# Patient Record
Sex: MALE | Race: BLACK OR AFRICAN AMERICAN | NOT HISPANIC OR LATINO | URBAN - METROPOLITAN AREA
[De-identification: names, ages, dates, MRNs, and addresses within clinical notes are randomized per-mention and may not be internally consistent; named-entity substitution may affect disease eponyms.]

---

## 2019-12-26 ENCOUNTER — INPATIENT (INPATIENT)
Facility: HOSPITAL | Age: 29
LOS: 7 days | Discharge: HOME | End: 2020-01-03
Attending: INTERNAL MEDICINE | Admitting: INTERNAL MEDICINE
Payer: SELF-PAY

## 2019-12-26 VITALS
DIASTOLIC BLOOD PRESSURE: 78 MMHG | SYSTOLIC BLOOD PRESSURE: 116 MMHG | TEMPERATURE: 98 F | RESPIRATION RATE: 18 BRPM | HEART RATE: 122 BPM | OXYGEN SATURATION: 100 %

## 2019-12-26 DIAGNOSIS — E11.10 TYPE 2 DIABETES MELLITUS WITH KETOACIDOSIS WITHOUT COMA: ICD-10-CM

## 2019-12-26 LAB
ALBUMIN SERPL ELPH-MCNC: 3.8 G/DL — SIGNIFICANT CHANGE UP (ref 3.3–5)
ALBUMIN SERPL ELPH-MCNC: 4.9 G/DL — SIGNIFICANT CHANGE UP (ref 3.3–5)
ALP SERPL-CCNC: 113 U/L — SIGNIFICANT CHANGE UP (ref 40–120)
ALP SERPL-CCNC: 159 U/L — HIGH (ref 40–120)
ALT FLD-CCNC: 105 U/L — HIGH (ref 4–41)
ALT FLD-CCNC: 152 U/L — HIGH (ref 4–41)
ANION GAP SERPL CALC-SCNC: 18 MMO/L — HIGH (ref 7–14)
ANION GAP SERPL CALC-SCNC: 20 MMO/L — HIGH (ref 7–14)
ANION GAP SERPL CALC-SCNC: 25 MMO/L — HIGH (ref 7–14)
APPEARANCE UR: CLEAR — SIGNIFICANT CHANGE UP
AST SERPL-CCNC: 127 U/L — HIGH (ref 4–40)
AST SERPL-CCNC: 73 U/L — HIGH (ref 4–40)
B-OH-BUTYR SERPL-SCNC: 5.2 MMOL/L — HIGH (ref 0–0.4)
BACTERIA # UR AUTO: NEGATIVE — SIGNIFICANT CHANGE UP
BASE EXCESS BLDV CALC-SCNC: -2.6 MMOL/L — SIGNIFICANT CHANGE UP
BASE EXCESS BLDV CALC-SCNC: -4.9 MMOL/L — SIGNIFICANT CHANGE UP
BASE EXCESS BLDV CALC-SCNC: 1 MMOL/L — SIGNIFICANT CHANGE UP
BASOPHILS # BLD AUTO: 0.06 K/UL — SIGNIFICANT CHANGE UP (ref 0–0.2)
BASOPHILS NFR BLD AUTO: 0.2 % — SIGNIFICANT CHANGE UP (ref 0–2)
BILIRUB SERPL-MCNC: 0.7 MG/DL — SIGNIFICANT CHANGE UP (ref 0.2–1.2)
BILIRUB SERPL-MCNC: 0.8 MG/DL — SIGNIFICANT CHANGE UP (ref 0.2–1.2)
BILIRUB UR-MCNC: NEGATIVE — SIGNIFICANT CHANGE UP
BLOOD GAS VENOUS - CREATININE: 0.86 MG/DL — SIGNIFICANT CHANGE UP (ref 0.5–1.3)
BLOOD GAS VENOUS - CREATININE: 0.97 MG/DL — SIGNIFICANT CHANGE UP (ref 0.5–1.3)
BLOOD GAS VENOUS - CREATININE: 1.19 MG/DL — SIGNIFICANT CHANGE UP (ref 0.5–1.3)
BLOOD GAS VENOUS - FIO2: 21 — SIGNIFICANT CHANGE UP
BLOOD UR QL VISUAL: NEGATIVE — SIGNIFICANT CHANGE UP
BUN SERPL-MCNC: 20 MG/DL — SIGNIFICANT CHANGE UP (ref 7–23)
BUN SERPL-MCNC: 23 MG/DL — SIGNIFICANT CHANGE UP (ref 7–23)
BUN SERPL-MCNC: 27 MG/DL — HIGH (ref 7–23)
CALCIUM SERPL-MCNC: 10.3 MG/DL — SIGNIFICANT CHANGE UP (ref 8.4–10.5)
CALCIUM SERPL-MCNC: 10.9 MG/DL — HIGH (ref 8.4–10.5)
CALCIUM SERPL-MCNC: 9.8 MG/DL — SIGNIFICANT CHANGE UP (ref 8.4–10.5)
CHLORIDE BLDV-SCNC: 102 MMOL/L — SIGNIFICANT CHANGE UP (ref 96–108)
CHLORIDE BLDV-SCNC: 107 MMOL/L — SIGNIFICANT CHANGE UP (ref 96–108)
CHLORIDE BLDV-SCNC: 99 MMOL/L — SIGNIFICANT CHANGE UP (ref 96–108)
CHLORIDE SERPL-SCNC: 103 MMOL/L — SIGNIFICANT CHANGE UP (ref 98–107)
CHLORIDE SERPL-SCNC: 90 MMOL/L — LOW (ref 98–107)
CHLORIDE SERPL-SCNC: 99 MMOL/L — SIGNIFICANT CHANGE UP (ref 98–107)
CO2 SERPL-SCNC: 18 MMOL/L — LOW (ref 22–31)
CO2 SERPL-SCNC: 20 MMOL/L — LOW (ref 22–31)
CO2 SERPL-SCNC: 20 MMOL/L — LOW (ref 22–31)
COLOR SPEC: SIGNIFICANT CHANGE UP
CREAT SERPL-MCNC: 0.86 MG/DL — SIGNIFICANT CHANGE UP (ref 0.5–1.3)
CREAT SERPL-MCNC: 0.99 MG/DL — SIGNIFICANT CHANGE UP (ref 0.5–1.3)
CREAT SERPL-MCNC: 1.28 MG/DL — SIGNIFICANT CHANGE UP (ref 0.5–1.3)
EOSINOPHIL # BLD AUTO: 0 K/UL — SIGNIFICANT CHANGE UP (ref 0–0.5)
EOSINOPHIL NFR BLD AUTO: 0 % — SIGNIFICANT CHANGE UP (ref 0–6)
GAS PNL BLDV: 136 MMOL/L — SIGNIFICANT CHANGE UP (ref 136–146)
GAS PNL BLDV: 138 MMOL/L — SIGNIFICANT CHANGE UP (ref 136–146)
GAS PNL BLDV: 139 MMOL/L — SIGNIFICANT CHANGE UP (ref 136–146)
GLUCOSE BLDV-MCNC: 203 MG/DL — HIGH (ref 70–99)
GLUCOSE BLDV-MCNC: 341 MG/DL — HIGH (ref 70–99)
GLUCOSE BLDV-MCNC: 586 MG/DL — CRITICAL HIGH (ref 70–99)
GLUCOSE SERPL-MCNC: 208 MG/DL — HIGH (ref 70–99)
GLUCOSE SERPL-MCNC: 370 MG/DL — HIGH (ref 70–99)
GLUCOSE SERPL-MCNC: 630 MG/DL — CRITICAL HIGH (ref 70–99)
GLUCOSE UR-MCNC: >1000 — HIGH
HCO3 BLDV-SCNC: 18 MMOL/L — LOW (ref 20–27)
HCO3 BLDV-SCNC: 21 MMOL/L — SIGNIFICANT CHANGE UP (ref 20–27)
HCO3 BLDV-SCNC: 25 MMOL/L — SIGNIFICANT CHANGE UP (ref 20–27)
HCT VFR BLD CALC: 51.2 % — HIGH (ref 39–50)
HCT VFR BLDV CALC: 45.5 % — SIGNIFICANT CHANGE UP (ref 39–51)
HCT VFR BLDV CALC: 49.2 % — SIGNIFICANT CHANGE UP (ref 39–51)
HCT VFR BLDV CALC: 52.8 % — HIGH (ref 39–51)
HGB BLD-MCNC: 16.9 G/DL — SIGNIFICANT CHANGE UP (ref 13–17)
HGB BLDV-MCNC: 14.8 G/DL — SIGNIFICANT CHANGE UP (ref 13–17)
HGB BLDV-MCNC: 16.1 G/DL — SIGNIFICANT CHANGE UP (ref 13–17)
HGB BLDV-MCNC: 17.3 G/DL — HIGH (ref 13–17)
HYALINE CASTS # UR AUTO: NEGATIVE — SIGNIFICANT CHANGE UP
IMM GRANULOCYTES NFR BLD AUTO: 0.9 % — SIGNIFICANT CHANGE UP (ref 0–1.5)
KETONES UR-MCNC: HIGH
LACTATE BLDV-MCNC: 2.5 MMOL/L — HIGH (ref 0.5–2)
LACTATE BLDV-MCNC: 3.8 MMOL/L — HIGH (ref 0.5–2)
LACTATE BLDV-MCNC: 6.6 MMOL/L — CRITICAL HIGH (ref 0.5–2)
LEUKOCYTE ESTERASE UR-ACNC: NEGATIVE — SIGNIFICANT CHANGE UP
LYMPHOCYTES # BLD AUTO: 1.61 K/UL — SIGNIFICANT CHANGE UP (ref 1–3.3)
LYMPHOCYTES # BLD AUTO: 6.6 % — LOW (ref 13–44)
MAGNESIUM SERPL-MCNC: 2.7 MG/DL — HIGH (ref 1.6–2.6)
MCHC RBC-ENTMCNC: 30.3 PG — SIGNIFICANT CHANGE UP (ref 27–34)
MCHC RBC-ENTMCNC: 33 % — SIGNIFICANT CHANGE UP (ref 32–36)
MCV RBC AUTO: 91.8 FL — SIGNIFICANT CHANGE UP (ref 80–100)
MONOCYTES # BLD AUTO: 1.62 K/UL — HIGH (ref 0–0.9)
MONOCYTES NFR BLD AUTO: 6.6 % — SIGNIFICANT CHANGE UP (ref 2–14)
NEUTROPHILS # BLD AUTO: 21.03 K/UL — HIGH (ref 1.8–7.4)
NEUTROPHILS NFR BLD AUTO: 85.7 % — HIGH (ref 43–77)
NITRITE UR-MCNC: NEGATIVE — SIGNIFICANT CHANGE UP
NRBC # FLD: 0 K/UL — SIGNIFICANT CHANGE UP (ref 0–0)
PCO2 BLDV: 40 MMHG — LOW (ref 41–51)
PCO2 BLDV: 47 MMHG — SIGNIFICANT CHANGE UP (ref 41–51)
PCO2 BLDV: 48 MMHG — SIGNIFICANT CHANGE UP (ref 41–51)
PH BLDV: 7.26 PH — LOW (ref 7.32–7.43)
PH BLDV: 7.31 PH — LOW (ref 7.32–7.43)
PH BLDV: 7.42 PH — SIGNIFICANT CHANGE UP (ref 7.32–7.43)
PH UR: 5.5 — SIGNIFICANT CHANGE UP (ref 5–8)
PHOSPHATE SERPL-MCNC: 3.5 MG/DL — SIGNIFICANT CHANGE UP (ref 2.5–4.5)
PLATELET # BLD AUTO: 368 K/UL — SIGNIFICANT CHANGE UP (ref 150–400)
PMV BLD: 10.7 FL — SIGNIFICANT CHANGE UP (ref 7–13)
PO2 BLDV: 29 MMHG — LOW (ref 35–40)
PO2 BLDV: 31 MMHG — LOW (ref 35–40)
PO2 BLDV: 41 MMHG — HIGH (ref 35–40)
POTASSIUM BLDV-SCNC: 4 MMOL/L — SIGNIFICANT CHANGE UP (ref 3.4–4.5)
POTASSIUM BLDV-SCNC: 4.7 MMOL/L — HIGH (ref 3.4–4.5)
POTASSIUM BLDV-SCNC: 6.1 MMOL/L — HIGH (ref 3.4–4.5)
POTASSIUM SERPL-MCNC: 4.2 MMOL/L — SIGNIFICANT CHANGE UP (ref 3.5–5.3)
POTASSIUM SERPL-MCNC: 4.9 MMOL/L — SIGNIFICANT CHANGE UP (ref 3.5–5.3)
POTASSIUM SERPL-MCNC: 5.6 MMOL/L — HIGH (ref 3.5–5.3)
POTASSIUM SERPL-SCNC: 4.2 MMOL/L — SIGNIFICANT CHANGE UP (ref 3.5–5.3)
POTASSIUM SERPL-SCNC: 4.9 MMOL/L — SIGNIFICANT CHANGE UP (ref 3.5–5.3)
POTASSIUM SERPL-SCNC: 5.6 MMOL/L — HIGH (ref 3.5–5.3)
PROT SERPL-MCNC: 7.3 G/DL — SIGNIFICANT CHANGE UP (ref 6–8.3)
PROT SERPL-MCNC: 9.8 G/DL — HIGH (ref 6–8.3)
PROT UR-MCNC: 20 — SIGNIFICANT CHANGE UP
RBC # BLD: 5.58 M/UL — SIGNIFICANT CHANGE UP (ref 4.2–5.8)
RBC # FLD: 12.2 % — SIGNIFICANT CHANGE UP (ref 10.3–14.5)
RBC CASTS # UR COMP ASSIST: SIGNIFICANT CHANGE UP (ref 0–?)
SAO2 % BLDV: 48.6 % — LOW (ref 60–85)
SAO2 % BLDV: 54.4 % — LOW (ref 60–85)
SAO2 % BLDV: 78.4 % — SIGNIFICANT CHANGE UP (ref 60–85)
SODIUM SERPL-SCNC: 133 MMOL/L — LOW (ref 135–145)
SODIUM SERPL-SCNC: 139 MMOL/L — SIGNIFICANT CHANGE UP (ref 135–145)
SODIUM SERPL-SCNC: 141 MMOL/L — SIGNIFICANT CHANGE UP (ref 135–145)
SP GR SPEC: 1.03 — SIGNIFICANT CHANGE UP (ref 1–1.04)
SQUAMOUS # UR AUTO: SIGNIFICANT CHANGE UP
UROBILINOGEN FLD QL: NORMAL — SIGNIFICANT CHANGE UP
WBC # BLD: 24.55 K/UL — HIGH (ref 3.8–10.5)
WBC # FLD AUTO: 24.55 K/UL — HIGH (ref 3.8–10.5)
WBC UR QL: SIGNIFICANT CHANGE UP (ref 0–?)

## 2019-12-26 PROCEDURE — 71045 X-RAY EXAM CHEST 1 VIEW: CPT | Mod: 26

## 2019-12-26 RX ORDER — SODIUM CHLORIDE 9 MG/ML
1000 INJECTION, SOLUTION INTRAVENOUS
Refills: 0 | Status: DISCONTINUED | OUTPATIENT
Start: 2019-12-26 | End: 2019-12-26

## 2019-12-26 RX ORDER — METOCLOPRAMIDE HCL 10 MG
10 TABLET ORAL ONCE
Refills: 0 | Status: COMPLETED | OUTPATIENT
Start: 2019-12-26 | End: 2019-12-26

## 2019-12-26 RX ORDER — SODIUM CHLORIDE 9 MG/ML
2000 INJECTION, SOLUTION INTRAVENOUS ONCE
Refills: 0 | Status: COMPLETED | OUTPATIENT
Start: 2019-12-26 | End: 2019-12-26

## 2019-12-26 RX ORDER — ONDANSETRON 8 MG/1
4 TABLET, FILM COATED ORAL ONCE
Refills: 0 | Status: COMPLETED | OUTPATIENT
Start: 2019-12-26 | End: 2019-12-26

## 2019-12-26 RX ORDER — INSULIN LISPRO 100/ML
10 VIAL (ML) SUBCUTANEOUS ONCE
Refills: 0 | Status: COMPLETED | OUTPATIENT
Start: 2019-12-26 | End: 2019-12-26

## 2019-12-26 RX ADMIN — SODIUM CHLORIDE 2000 MILLILITER(S): 9 INJECTION, SOLUTION INTRAVENOUS at 16:01

## 2019-12-26 RX ADMIN — SODIUM CHLORIDE 2000 MILLILITER(S): 9 INJECTION, SOLUTION INTRAVENOUS at 21:27

## 2019-12-26 RX ADMIN — Medication 10 MILLIGRAM(S): at 19:00

## 2019-12-26 RX ADMIN — ONDANSETRON 4 MILLIGRAM(S): 8 TABLET, FILM COATED ORAL at 16:24

## 2019-12-26 RX ADMIN — Medication 10 UNIT(S): at 19:00

## 2019-12-26 RX ADMIN — Medication 10 UNIT(S): at 16:57

## 2019-12-26 RX ADMIN — SODIUM CHLORIDE 25 MILLILITER(S): 9 INJECTION, SOLUTION INTRAVENOUS at 21:27

## 2019-12-26 RX ADMIN — SODIUM CHLORIDE 2000 MILLILITER(S): 9 INJECTION, SOLUTION INTRAVENOUS at 18:04

## 2019-12-26 NOTE — ED PROVIDER NOTE - CLINICAL SUMMARY MEDICAL DECISION MAKING FREE TEXT BOX
30yo male p/w DKA from likely non-compliance. Will get labs, urine and give fluids/insulin and reassess then admit.

## 2019-12-26 NOTE — ED PROVIDER NOTE - CRITICAL CARE PROVIDED
direct patient care (not related to procedure)/documentation/consultation with other physicians/interpretation of diagnostic studies/additional history taking/consult w/ pt's family directly relating to pts condition

## 2019-12-26 NOTE — ED PROVIDER NOTE - OBJECTIVE STATEMENT
30yo male T1DM p/w 2 days N/V. Multiple episodes of NBNB vomiting over the past 2 days. started to have generalized abdominal pain today and found to have hypergylcemia >500. Per mother, this is how patient looks when in DKA. Took insulin this morning. Denies diarrhea, fever, altered mental status.

## 2019-12-26 NOTE — ED PROVIDER NOTE - ATTENDING CONTRIBUTION TO CARE
29M h/o DM1 on insulin p/w n/v and abd pain x2 days. Pt reports not taking his insulin in recent days due to not feeling well. Does not have endocrinologist, reports "buying insulin from hospital." Mother states he appears similar to previus episodes of DKA. No fever/chills, cp, cough, sob, dysuria/hematuria.  Gen: uncomfrtable appearing, nad  CV: tachy, regular  Pulm: clear lungs  Abd: soft, nt, nd  Ext: no edema  MDM: 29M h/o DM1 on insulin p/w n/v and abd pain x2 days concerning for DKA - likely from med compliance though will do infectious w/u with UA/UC and CXR, check basic labs and blood gas with lactate and beta-hydroxy - will give aggressive IV hydration while awaiting labs.

## 2019-12-26 NOTE — ED PROVIDER NOTE - PHYSICAL EXAMINATION
Physical Exam:  Gen: looks ill, AOx3  Head: NCAT  HEENT: EOMI, PEERLA, normal conjunctiva, tongue midline, oral mucosa moist  Lung: CTAB, no respiratory distress, no wheezes/rhonchi/rales B/L, speaking in full sentences  CV: RRR, no murmurs, rubs or gallops, distal pulses 2+ b/l  Abd: soft, NT, ND  MSK: no visible deformities, ROM normal in UE/LE, no back TTP  Neuro: No focal sensory or motor deficits  Skin: Warm, well perfused, no rash, no leg swelling  Psych: normal affect, calm

## 2019-12-26 NOTE — ED PROVIDER NOTE - PROGRESS NOTE DETAILS
Andrea PGY1: elevated WBC, will get CXR for r/o PNA Jrg: Patient endorsed to me from Dr. Sosa.  Patient is 28yo male T1DM p/w 2 days N/V who follows up in [place].  Noted to have mild to moderate DKA on labs with pH 7.26 and anion gap 25 and bicarb 18.  Will give 10 units humalog, pending completion of iv fluids.  Pending repeat BMP.  Reassess. pt was signed out to me pending repeat lab work, type 1 diabetic with hyperglycemia now downtrending to 205, pH normalized, lactate downtrending from 6 to 2, on IVF with dextrose as he has not taken PO yet, infiltrate on xray may represent pna, ct pending for better clarification, stable for admpadmini Giles, PGY-3 EM

## 2019-12-26 NOTE — ED ADULT NURSE REASSESSMENT NOTE - NS ED NURSE REASSESS COMMENT FT1
report received from DEVYN Allen, pt A&Ox3, appears weak. repeat FS done, 205. MD Napoles aware. RN to administer fluids as ordered. family at bedside.

## 2019-12-27 DIAGNOSIS — Z29.9 ENCOUNTER FOR PROPHYLACTIC MEASURES, UNSPECIFIED: ICD-10-CM

## 2019-12-27 DIAGNOSIS — D72.829 ELEVATED WHITE BLOOD CELL COUNT, UNSPECIFIED: ICD-10-CM

## 2019-12-27 DIAGNOSIS — R93.89 ABNORMAL FINDINGS ON DIAGNOSTIC IMAGING OF OTHER SPECIFIED BODY STRUCTURES: ICD-10-CM

## 2019-12-27 DIAGNOSIS — E10.10 TYPE 1 DIABETES MELLITUS WITH KETOACIDOSIS WITHOUT COMA: ICD-10-CM

## 2019-12-27 DIAGNOSIS — Z02.9 ENCOUNTER FOR ADMINISTRATIVE EXAMINATIONS, UNSPECIFIED: ICD-10-CM

## 2019-12-27 DIAGNOSIS — R10.84 GENERALIZED ABDOMINAL PAIN: ICD-10-CM

## 2019-12-27 LAB
ANION GAP SERPL CALC-SCNC: 19 MMO/L — HIGH (ref 7–14)
ANION GAP SERPL CALC-SCNC: 20 MMO/L — HIGH (ref 7–14)
ANION GAP SERPL CALC-SCNC: 20 MMO/L — HIGH (ref 7–14)
ANION GAP SERPL CALC-SCNC: 22 MMO/L — HIGH (ref 7–14)
ANION GAP SERPL CALC-SCNC: 25 MMO/L — HIGH (ref 7–14)
ANION GAP SERPL CALC-SCNC: 26 MMO/L — HIGH (ref 7–14)
B-OH-BUTYR SERPL-SCNC: 6.2 MMOL/L — HIGH (ref 0–0.4)
B-OH-BUTYR SERPL-SCNC: 6.7 MMOL/L — HIGH (ref 0–0.4)
B-OH-BUTYR SERPL-SCNC: 7.5 MMOL/L — HIGH (ref 0–0.4)
B-OH-BUTYR SERPL-SCNC: 7.5 MMOL/L — HIGH (ref 0–0.4)
BASE EXCESS BLDV CALC-SCNC: -4.4 MMOL/L — SIGNIFICANT CHANGE UP
BASE EXCESS BLDV CALC-SCNC: -4.6 MMOL/L — SIGNIFICANT CHANGE UP
BASE EXCESS BLDV CALC-SCNC: -6 MMOL/L — SIGNIFICANT CHANGE UP
BASE EXCESS BLDV CALC-SCNC: -7.1 MMOL/L — SIGNIFICANT CHANGE UP
BLOOD GAS VENOUS - CREATININE: 0.87 MG/DL — SIGNIFICANT CHANGE UP (ref 0.5–1.3)
BUN SERPL-MCNC: 15 MG/DL — SIGNIFICANT CHANGE UP (ref 7–23)
BUN SERPL-MCNC: 16 MG/DL — SIGNIFICANT CHANGE UP (ref 7–23)
BUN SERPL-MCNC: 17 MG/DL — SIGNIFICANT CHANGE UP (ref 7–23)
BUN SERPL-MCNC: 18 MG/DL — SIGNIFICANT CHANGE UP (ref 7–23)
BUN SERPL-MCNC: 18 MG/DL — SIGNIFICANT CHANGE UP (ref 7–23)
BUN SERPL-MCNC: 20 MG/DL — SIGNIFICANT CHANGE UP (ref 7–23)
CALCIUM SERPL-MCNC: 9.1 MG/DL — SIGNIFICANT CHANGE UP (ref 8.4–10.5)
CALCIUM SERPL-MCNC: 9.2 MG/DL — SIGNIFICANT CHANGE UP (ref 8.4–10.5)
CALCIUM SERPL-MCNC: 9.3 MG/DL — SIGNIFICANT CHANGE UP (ref 8.4–10.5)
CALCIUM SERPL-MCNC: 9.3 MG/DL — SIGNIFICANT CHANGE UP (ref 8.4–10.5)
CALCIUM SERPL-MCNC: 9.4 MG/DL — SIGNIFICANT CHANGE UP (ref 8.4–10.5)
CALCIUM SERPL-MCNC: 9.6 MG/DL — SIGNIFICANT CHANGE UP (ref 8.4–10.5)
CHLORIDE BLDV-SCNC: 104 MMOL/L — SIGNIFICANT CHANGE UP (ref 96–108)
CHLORIDE SERPL-SCNC: 100 MMOL/L — SIGNIFICANT CHANGE UP (ref 98–107)
CHLORIDE SERPL-SCNC: 101 MMOL/L — SIGNIFICANT CHANGE UP (ref 98–107)
CHLORIDE SERPL-SCNC: 103 MMOL/L — SIGNIFICANT CHANGE UP (ref 98–107)
CHLORIDE SERPL-SCNC: 103 MMOL/L — SIGNIFICANT CHANGE UP (ref 98–107)
CHLORIDE SERPL-SCNC: 104 MMOL/L — SIGNIFICANT CHANGE UP (ref 98–107)
CHLORIDE SERPL-SCNC: 98 MMOL/L — SIGNIFICANT CHANGE UP (ref 98–107)
CHOLEST SERPL-MCNC: 142 MG/DL — SIGNIFICANT CHANGE UP (ref 120–199)
CO2 SERPL-SCNC: 12 MMOL/L — LOW (ref 22–31)
CO2 SERPL-SCNC: 14 MMOL/L — LOW (ref 22–31)
CO2 SERPL-SCNC: 17 MMOL/L — LOW (ref 22–31)
CO2 SERPL-SCNC: 18 MMOL/L — LOW (ref 22–31)
CREAT SERPL-MCNC: 0.86 MG/DL — SIGNIFICANT CHANGE UP (ref 0.5–1.3)
CREAT SERPL-MCNC: 0.88 MG/DL — SIGNIFICANT CHANGE UP (ref 0.5–1.3)
CREAT SERPL-MCNC: 0.88 MG/DL — SIGNIFICANT CHANGE UP (ref 0.5–1.3)
CREAT SERPL-MCNC: 0.89 MG/DL — SIGNIFICANT CHANGE UP (ref 0.5–1.3)
CREAT SERPL-MCNC: 0.91 MG/DL — SIGNIFICANT CHANGE UP (ref 0.5–1.3)
CREAT SERPL-MCNC: 0.98 MG/DL — SIGNIFICANT CHANGE UP (ref 0.5–1.3)
GAS PNL BLDV: 140 MMOL/L — SIGNIFICANT CHANGE UP (ref 136–146)
GAS PNL BLDV: 141 MMOL/L — SIGNIFICANT CHANGE UP (ref 136–146)
GAS PNL BLDV: 143 MMOL/L — SIGNIFICANT CHANGE UP (ref 136–146)
GAS PNL BLDV: 143 MMOL/L — SIGNIFICANT CHANGE UP (ref 136–146)
GLUCOSE BLDV-MCNC: 233 MG/DL — HIGH (ref 70–99)
GLUCOSE BLDV-MCNC: 241 MG/DL — HIGH (ref 70–99)
GLUCOSE BLDV-MCNC: 257 MG/DL — HIGH (ref 70–99)
GLUCOSE BLDV-MCNC: 289 MG/DL — HIGH (ref 70–99)
GLUCOSE SERPL-MCNC: 225 MG/DL — HIGH (ref 70–99)
GLUCOSE SERPL-MCNC: 260 MG/DL — HIGH (ref 70–99)
GLUCOSE SERPL-MCNC: 261 MG/DL — HIGH (ref 70–99)
GLUCOSE SERPL-MCNC: 289 MG/DL — HIGH (ref 70–99)
GLUCOSE SERPL-MCNC: 292 MG/DL — HIGH (ref 70–99)
GLUCOSE SERPL-MCNC: 438 MG/DL — HIGH (ref 70–99)
HCO3 BLDV-SCNC: 19 MMOL/L — LOW (ref 20–27)
HCO3 BLDV-SCNC: 19 MMOL/L — LOW (ref 20–27)
HCO3 BLDV-SCNC: 20 MMOL/L — SIGNIFICANT CHANGE UP (ref 20–27)
HCO3 BLDV-SCNC: 21 MMOL/L — SIGNIFICANT CHANGE UP (ref 20–27)
HCT VFR BLD CALC: 42.2 % — SIGNIFICANT CHANGE UP (ref 39–50)
HCT VFR BLDV CALC: 41.2 % — SIGNIFICANT CHANGE UP (ref 39–51)
HCT VFR BLDV CALC: 41.3 % — SIGNIFICANT CHANGE UP (ref 39–51)
HCT VFR BLDV CALC: 41.7 % — SIGNIFICANT CHANGE UP (ref 39–51)
HCT VFR BLDV CALC: 43.1 % — SIGNIFICANT CHANGE UP (ref 39–51)
HDLC SERPL-MCNC: 55 MG/DL — SIGNIFICANT CHANGE UP (ref 35–55)
HGB BLD-MCNC: 13.7 G/DL — SIGNIFICANT CHANGE UP (ref 13–17)
HGB BLDV-MCNC: 13.4 G/DL — SIGNIFICANT CHANGE UP (ref 13–17)
HGB BLDV-MCNC: 13.4 G/DL — SIGNIFICANT CHANGE UP (ref 13–17)
HGB BLDV-MCNC: 13.6 G/DL — SIGNIFICANT CHANGE UP (ref 13–17)
HGB BLDV-MCNC: 14 G/DL — SIGNIFICANT CHANGE UP (ref 13–17)
LACTATE BLDV-MCNC: 1.5 MMOL/L — SIGNIFICANT CHANGE UP (ref 0.5–2)
LIPID PNL WITH DIRECT LDL SERPL: 74 MG/DL — SIGNIFICANT CHANGE UP
MAGNESIUM SERPL-MCNC: 1.9 MG/DL — SIGNIFICANT CHANGE UP (ref 1.6–2.6)
MAGNESIUM SERPL-MCNC: 2.1 MG/DL — SIGNIFICANT CHANGE UP (ref 1.6–2.6)
MCHC RBC-ENTMCNC: 30.2 PG — SIGNIFICANT CHANGE UP (ref 27–34)
MCHC RBC-ENTMCNC: 32.5 % — SIGNIFICANT CHANGE UP (ref 32–36)
MCV RBC AUTO: 93 FL — SIGNIFICANT CHANGE UP (ref 80–100)
NRBC # FLD: 0 K/UL — SIGNIFICANT CHANGE UP (ref 0–0)
PCO2 BLDV: 34 MMHG — LOW (ref 41–51)
PCO2 BLDV: 35 MMHG — LOW (ref 41–51)
PCO2 BLDV: 37 MMHG — LOW (ref 41–51)
PCO2 BLDV: 38 MMHG — LOW (ref 41–51)
PH BLDV: 7.33 PH — SIGNIFICANT CHANGE UP (ref 7.32–7.43)
PH BLDV: 7.34 PH — SIGNIFICANT CHANGE UP (ref 7.32–7.43)
PH BLDV: 7.35 PH — SIGNIFICANT CHANGE UP (ref 7.32–7.43)
PH BLDV: 7.37 PH — SIGNIFICANT CHANGE UP (ref 7.32–7.43)
PHOSPHATE SERPL-MCNC: 1.8 MG/DL — LOW (ref 2.5–4.5)
PHOSPHATE SERPL-MCNC: 2.2 MG/DL — LOW (ref 2.5–4.5)
PHOSPHATE SERPL-MCNC: 2.5 MG/DL — SIGNIFICANT CHANGE UP (ref 2.5–4.5)
PHOSPHATE SERPL-MCNC: 3.1 MG/DL — SIGNIFICANT CHANGE UP (ref 2.5–4.5)
PLATELET # BLD AUTO: 281 K/UL — SIGNIFICANT CHANGE UP (ref 150–400)
PMV BLD: 11.3 FL — SIGNIFICANT CHANGE UP (ref 7–13)
PO2 BLDV: 35 MMHG — SIGNIFICANT CHANGE UP (ref 35–40)
PO2 BLDV: 43 MMHG — HIGH (ref 35–40)
PO2 BLDV: 45 MMHG — HIGH (ref 35–40)
PO2 BLDV: 46 MMHG — HIGH (ref 35–40)
POTASSIUM BLDV-SCNC: 3.7 MMOL/L — SIGNIFICANT CHANGE UP (ref 3.4–4.5)
POTASSIUM BLDV-SCNC: 3.8 MMOL/L — SIGNIFICANT CHANGE UP (ref 3.4–4.5)
POTASSIUM BLDV-SCNC: 4.1 MMOL/L — SIGNIFICANT CHANGE UP (ref 3.4–4.5)
POTASSIUM BLDV-SCNC: 4.3 MMOL/L — SIGNIFICANT CHANGE UP (ref 3.4–4.5)
POTASSIUM SERPL-MCNC: 3.9 MMOL/L — SIGNIFICANT CHANGE UP (ref 3.5–5.3)
POTASSIUM SERPL-MCNC: 4 MMOL/L — SIGNIFICANT CHANGE UP (ref 3.5–5.3)
POTASSIUM SERPL-MCNC: 4.2 MMOL/L — SIGNIFICANT CHANGE UP (ref 3.5–5.3)
POTASSIUM SERPL-MCNC: 4.2 MMOL/L — SIGNIFICANT CHANGE UP (ref 3.5–5.3)
POTASSIUM SERPL-MCNC: 4.4 MMOL/L — SIGNIFICANT CHANGE UP (ref 3.5–5.3)
POTASSIUM SERPL-MCNC: 5.2 MMOL/L — SIGNIFICANT CHANGE UP (ref 3.5–5.3)
POTASSIUM SERPL-SCNC: 3.9 MMOL/L — SIGNIFICANT CHANGE UP (ref 3.5–5.3)
POTASSIUM SERPL-SCNC: 4 MMOL/L — SIGNIFICANT CHANGE UP (ref 3.5–5.3)
POTASSIUM SERPL-SCNC: 4.2 MMOL/L — SIGNIFICANT CHANGE UP (ref 3.5–5.3)
POTASSIUM SERPL-SCNC: 4.2 MMOL/L — SIGNIFICANT CHANGE UP (ref 3.5–5.3)
POTASSIUM SERPL-SCNC: 4.4 MMOL/L — SIGNIFICANT CHANGE UP (ref 3.5–5.3)
POTASSIUM SERPL-SCNC: 5.2 MMOL/L — SIGNIFICANT CHANGE UP (ref 3.5–5.3)
RBC # BLD: 4.54 M/UL — SIGNIFICANT CHANGE UP (ref 4.2–5.8)
RBC # FLD: 12.7 % — SIGNIFICANT CHANGE UP (ref 10.3–14.5)
SAO2 % BLDV: 67.5 % — SIGNIFICANT CHANGE UP (ref 60–85)
SAO2 % BLDV: 78.2 % — SIGNIFICANT CHANGE UP (ref 60–85)
SAO2 % BLDV: 78.2 % — SIGNIFICANT CHANGE UP (ref 60–85)
SAO2 % BLDV: 79.8 % — SIGNIFICANT CHANGE UP (ref 60–85)
SODIUM SERPL-SCNC: 136 MMOL/L — SIGNIFICANT CHANGE UP (ref 135–145)
SODIUM SERPL-SCNC: 137 MMOL/L — SIGNIFICANT CHANGE UP (ref 135–145)
SODIUM SERPL-SCNC: 140 MMOL/L — SIGNIFICANT CHANGE UP (ref 135–145)
SODIUM SERPL-SCNC: 140 MMOL/L — SIGNIFICANT CHANGE UP (ref 135–145)
SODIUM SERPL-SCNC: 141 MMOL/L — SIGNIFICANT CHANGE UP (ref 135–145)
SODIUM SERPL-SCNC: 142 MMOL/L — SIGNIFICANT CHANGE UP (ref 135–145)
TRIGL SERPL-MCNC: 86 MG/DL — SIGNIFICANT CHANGE UP (ref 10–149)
TSH SERPL-MCNC: 1.79 UIU/ML — SIGNIFICANT CHANGE UP (ref 0.27–4.2)
WBC # BLD: 18.93 K/UL — HIGH (ref 3.8–10.5)
WBC # FLD AUTO: 18.93 K/UL — HIGH (ref 3.8–10.5)

## 2019-12-27 PROCEDURE — 71260 CT THORAX DX C+: CPT | Mod: 26

## 2019-12-27 PROCEDURE — 12345: CPT | Mod: NC,GC

## 2019-12-27 PROCEDURE — 99291 CRITICAL CARE FIRST HOUR: CPT | Mod: 25

## 2019-12-27 PROCEDURE — 99223 1ST HOSP IP/OBS HIGH 75: CPT

## 2019-12-27 PROCEDURE — 99255 IP/OBS CONSLTJ NEW/EST HI 80: CPT | Mod: GC

## 2019-12-27 RX ORDER — INSULIN HUMAN 100 [IU]/ML
1 INJECTION, SOLUTION SUBCUTANEOUS
Qty: 100 | Refills: 0 | Status: DISCONTINUED | OUTPATIENT
Start: 2019-12-27 | End: 2019-12-29

## 2019-12-27 RX ORDER — DEXTROSE 50 % IN WATER 50 %
25 SYRINGE (ML) INTRAVENOUS ONCE
Refills: 0 | Status: DISCONTINUED | OUTPATIENT
Start: 2019-12-27 | End: 2020-01-03

## 2019-12-27 RX ORDER — METOCLOPRAMIDE HCL 10 MG
10 TABLET ORAL ONCE
Refills: 0 | Status: COMPLETED | OUTPATIENT
Start: 2019-12-27 | End: 2019-12-27

## 2019-12-27 RX ORDER — ACETAMINOPHEN 500 MG
1000 TABLET ORAL ONCE
Refills: 0 | Status: COMPLETED | OUTPATIENT
Start: 2019-12-27 | End: 2019-12-27

## 2019-12-27 RX ORDER — INSULIN LISPRO 100/ML
VIAL (ML) SUBCUTANEOUS EVERY 6 HOURS
Refills: 0 | Status: DISCONTINUED | OUTPATIENT
Start: 2019-12-27 | End: 2019-12-27

## 2019-12-27 RX ORDER — INSULIN LISPRO 100/ML
VIAL (ML) SUBCUTANEOUS EVERY 4 HOURS
Refills: 0 | Status: DISCONTINUED | OUTPATIENT
Start: 2019-12-27 | End: 2019-12-27

## 2019-12-27 RX ORDER — SODIUM CHLORIDE 9 MG/ML
1000 INJECTION, SOLUTION INTRAVENOUS
Refills: 0 | Status: DISCONTINUED | OUTPATIENT
Start: 2019-12-27 | End: 2019-12-28

## 2019-12-27 RX ORDER — DEXTROSE 50 % IN WATER 50 %
12.5 SYRINGE (ML) INTRAVENOUS ONCE
Refills: 0 | Status: DISCONTINUED | OUTPATIENT
Start: 2019-12-27 | End: 2020-01-03

## 2019-12-27 RX ORDER — CHLORHEXIDINE GLUCONATE 213 G/1000ML
1 SOLUTION TOPICAL
Refills: 0 | Status: DISCONTINUED | OUTPATIENT
Start: 2019-12-27 | End: 2019-12-27

## 2019-12-27 RX ORDER — SODIUM CHLORIDE 9 MG/ML
1000 INJECTION, SOLUTION INTRAVENOUS
Refills: 0 | Status: DISCONTINUED | OUTPATIENT
Start: 2019-12-27 | End: 2020-01-03

## 2019-12-27 RX ORDER — INSULIN GLARGINE 100 [IU]/ML
15 INJECTION, SOLUTION SUBCUTANEOUS AT BEDTIME
Refills: 0 | Status: DISCONTINUED | OUTPATIENT
Start: 2019-12-27 | End: 2019-12-27

## 2019-12-27 RX ORDER — ONDANSETRON 8 MG/1
4 TABLET, FILM COATED ORAL EVERY 8 HOURS
Refills: 0 | Status: DISCONTINUED | OUTPATIENT
Start: 2019-12-27 | End: 2020-01-03

## 2019-12-27 RX ORDER — INSULIN LISPRO 100/ML
VIAL (ML) SUBCUTANEOUS
Refills: 0 | Status: DISCONTINUED | OUTPATIENT
Start: 2019-12-27 | End: 2019-12-27

## 2019-12-27 RX ORDER — SODIUM CHLORIDE 9 MG/ML
1000 INJECTION, SOLUTION INTRAVENOUS
Refills: 0 | Status: DISCONTINUED | OUTPATIENT
Start: 2019-12-27 | End: 2019-12-27

## 2019-12-27 RX ORDER — FAMOTIDINE 10 MG/ML
20 INJECTION INTRAVENOUS ONCE
Refills: 0 | Status: COMPLETED | OUTPATIENT
Start: 2019-12-27 | End: 2019-12-27

## 2019-12-27 RX ORDER — DEXTROSE 50 % IN WATER 50 %
15 SYRINGE (ML) INTRAVENOUS ONCE
Refills: 0 | Status: DISCONTINUED | OUTPATIENT
Start: 2019-12-27 | End: 2020-01-03

## 2019-12-27 RX ORDER — INSULIN GLARGINE 100 [IU]/ML
20 INJECTION, SOLUTION SUBCUTANEOUS AT BEDTIME
Refills: 0 | Status: DISCONTINUED | OUTPATIENT
Start: 2019-12-27 | End: 2019-12-27

## 2019-12-27 RX ORDER — INFLUENZA VIRUS VACCINE 15; 15; 15; 15 UG/.5ML; UG/.5ML; UG/.5ML; UG/.5ML
0.5 SUSPENSION INTRAMUSCULAR ONCE
Refills: 0 | Status: DISCONTINUED | OUTPATIENT
Start: 2019-12-27 | End: 2020-01-03

## 2019-12-27 RX ORDER — POTASSIUM PHOSPHATE, MONOBASIC POTASSIUM PHOSPHATE, DIBASIC 236; 224 MG/ML; MG/ML
30 INJECTION, SOLUTION INTRAVENOUS ONCE
Refills: 0 | Status: COMPLETED | OUTPATIENT
Start: 2019-12-27 | End: 2019-12-27

## 2019-12-27 RX ORDER — INSULIN LISPRO 100/ML
5 VIAL (ML) SUBCUTANEOUS ONCE
Refills: 0 | Status: COMPLETED | OUTPATIENT
Start: 2019-12-27 | End: 2019-12-27

## 2019-12-27 RX ORDER — GLUCAGON INJECTION, SOLUTION 0.5 MG/.1ML
1 INJECTION, SOLUTION SUBCUTANEOUS ONCE
Refills: 0 | Status: DISCONTINUED | OUTPATIENT
Start: 2019-12-27 | End: 2020-01-03

## 2019-12-27 RX ADMIN — Medication 63.75 MILLIMOLE(S): at 13:51

## 2019-12-27 RX ADMIN — INSULIN GLARGINE 20 UNIT(S): 100 INJECTION, SOLUTION SUBCUTANEOUS at 21:01

## 2019-12-27 RX ADMIN — Medication 100 MILLIGRAM(S): at 02:53

## 2019-12-27 RX ADMIN — ONDANSETRON 4 MILLIGRAM(S): 8 TABLET, FILM COATED ORAL at 17:09

## 2019-12-27 RX ADMIN — SODIUM CHLORIDE 150 MILLILITER(S): 9 INJECTION, SOLUTION INTRAVENOUS at 00:35

## 2019-12-27 RX ADMIN — FAMOTIDINE 20 MILLIGRAM(S): 10 INJECTION INTRAVENOUS at 09:36

## 2019-12-27 RX ADMIN — Medication 2: at 09:38

## 2019-12-27 RX ADMIN — SODIUM CHLORIDE 100 MILLILITER(S): 9 INJECTION, SOLUTION INTRAVENOUS at 18:20

## 2019-12-27 RX ADMIN — Medication 400 MILLIGRAM(S): at 09:35

## 2019-12-27 RX ADMIN — ONDANSETRON 4 MILLIGRAM(S): 8 TABLET, FILM COATED ORAL at 09:35

## 2019-12-27 RX ADMIN — Medication 2: at 17:09

## 2019-12-27 RX ADMIN — SODIUM CHLORIDE 200 MILLILITER(S): 9 INJECTION, SOLUTION INTRAVENOUS at 09:39

## 2019-12-27 RX ADMIN — Medication 6: at 21:01

## 2019-12-27 RX ADMIN — Medication 4: at 13:51

## 2019-12-27 RX ADMIN — Medication 6: at 05:15

## 2019-12-27 RX ADMIN — Medication 1000 MILLIGRAM(S): at 09:55

## 2019-12-27 RX ADMIN — SODIUM CHLORIDE 2000 MILLILITER(S): 9 INJECTION, SOLUTION INTRAVENOUS at 01:00

## 2019-12-27 RX ADMIN — INSULIN GLARGINE 15 UNIT(S): 100 INJECTION, SOLUTION SUBCUTANEOUS at 02:53

## 2019-12-27 RX ADMIN — Medication 5 UNIT(S): at 06:05

## 2019-12-27 RX ADMIN — Medication 10 MILLIGRAM(S): at 00:35

## 2019-12-27 NOTE — H&P ADULT - HISTORY OF PRESENT ILLNESS
This is a 28 y/o male with hx of type 1DM who presents to the ED with 2 days of nausea/vomiting (NBNB) and abdominal pain which started all of a sudden. No clear precipitating factor and pt cannot describe abdominal pain further, just diffuse no radiation- pt denies fevers chills, shortness of breath, cough, diarrhea, dysuria. Per mother his sugars have been very elevated recently, however did not clarify how high. Pt reports being on 18 units of "long acting" and 10 units of "fast acting" at meals. Reports good compliance however did not take one dose two nights ago and instead took it this AM. Reports no alcohol use or other ingestion. Also reporting hiccups. No known gastroparesis. Does not seen an endocrinologist currently. no sick contacts or recent travel. In the ED pt was found hyperglycemic in DKA, given total humalog of 20 units and 4L of LR as well as reglan and zofran

## 2019-12-27 NOTE — CONSULT NOTE ADULT - SUBJECTIVE AND OBJECTIVE BOX
HPI:  This is a 30 y/o male with hx of type 1DM who presents to the ED with 2 days of nausea/vomiting (NBNB) and abdominal pain which started all of a sudden.     Endocrine History:   T1DM diagnosed in 2015 with unknown hgb a1c on Toujeo 18 units qAm and Novolog 10 units TIDAC. Follows with PCP, never had an endocrinologist in the past. Endorses compliance with insulin, last toujeo was on 12/25 in the AM. Checks FS 2-3 times a day.   ACbreakfast - 180-200  Bedtime - 190   30 mins post prandial lunch/dinner - low 200s.   No hypoglycemias for the past 3 months. Last DKA was 2 years ago. Diet is typically   Breakfast - eggs   Lunch - Turkey sandwich   Dinner - Mainly protein, such as chicken. Rare has carbs   Snack- chips and sports drink. No juice or soda.   No known microvasculaar complicaitons of diabetes. Last opthal 8 months ago.     PAST MEDICAL & SURGICAL HISTORY:  Diabetes: type 1  No significant past surgical history      FAMILY HISTORY:  FH: diabetes mellitus. Grandmother and uncle.       Social History: Works as car [employer]. No history of tobacco, etoh or illicit drug use.      Outpatient Medications: Home Medications:  Lantus Solostar Pen 100 units/mL subcutaneous solution: Inject 18 units SQ once daily at bedtime  (27 Dec 2019 11:31)  NovoLOG 100 units/mL subcutaneous solution: Inject 10 units SQ 3 times daily before meals  (27 Dec 2019 11:31)      MEDICATIONS  (STANDING):  dextrose 5%. 1000 milliLiter(s) (50 mL/Hr) IV Continuous <Continuous>  dextrose 50% Injectable 12.5 Gram(s) IV Push once  dextrose 50% Injectable 25 Gram(s) IV Push once  dextrose 50% Injectable 25 Gram(s) IV Push once  influenza   Vaccine 0.5 milliLiter(s) IntraMuscular once  insulin glargine Injectable (LANTUS) 20 Unit(s) SubCutaneous at bedtime  insulin lispro (HumaLOG) corrective regimen sliding scale   SubCutaneous every 4 hours  lactated ringers. 1000 milliLiter(s) (200 mL/Hr) IV Continuous <Continuous>    MEDICATIONS  (PRN):  dextrose 40% Gel 15 Gram(s) Oral once PRN Blood Glucose LESS THAN 70 milliGRAM(s)/deciliter  glucagon  Injectable 1 milliGRAM(s) IntraMuscular once PRN Glucose LESS THAN 70 milligrams/deciliter  ondansetron Injectable 4 milliGRAM(s) IV Push every 8 hours PRN Nausea and/or Vomiting      Allergies    No Known Allergies    Intolerances      Review of Systems:  Constitutional: No fever, chills   Neuro: No tremors, headache   Cardiovascular: No chest pain, palpitations  Respiratory: No SOB, no cough  GI: + nausea, vomiting, abdominal pain  : No dysuria, polyuria   Skin: no rash, ulcers   Psych: no depression, anxiety   Endocrine: no polyphagia, polydipsia     ALL OTHER SYSTEMS REVIEWED AND NEGATIVE        PHYSICAL EXAM:  VITALS: T(C): 36.8 (12-27-19 @ 10:38)  T(F): 98.2 (12-27-19 @ 10:38), Max: 100.2 (12-26-19 @ 23:38)  HR: 100 (12-27-19 @ 13:54) (98 - 117)  BP: 112/75 (12-27-19 @ 13:54) (108/72 - 130/81)  RR:  (16 - 18)  SpO2:  (100% - 100%)  Wt(kg): --  GENERAL: NAD, well-groomed, well-developed  EYES: No proptosis, anicteric  HEENT:  Atraumatic, Normocephalic, moist mucous membranes  RESPIRATORY: Clear to auscultation bilaterally; No rales, rhonchi, wheezing  CARDIOVASCULAR: Regular rate and rhythm; No murmurs; no peripheral edema  GI: Soft, nontender, non distended, normal bowel sounds  SKIN: Dry, intact, No rashes or lesions  NEURO: AOx3, moves all extremities spontaneously   PSYCH: Reactive affect, euthymic mood    POCT Blood Glucose.: 216 mg/dL (12-27-19 @ 13:49)  POCT Blood Glucose.: 190 mg/dL (12-27-19 @ 09:38)  POCT Blood Glucose.: 310 mg/dL (12-27-19 @ 06:42)  POCT Blood Glucose.: 415 mg/dL (12-27-19 @ 05:14)  POCT Blood Glucose.: 333 mg/dL (12-27-19 @ 02:39)  POCT Blood Glucose.: 205 mg/dL (12-26-19 @ 21:03)  POCT Blood Glucose.: 444 mg/dL (12-26-19 @ 18:02)  POCT Blood Glucose.: 507 mg/dL (12-26-19 @ 14:45)                            13.7   18.93 )-----------( 281      ( 27 Dec 2019 05:30 )             42.2       12-27    140  |  103  |  17  ----------------------------<  261<H>  4.2   |  17<L>  |  0.86    EGFR if : 137  EGFR if non : 118    Ca    9.6      12-27  Mg     1.9     12-27  Phos  2.5     12-27    TPro  7.3  /  Alb  3.8  /  TBili  0.8  /  DBili  x   /  AST  73<H>  /  ALT  105<H>  /  AlkPhos  113  12-26      Thyroid Function Tests:  12-27 @ 05:30 TSH 1.79 FreeT4 -- T3 -- Anti TPO -- Anti Thyroglobulin Ab -- TSI --          12-27 Chol 142 LDL 74 HDL 55 Trig 86      Radiology:

## 2019-12-27 NOTE — PROVIDER CONTACT NOTE (OTHER) - ACTION/TREATMENT ORDERED:
Give correctional dose of sliding scale insulin and continue to monitor. recheck glucose in one hour. Might order another dose of sliding scale insulin on top of the ordered dose at this time.

## 2019-12-27 NOTE — H&P ADULT - PROBLEM SELECTOR PLAN 2
Likely 2/2 hyperglycemia/dka. No fevers or diarrhea reported. On differential includes gastroparesis given mult episodes of n/v and suspected poor control of DM  -Will check a lipase and continue LR at 150cc/hr and keep NPO for now. endocrine consult in AM

## 2019-12-27 NOTE — CONSULT NOTE ADULT - ASSESSMENT
Pt is a 28 y/o male with hx of type 1DM who presents to the ED with 2 days of nausea/vomiting (NBNB) and abdominal pain a/w DKA; MICU consulted for insulin gtt treatment.    Pt not MICU candidate at this time    #DKA  Pt in DKA likely due to missed dose 2 days ago. Low suspicion for infectious etiology.   Pt GAP closed last night, however re-opened overnight. Pt received 11U of Lispro this AM at 6:05 AM.  AG increased from 18 -> 32. Endocrine consulted, recs appreciated.   Repeat VBG shows pH wnl. AG now improving.     Recommendations:   - C/w BMP q4hrs  - C/w IVF hydration  - Famotidine for dyspepsia  - C/w zofran/reglan for nausea  - C/w Lantus 20mg qhs, Humalog 7units premeal once able to tolerate diet as per Endo  - F/u Endocrine recs  - reconsult as needed Pt is a 30 y/o male with hx of type 1DM who presents to the ED with 2 days of nausea/vomiting (NBNB) and abdominal pain a/w DKA; MICU consulted for insulin gtt treatment.    Pt not MICU candidate at this time    #DKA  Pt in DKA likely due to missed dose 2 days ago. Low suspicion for infectious etiology.   Pt GAP closed last night, however re-opened overnight. Pt received 11U of Lispro this AM at 6:05 AM.  AG increased from 18 -> 26. Endocrine consulted, recs appreciated.   Repeat VBG shows pH wnl. AG now improving, however remains high. If Endocrine is comfortable with current management; recommend continue managing on the floor.   If AG remains high, or worsens, please re-consult.     Recommendations:   - C/w BMP q4hrs  - C/w IVF hydration  - Famotidine for dyspepsia  - C/w zofran/reglan for nausea  - C/w Lantus 20mg qhs, Humalog 7units premeal once able to tolerate diet as per Endo  - F/u Endocrine recs  - reconsult as needed Pt is a 28 y/o male with hx of type 1DM who presents to the ED with 2 days of nausea/vomiting (NBNB) and abdominal pain a/w DKA; MICU consulted for insulin gtt treatment.    Pt not MICU candidate at this time    #DKA  Pt in DKA likely due to missed dose 2 days ago. Low suspicion for infectious etiology.   Pt GAP closed last night, however re-opened overnight. Pt received 11U of Lispro this AM at 6:05 AM.  AG increased from 18 -> 26. Endocrine consulted, recs appreciated.   Repeat VBG shows pH wnl. BHB worsening 5.2 -> 6.2.  AG now improving, however remains high. If Endocrine is comfortable with current management; recommend continue managing on the floor.   If AG remains high, or worsens, please re-consult.     Recommendations:   - C/w BMP q4hrs  - C/w IVF hydration  - Famotidine for dyspepsia  - C/w zofran/reglan for nausea  - C/w Lantus 20mg qhs, Humalog 7units premeal once able to tolerate diet as per Endo  - F/u Endocrine recs  - reconsult as needed

## 2019-12-27 NOTE — H&P ADULT - ASSESSMENT
28 y/o male with hx of type 1DM who presents to the ED with 2 days of nausea/vomiting (NBNB) and abdominal pain found in the ED to be in DKA with unclear large opacity noted on CXR

## 2019-12-27 NOTE — CHART NOTE - NSCHARTNOTEFT_GEN_A_CORE
MICU Accept Note    CHIEF COMPLAINT:     HPI / INTERVAL HISTORY:     HPI:  This is a 28 y/o male with hx of type 1DM who presents to the ED with 2 days of nausea/vomiting (NBNB) and abdominal pain which started all of a sudden. No clear precipitating factor and pt cannot describe abdominal pain further, just diffuse no radiation- pt denies fevers chills, shortness of breath, cough, diarrhea, dysuria. Per mother his sugars have been very elevated recently, however did not clarify how high. Pt reports being on 18 units of "long acting" and 10 units of "fast acting" at meals. Reports good compliance however did not take one dose two nights ago and instead took it this AM. Reports no alcohol use or other ingestion. Also reporting hiccups. No known gastroparesis. Does not seen an endocrinologist currently. no sick contacts or recent travel. In the ED pt was found hyperglycemic in DKA, given total humalog of 20 units and 4L of LR as well as reglan and zofran (27 Dec 2019 00:20)    Overnight, Pt received about 4L of IVF. Anion Gap worsened 18 -> 26. Pt reported continued nausea and abdominal pain. Endocrine was consulted and gave primary team recommendations. Pt was switched to moderate insulin sliding scale and Lantus dose increased from 15U qhs to 20U qhs. Repeat vbg/ BHB/BMP was sent. MICU consulted for possible insulin gtt. Initially Pt was deemed not candidate ICU admission,  however Pt AG remain elevated (although improving) with worsening BHB. Endocrine recommended starting Insulin gtt, MICU reconsulted for ICU admission.       PAST MEDICAL & SURGICAL HISTORY:  Diabetes: type 1  No significant past surgical history      FAMILY HISTORY:  FH: diabetes mellitus      SOCIAL HISTORY:  denies alcohol, smoking, drug use    Allergies    No Known Allergies    Intolerances      HOME MEDICATIONS:    REVIEW OF SYSTEMS:  Constitutional:   Eyes:  ENT:  CV:  Resp:  GI: + abdominal pain, + dyspepsia, +nausea  :  MSK:  Integumentary:  Neurological:  Psychiatric:  Endocrine:  Hematologic/Lymphatic:  Allergic/Immunologic:  [ x] All other systems negative  [ ] Unable to assess ROS because ________      OBJECTIVE:  ICU Vital Signs Last 24 Hrs  T(C): 36.8 (27 Dec 2019 10:38), Max: 37.9 (26 Dec 2019 23:38)  T(F): 98.2 (27 Dec 2019 10:38), Max: 100.2 (26 Dec 2019 23:38)  HR: 100 (27 Dec 2019 13:54) (98 - 117)  BP: 112/75 (27 Dec 2019 13:54) (108/72 - 130/81)  BP(mean): --  ABP: --  ABP(mean): --  RR: 18 (27 Dec 2019 13:54) (16 - 18)  SpO2: 100% (27 Dec 2019 13:54) (100% - 100%)         @ 07:01  -   @ 07:00  --------------------------------------------------------  IN: 0 mL / OUT: 450 mL / NET: -450 mL     @ 07:01  -   @ 17:16  --------------------------------------------------------  IN: 0 mL / OUT: 700 mL / NET: -700 mL      CAPILLARY BLOOD GLUCOSE      POCT Blood Glucose.: 182 mg/dL (27 Dec 2019 17:01)      PHYSICAL EXAM:  GENERAL: Pt appear to be in discomfort.   HEAD:  Atraumatic, Normocephalic  EYES: EOMI, PERRLA, conjunctiva and sclera clear  NECK: Supple, No JVD  CHEST/LUNG: Clear to auscultation bilaterally; No wheeze  HEART: Regular rate and rhythm; No murmurs, rubs, or gallops  ABDOMEN: Soft, mild diffuse tenderness in all 4 quadrants   EXTREMITIES:  2+ Peripheral Pulses, No clubbing, cyanosis, or edema  PSYCH: AAOx3  NEUROLOGY: non-focal  SKIN: No rashes or lesions    LINES:     HOSPITAL MEDICATIONS:  MEDICATIONS  (STANDING):  chlorhexidine 4% Liquid 1 Application(s) Topical <User Schedule>  dextrose 5%. 1000 milliLiter(s) (50 mL/Hr) IV Continuous <Continuous>  dextrose 50% Injectable 12.5 Gram(s) IV Push once  dextrose 50% Injectable 25 Gram(s) IV Push once  dextrose 50% Injectable 25 Gram(s) IV Push once  influenza   Vaccine 0.5 milliLiter(s) IntraMuscular once  insulin glargine Injectable (LANTUS) 20 Unit(s) SubCutaneous at bedtime  insulin lispro (HumaLOG) corrective regimen sliding scale   SubCutaneous every 4 hours  lactated ringers. 1000 milliLiter(s) (200 mL/Hr) IV Continuous <Continuous>    MEDICATIONS  (PRN):  dextrose 40% Gel 15 Gram(s) Oral once PRN Blood Glucose LESS THAN 70 milliGRAM(s)/deciliter  glucagon  Injectable 1 milliGRAM(s) IntraMuscular once PRN Glucose LESS THAN 70 milligrams/deciliter  ondansetron Injectable 4 milliGRAM(s) IV Push every 8 hours PRN Nausea and/or Vomiting      LABS:                        13.7   18.93 )-----------( 281      ( 27 Dec 2019 05:30 )             42.2     Hgb Trend: 13.7<--, 16.9<--      140  |  103  |  17  ----------------------------<  261<H>  4.2   |  17<L>  |  0.86    Ca    9.6      27 Dec 2019 14:11  Phos  2.5       Mg     1.9         TPro  7.3  /  Alb  3.8  /  TBili  0.8  /  DBili  x   /  AST  73<H>  /  ALT  105<H>  /  AlkPhos  113      Creatinine Trend: 0.86<--, 0.91<--, 0.98<--, 0.89<--, 0.86<--, 0.99<--    Urinalysis Basic - ( 26 Dec 2019 17:43 )    Color: LIGHT YELLOW / Appearance: CLEAR / S.035 / pH: 5.5  Gluc: >1000 / Ketone: LARGE  / Bili: NEGATIVE / Urobili: NORMAL   Blood: NEGATIVE / Protein: 20 / Nitrite: NEGATIVE   Leuk Esterase: NEGATIVE / RBC: 0-2 / WBC 0-2   Sq Epi: OCC / Non Sq Epi: x / Bacteria: NEGATIVE        Venous Blood Gas:   @ 14:11  7.33/37/35/19/67.5  VBG Lactate: --  Venous Blood Gas:   @ 09:49  7.37/35/43/21/79.8  VBG Lactate: --  Venous Blood Gas:   @ 22:30  7.42/40/41/25/78.4  VBG Lactate: 2.5  Venous Blood Gas:   @ 19:38  7.31/47/31/21/54.4  VBG Lactate: 3.8  Venous Blood Gas:   @ 15:41  7.26/48/29/18/48.6  VBG Lactate: 6.6      MICROBIOLOGY:     RADIOLOGY & ADDITIONAL TESTS:  < from: CT Chest w/ IV Cont (19 @ 01:36) >    FINDINGS:    LUNGS AND AIRWAYS: Patentcentral airways.  No acute infiltrates or consolidative opacity.    PLEURA: No pleural effusion.    MEDIASTINUM AND SHERON: No lymphadenopathy.    VESSELS: Within normal limits.    HEART: Heart size is normal. No pericardial effusion.    CHEST WALL ANDLOWER NECK: Within normal limits.    VISUALIZED UPPER ABDOMEN: Bochdalek hernia with herniation of portion of the liver into the lower right hemithorax. There is mild wall thickening of the thoracic esophagus. This may be related to gastroesophageal reflux disease or esophagitis. Consider nonemergent endoscopic evaluation to exclude underlying lesion.    BONES: Within normal limits.    IMPRESSION:     No consolidation or pleural effusion.    Right sided Bochdalek hernia containing portion of liver.    Mild wall thickening of the thoracic esophagus. This may be related to gastroesophageal reflux disease or esophagitis. Consider nonemergent endoscopic evaluation to exclude underlying lesion.    < end of copied text >    Assessment and Plan:    Pt is a 28 y/o male with hx of type 1DM who presents to the ED with 2 days of nausea/vomiting (NBNB) and abdominal pain a/w DKA; MICU consulted for insulin gtt treatment.    #Neuro  - No acute issues; Pt awake and alert. A&Ox3    #Cards  - No acute issues    #Pulm  - No acute issues.    #GI  - NPO    #Endo  T1DM: Pt on Lantus 18U qhs and 10U pre-meals at home. Pt reported that he missed his evening dose two days ago because he was came home tired from work and fell asleep.   DKA  - Pt found to be in DKA; initially came in with A that improved to 18 but then re-opened to 26. Pt was placed on Lantus 25qhs. Endocrine on board. Now recommending Insulin gtt.   - Start insulin gtt  - FS q1hr  - BMP q4hr  - A1c  - Will plan to transition to lantus once AG is normal on 2 repeat labs.   - Will overlap insulin gtt with lantus by 2 hours    #Heme  No acute issues    #Renal/  No acute issues. MICU Accept Note    CHIEF COMPLAINT:     HPI / INTERVAL HISTORY:     HPI:  This is a 30 y/o male with hx of type 1DM who presents to the ED with 2 days of nausea/vomiting (NBNB) and abdominal pain which started all of a sudden. No clear precipitating factor and pt cannot describe abdominal pain further, just diffuse no radiation- pt denies fevers chills, shortness of breath, cough, diarrhea, dysuria. Per mother his sugars have been very elevated recently, however did not clarify how high. Pt reports being on 18 units of "long acting" and 10 units of "fast acting" at meals. Reports good compliance however did not take one dose two nights ago and instead took it this AM. Reports no alcohol use or other ingestion. Also reporting hiccups. No known gastroparesis. Does not seen an endocrinologist currently. no sick contacts or recent travel. In the ED pt was found hyperglycemic in DKA, given total humalog of 20 units and 4L of LR as well as reglan and zofran (27 Dec 2019 00:20)    Overnight, Pt received about 4L of IVF. Anion Gap worsened 18 -> 26. Pt reported continued nausea and abdominal pain. Endocrine was consulted and gave primary team recommendations. Pt was switched to moderate insulin sliding scale and Lantus dose increased from 15U qhs to 20U qhs. Repeat vbg/ BHB/BMP was sent. MICU consulted for possible insulin gtt. Initially Pt was deemed not candidate ICU admission,  however Pt AG remain elevated (although improving) with worsening BHB. Endocrine recommended starting Insulin gtt, MICU reconsulted for ICU admission.       PAST MEDICAL & SURGICAL HISTORY:  Diabetes: type 1  No significant past surgical history      FAMILY HISTORY:  FH: diabetes mellitus      SOCIAL HISTORY:  denies alcohol, smoking, drug use    Allergies    No Known Allergies    Intolerances      HOME MEDICATIONS:    REVIEW OF SYSTEMS:  Constitutional:   Eyes:  ENT:  CV:  Resp:  GI: + abdominal pain, + dyspepsia, +nausea  :  MSK:  Integumentary:  Neurological:  Psychiatric:  Endocrine:  Hematologic/Lymphatic:  Allergic/Immunologic:  [ x] All other systems negative  [ ] Unable to assess ROS because ________      OBJECTIVE:  ICU Vital Signs Last 24 Hrs  T(C): 36.8 (27 Dec 2019 10:38), Max: 37.9 (26 Dec 2019 23:38)  T(F): 98.2 (27 Dec 2019 10:38), Max: 100.2 (26 Dec 2019 23:38)  HR: 100 (27 Dec 2019 13:54) (98 - 117)  BP: 112/75 (27 Dec 2019 13:54) (108/72 - 130/81)  BP(mean): --  ABP: --  ABP(mean): --  RR: 18 (27 Dec 2019 13:54) (16 - 18)  SpO2: 100% (27 Dec 2019 13:54) (100% - 100%)         @ 07:01  -   @ 07:00  --------------------------------------------------------  IN: 0 mL / OUT: 450 mL / NET: -450 mL     @ 07:01  -   @ 17:16  --------------------------------------------------------  IN: 0 mL / OUT: 700 mL / NET: -700 mL      CAPILLARY BLOOD GLUCOSE      POCT Blood Glucose.: 182 mg/dL (27 Dec 2019 17:01)      PHYSICAL EXAM:  GENERAL: Pt appear to be in discomfort.   HEAD:  Atraumatic, Normocephalic  EYES: EOMI, PERRLA, conjunctiva and sclera clear  NECK: Supple, No JVD  CHEST/LUNG: Clear to auscultation bilaterally; No wheeze  HEART: Regular rate and rhythm; No murmurs, rubs, or gallops  ABDOMEN: Soft, mild diffuse tenderness in all 4 quadrants   EXTREMITIES:  2+ Peripheral Pulses, No clubbing, cyanosis, or edema  PSYCH: AAOx3  NEUROLOGY: non-focal  SKIN: No rashes or lesions    LINES:     HOSPITAL MEDICATIONS:  MEDICATIONS  (STANDING):  chlorhexidine 4% Liquid 1 Application(s) Topical <User Schedule>  dextrose 5%. 1000 milliLiter(s) (50 mL/Hr) IV Continuous <Continuous>  dextrose 50% Injectable 12.5 Gram(s) IV Push once  dextrose 50% Injectable 25 Gram(s) IV Push once  dextrose 50% Injectable 25 Gram(s) IV Push once  influenza   Vaccine 0.5 milliLiter(s) IntraMuscular once  insulin glargine Injectable (LANTUS) 20 Unit(s) SubCutaneous at bedtime  insulin lispro (HumaLOG) corrective regimen sliding scale   SubCutaneous every 4 hours  lactated ringers. 1000 milliLiter(s) (200 mL/Hr) IV Continuous <Continuous>    MEDICATIONS  (PRN):  dextrose 40% Gel 15 Gram(s) Oral once PRN Blood Glucose LESS THAN 70 milliGRAM(s)/deciliter  glucagon  Injectable 1 milliGRAM(s) IntraMuscular once PRN Glucose LESS THAN 70 milligrams/deciliter  ondansetron Injectable 4 milliGRAM(s) IV Push every 8 hours PRN Nausea and/or Vomiting      LABS:                        13.7   18.93 )-----------( 281      ( 27 Dec 2019 05:30 )             42.2     Hgb Trend: 13.7<--, 16.9<--      140  |  103  |  17  ----------------------------<  261<H>  4.2   |  17<L>  |  0.86    Ca    9.6      27 Dec 2019 14:11  Phos  2.5       Mg     1.9         TPro  7.3  /  Alb  3.8  /  TBili  0.8  /  DBili  x   /  AST  73<H>  /  ALT  105<H>  /  AlkPhos  113      Creatinine Trend: 0.86<--, 0.91<--, 0.98<--, 0.89<--, 0.86<--, 0.99<--    Urinalysis Basic - ( 26 Dec 2019 17:43 )    Color: LIGHT YELLOW / Appearance: CLEAR / S.035 / pH: 5.5  Gluc: >1000 / Ketone: LARGE  / Bili: NEGATIVE / Urobili: NORMAL   Blood: NEGATIVE / Protein: 20 / Nitrite: NEGATIVE   Leuk Esterase: NEGATIVE / RBC: 0-2 / WBC 0-2   Sq Epi: OCC / Non Sq Epi: x / Bacteria: NEGATIVE        Venous Blood Gas:   @ 14:11  7.33/37/35/19/67.5  VBG Lactate: --  Venous Blood Gas:   @ 09:49  7.37/35/43/21/79.8  VBG Lactate: --  Venous Blood Gas:   @ 22:30  7.42/40/41/25/78.4  VBG Lactate: 2.5  Venous Blood Gas:   @ 19:38  7.31/47/31/21/54.4  VBG Lactate: 3.8  Venous Blood Gas:   @ 15:41  7.26/48/29/18/48.6  VBG Lactate: 6.6      MICROBIOLOGY:     RADIOLOGY & ADDITIONAL TESTS:  < from: CT Chest w/ IV Cont (19 @ 01:36) >    FINDINGS:    LUNGS AND AIRWAYS: Patentcentral airways.  No acute infiltrates or consolidative opacity.    PLEURA: No pleural effusion.    MEDIASTINUM AND SHERON: No lymphadenopathy.    VESSELS: Within normal limits.    HEART: Heart size is normal. No pericardial effusion.    CHEST WALL ANDLOWER NECK: Within normal limits.    VISUALIZED UPPER ABDOMEN: Bochdalek hernia with herniation of portion of the liver into the lower right hemithorax. There is mild wall thickening of the thoracic esophagus. This may be related to gastroesophageal reflux disease or esophagitis. Consider nonemergent endoscopic evaluation to exclude underlying lesion.    BONES: Within normal limits.    IMPRESSION:     No consolidation or pleural effusion.    Right sided Bochdalek hernia containing portion of liver.    Mild wall thickening of the thoracic esophagus. This may be related to gastroesophageal reflux disease or esophagitis. Consider nonemergent endoscopic evaluation to exclude underlying lesion.    < end of copied text >    Assessment and Plan:    Pt is a 30 y/o male with hx of type 1DM who presents to the ED with 2 days of nausea/vomiting (NBNB) and abdominal pain a/w DKA; MICU consulted for insulin gtt treatment.    #Neuro  - No acute issues; Pt awake and alert. A&Ox3    #Cards  - No acute issues    #Pulm  - No acute issues.    #GI  - NPO    #Endo  T1DM: Pt on Lantus 18U qhs and 10U pre-meals at home. Pt reported that he missed his evening dose two days ago because he was came home tired from work and fell asleep.   DKA  - Pt found to be in DKA; initially came in with A that improved to 18 but then re-opened to 26. Pt was placed on Lantus 25qhs. Endocrine on board. Now recommending Insulin gtt.   - Start insulin gtt  - FS q1hr  - BMP q4hr  - A1c  - Will plan to transition to lantus once AG is normal on 2 repeat labs.   - Will overlap insulin gtt with lantus by 2 hours    #Heme  No acute issues    #Renal/  No acute issues.      Attending Attestation:  Patient seen and examined with resident/fellow.  Agree with above except as noted.  Patient with history as above, admitted with DKA and high anion gap metabolic acidosis.  Initially treated with lantus and AG improved, but never fully closed.   Will admit to MICU for insulin gtt, q1h finger sticks, IVF.  Appreciate endo reccs.  Serial BMPs.    This patient is critically ill and required frequent bedside visits with therapy change.  Total critical care time spent was 35 minutes.    María Morris MD  Attending  Pulmonary & Critical Care Medicine

## 2019-12-27 NOTE — ED ADULT NURSE REASSESSMENT NOTE - NS ED NURSE REASSESS COMMENT FT1
Pt appears resting comfortably in bed. Pt c/o epigastric pain and hiccups, medicated as per MD orders. Report given to Macy silva RN. Pt currently at CT scan. Will continue to monitor. Pt appears resting comfortably in bed. Pt c/o epigastric pain and hiccups, medicated as per MD orders. Report given to Macy silva RN. Pt currently at CT scan. Sinus tachycardia on monitor, not actively vomiting at this time. Breathing equal and unlabored. Will continue to monitor.

## 2019-12-27 NOTE — H&P ADULT - NSHPREVIEWOFSYSTEMS_GEN_ALL_CORE
REVIEW OF SYSTEMS:    CONSTITUTIONAL: +weakness, no fevers or chills  EYES/ENT: No visual changes;  No dysphagia  NECK: No pain or stiffness  RESPIRATORY: No cough, wheezing, hemoptysis; No shortness of breath  CARDIOVASCULAR: No chest pain or palpitations; No lower extremity edema  GASTROINTESTINAL: +generalized abdominal pain. + nausea, vomiting, no hematemesis; No diarrhea or constipation. No melena or hematochezia.  GENITOURINARY: No dysuria, frequency or hematuria  NEUROLOGICAL: No numbness or weakness  SKIN: No itching, burning, rashes, or lesions

## 2019-12-27 NOTE — CONSULT NOTE ADULT - PROBLEM SELECTOR RECOMMENDATION 9
T1DM presenting with DKA with glucose in 600s now with glucose that is improving but still has AG and increased BHOB on lantus 15 units (received 3AM) and mod dose humalog q4h sliding scale.    -recommend MICU reconsult as AG is only improving slightly and BHOB is worsening   -recommend insulin gtt and dka protocol until AG closes. May start d5 drip (with LR) once glucose <200.   -transition to lantus once AG is normal on 2 repeat labs. Needs to overlap insulin gtt with lantus by 2 hours. May calculate lantus dosing based on insulin gtt rate.   -NPO  -q4h bmp, vbg and bhob   -get hgb a1c with next set of labs  -page endocrine if there are any questions     discharge  basal/bolus . dose TBD   [facility]  [address] [place] [address], [address], [place] [address]  [phone] T1DM presenting with DKA with glucose in 600s now with glucose that is improving but still has AG and increased BHOB on lantus 15 units (received 3AM) and mod dose humalog q4h sliding scale.    -recommend MICU reconsult as AG is only improving slightly and BHOB is worsening   -recommend insulin gtt and dka protocol until AG closes. May start d5 drip (with LR) once glucose <200.   -transition to lantus once AG is normal on 2 repeat labs. Needs to overlap insulin gtt with lantus by 2 hours. May calculate lantus dosing based on insulin gtt rate.   -NPO  -q4h bmp, vbg and bhob   -get hgb a1c with next set of labs  -page endocrine if there are any questions     discharge  basal/bolus . dose TBD   [facility]. Patient said he has [facility]   [address], [address], [place] [address]  [phone] T1DM presenting with DKA with glucose in 600s now with glucose that is improving but still has AG and increased BHOB on lantus 15 units (received 3AM) and mod dose humalog q4h sliding scale.    -recommend MICU reconsult as AG is only improving slightly and BHOB is worsening   -recommend insulin gtt and dka protocol until AG closes. May start d5 drip (with NS) once glucose <200.   -transition to lantus once AG is normal on 2 repeat labs. Needs to overlap insulin gtt with lantus by 2 hours. May calculate lantus dosing based on insulin gtt rate.   -NPO  -if anticipating a delay in transfer to ICU setting, may do humalog mod dose sliding scale q3h with D5NS. Keep Lantus 20 units qhs    -q4h bmp, vbg and bhob   -get hgb a1c with next set of labs  -page endocrine if there are any questions     discharge  basal/bolus . dose TBD   [facility]. Patient said he has [facility]   [address], [address], [place] [address]  [phone]

## 2019-12-27 NOTE — PROGRESS NOTE ADULT - PROBLEM SELECTOR PLAN 1
Pt with widening anion gap and worsening acidosis. DKA from n/v and missing dose of insulin; no signs/symptoms of infection. Unclear trigger of n/v, possible gastroparesis. Pt does not have an endocrinologist; gets insulin from [facility] (last visited 4 months ago).  - endocrine consulted, recs appreciated  - lantus 20qhs, moderate sliding scale q4h, FS q4h  - IVF - LR 200cc/hr   - will repeat BMP, VBG and b-hydroxybutrate

## 2019-12-27 NOTE — H&P ADULT - PROBLEM SELECTOR PLAN 3
unclear, large opacity seen on cxr. Pt denies any cough, nasal congestion, or shortness of breath or sick contacts. Pt was initially afebrile but did develop low grade fever to 100.2. Will hold of antibiotics for now and obtain CT chest for further evaluation.

## 2019-12-27 NOTE — PROGRESS NOTE ADULT - SUBJECTIVE AND OBJECTIVE BOX
Ese Borjas PGY1  -[id number] | [facility] [id number]  =========================    Patient is a 29y old  Male who presents with a chief complaint of abdominal pain and hyperglycemia (27 Dec 2019 00:20)      INTERVAL HPI/OVERNIGHT EVENTS:  Pt seen at bedside, comfortable appearing. Pt endorses throat pain from vomiting. 1 episode of NBNB emesis yesterday. Denies abdominal pain, f/c, headache, chest pain.       MEDICATIONS  (STANDING):  acetaminophen  IVPB .. 1000 milliGRAM(s) IV Intermittent once  dextrose 5%. 1000 milliLiter(s) (50 mL/Hr) IV Continuous <Continuous>  dextrose 50% Injectable 12.5 Gram(s) IV Push once  dextrose 50% Injectable 25 Gram(s) IV Push once  dextrose 50% Injectable 25 Gram(s) IV Push once  influenza   Vaccine 0.5 milliLiter(s) IntraMuscular once  insulin glargine Injectable (LANTUS) 15 Unit(s) SubCutaneous at bedtime  insulin lispro (HumaLOG) corrective regimen sliding scale   SubCutaneous every 4 hours  lactated ringers. 1000 milliLiter(s) (150 mL/Hr) IV Continuous <Continuous>    MEDICATIONS  (PRN):  dextrose 40% Gel 15 Gram(s) Oral once PRN Blood Glucose LESS THAN 70 milliGRAM(s)/deciliter  glucagon  Injectable 1 milliGRAM(s) IntraMuscular once PRN Glucose LESS THAN 70 milligrams/deciliter  ondansetron Injectable 4 milliGRAM(s) IV Push every 8 hours PRN Nausea and/or Vomiting      Allergies    No Known Allergies    Intolerances        Vital Signs Last 24 Hrs  T(C): 36.6 (27 Dec 2019 05:11), Max: 37.9 (26 Dec 2019 23:38)  T(F): 97.9 (27 Dec 2019 05:11), Max: 100.2 (26 Dec 2019 23:38)  HR: 100 (27 Dec 2019 05:11) (100 - 122)  BP: 108/72 (27 Dec 2019 05:11) (108/72 - 130/81)  BP(mean): --  RR: 18 (27 Dec 2019 05:11) (16 - 18)  SpO2: 100% (27 Dec 2019 05:11) (100% - 100%)    PHYSICAL EXAM:  GENERAL: NAD. comfortable appearing,   HEENT: EOMI, clear sclera, oropharynx clear.   CHEST/LUNG: Clear to auscultation; No rales, rhonchi, or wheezing.  Respiratory effort does not appear labored.  HEART: tachycardic; S1 and S2,  no murmurs, rubs, or gallops.  ABDOMEN: Soft, not tender to palpation.  No masses or HSM appreciated.  No distension.  Bowel sounds present.  EXTREMITIES:  No clubbing, cyanosis, or edema.  Moves all extremities with strength 5/5.  SKIN: No obvious rashes or lesions.  Turgor okay.  NEURO:  Alert and oriented x 3, no focal sensory or  motor deficit    LABS:                        13.7   18.93 )-----------( 281      ( 27 Dec 2019 05:30 )             42.2         136  |  98  |  20  ----------------------------<  438<H>  5.2   |  12<L>  |  0.98    Ca    9.3      27 Dec 2019 05:30  Phos  3.1       Mg     1.9         TPro  7.3  /  Alb  3.8  /  TBili  0.8  /  DBili  x   /  AST  73<H>  /  ALT  105<H>  /  AlkPhos  113        Urinalysis Basic - ( 26 Dec 2019 17:43 )    Color: LIGHT YELLOW / Appearance: CLEAR / S.035 / pH: 5.5  Gluc: >1000 / Ketone: LARGE  / Bili: NEGATIVE / Urobili: NORMAL   Blood: NEGATIVE / Protein: 20 / Nitrite: NEGATIVE   Leuk Esterase: NEGATIVE / RBC: 0-2 / WBC 0-2   Sq Epi: OCC / Non Sq Epi: x / Bacteria: NEGATIVE      CAPILLARY BLOOD GLUCOSE      POCT Blood Glucose.: 310 mg/dL (27 Dec 2019 06:42)  POCT Blood Glucose.: 415 mg/dL (27 Dec 2019 05:14)  POCT Blood Glucose.: 333 mg/dL (27 Dec 2019 02:39)  POCT Blood Glucose.: 205 mg/dL (26 Dec 2019 21:03)  POCT Blood Glucose.: 444 mg/dL (26 Dec 2019 18:02)  POCT Blood Glucose.: 507 mg/dL (26 Dec 2019 14:45)

## 2019-12-27 NOTE — PROGRESS NOTE ADULT - ASSESSMENT
30 y/o male with hx of type 1DM,  who presents to the ED with 2 days of nausea/vomiting (NBNB), admitted for DKA, currently with increasing anion gap and acidosis

## 2019-12-27 NOTE — H&P ADULT - NSHPLABSRESULTS_GEN_ALL_CORE
141  |  103  |  20  ----------------------------<  208<H>  4.2   |  20<L>  |  0.86    Ca    9.8      26 Dec 2019 22:30  Phos  3.5       Mg     2.7         TPro  7.3  /  Alb  3.8  /  TBili  0.8  /  DBili  x   /  AST  73<H>  /  ALT  105<H>  /  AlkPhos  113                              16.9   24.55 )-----------( 368      ( 26 Dec 2019 15:41 )             51.2             LIVER FUNCTIONS - ( 26 Dec 2019 22:30 )  Alb: 3.8 g/dL / Pro: 7.3 g/dL / ALK PHOS: 113 u/L / ALT: 105 u/L / AST: 73 u/L / GGT: x                 Urinalysis Basic - ( 26 Dec 2019 17:43 )    Color: LIGHT YELLOW / Appearance: CLEAR / S.035 / pH: 5.5  Gluc: >1000 / Ketone: LARGE  / Bili: NEGATIVE / Urobili: NORMAL   Blood: NEGATIVE / Protein: 20 / Nitrite: NEGATIVE   Leuk Esterase: NEGATIVE / RBC: 0-2 / WBC 0-2   Sq Epi: OCC / Non Sq Epi: x / Bacteria: NEGATIVE    EKG: sinus tachycardia    CXR: Large opacity overlying the lower right hemithorax measuring 7.4 cm. Recommend CT chest for further evaluation.

## 2019-12-27 NOTE — CHART NOTE - NSCHARTNOTEFT_GEN_A_CORE
CT chest reviewed results reviewed, reports Bochdalek hernia with herniation of portion of the liver into the lower right hemithorax. Also with mild thickening of thoracic esophagus. Assessed pt at bedside who reported no further abdominal pain at this time. Abdomen continues to be soft and nontender, no guarding or rebound. Pt also reports he has known about the bochdalek hernia for a while and this is not new. Does not have follow up for this. Will likely obtain nonurgent surgical consult in AM    Jean-Pierre Foster MD

## 2019-12-27 NOTE — PROGRESS NOTE ADULT - PROBLEM SELECTOR PLAN 3
1.  Name of PCP: no PCP  2.  PCP Contacted on Admission: [ ] Y    [ ] N  [x] NO PCP  3.  PCP contacted at Discharge: [ ] Y    [ ] N    [ ] N/A  4.  Post-Discharge Appointment Date and Location:  5.  Summary of Handoff given to PCP:

## 2019-12-27 NOTE — H&P ADULT - PROBLEM SELECTOR PLAN 4
1.  Name of PCP:  2.  PCP Contacted on Admission: [ ] Y    [ ] N    3.  PCP contacted at Discharge: [ ] Y    [ ] N    [ ] N/A  4.  Post-Discharge Appointment Date and Location:  5.  Summary of Handoff given to PCP: likely reactive, however having low grade fever and unclear opacity on cxr. CT chest pending. Will start tx for CAP if indicated

## 2019-12-27 NOTE — CHART NOTE - NSCHARTNOTEFT_GEN_A_CORE
received page from dr valdes     28 yo M with history of DM1 presented with abdominal pain, nausea and vomiting found to be in DKA.  Serum glucose 630, HC03 18, AG 25, Ph 7.26, BHB 5.2, large ketones in urine  Overnight patient given IVF, Lantus 10 units and humalog, with improvement in AG.   This morning AG trended up to 26     ICU aware  Aggressive IVF  NPO until DKA resolved  BMP q4h, BHB, VBG  Supplement electrolytes per protocol   Recommend Lantus 20 units qhs   FS q6h   Recommend humalog mod correctional scale q6h while NPO   recommend Humalog 7units premeal once able to tolerate diet       official consult to follow today     Preet Mcghee MD  Endocrine Fellow   Pager

## 2019-12-27 NOTE — CONSULT NOTE ADULT - SUBJECTIVE AND OBJECTIVE BOX
CHIEF COMPLAINT:     HPI:  This is a 30 y/o male with hx of type 1DM who presents to the ED with 2 days of nausea/vomiting (NBNB) and abdominal pain which started all of a sudden. No clear precipitating factor and pt cannot describe abdominal pain further, just diffuse no radiation- pt denies fevers chills, shortness of breath, cough, diarrhea, dysuria. Per mother his sugars have been very elevated recently, however did not clarify how high. Pt reports being on 18 units of "long acting" and 10 units of "fast acting" at meals. Reports good compliance however did not take one dose two nights ago and instead took it this AM. Reports no alcohol use or other ingestion. Also reporting hiccups. No known gastroparesis. Does not seen an endocrinologist currently. no sick contacts or recent travel. In the ED pt was found hyperglycemic in DKA, given total humalog of 20 units and 4L of LR as well as reglan and zofran (27 Dec 2019 00:20)    Overnight, Pt received about 4L of IVF. Anion Gap worsened 18 -> 32. Pt reported continued nausea and abdominal pain. Endocrine was consulted and gave primary team recommendations. Pt was switched to moderate insulin sliding scale and Lantus dose increased from 15U qhs to 20U qhs. Repeat vbg/ BHB/BMP was sent. MICU consulted for possible insulin gtt.    FAMILY HISTORY:  FH: diabetes mellitus      SOCIAL HISTORY:  denies alcohol, smoking, drug use    Allergies    No Known Allergies    Intolerances        HOME MEDICATIONS:    REVIEW OF SYSTEMS:  Constitutional:   Eyes:  ENT:  CV:  Resp:  GI: + abdominal pain, + dypspepsia, +nausea  :  MSK:  Integumentary:  Neurological:  Psychiatric:  Endocrine:  Hematologic/Lymphatic:  Allergic/Immunologic:  [ x] All other systems negative  [ ] Unable to assess ROS because ________    OBJECTIVE:  ICU Vital Signs Last 24 Hrs  T(C): 36.6 (27 Dec 2019 05:11), Max: 37.9 (26 Dec 2019 23:38)  T(F): 97.9 (27 Dec 2019 05:11), Max: 100.2 (26 Dec 2019 23:38)  HR: 100 (27 Dec 2019 05:11) (100 - 122)  BP: 108/72 (27 Dec 2019 05:11) (108/72 - 130/81)  BP(mean): --  ABP: --  ABP(mean): --  RR: 18 (27 Dec 2019 05:11) (16 - 18)  SpO2: 100% (27 Dec 2019 05:11) (100% - 100%)         @ 07:  -   @ 07:00  --------------------------------------------------------  IN: 0 mL / OUT: 450 mL / NET: -450 mL     @ 07:01   @ 10:27  --------------------------------------------------------  IN: 0 mL / OUT: 700 mL / NET: -700 mL      CAPILLARY BLOOD GLUCOSE      POCT Blood Glucose.: 190 mg/dL (27 Dec 2019 09:38)      PHYSICAL EXAM:  GENERAL: Pt appear to be in discomfort.   HEAD:  Atraumatic, Normocephalic  EYES: EOMI, PERRLA, conjunctiva and sclera clear  NECK: Supple, No JVD  CHEST/LUNG: Clear to auscultation bilaterally; No wheeze  HEART: Regular rate and rhythm; No murmurs, rubs, or gallops  ABDOMEN: Soft, mild diffuse tenderness in all 4 quadrants   EXTREMITIES:  2+ Peripheral Pulses, No clubbing, cyanosis, or edema  PSYCH: AAOx3  NEUROLOGY: non-focal  SKIN: No rashes or lesions      HOSPITAL MEDICATIONS:  MEDICATIONS  (STANDING):  dextrose 5%. 1000 milliLiter(s) (50 mL/Hr) IV Continuous <Continuous>  dextrose 50% Injectable 12.5 Gram(s) IV Push once  dextrose 50% Injectable 25 Gram(s) IV Push once  dextrose 50% Injectable 25 Gram(s) IV Push once  influenza   Vaccine 0.5 milliLiter(s) IntraMuscular once  insulin glargine Injectable (LANTUS) 20 Unit(s) SubCutaneous at bedtime  insulin lispro (HumaLOG) corrective regimen sliding scale   SubCutaneous every 6 hours  lactated ringers. 1000 milliLiter(s) (200 mL/Hr) IV Continuous <Continuous>    MEDICATIONS  (PRN):  dextrose 40% Gel 15 Gram(s) Oral once PRN Blood Glucose LESS THAN 70 milliGRAM(s)/deciliter  glucagon  Injectable 1 milliGRAM(s) IntraMuscular once PRN Glucose LESS THAN 70 milligrams/deciliter  ondansetron Injectable 4 milliGRAM(s) IV Push every 8 hours PRN Nausea and/or Vomiting      LABS:                        13.7   18.93 )-----------( 281      ( 27 Dec 2019 05:30 )             42.2         142  |  103  |  18  ----------------------------<  225<H>  4.2   |  17<L>  |  0.91    Ca    9.4      27 Dec 2019 09:49  Phos  2.2       Mg     2.1         TPro  7.3  /  Alb  3.8  /  TBili  0.8  /  DBili  x   /  AST  73<H>  /  ALT  105<H>  /  AlkPhos  113        Urinalysis Basic - ( 26 Dec 2019 17:43 )    Color: LIGHT YELLOW / Appearance: CLEAR / S.035 / pH: 5.5  Gluc: >1000 / Ketone: LARGE  / Bili: NEGATIVE / Urobili: NORMAL   Blood: NEGATIVE / Protein: 20 / Nitrite: NEGATIVE   Leuk Esterase: NEGATIVE / RBC: 0-2 / WBC 0-2   Sq Epi: OCC / Non Sq Epi: x / Bacteria: NEGATIVE        Venous Blood Gas:   @ 09:49  7.37/35/43/21/79.8  VBG Lactate: --  Venous Blood Gas:   @ 22:30  7.42/40/41/25/78.4  VBG Lactate: 2.5  Venous Blood Gas:   @ 19:38  7.31/47/31/21/54.4  VBG Lactate: 3.8  Venous Blood Gas:   @ 15:41  7.26/48/29/18/48.6  VBG Lactate: 6.6      MICROBIOLOGY:     RADIOLOGY:  < from: CT Chest w/ IV Cont (19 @ 01:36) >  FINDINGS:    LUNGS AND AIRWAYS: Patentcentral airways.  No acute infiltrates or consolidative opacity.    PLEURA: No pleural effusion.    MEDIASTINUM AND SHERON: No lymphadenopathy.    VESSELS: Within normal limits.    HEART: Heart size is normal. No pericardial effusion.    CHEST WALL ANDLOWER NECK: Within normal limits.    VISUALIZED UPPER ABDOMEN: Bochdalek hernia with herniation of portion of the liver into the lower right hemithorax. There is mild wall thickening of the thoracic esophagus. This may be related to gastroesophageal reflux disease or esophagitis. Consider nonemergent endoscopic evaluation to exclude underlying lesion.    BONES: Within normal limits.    IMPRESSION:     No consolidation or pleural effusion.    Right sided Bochdalek hernia containing portion of liver.    Mild wall thickening of the thoracic esophagus. This may be related to gastroesophageal reflux disease or esophagitis. Consider nonemergent endoscopic evaluation to exclude underlying lesion.    < end of copied text >      EKG: CHIEF COMPLAINT:     HPI:  This is a 30 y/o male with hx of type 1DM who presents to the ED with 2 days of nausea/vomiting (NBNB) and abdominal pain which started all of a sudden. No clear precipitating factor and pt cannot describe abdominal pain further, just diffuse no radiation- pt denies fevers chills, shortness of breath, cough, diarrhea, dysuria. Per mother his sugars have been very elevated recently, however did not clarify how high. Pt reports being on 18 units of "long acting" and 10 units of "fast acting" at meals. Reports good compliance however did not take one dose two nights ago and instead took it this AM. Reports no alcohol use or other ingestion. Also reporting hiccups. No known gastroparesis. Does not seen an endocrinologist currently. no sick contacts or recent travel. In the ED pt was found hyperglycemic in DKA, given total humalog of 20 units and 4L of LR as well as reglan and zofran (27 Dec 2019 00:20)    Overnight, Pt received about 4L of IVF. Anion Gap worsened 18 -> 26. Pt reported continued nausea and abdominal pain. Endocrine was consulted and gave primary team recommendations. Pt was switched to moderate insulin sliding scale and Lantus dose increased from 15U qhs to 20U qhs. Repeat vbg/ BHB/BMP was sent. MICU consulted for possible insulin gtt.    FAMILY HISTORY:  FH: diabetes mellitus      SOCIAL HISTORY:  denies alcohol, smoking, drug use    Allergies    No Known Allergies    Intolerances        HOME MEDICATIONS:    REVIEW OF SYSTEMS:  Constitutional:   Eyes:  ENT:  CV:  Resp:  GI: + abdominal pain, + dypspepsia, +nausea  :  MSK:  Integumentary:  Neurological:  Psychiatric:  Endocrine:  Hematologic/Lymphatic:  Allergic/Immunologic:  [ x] All other systems negative  [ ] Unable to assess ROS because ________    OBJECTIVE:  ICU Vital Signs Last 24 Hrs  T(C): 36.6 (27 Dec 2019 05:11), Max: 37.9 (26 Dec 2019 23:38)  T(F): 97.9 (27 Dec 2019 05:11), Max: 100.2 (26 Dec 2019 23:38)  HR: 100 (27 Dec 2019 05:11) (100 - 122)  BP: 108/72 (27 Dec 2019 05:11) (108/72 - 130/81)  BP(mean): --  ABP: --  ABP(mean): --  RR: 18 (27 Dec 2019 05:11) (16 - 18)  SpO2: 100% (27 Dec 2019 05:11) (100% - 100%)         @ 07:  -   @ 07:00  --------------------------------------------------------  IN: 0 mL / OUT: 450 mL / NET: -450 mL     @ 07:01   @ 10:27  --------------------------------------------------------  IN: 0 mL / OUT: 700 mL / NET: -700 mL      CAPILLARY BLOOD GLUCOSE      POCT Blood Glucose.: 190 mg/dL (27 Dec 2019 09:38)      PHYSICAL EXAM:  GENERAL: Pt appear to be in discomfort.   HEAD:  Atraumatic, Normocephalic  EYES: EOMI, PERRLA, conjunctiva and sclera clear  NECK: Supple, No JVD  CHEST/LUNG: Clear to auscultation bilaterally; No wheeze  HEART: Regular rate and rhythm; No murmurs, rubs, or gallops  ABDOMEN: Soft, mild diffuse tenderness in all 4 quadrants   EXTREMITIES:  2+ Peripheral Pulses, No clubbing, cyanosis, or edema  PSYCH: AAOx3  NEUROLOGY: non-focal  SKIN: No rashes or lesions      HOSPITAL MEDICATIONS:  MEDICATIONS  (STANDING):  dextrose 5%. 1000 milliLiter(s) (50 mL/Hr) IV Continuous <Continuous>  dextrose 50% Injectable 12.5 Gram(s) IV Push once  dextrose 50% Injectable 25 Gram(s) IV Push once  dextrose 50% Injectable 25 Gram(s) IV Push once  influenza   Vaccine 0.5 milliLiter(s) IntraMuscular once  insulin glargine Injectable (LANTUS) 20 Unit(s) SubCutaneous at bedtime  insulin lispro (HumaLOG) corrective regimen sliding scale   SubCutaneous every 6 hours  lactated ringers. 1000 milliLiter(s) (200 mL/Hr) IV Continuous <Continuous>    MEDICATIONS  (PRN):  dextrose 40% Gel 15 Gram(s) Oral once PRN Blood Glucose LESS THAN 70 milliGRAM(s)/deciliter  glucagon  Injectable 1 milliGRAM(s) IntraMuscular once PRN Glucose LESS THAN 70 milligrams/deciliter  ondansetron Injectable 4 milliGRAM(s) IV Push every 8 hours PRN Nausea and/or Vomiting      LABS:                        13.7   18.93 )-----------( 281      ( 27 Dec 2019 05:30 )             42.2         142  |  103  |  18  ----------------------------<  225<H>  4.2   |  17<L>  |  0.91    Ca    9.4      27 Dec 2019 09:49  Phos  2.2       Mg     2.1         TPro  7.3  /  Alb  3.8  /  TBili  0.8  /  DBili  x   /  AST  73<H>  /  ALT  105<H>  /  AlkPhos  113        Urinalysis Basic - ( 26 Dec 2019 17:43 )    Color: LIGHT YELLOW / Appearance: CLEAR / S.035 / pH: 5.5  Gluc: >1000 / Ketone: LARGE  / Bili: NEGATIVE / Urobili: NORMAL   Blood: NEGATIVE / Protein: 20 / Nitrite: NEGATIVE   Leuk Esterase: NEGATIVE / RBC: 0-2 / WBC 0-2   Sq Epi: OCC / Non Sq Epi: x / Bacteria: NEGATIVE        Venous Blood Gas:   @ 09:49  7.37/35/43/21/79.8  VBG Lactate: --  Venous Blood Gas:   @ 22:30  7.42/40/41/25/78.4  VBG Lactate: 2.5  Venous Blood Gas:   @ 19:38  7.31/47/31/21/54.4  VBG Lactate: 3.8  Venous Blood Gas:   @ 15:41  7.26/48/29/18/48.6  VBG Lactate: 6.6      MICROBIOLOGY:     RADIOLOGY:  < from: CT Chest w/ IV Cont (19 @ 01:36) >  FINDINGS:    LUNGS AND AIRWAYS: Patentcentral airways.  No acute infiltrates or consolidative opacity.    PLEURA: No pleural effusion.    MEDIASTINUM AND SHERON: No lymphadenopathy.    VESSELS: Within normal limits.    HEART: Heart size is normal. No pericardial effusion.    CHEST WALL ANDLOWER NECK: Within normal limits.    VISUALIZED UPPER ABDOMEN: Bochdalek hernia with herniation of portion of the liver into the lower right hemithorax. There is mild wall thickening of the thoracic esophagus. This may be related to gastroesophageal reflux disease or esophagitis. Consider nonemergent endoscopic evaluation to exclude underlying lesion.    BONES: Within normal limits.    IMPRESSION:     No consolidation or pleural effusion.    Right sided Bochdalek hernia containing portion of liver.    Mild wall thickening of the thoracic esophagus. This may be related to gastroesophageal reflux disease or esophagitis. Consider nonemergent endoscopic evaluation to exclude underlying lesion.    < end of copied text >      EKG:

## 2019-12-27 NOTE — H&P ADULT - PROBLEM SELECTOR PLAN 5
SCDs, NPO 1.  Name of PCP:  2.  PCP Contacted on Admission: [ ] Y    [ ] N    3.  PCP contacted at Discharge: [ ] Y    [ ] N    [ ] N/A  4.  Post-Discharge Appointment Date and Location:  5.  Summary of Handoff given to PCP:

## 2019-12-27 NOTE — PROVIDER CONTACT NOTE (OTHER) - RECOMMENDATIONS
Give correctional dose of sliding scale insulin and continue to monitor. recheck glucose in one hour.

## 2019-12-27 NOTE — H&P ADULT - PROBLEM SELECTOR PLAN 1
Pt presented with sugars in 600s, elevated AG, ketones in urine with pH 7.2. Unclear trigger, possibly infectious given abdominal pain and n/v however from history unclear which came first. No diarrhea, fevers, chills, cough reported, however CXR does show abnormal opacity which needs further evaluation.  -unclear how compliant pt is as well with insulin as mother says he is freq hyperglycemic and pt just knows he is on "18 units of long acting and 10 units of short acting" Will need to confirm med rec. No endocrinologist following pt currently  -Pt reports missing last nights dose of lantus but took it this AM. In ED gap closing and pH normalized with humalog 10 units x2 and 4liters of LR  -will trend another bmp and restart lantus 15 units now with sliding scale q6h. Will keep NPO for now and continue IVF at 150cc/hr   -endocrine consult in AM. Trend electrolytes. check a1c

## 2019-12-27 NOTE — H&P ADULT - NSHPPHYSICALEXAM_GEN_ALL_CORE
PHYSICAL EXAM:  GENERAL: in moderate distress, laying in bed appears cachectic  HEAD:  Atraumatic, Normocephalic  EYES: EOMI, PERRLA, conjunctiva and sclera clear  NECK: Supple, No JVD  CHEST/LUNG: Clear to auscultation bilaterally; No wheezes, rales or rhonchi  HEART: tachycardic, regular rhythm, No murmurs, rubs, or gallops, (+)S1, S2  ABDOMEN: Soft, mildly ttp diffusely, no murphys, no rebound or guarding Normal Bowel sounds   EXTREMITIES:  2+ Peripheral Pulses, No clubbing, cyanosis, or edema  PSYCH: normal mood and affect  NEUROLOGY: AAOx3, non-focal  SKIN: No rashes or lesions

## 2019-12-27 NOTE — CONSULT NOTE ADULT - ASSESSMENT
This is a 28 y/o male with hx of type 1DM who presents to the ED with 2 days of nausea/vomiting (NBNB) and abdominal pain which started all of a sudden.   Consulted for T1DM and DKA

## 2019-12-28 LAB
ALBUMIN SERPL ELPH-MCNC: 3.6 G/DL — SIGNIFICANT CHANGE UP (ref 3.3–5)
ALP SERPL-CCNC: 107 U/L — SIGNIFICANT CHANGE UP (ref 40–120)
ALT FLD-CCNC: 79 U/L — HIGH (ref 4–41)
AMPHET UR-MCNC: NEGATIVE — SIGNIFICANT CHANGE UP
ANION GAP SERPL CALC-SCNC: 12 MMO/L — SIGNIFICANT CHANGE UP (ref 7–14)
ANION GAP SERPL CALC-SCNC: 13 MMO/L — SIGNIFICANT CHANGE UP (ref 7–14)
ANION GAP SERPL CALC-SCNC: 13 MMO/L — SIGNIFICANT CHANGE UP (ref 7–14)
ANION GAP SERPL CALC-SCNC: 14 MMO/L — SIGNIFICANT CHANGE UP (ref 7–14)
ANION GAP SERPL CALC-SCNC: 18 MMO/L — HIGH (ref 7–14)
AST SERPL-CCNC: 60 U/L — HIGH (ref 4–40)
B PERT DNA SPEC QL NAA+PROBE: NOT DETECTED — SIGNIFICANT CHANGE UP
B-OH-BUTYR SERPL-SCNC: 0.9 MMOL/L — HIGH (ref 0–0.4)
B-OH-BUTYR SERPL-SCNC: 1.3 MMOL/L — HIGH (ref 0–0.4)
BARBITURATES UR SCN-MCNC: NEGATIVE — SIGNIFICANT CHANGE UP
BASE EXCESS BLDV CALC-SCNC: -0.5 MMOL/L — SIGNIFICANT CHANGE UP
BASE EXCESS BLDV CALC-SCNC: 1.1 MMOL/L — SIGNIFICANT CHANGE UP
BASOPHILS # BLD AUTO: 0.03 K/UL — SIGNIFICANT CHANGE UP (ref 0–0.2)
BASOPHILS NFR BLD AUTO: 0.2 % — SIGNIFICANT CHANGE UP (ref 0–2)
BENZODIAZ UR-MCNC: NEGATIVE — SIGNIFICANT CHANGE UP
BILIRUB SERPL-MCNC: 0.8 MG/DL — SIGNIFICANT CHANGE UP (ref 0.2–1.2)
BLOOD GAS VENOUS - CREATININE: 0.77 MG/DL — SIGNIFICANT CHANGE UP (ref 0.5–1.3)
BLOOD GAS VENOUS - CREATININE: 0.79 MG/DL — SIGNIFICANT CHANGE UP (ref 0.5–1.3)
BUN SERPL-MCNC: 11 MG/DL — SIGNIFICANT CHANGE UP (ref 7–23)
BUN SERPL-MCNC: 12 MG/DL — SIGNIFICANT CHANGE UP (ref 7–23)
BUN SERPL-MCNC: 13 MG/DL — SIGNIFICANT CHANGE UP (ref 7–23)
BUN SERPL-MCNC: 14 MG/DL — SIGNIFICANT CHANGE UP (ref 7–23)
BUN SERPL-MCNC: 9 MG/DL — SIGNIFICANT CHANGE UP (ref 7–23)
C PNEUM DNA SPEC QL NAA+PROBE: NOT DETECTED — SIGNIFICANT CHANGE UP
CALCIUM SERPL-MCNC: 8.7 MG/DL — SIGNIFICANT CHANGE UP (ref 8.4–10.5)
CALCIUM SERPL-MCNC: 8.8 MG/DL — SIGNIFICANT CHANGE UP (ref 8.4–10.5)
CALCIUM SERPL-MCNC: 8.8 MG/DL — SIGNIFICANT CHANGE UP (ref 8.4–10.5)
CALCIUM SERPL-MCNC: 9.1 MG/DL — SIGNIFICANT CHANGE UP (ref 8.4–10.5)
CALCIUM SERPL-MCNC: 9.5 MG/DL — SIGNIFICANT CHANGE UP (ref 8.4–10.5)
CANNABINOIDS UR-MCNC: NEGATIVE — SIGNIFICANT CHANGE UP
CHLORIDE BLDV-SCNC: 108 MMOL/L — SIGNIFICANT CHANGE UP (ref 96–108)
CHLORIDE BLDV-SCNC: 113 MMOL/L — HIGH (ref 96–108)
CHLORIDE SERPL-SCNC: 106 MMOL/L — SIGNIFICANT CHANGE UP (ref 98–107)
CHLORIDE SERPL-SCNC: 107 MMOL/L — SIGNIFICANT CHANGE UP (ref 98–107)
CHLORIDE SERPL-SCNC: 109 MMOL/L — HIGH (ref 98–107)
CHLORIDE SERPL-SCNC: 110 MMOL/L — HIGH (ref 98–107)
CHLORIDE SERPL-SCNC: 111 MMOL/L — HIGH (ref 98–107)
CO2 SERPL-SCNC: 18 MMOL/L — LOW (ref 22–31)
CO2 SERPL-SCNC: 19 MMOL/L — LOW (ref 22–31)
CO2 SERPL-SCNC: 20 MMOL/L — LOW (ref 22–31)
CO2 SERPL-SCNC: 22 MMOL/L — SIGNIFICANT CHANGE UP (ref 22–31)
CO2 SERPL-SCNC: 22 MMOL/L — SIGNIFICANT CHANGE UP (ref 22–31)
COCAINE METAB.OTHER UR-MCNC: NEGATIVE — SIGNIFICANT CHANGE UP
CREAT SERPL-MCNC: 0.73 MG/DL — SIGNIFICANT CHANGE UP (ref 0.5–1.3)
CREAT SERPL-MCNC: 0.78 MG/DL — SIGNIFICANT CHANGE UP (ref 0.5–1.3)
CREAT SERPL-MCNC: 0.8 MG/DL — SIGNIFICANT CHANGE UP (ref 0.5–1.3)
CREAT SERPL-MCNC: 0.87 MG/DL — SIGNIFICANT CHANGE UP (ref 0.5–1.3)
CREAT SERPL-MCNC: 0.89 MG/DL — SIGNIFICANT CHANGE UP (ref 0.5–1.3)
EOSINOPHIL # BLD AUTO: 0 K/UL — SIGNIFICANT CHANGE UP (ref 0–0.5)
EOSINOPHIL NFR BLD AUTO: 0 % — SIGNIFICANT CHANGE UP (ref 0–6)
FLUAV H1 2009 PAND RNA SPEC QL NAA+PROBE: NOT DETECTED — SIGNIFICANT CHANGE UP
FLUAV H1 RNA SPEC QL NAA+PROBE: NOT DETECTED — SIGNIFICANT CHANGE UP
FLUAV H3 RNA SPEC QL NAA+PROBE: NOT DETECTED — SIGNIFICANT CHANGE UP
FLUAV SUBTYP SPEC NAA+PROBE: NOT DETECTED — SIGNIFICANT CHANGE UP
FLUBV RNA SPEC QL NAA+PROBE: NOT DETECTED — SIGNIFICANT CHANGE UP
GAS PNL BLDV: 142 MMOL/L — SIGNIFICANT CHANGE UP (ref 136–146)
GAS PNL BLDV: 143 MMOL/L — SIGNIFICANT CHANGE UP (ref 136–146)
GLUCOSE BLDV-MCNC: 162 MG/DL — HIGH (ref 70–99)
GLUCOSE BLDV-MCNC: 99 MG/DL — SIGNIFICANT CHANGE UP (ref 70–99)
GLUCOSE SERPL-MCNC: 103 MG/DL — HIGH (ref 70–99)
GLUCOSE SERPL-MCNC: 112 MG/DL — HIGH (ref 70–99)
GLUCOSE SERPL-MCNC: 153 MG/DL — HIGH (ref 70–99)
GLUCOSE SERPL-MCNC: 159 MG/DL — HIGH (ref 70–99)
GLUCOSE SERPL-MCNC: 193 MG/DL — HIGH (ref 70–99)
HADV DNA SPEC QL NAA+PROBE: NOT DETECTED — SIGNIFICANT CHANGE UP
HBA1C BLD-MCNC: 14.5 % — HIGH (ref 4–5.6)
HCO3 BLDV-SCNC: 23 MMOL/L — SIGNIFICANT CHANGE UP (ref 20–27)
HCO3 BLDV-SCNC: 25 MMOL/L — SIGNIFICANT CHANGE UP (ref 20–27)
HCOV PNL SPEC NAA+PROBE: SIGNIFICANT CHANGE UP
HCT VFR BLD CALC: 40.3 % — SIGNIFICANT CHANGE UP (ref 39–50)
HCT VFR BLDV CALC: 39.1 % — SIGNIFICANT CHANGE UP (ref 39–51)
HCT VFR BLDV CALC: 40.2 % — SIGNIFICANT CHANGE UP (ref 39–51)
HGB BLD-MCNC: 13.3 G/DL — SIGNIFICANT CHANGE UP (ref 13–17)
HGB BLDV-MCNC: 12.7 G/DL — LOW (ref 13–17)
HGB BLDV-MCNC: 13.1 G/DL — SIGNIFICANT CHANGE UP (ref 13–17)
HMPV RNA SPEC QL NAA+PROBE: NOT DETECTED — SIGNIFICANT CHANGE UP
HPIV1 RNA SPEC QL NAA+PROBE: NOT DETECTED — SIGNIFICANT CHANGE UP
HPIV2 RNA SPEC QL NAA+PROBE: NOT DETECTED — SIGNIFICANT CHANGE UP
HPIV3 RNA SPEC QL NAA+PROBE: NOT DETECTED — SIGNIFICANT CHANGE UP
HPIV4 RNA SPEC QL NAA+PROBE: NOT DETECTED — SIGNIFICANT CHANGE UP
IMM GRANULOCYTES NFR BLD AUTO: 0.4 % — SIGNIFICANT CHANGE UP (ref 0–1.5)
LACTATE BLDV-MCNC: 1.4 MMOL/L — SIGNIFICANT CHANGE UP (ref 0.5–2)
LACTATE BLDV-MCNC: 1.5 MMOL/L — SIGNIFICANT CHANGE UP (ref 0.5–2)
LYMPHOCYTES # BLD AUTO: 1.88 K/UL — SIGNIFICANT CHANGE UP (ref 1–3.3)
LYMPHOCYTES # BLD AUTO: 11.5 % — LOW (ref 13–44)
MAGNESIUM SERPL-MCNC: 1.5 MG/DL — LOW (ref 1.6–2.6)
MAGNESIUM SERPL-MCNC: 1.7 MG/DL — SIGNIFICANT CHANGE UP (ref 1.6–2.6)
MAGNESIUM SERPL-MCNC: 1.8 MG/DL — SIGNIFICANT CHANGE UP (ref 1.6–2.6)
MAGNESIUM SERPL-MCNC: 1.9 MG/DL — SIGNIFICANT CHANGE UP (ref 1.6–2.6)
MAGNESIUM SERPL-MCNC: 2.1 MG/DL — SIGNIFICANT CHANGE UP (ref 1.6–2.6)
MCHC RBC-ENTMCNC: 30.2 PG — SIGNIFICANT CHANGE UP (ref 27–34)
MCHC RBC-ENTMCNC: 33 % — SIGNIFICANT CHANGE UP (ref 32–36)
MCV RBC AUTO: 91.4 FL — SIGNIFICANT CHANGE UP (ref 80–100)
METHADONE UR-MCNC: NEGATIVE — SIGNIFICANT CHANGE UP
MONOCYTES # BLD AUTO: 1.08 K/UL — HIGH (ref 0–0.9)
MONOCYTES NFR BLD AUTO: 6.6 % — SIGNIFICANT CHANGE UP (ref 2–14)
NEUTROPHILS # BLD AUTO: 13.27 K/UL — HIGH (ref 1.8–7.4)
NEUTROPHILS NFR BLD AUTO: 81.3 % — HIGH (ref 43–77)
NRBC # FLD: 0 K/UL — SIGNIFICANT CHANGE UP (ref 0–0)
OPIATES UR-MCNC: NEGATIVE — SIGNIFICANT CHANGE UP
OXYCODONE UR-MCNC: NEGATIVE — SIGNIFICANT CHANGE UP
PCO2 BLDV: 43 MMHG — SIGNIFICANT CHANGE UP (ref 41–51)
PCO2 BLDV: 43 MMHG — SIGNIFICANT CHANGE UP (ref 41–51)
PCP UR-MCNC: NEGATIVE — SIGNIFICANT CHANGE UP
PH BLDV: 7.37 PH — SIGNIFICANT CHANGE UP (ref 7.32–7.43)
PH BLDV: 7.39 PH — SIGNIFICANT CHANGE UP (ref 7.32–7.43)
PHOSPHATE SERPL-MCNC: 1.3 MG/DL — LOW (ref 2.5–4.5)
PHOSPHATE SERPL-MCNC: 2.1 MG/DL — LOW (ref 2.5–4.5)
PHOSPHATE SERPL-MCNC: 2.9 MG/DL — SIGNIFICANT CHANGE UP (ref 2.5–4.5)
PHOSPHATE SERPL-MCNC: 3 MG/DL — SIGNIFICANT CHANGE UP (ref 2.5–4.5)
PHOSPHATE SERPL-MCNC: 3 MG/DL — SIGNIFICANT CHANGE UP (ref 2.5–4.5)
PLATELET # BLD AUTO: 253 K/UL — SIGNIFICANT CHANGE UP (ref 150–400)
PMV BLD: 10.7 FL — SIGNIFICANT CHANGE UP (ref 7–13)
PO2 BLDV: 36 MMHG — SIGNIFICANT CHANGE UP (ref 35–40)
PO2 BLDV: 47 MMHG — HIGH (ref 35–40)
POTASSIUM BLDV-SCNC: 3.1 MMOL/L — LOW (ref 3.4–4.5)
POTASSIUM BLDV-SCNC: 3.2 MMOL/L — LOW (ref 3.4–4.5)
POTASSIUM SERPL-MCNC: 3.3 MMOL/L — LOW (ref 3.5–5.3)
POTASSIUM SERPL-MCNC: 3.4 MMOL/L — LOW (ref 3.5–5.3)
POTASSIUM SERPL-MCNC: 3.4 MMOL/L — LOW (ref 3.5–5.3)
POTASSIUM SERPL-MCNC: 4 MMOL/L — SIGNIFICANT CHANGE UP (ref 3.5–5.3)
POTASSIUM SERPL-MCNC: 4.3 MMOL/L — SIGNIFICANT CHANGE UP (ref 3.5–5.3)
POTASSIUM SERPL-SCNC: 3.3 MMOL/L — LOW (ref 3.5–5.3)
POTASSIUM SERPL-SCNC: 3.4 MMOL/L — LOW (ref 3.5–5.3)
POTASSIUM SERPL-SCNC: 3.4 MMOL/L — LOW (ref 3.5–5.3)
POTASSIUM SERPL-SCNC: 4 MMOL/L — SIGNIFICANT CHANGE UP (ref 3.5–5.3)
POTASSIUM SERPL-SCNC: 4.3 MMOL/L — SIGNIFICANT CHANGE UP (ref 3.5–5.3)
PROT SERPL-MCNC: 7.1 G/DL — SIGNIFICANT CHANGE UP (ref 6–8.3)
RBC # BLD: 4.41 M/UL — SIGNIFICANT CHANGE UP (ref 4.2–5.8)
RBC # FLD: 12.9 % — SIGNIFICANT CHANGE UP (ref 10.3–14.5)
RSV RNA SPEC QL NAA+PROBE: NOT DETECTED — SIGNIFICANT CHANGE UP
RV+EV RNA SPEC QL NAA+PROBE: NOT DETECTED — SIGNIFICANT CHANGE UP
SAO2 % BLDV: 68.2 % — SIGNIFICANT CHANGE UP (ref 60–85)
SAO2 % BLDV: 80.4 % — SIGNIFICANT CHANGE UP (ref 60–85)
SODIUM SERPL-SCNC: 142 MMOL/L — SIGNIFICANT CHANGE UP (ref 135–145)
SODIUM SERPL-SCNC: 143 MMOL/L — SIGNIFICANT CHANGE UP (ref 135–145)
SODIUM SERPL-SCNC: 145 MMOL/L — SIGNIFICANT CHANGE UP (ref 135–145)
WBC # BLD: 16.33 K/UL — HIGH (ref 3.8–10.5)
WBC # FLD AUTO: 16.33 K/UL — HIGH (ref 3.8–10.5)

## 2019-12-28 PROCEDURE — 99233 SBSQ HOSP IP/OBS HIGH 50: CPT

## 2019-12-28 PROCEDURE — 99232 SBSQ HOSP IP/OBS MODERATE 35: CPT

## 2019-12-28 RX ORDER — SODIUM CHLORIDE 9 MG/ML
1000 INJECTION, SOLUTION INTRAVENOUS
Refills: 0 | Status: DISCONTINUED | OUTPATIENT
Start: 2019-12-28 | End: 2019-12-28

## 2019-12-28 RX ORDER — ACETAMINOPHEN 500 MG
1000 TABLET ORAL ONCE
Refills: 0 | Status: COMPLETED | OUTPATIENT
Start: 2019-12-28 | End: 2019-12-28

## 2019-12-28 RX ORDER — POTASSIUM CHLORIDE 20 MEQ
10 PACKET (EA) ORAL
Refills: 0 | Status: COMPLETED | OUTPATIENT
Start: 2019-12-28 | End: 2019-12-29

## 2019-12-28 RX ORDER — METOCLOPRAMIDE HCL 10 MG
10 TABLET ORAL EVERY 6 HOURS
Refills: 0 | Status: DISCONTINUED | OUTPATIENT
Start: 2019-12-28 | End: 2019-12-29

## 2019-12-28 RX ORDER — FAMOTIDINE 10 MG/ML
20 INJECTION INTRAVENOUS ONCE
Refills: 0 | Status: COMPLETED | OUTPATIENT
Start: 2019-12-28 | End: 2019-12-28

## 2019-12-28 RX ORDER — SODIUM CHLORIDE 9 MG/ML
1000 INJECTION, SOLUTION INTRAVENOUS
Refills: 0 | Status: DISCONTINUED | OUTPATIENT
Start: 2019-12-28 | End: 2019-12-31

## 2019-12-28 RX ORDER — SODIUM,POTASSIUM PHOSPHATES 278-250MG
1 POWDER IN PACKET (EA) ORAL ONCE
Refills: 0 | Status: COMPLETED | OUTPATIENT
Start: 2019-12-28 | End: 2019-12-28

## 2019-12-28 RX ORDER — FAMOTIDINE 10 MG/ML
20 INJECTION INTRAVENOUS
Refills: 0 | Status: DISCONTINUED | OUTPATIENT
Start: 2019-12-28 | End: 2019-12-29

## 2019-12-28 RX ORDER — POTASSIUM PHOSPHATE, MONOBASIC POTASSIUM PHOSPHATE, DIBASIC 236; 224 MG/ML; MG/ML
30 INJECTION, SOLUTION INTRAVENOUS ONCE
Refills: 0 | Status: COMPLETED | OUTPATIENT
Start: 2019-12-28 | End: 2019-12-28

## 2019-12-28 RX ORDER — POTASSIUM PHOSPHATE, MONOBASIC POTASSIUM PHOSPHATE, DIBASIC 236; 224 MG/ML; MG/ML
15 INJECTION, SOLUTION INTRAVENOUS ONCE
Refills: 0 | Status: COMPLETED | OUTPATIENT
Start: 2019-12-28 | End: 2019-12-28

## 2019-12-28 RX ORDER — KETOROLAC TROMETHAMINE 30 MG/ML
15 SYRINGE (ML) INJECTION ONCE
Refills: 0 | Status: DISCONTINUED | OUTPATIENT
Start: 2019-12-28 | End: 2019-12-28

## 2019-12-28 RX ADMIN — POTASSIUM PHOSPHATE, MONOBASIC POTASSIUM PHOSPHATE, DIBASIC 62.5 MILLIMOLE(S): 236; 224 INJECTION, SOLUTION INTRAVENOUS at 22:30

## 2019-12-28 RX ADMIN — SODIUM CHLORIDE 200 MILLILITER(S): 9 INJECTION, SOLUTION INTRAVENOUS at 14:43

## 2019-12-28 RX ADMIN — Medication 100 MILLIEQUIVALENT(S): at 23:30

## 2019-12-28 RX ADMIN — SODIUM CHLORIDE 150 MILLILITER(S): 9 INJECTION, SOLUTION INTRAVENOUS at 08:43

## 2019-12-28 RX ADMIN — POTASSIUM PHOSPHATE, MONOBASIC POTASSIUM PHOSPHATE, DIBASIC 83.33 MILLIMOLE(S): 236; 224 INJECTION, SOLUTION INTRAVENOUS at 00:54

## 2019-12-28 RX ADMIN — Medication 400 MILLIGRAM(S): at 10:58

## 2019-12-28 RX ADMIN — FAMOTIDINE 20 MILLIGRAM(S): 10 INJECTION INTRAVENOUS at 14:41

## 2019-12-28 RX ADMIN — INSULIN HUMAN 5 UNIT(S)/HR: 100 INJECTION, SOLUTION SUBCUTANEOUS at 00:22

## 2019-12-28 RX ADMIN — Medication 30 MILLILITER(S): at 14:41

## 2019-12-28 RX ADMIN — Medication 100 MILLIEQUIVALENT(S): at 22:29

## 2019-12-28 RX ADMIN — SODIUM CHLORIDE 200 MILLILITER(S): 9 INJECTION, SOLUTION INTRAVENOUS at 20:00

## 2019-12-28 RX ADMIN — INSULIN HUMAN 5 UNIT(S)/HR: 100 INJECTION, SOLUTION SUBCUTANEOUS at 08:43

## 2019-12-28 RX ADMIN — ONDANSETRON 4 MILLIGRAM(S): 8 TABLET, FILM COATED ORAL at 08:39

## 2019-12-28 RX ADMIN — Medication 1000 MILLIGRAM(S): at 11:37

## 2019-12-28 RX ADMIN — SODIUM CHLORIDE 150 MILLILITER(S): 9 INJECTION, SOLUTION INTRAVENOUS at 00:22

## 2019-12-28 RX ADMIN — FAMOTIDINE 104 MILLIGRAM(S): 10 INJECTION INTRAVENOUS at 22:29

## 2019-12-28 RX ADMIN — POTASSIUM PHOSPHATE, MONOBASIC POTASSIUM PHOSPHATE, DIBASIC 83.33 MILLIMOLE(S): 236; 224 INJECTION, SOLUTION INTRAVENOUS at 08:43

## 2019-12-28 RX ADMIN — INSULIN HUMAN 1 UNIT(S)/HR: 100 INJECTION, SOLUTION SUBCUTANEOUS at 16:31

## 2019-12-28 RX ADMIN — ONDANSETRON 4 MILLIGRAM(S): 8 TABLET, FILM COATED ORAL at 23:55

## 2019-12-28 RX ADMIN — Medication 10 MILLIGRAM(S): at 23:55

## 2019-12-28 RX ADMIN — Medication 15 MILLIGRAM(S): at 16:53

## 2019-12-28 RX ADMIN — Medication 15 MILLIGRAM(S): at 16:31

## 2019-12-28 RX ADMIN — INSULIN HUMAN 1 UNIT(S)/HR: 100 INJECTION, SOLUTION SUBCUTANEOUS at 20:00

## 2019-12-28 NOTE — PROGRESS NOTE ADULT - ASSESSMENT
This is a 30 y/o male with hx of type 1DM who presents to the ED with 2 days of nausea/vomiting (NBNB) and abdominal pain which started all of a sudden.   Consulted for T1DM and DKA , a1c 14.5%    1) Type 1 DM, severely uncontrolled with DKA  - target fs is 140-180 mg/dl in ICU setting  - Beta hyrdroxybutyrate is 1.5  - Would continue to check AG and BHB q 4 hours until BHb is negative  - Would c/w NPO and IV insulin infusion following DKA/insulin gtt guidelines until beta hydroxy-butyrate is negative  - Once BHB negative, pt can be transitioned off of insulin gtt to basal bolus regimen  - When transitioning off of insulin gtt, would give lantus dose (based on insulin gtt calculations at time of transition) then insulin gtt would be discontinued 2 hours after lantus administered.  - Please call endocrine if planning to transition off of insulin gtt for dose recs.  If planning to transition now, would give Lantus 25 units, turn gtt off 2 hours later, and also start humalog 9 units sq tid ac- HOLD for skips meals- and start a low humalog correction scale ac and hs.    - will trend and follow

## 2019-12-28 NOTE — PROGRESS NOTE ADULT - ASSESSMENT
Pt is a 30 y/o male with hx of type 1DM who presents to the ED with 2 days of nausea/vomiting (NBNB) and abdominal pain a/w DKA; MICU consulted for insulin gtt treatment.    #Neuro  - No acute issues; Pt awake and alert. A&Ox3    #Cards  - No acute issues    #Pulm  - No acute issues.    #GI  - NPO    #Endo  T1DM: Pt on Lantus 18U qhs and 10U pre-meals at home. Pt reported that he missed his evening dose two days ago because he was came home tired from work and fell asleep.   DKA  - Pt found to be in DKA; initially came in with A that improved to 18 but then re-opened to 26. Pt was placed on Lantus 25qhs. Endocrine on board. Now recommending Insulin gtt.   - Start insulin gtt  - FS q1hr  - BMP q4hr  - A1c  - Will plan to transition to lantus once AG is normal on 2 repeat labs.   - Will overlap insulin gtt with lantus by 2 hours    #Heme  No acute issues    #Renal/  No acute issues. Pt is a 28 y/o male with hx of type 1DM who presents to the ED with 2 days of nausea/vomiting (NBNB) and abdominal pain a/w DKA; MICU consulted for insulin gtt treatment.    #Neuro  - No acute issues; Pt awake and alert. A&Ox3    #Cards  - No acute issues    #Pulm  - No acute issues.    #GI  - NPO    #Endo  T1DM: Pt on Lantus 18U qhs and 10U pre-meals at home. Pt reported that he missed his evening dose two days prior to admission because he was came home tired from work and fell asleep.   DKA  - AG now closed on two consecutive blood draws.   - Pt not tolerateing PO   - Start insulin gtt  - FS q1hr  - BMP q4hr  - A1c: 14.5  - Will plan to transition to lantus once AG is normal on 2 repeat labs.   - Will overlap insulin gtt with lantus by 2 hours    #Heme  No acute issues    #Renal/  No acute issues.

## 2019-12-28 NOTE — PROGRESS NOTE ADULT - SUBJECTIVE AND OBJECTIVE BOX
CHIEF COMPLAINT:    Interval Events:    REVIEW OF SYSTEMS:  Constitutional: [ ] negative [ ] fevers [ ] chills [ ] weight loss [ ] weight gain  HEENT: [ ] negative [ ] dry eyes [ ] eye irritation [ ] postnasal drip [ ] nasal congestion  CV: [ ] negative  [ ] chest pain [ ] orthopnea [ ] palpitations [ ] murmur  Resp: [ ] negative [ ] cough [ ] shortness of breath [ ] dyspnea [ ] wheezing [ ] sputum [ ] hemoptysis  GI: [ ] negative [ ] nausea [ ] vomiting [ ] diarrhea [ ] constipation [ ] abd pain [ ] dysphagia   : [ ] negative [ ] dysuria [ ] nocturia [ ] hematuria [ ] increased urinary frequency  Musculoskeletal: [ ] negative [ ] back pain [ ] myalgias [ ] arthralgias [ ] fracture  Skin: [ ] negative [ ] rash [ ] itch  Neurological: [ ] negative [ ] headache [ ] dizziness [ ] syncope [ ] weakness [ ] numbness  Psychiatric: [ ] negative [ ] anxiety [ ] depression  Endocrine: [ ] negative [ ] diabetes [ ] thyroid problem  Hematologic/Lymphatic: [ ] negative [ ] anemia [ ] bleeding problem  Allergic/Immunologic: [ ] negative [ ] itchy eyes [ ] nasal discharge [ ] hives [ ] angioedema  [ ] All other systems negative  [ ] Unable to assess ROS because ________    OBJECTIVE:  ICU Vital Signs Last 24 Hrs  T(C): 36.1 (28 Dec 2019 04:00), Max: 37.3 (27 Dec 2019 22:12)  T(F): 97 (28 Dec 2019 04:00), Max: 99.1 (27 Dec 2019 22:12)  HR: 79 (28 Dec 2019 07:00) (72 - 100)  BP: 97/58 (28 Dec 2019 07:00) (97/58 - 128/83)  BP(mean): 66 (28 Dec 2019 07:00) (66 - 91)  ABP: --  ABP(mean): --  RR: 13 (28 Dec 2019 07:00) (10 - 18)  SpO2: 99% (28 Dec 2019 07:00) (99% - 100%)         @ 07:01  -   @ 07:00  --------------------------------------------------------  IN: 1918 mL / OUT: 1150 mL / NET: 768 mL      CAPILLARY BLOOD GLUCOSE      POCT Blood Glucose.: 175 mg/dL (28 Dec 2019 07:04)      PHYSICAL EXAM:  General:   HEENT:   Lymph Nodes:  Neck:   Respiratory:   Cardiovascular:   Abdomen:   Extremities:   Skin:   Neurological:  Psychiatry:    LINES:    HOSPITAL MEDICATIONS:  Standing Meds:  dextrose 5% + sodium chloride 0.45%. 1000 milliLiter(s) IV Continuous <Continuous>  dextrose 5% + sodium chloride 0.9%. 1000 milliLiter(s) IV Continuous <Continuous>  dextrose 5%. 1000 milliLiter(s) IV Continuous <Continuous>  dextrose 50% Injectable 12.5 Gram(s) IV Push once  dextrose 50% Injectable 25 Gram(s) IV Push once  dextrose 50% Injectable 25 Gram(s) IV Push once  influenza   Vaccine 0.5 milliLiter(s) IntraMuscular once  insulin regular Infusion 5 Unit(s)/Hr IV Continuous <Continuous>  potassium phosphate IVPB 30 milliMole(s) IV Intermittent once      PRN Meds:  dextrose 40% Gel 15 Gram(s) Oral once PRN  glucagon  Injectable 1 milliGRAM(s) IntraMuscular once PRN  ondansetron Injectable 4 milliGRAM(s) IV Push every 8 hours PRN      LABS:                        13.3   16.33 )-----------( 253      ( 28 Dec 2019 02:05 )             40.3     Hgb Trend: 13.3<--, 13.7<--, 16.9<--      142  |  109<H>  |  13  ----------------------------<  193<H>  4.0   |  19<L>  |  0.87    Ca    9.1      28 Dec 2019 06:29  Phos  3.0       Mg     1.9         TPro  7.1  /  Alb  3.6  /  TBili  0.8  /  DBili  x   /  AST  60<H>  /  ALT  79<H>  /  AlkPhos  107      Creatinine Trend: 0.87<--, 0.89<--, 0.88<--, 0.88<--, 0.86<--, 0.91<--    Urinalysis Basic - ( 26 Dec 2019 17:43 )    Color: LIGHT YELLOW / Appearance: CLEAR / S.035 / pH: 5.5  Gluc: >1000 / Ketone: LARGE  / Bili: NEGATIVE / Urobili: NORMAL   Blood: NEGATIVE / Protein: 20 / Nitrite: NEGATIVE   Leuk Esterase: NEGATIVE / RBC: 0-2 / WBC 0-2   Sq Epi: OCC / Non Sq Epi: x / Bacteria: NEGATIVE        Venous Blood Gas:   @ 22:20  7.34/34/46/19/78.2  VBG Lactate: 1.5  Venous Blood Gas:   @ 18:28  7.35/38/45/20/78.2  VBG Lactate: --  Venous Blood Gas:   @ 14:11  7.33/37/35/19/67.5  VBG Lactate: --  Venous Blood Gas:   @ 09:49  7.37/35/43/21/79.8  VBG Lactate: --  Venous Blood Gas:   @ 22:30  7.42/40/41/25/78.4  VBG Lactate: 2.5  Venous Blood Gas:   @ 19:38  7.31/47/31/21/54.4  VBG Lactate: 3.8  Venous Blood Gas:   @ 15:41  7.26/48/29/18/48.6  VBG Lactate: 6.6      MICROBIOLOGY:     RADIOLOGY:  [ ] Reviewed and interpreted by me    EKG: CHIEF COMPLAINT:    Interval Events: No acute overnight events. Pt continued to report abdominal pain and nausea.     REVIEW OF SYSTEMS:  Constitutional: [ ] negative [ ] fevers [ ] chills [ ] weight loss [ ] weight gain  HEENT: [ ] negative [ ] dry eyes [ ] eye irritation [ ] postnasal drip [ ] nasal congestion  CV: [ ] negative  [ ] chest pain [ ] orthopnea [ ] palpitations [ ] murmur  Resp: [ ] negative [ ] cough [ ] shortness of breath [ ] dyspnea [ ] wheezing [ ] sputum [ ] hemoptysis  GI: [ ] negative [x ] nausea [ ] vomiting [ ] diarrhea [ ] constipation [x ] abd pain [ ] dysphagia   : [ ] negative [ ] dysuria [ ] nocturia [ ] hematuria [ ] increased urinary frequency  Musculoskeletal: [ ] negative [ ] back pain [ ] myalgias [ ] arthralgias [ ] fracture  Skin: [ ] negative [ ] rash [ ] itch  Neurological: [ ] negative [ ] headache [ ] dizziness [ ] syncope [ ] weakness [ ] numbness  Psychiatric: [ ] negative [ ] anxiety [ ] depression  Endocrine: [ ] negative [ ] diabetes [ ] thyroid problem  Hematologic/Lymphatic: [ ] negative [ ] anemia [ ] bleeding problem  Allergic/Immunologic: [ ] negative [ ] itchy eyes [ ] nasal discharge [ ] hives [ ] angioedema  [ x] All other systems negative  [ ] Unable to assess ROS because ________    OBJECTIVE:  ICU Vital Signs Last 24 Hrs  T(C): 36.1 (28 Dec 2019 04:00), Max: 37.3 (27 Dec 2019 22:12)  T(F): 97 (28 Dec 2019 04:00), Max: 99.1 (27 Dec 2019 22:12)  HR: 79 (28 Dec 2019 07:00) (72 - 100)  BP: 97/58 (28 Dec 2019 07:00) (97/58 - 128/83)  BP(mean): 66 (28 Dec 2019 07:00) (66 - 91)  ABP: --  ABP(mean): --  RR: 13 (28 Dec 2019 07:00) (10 - 18)  SpO2: 99% (28 Dec 2019 07:00) (99% - 100%)         @ 07:01  -   @ 07:00  --------------------------------------------------------  IN: 1918 mL / OUT: 1150 mL / NET: 768 mL      CAPILLARY BLOOD GLUCOSE      POCT Blood Glucose.: 175 mg/dL (28 Dec 2019 07:04)      PHYSICAL EXAM:  GENERAL: Pt appear to be in discomfort.   HEAD:  Atraumatic, Normocephalic  EYES: EOMI, PERRLA, conjunctiva and sclera clear  NECK: Supple, No JVD  CHEST/LUNG: Clear to auscultation bilaterally; No wheeze  HEART: Regular rate and rhythm; No murmurs, rubs, or gallops  ABDOMEN: Soft, mild diffuse tenderness in all 4 quadrants   EXTREMITIES:  2+ Peripheral Pulses, No clubbing, cyanosis, or edema  PSYCH: AAOx3  NEUROLOGY: non-focal  SKIN: No rashes or lesions    LINES:    HOSPITAL MEDICATIONS:  Standing Meds:  dextrose 5% + sodium chloride 0.45%. 1000 milliLiter(s) IV Continuous <Continuous>  dextrose 5% + sodium chloride 0.9%. 1000 milliLiter(s) IV Continuous <Continuous>  dextrose 5%. 1000 milliLiter(s) IV Continuous <Continuous>  dextrose 50% Injectable 12.5 Gram(s) IV Push once  dextrose 50% Injectable 25 Gram(s) IV Push once  dextrose 50% Injectable 25 Gram(s) IV Push once  influenza   Vaccine 0.5 milliLiter(s) IntraMuscular once  insulin regular Infusion 5 Unit(s)/Hr IV Continuous <Continuous>  potassium phosphate IVPB 30 milliMole(s) IV Intermittent once      PRN Meds:  dextrose 40% Gel 15 Gram(s) Oral once PRN  glucagon  Injectable 1 milliGRAM(s) IntraMuscular once PRN  ondansetron Injectable 4 milliGRAM(s) IV Push every 8 hours PRN      LABS:                        13.3   16.33 )-----------( 253      ( 28 Dec 2019 02:05 )             40.3     Hgb Trend: 13.3<--, 13.7<--, 16.9<--      142  |  109<H>  |  13  ----------------------------<  193<H>  4.0   |  19<L>  |  0.87    Ca    9.1      28 Dec 2019 06:29  Phos  3.0       Mg     1.9         TPro  7.1  /  Alb  3.6  /  TBili  0.8  /  DBili  x   /  AST  60<H>  /  ALT  79<H>  /  AlkPhos  107      Creatinine Trend: 0.87<--, 0.89<--, 0.88<--, 0.88<--, 0.86<--, 0.91<--    Urinalysis Basic - ( 26 Dec 2019 17:43 )    Color: LIGHT YELLOW / Appearance: CLEAR / S.035 / pH: 5.5  Gluc: >1000 / Ketone: LARGE  / Bili: NEGATIVE / Urobili: NORMAL   Blood: NEGATIVE / Protein: 20 / Nitrite: NEGATIVE   Leuk Esterase: NEGATIVE / RBC: 0-2 / WBC 0-2   Sq Epi: OCC / Non Sq Epi: x / Bacteria: NEGATIVE        Venous Blood Gas:   @ 22:20  7.34/34/46/19/78.2  VBG Lactate: 1.5  Venous Blood Gas:   @ 18:28  7.35/38/45/20/78.2  VBG Lactate: --  Venous Blood Gas:   @ 14:11  7.33/37/35/19/67.5  VBG Lactate: --  Venous Blood Gas:   @ 09:49  7.37/35/43/21/79.8  VBG Lactate: --  Venous Blood Gas:   @ 22:30  7.42/40/41/25/78.4  VBG Lactate: 2.5  Venous Blood Gas:   @ 19:38  7.31/47/31/21/54.4  VBG Lactate: 3.8  Venous Blood Gas:   @ 15:41  7.26/48/29/18/48.6  VBG Lactate: 6.6      MICROBIOLOGY:     RADIOLOGY:  [ ] Reviewed and interpreted by me    EKG:

## 2019-12-29 LAB
ANION GAP SERPL CALC-SCNC: 11 MMO/L — SIGNIFICANT CHANGE UP (ref 7–14)
ANION GAP SERPL CALC-SCNC: 11 MMO/L — SIGNIFICANT CHANGE UP (ref 7–14)
ANION GAP SERPL CALC-SCNC: 12 MMO/L — SIGNIFICANT CHANGE UP (ref 7–14)
ANION GAP SERPL CALC-SCNC: 12 MMO/L — SIGNIFICANT CHANGE UP (ref 7–14)
APAP SERPL-MCNC: < 15 UG/ML — LOW (ref 15–25)
B-OH-BUTYR SERPL-SCNC: 1.6 MMOL/L — HIGH (ref 0–0.4)
B-OH-BUTYR SERPL-SCNC: < 0 MMOL/L — LOW (ref 0–0.4)
BASOPHILS # BLD AUTO: 0.03 K/UL — SIGNIFICANT CHANGE UP (ref 0–0.2)
BASOPHILS NFR BLD AUTO: 0.4 % — SIGNIFICANT CHANGE UP (ref 0–2)
BUN SERPL-MCNC: 5 MG/DL — LOW (ref 7–23)
BUN SERPL-MCNC: 7 MG/DL — SIGNIFICANT CHANGE UP (ref 7–23)
BUN SERPL-MCNC: 8 MG/DL — SIGNIFICANT CHANGE UP (ref 7–23)
BUN SERPL-MCNC: 8 MG/DL — SIGNIFICANT CHANGE UP (ref 7–23)
CALCIUM SERPL-MCNC: 8.5 MG/DL — SIGNIFICANT CHANGE UP (ref 8.4–10.5)
CALCIUM SERPL-MCNC: 8.6 MG/DL — SIGNIFICANT CHANGE UP (ref 8.4–10.5)
CHLORIDE SERPL-SCNC: 105 MMOL/L — SIGNIFICANT CHANGE UP (ref 98–107)
CHLORIDE SERPL-SCNC: 108 MMOL/L — HIGH (ref 98–107)
CHLORIDE SERPL-SCNC: 108 MMOL/L — HIGH (ref 98–107)
CHLORIDE SERPL-SCNC: 110 MMOL/L — HIGH (ref 98–107)
CO2 SERPL-SCNC: 18 MMOL/L — LOW (ref 22–31)
CO2 SERPL-SCNC: 19 MMOL/L — LOW (ref 22–31)
CO2 SERPL-SCNC: 22 MMOL/L — SIGNIFICANT CHANGE UP (ref 22–31)
CO2 SERPL-SCNC: 23 MMOL/L — SIGNIFICANT CHANGE UP (ref 22–31)
CREAT SERPL-MCNC: 0.71 MG/DL — SIGNIFICANT CHANGE UP (ref 0.5–1.3)
CREAT SERPL-MCNC: 0.71 MG/DL — SIGNIFICANT CHANGE UP (ref 0.5–1.3)
CREAT SERPL-MCNC: 0.74 MG/DL — SIGNIFICANT CHANGE UP (ref 0.5–1.3)
CREAT SERPL-MCNC: 0.74 MG/DL — SIGNIFICANT CHANGE UP (ref 0.5–1.3)
EOSINOPHIL # BLD AUTO: 0 K/UL — SIGNIFICANT CHANGE UP (ref 0–0.5)
EOSINOPHIL NFR BLD AUTO: 0 % — SIGNIFICANT CHANGE UP (ref 0–6)
ETHANOL BLD-MCNC: < 10 MG/DL — SIGNIFICANT CHANGE UP
GLUCOSE SERPL-MCNC: 123 MG/DL — HIGH (ref 70–99)
GLUCOSE SERPL-MCNC: 154 MG/DL — HIGH (ref 70–99)
GLUCOSE SERPL-MCNC: 199 MG/DL — HIGH (ref 70–99)
GLUCOSE SERPL-MCNC: 242 MG/DL — HIGH (ref 70–99)
HCT VFR BLD CALC: 36.9 % — LOW (ref 39–50)
HGB BLD-MCNC: 11.9 G/DL — LOW (ref 13–17)
IMM GRANULOCYTES NFR BLD AUTO: 0.6 % — SIGNIFICANT CHANGE UP (ref 0–1.5)
LYMPHOCYTES # BLD AUTO: 1.71 K/UL — SIGNIFICANT CHANGE UP (ref 1–3.3)
LYMPHOCYTES # BLD AUTO: 21.5 % — SIGNIFICANT CHANGE UP (ref 13–44)
MAGNESIUM SERPL-MCNC: 1.4 MG/DL — LOW (ref 1.6–2.6)
MAGNESIUM SERPL-MCNC: 1.5 MG/DL — LOW (ref 1.6–2.6)
MAGNESIUM SERPL-MCNC: 1.8 MG/DL — SIGNIFICANT CHANGE UP (ref 1.6–2.6)
MAGNESIUM SERPL-MCNC: 2.1 MG/DL — SIGNIFICANT CHANGE UP (ref 1.6–2.6)
MCHC RBC-ENTMCNC: 29.8 PG — SIGNIFICANT CHANGE UP (ref 27–34)
MCHC RBC-ENTMCNC: 32.2 % — SIGNIFICANT CHANGE UP (ref 32–36)
MCV RBC AUTO: 92.5 FL — SIGNIFICANT CHANGE UP (ref 80–100)
MONOCYTES # BLD AUTO: 0.67 K/UL — SIGNIFICANT CHANGE UP (ref 0–0.9)
MONOCYTES NFR BLD AUTO: 8.4 % — SIGNIFICANT CHANGE UP (ref 2–14)
NEUTROPHILS # BLD AUTO: 5.49 K/UL — SIGNIFICANT CHANGE UP (ref 1.8–7.4)
NEUTROPHILS NFR BLD AUTO: 69.1 % — SIGNIFICANT CHANGE UP (ref 43–77)
NRBC # FLD: 0 K/UL — SIGNIFICANT CHANGE UP (ref 0–0)
PHOSPHATE SERPL-MCNC: 1.9 MG/DL — LOW (ref 2.5–4.5)
PHOSPHATE SERPL-MCNC: 2.8 MG/DL — SIGNIFICANT CHANGE UP (ref 2.5–4.5)
PHOSPHATE SERPL-MCNC: 2.8 MG/DL — SIGNIFICANT CHANGE UP (ref 2.5–4.5)
PHOSPHATE SERPL-MCNC: 3.1 MG/DL — SIGNIFICANT CHANGE UP (ref 2.5–4.5)
PLATELET # BLD AUTO: 195 K/UL — SIGNIFICANT CHANGE UP (ref 150–400)
PMV BLD: 10.6 FL — SIGNIFICANT CHANGE UP (ref 7–13)
POTASSIUM SERPL-MCNC: 3.3 MMOL/L — LOW (ref 3.5–5.3)
POTASSIUM SERPL-MCNC: 3.9 MMOL/L — SIGNIFICANT CHANGE UP (ref 3.5–5.3)
POTASSIUM SERPL-MCNC: 4 MMOL/L — SIGNIFICANT CHANGE UP (ref 3.5–5.3)
POTASSIUM SERPL-MCNC: 6.2 MMOL/L — CRITICAL HIGH (ref 3.5–5.3)
POTASSIUM SERPL-SCNC: 3.3 MMOL/L — LOW (ref 3.5–5.3)
POTASSIUM SERPL-SCNC: 3.9 MMOL/L — SIGNIFICANT CHANGE UP (ref 3.5–5.3)
POTASSIUM SERPL-SCNC: 4 MMOL/L — SIGNIFICANT CHANGE UP (ref 3.5–5.3)
POTASSIUM SERPL-SCNC: 6.2 MMOL/L — CRITICAL HIGH (ref 3.5–5.3)
RBC # BLD: 3.99 M/UL — LOW (ref 4.2–5.8)
RBC # FLD: 12.9 % — SIGNIFICANT CHANGE UP (ref 10.3–14.5)
SALICYLATES SERPL-MCNC: < 5 MG/DL — LOW (ref 15–30)
SODIUM SERPL-SCNC: 138 MMOL/L — SIGNIFICANT CHANGE UP (ref 135–145)
SODIUM SERPL-SCNC: 139 MMOL/L — SIGNIFICANT CHANGE UP (ref 135–145)
SODIUM SERPL-SCNC: 140 MMOL/L — SIGNIFICANT CHANGE UP (ref 135–145)
SODIUM SERPL-SCNC: 142 MMOL/L — SIGNIFICANT CHANGE UP (ref 135–145)
WBC # BLD: 7.95 K/UL — SIGNIFICANT CHANGE UP (ref 3.8–10.5)
WBC # FLD AUTO: 7.95 K/UL — SIGNIFICANT CHANGE UP (ref 3.8–10.5)

## 2019-12-29 PROCEDURE — 99232 SBSQ HOSP IP/OBS MODERATE 35: CPT | Mod: GC

## 2019-12-29 PROCEDURE — 99232 SBSQ HOSP IP/OBS MODERATE 35: CPT

## 2019-12-29 RX ORDER — ENOXAPARIN SODIUM 100 MG/ML
40 INJECTION SUBCUTANEOUS DAILY
Refills: 0 | Status: DISCONTINUED | OUTPATIENT
Start: 2019-12-29 | End: 2020-01-01

## 2019-12-29 RX ORDER — FAMOTIDINE 10 MG/ML
20 INJECTION INTRAVENOUS EVERY 24 HOURS
Refills: 0 | Status: DISCONTINUED | OUTPATIENT
Start: 2019-12-29 | End: 2019-12-29

## 2019-12-29 RX ORDER — INSULIN HUMAN 100 [IU]/ML
1 INJECTION, SOLUTION SUBCUTANEOUS
Qty: 100 | Refills: 0 | Status: DISCONTINUED | OUTPATIENT
Start: 2019-12-29 | End: 2019-12-31

## 2019-12-29 RX ORDER — FAMOTIDINE 10 MG/ML
20 INJECTION INTRAVENOUS DAILY
Refills: 0 | Status: DISCONTINUED | OUTPATIENT
Start: 2019-12-29 | End: 2020-01-02

## 2019-12-29 RX ORDER — PANTOPRAZOLE SODIUM 20 MG/1
40 TABLET, DELAYED RELEASE ORAL DAILY
Refills: 0 | Status: DISCONTINUED | OUTPATIENT
Start: 2019-12-29 | End: 2020-01-02

## 2019-12-29 RX ORDER — ACETAMINOPHEN 500 MG
1000 TABLET ORAL ONCE
Refills: 0 | Status: COMPLETED | OUTPATIENT
Start: 2019-12-29 | End: 2019-12-29

## 2019-12-29 RX ORDER — METOCLOPRAMIDE HCL 10 MG
10 TABLET ORAL EVERY 8 HOURS
Refills: 0 | Status: DISCONTINUED | OUTPATIENT
Start: 2019-12-29 | End: 2020-01-03

## 2019-12-29 RX ORDER — MAGNESIUM SULFATE 500 MG/ML
1 VIAL (ML) INJECTION ONCE
Refills: 0 | Status: COMPLETED | OUTPATIENT
Start: 2019-12-29 | End: 2019-12-29

## 2019-12-29 RX ORDER — POTASSIUM CHLORIDE 20 MEQ
10 PACKET (EA) ORAL
Refills: 0 | Status: COMPLETED | OUTPATIENT
Start: 2019-12-29 | End: 2019-12-29

## 2019-12-29 RX ADMIN — Medication 100 MILLIEQUIVALENT(S): at 00:30

## 2019-12-29 RX ADMIN — ENOXAPARIN SODIUM 40 MILLIGRAM(S): 100 INJECTION SUBCUTANEOUS at 15:01

## 2019-12-29 RX ADMIN — Medication 1000 MILLIGRAM(S): at 00:45

## 2019-12-29 RX ADMIN — SODIUM CHLORIDE 200 MILLILITER(S): 9 INJECTION, SOLUTION INTRAVENOUS at 20:00

## 2019-12-29 RX ADMIN — FAMOTIDINE 104 MILLIGRAM(S): 10 INJECTION INTRAVENOUS at 05:06

## 2019-12-29 RX ADMIN — Medication 100 MILLIEQUIVALENT(S): at 11:00

## 2019-12-29 RX ADMIN — Medication 10 MILLIGRAM(S): at 15:02

## 2019-12-29 RX ADMIN — Medication 400 MILLIGRAM(S): at 00:15

## 2019-12-29 RX ADMIN — Medication 100 GRAM(S): at 02:30

## 2019-12-29 RX ADMIN — SODIUM CHLORIDE 125 MILLILITER(S): 9 INJECTION, SOLUTION INTRAVENOUS at 21:14

## 2019-12-29 RX ADMIN — Medication 10 MILLIGRAM(S): at 21:21

## 2019-12-29 RX ADMIN — PANTOPRAZOLE SODIUM 40 MILLIGRAM(S): 20 TABLET, DELAYED RELEASE ORAL at 15:03

## 2019-12-29 RX ADMIN — Medication 100 MILLIEQUIVALENT(S): at 10:15

## 2019-12-29 RX ADMIN — ONDANSETRON 4 MILLIGRAM(S): 8 TABLET, FILM COATED ORAL at 08:50

## 2019-12-29 RX ADMIN — INSULIN HUMAN 2 UNIT(S)/HR: 100 INJECTION, SOLUTION SUBCUTANEOUS at 20:01

## 2019-12-29 RX ADMIN — Medication 85 MILLIMOLE(S): at 04:01

## 2019-12-29 RX ADMIN — Medication 10 MILLIGRAM(S): at 07:41

## 2019-12-29 RX ADMIN — INSULIN HUMAN 1 UNIT(S)/HR: 100 INJECTION, SOLUTION SUBCUTANEOUS at 07:42

## 2019-12-29 RX ADMIN — Medication 100 MILLIEQUIVALENT(S): at 08:50

## 2019-12-29 RX ADMIN — SODIUM CHLORIDE 200 MILLILITER(S): 9 INJECTION, SOLUTION INTRAVENOUS at 07:43

## 2019-12-29 RX ADMIN — SODIUM CHLORIDE 200 MILLILITER(S): 9 INJECTION, SOLUTION INTRAVENOUS at 10:15

## 2019-12-29 RX ADMIN — Medication 100 GRAM(S): at 05:06

## 2019-12-29 NOTE — PROGRESS NOTE ADULT - SUBJECTIVE AND OBJECTIVE BOX
Chief Complaint: uncontrolled Dm1, DKA    History:  patient started on diet yesterday. reports that he did not eat because was afraid of vomiting,.  today ate some breakfast but had nausea and reports "it came back up".  on insulin gtt    MEDICATIONS  (STANDING):  dextrose 5% + lactated ringers 1000 milliLiter(s) (200 mL/Hr) IV Continuous <Continuous>  dextrose 5%. 1000 milliLiter(s) (50 mL/Hr) IV Continuous <Continuous>  dextrose 50% Injectable 12.5 Gram(s) IV Push once  dextrose 50% Injectable 25 Gram(s) IV Push once  dextrose 50% Injectable 25 Gram(s) IV Push once  influenza   Vaccine 0.5 milliLiter(s) IntraMuscular once  insulin regular Infusion 3 Unit(s)/Hr (3 mL/Hr) IV Continuous <Continuous>    MEDICATIONS  (PRN):  dextrose 40% Gel 15 Gram(s) Oral once PRN Blood Glucose LESS THAN 70 milliGRAM(s)/deciliter  glucagon  Injectable 1 milliGRAM(s) IntraMuscular once PRN Glucose LESS THAN 70 milligrams/deciliter  metoclopramide Injectable 10 milliGRAM(s) IV Push every 6 hours PRN Nausea  ondansetron Injectable 4 milliGRAM(s) IV Push every 8 hours PRN Nausea and/or Vomiting      Allergies    No Known Allergies    Intolerances      Review of Systems:  Constitutional: No fever  Eyes: No blurry vision      ALL OTHER SYSTEMS REVIEWED AND NEGATIVE        PHYSICAL EXAM:  VITALS: T(C): 37.1 (12-28-19 @ 08:00)  T(F): 98.7 (12-28-19 @ 08:00), Max: 99.1 (12-27-19 @ 22:12)  HR: 68 (12-28-19 @ 12:00) (68 - 100)  BP: 115/74 (12-28-19 @ 12:00) (97/58 - 137/71)  RR:  (10 - 18)  SpO2:  (99% - 100%)  Wt(kg): --  GENERAL: NAD, well-groomed, well-developed  EYES: No proptosis, no lid lag, anicteric  GI: Soft, nontender, non distended  SKIN: Dry, intact, No rashes or lesions  PSYCH: Alert and oriented x 3, normal affect, normal mood      CAPILLARY BLOOD GLUCOSE      POCT Blood Glucose.: 114 mg/dL (28 Dec 2019 12:13)  POCT Blood Glucose.: 134 mg/dL (28 Dec 2019 11:10)  POCT Blood Glucose.: 154 mg/dL (28 Dec 2019 10:11)  POCT Blood Glucose.: 136 mg/dL (28 Dec 2019 09:18)  POCT Blood Glucose.: 133 mg/dL (28 Dec 2019 08:20)  POCT Blood Glucose.: 175 mg/dL (28 Dec 2019 07:04)  POCT Blood Glucose.: 174 mg/dL (28 Dec 2019 06:03)  POCT Blood Glucose.: 142 mg/dL (28 Dec 2019 05:11)  POCT Blood Glucose.: 145 mg/dL (28 Dec 2019 04:00)  POCT Blood Glucose.: 116 mg/dL (28 Dec 2019 03:05)  POCT Blood Glucose.: 135 mg/dL (28 Dec 2019 02:00)  POCT Blood Glucose.: 170 mg/dL (28 Dec 2019 00:57)  POCT Blood Glucose.: 223 mg/dL (27 Dec 2019 23:51)  POCT Blood Glucose.: 270 mg/dL (27 Dec 2019 20:42)  POCT Blood Glucose.: 182 mg/dL (27 Dec 2019 17:01)  POCT Blood Glucose.: 216 mg/dL (27 Dec 2019 13:49)      12-29    140  |  105  |  5<L>  ----------------------------<  199<H>  3.9   |  23  |  0.71    Ca    8.6      29 Dec 2019 10:44  Phos  3.1     12-29  Mg     1.8     12-29    TPro  7.1  /  Alb  3.6  /  TBili  0.8  /  DBili  x   /  AST  60<H>  /  ALT  79<H>  /  AlkPhos  107  12-28    Beta Hydroxy-Butyrate: < 0.0 mmol/L (12.29.19 @ 05:58)    Beta Hydroxy-Butyrate: 1.6 mmol/L (12.29.19 @ 02:10)    Anion Gap, Serum: 12 mmo/L (12.29.19 @ 10:44)        Thyroid Function Tests:  12-27 @ 05:30 TSH 1.79 FreeT4 -- T3 -- Anti TPO -- Anti Thyroglobulin Ab -- TSI --      Hemoglobin A1C, Whole Blood: 14.5 % <H> [4.0 - 5.6] (12-28-19 @ 02:05)

## 2019-12-29 NOTE — CONSULT NOTE ADULT - ASSESSMENT
Impression:  # Nausea/vomiting: Likely element of gastroparesis vs severe GERD vs less likely GOO. Pt symptoms appear acute but has long standing uncontroleld DM1 with A1c of 14.5.  # Esophageal thickening: Likely inflammatory changes from n/v with esophagitis and GERD. No risk factors of esophgeal cancer/neoplasm  # T1DM with DKA: resolving    Recommendation:  - can switch to standing reglan 10mg TID  - zofran prn for now  - add ppi 40mg po in the AM; in not tolerating can make IV ppi 40mg daily  - pepcid in the evening as needed  - monitor Qtc  - if symptoms persist, can check gastric emptying study, although results will be altered in setting of promotility agents  - small frequent meals and encourage ambulation  - minimize narcotics  - aggressive electrolyte repletion  - will consider EGD pending clinical course  - supportive care    Juan Sanderson  [phone]  [phone]  Please call/page on call fellow on weekends and weekdays after 5pm Impression:  # Nausea/vomiting: Likely element of gastroparesis vs severe GERD vs less likely GOO. Pt symptoms appear acute but has long standing uncontroleld DM1 with A1c of 14.5.  # Esophageal thickening: Likely inflammatory changes from n/v with esophagitis and GERD. No risk factors of esophgeal cancer/neoplasm  # T1DM with DKA: resolving    Recommendation:  - can switch to standing reglan 10mg TID  - zofran prn for now  - add ppi 40mg po in the AM; in not tolerating can make IV ppi 40mg daily  - pepcid in the evening as needed  - monitor Qtc  - if symptoms persist, can check gastric emptying study, although results will be altered in setting of promotility agents  - small frequent meals and encourage ambulation  - minimize narcotics  - aggressive electrolyte repletion  - will plan for EGD 12/31 to assess CT findings  - keep NPO midnight 12/30 evening  - supportive care    Juan Sanderson  [phone]  [phone]  Please call/page on call fellow on weekends and weekdays after 5pm

## 2019-12-29 NOTE — CONSULT NOTE ADULT - SUBJECTIVE AND OBJECTIVE BOX
Chief Complaint:  Patient is a 29y old  Male who presents with a chief complaint of abdominal pain and hyperglycemia (29 Dec 2019 07:28)      HPI: Pt is a 28 yo male with hx of type 1DM who presents to the ED with 2 days of nausea/vomiting (NBNB) and abdominal pain. Pt found to be in DKA; MICU consulted for insulin gtt treatment now with improvement. Pt with persistent n/v. No hematemesis or coffee grounds. Never had symptoms to this degree. Prior was tolerating po at home. has 1-2 BM daily. No issues with acid reflux or regurigtating food in past. Denies having abdominal pain prior after eating. No fever or chills, shortness of breath, cough.    Allergies:  No Known Allergies      Home Medications:    Hospital Medications:  aluminum hydroxide/magnesium hydroxide/simethicone Suspension 30 milliLiter(s) Oral every 4 hours PRN  dextrose 40% Gel 15 Gram(s) Oral once PRN  dextrose 5% + lactated ringers 1000 milliLiter(s) IV Continuous <Continuous>  dextrose 5%. 1000 milliLiter(s) IV Continuous <Continuous>  dextrose 50% Injectable 12.5 Gram(s) IV Push once  dextrose 50% Injectable 25 Gram(s) IV Push once  dextrose 50% Injectable 25 Gram(s) IV Push once  famotidine  IVPB 20 milliGRAM(s) IV Intermittent two times a day  glucagon  Injectable 1 milliGRAM(s) IntraMuscular once PRN  influenza   Vaccine 0.5 milliLiter(s) IntraMuscular once  insulin regular Infusion 1 Unit(s)/Hr IV Continuous <Continuous>  metoclopramide Injectable 10 milliGRAM(s) IV Push every 6 hours PRN  ondansetron Injectable 4 milliGRAM(s) IV Push every 8 hours PRN  potassium chloride  10 mEq/100 mL IVPB 10 milliEquivalent(s) IV Intermittent every 1 hour      PMHX/PSHX:  Diabetes  No significant past surgical history      Family history:  FH: diabetes mellitus      Social History:     ROS:     General:  No wt loss, fevers, chills, night sweats, fatigue,   Eyes:  Good vision, no reported pain  ENT:  No sore throat, pain, runny nose, dysphagia  CV:  No pain, palpitations, hypo/hypertension  Resp:  No dyspnea, cough, tachypnea, wheezing  GI:  See HPI  :  No pain, bleeding, incontinence, nocturia  Muscle:  No pain, weakness  Neuro:  No weakness, tingling, memory problems  Psych:  No fatigue, insomnia, mood problems, depression  Endocrine:  No polyuria, polydipsia, cold/heat intolerance  Heme:  No petechiae, ecchymosis, easy bruisability  Skin:  No rash, edema      PHYSICAL EXAM:     Vital Signs:  Vital Signs Last 24 Hrs  T(C): 37.2 (29 Dec 2019 08:00), Max: 37.6 (29 Dec 2019 00:00)  T(F): 98.9 (29 Dec 2019 08:00), Max: 99.6 (29 Dec 2019 00:00)  HR: 68 (29 Dec 2019 09:00) (67 - 86)  BP: 115/76 (29 Dec 2019 09:) (102/67 - 136/76)  BP(mean): 84 (29 Dec 2019 09:) (74 - 89)  RR: 11 (29 Dec 2019 09:) (10 - 15)  SpO2: 100% (29 Dec 2019 09:) (98% - 100%)  Daily     Daily Weight in k (29 Dec 2019 06:00)    GENERAL:  Appears stated age, well-groomed, well-nourished, no distress  HEENT:  NC/AT,  conjunctivae clear and pink  CHEST:  Full & symmetric excursion, no increased effort  HEART:  Regular rhythm, no JVD  ABDOMEN:  Soft, non-tender, non-distended, normoactive bowel sounds,  no masses , no hepatosplenomegaly  EXTREMITIES:  no cyanosis, clubbing or edema  SKIN:  No rash/erythema/ecchymoses/petechiae/wounds/abscess/warm/dry  NEURO:  Alert, oriented, nonfocal    LABS:                        11.9   7.95  )-----------( 195      ( 29 Dec 2019 02:10 )             36.9     12-    142  |  108<H>  |  7   ----------------------------<  123<H>  3.3<L>   |  22  |  0.74    Ca    8.5      29 Dec 2019 05:58  Phos  2.8     -  Mg     2.1     -    TPro  7.1  /  Alb  3.6  /  TBili  0.8  /  DBili  x   /  AST  60<H>  /  ALT  79<H>  /  AlkPhos  107  12    LIVER FUNCTIONS - ( 28 Dec 2019 02:05 )  Alb: 3.6 g/dL / Pro: 7.1 g/dL / ALK PHOS: 107 u/L / ALT: 79 u/L / AST: 60 u/L / GGT: x                   Imaging:    < from: CT Chest w/ IV Cont (19 @ 01:36) >      FINDINGS:    LUNGS AND AIRWAYS: Patentcentral airways.  No acute infiltrates or consolidative opacity.    PLEURA: No pleural effusion.    MEDIASTINUM AND SHERON: No lymphadenopathy.    VESSELS: Within normal limits.    HEART: Heart size is normal. No pericardial effusion.    CHEST WALL ANDLOWER NECK: Within normal limits.    VISUALIZED UPPER ABDOMEN: Bochdalek hernia with herniation of portion of the liver into the lower right hemithorax. There is mild wall thickening of the thoracic esophagus. This may be related to gastroesophageal reflux disease or esophagitis. Consider nonemergent endoscopic evaluation to exclude underlying lesion.    BONES: Within normal limits.    IMPRESSION:     No consolidation or pleural effusion.    Right sided Bochdalek hernia containing portion of liver.    Mild wall thickening of the thoracic esophagus. This may be related to gastroesophageal reflux disease or esophagitis. Consider nonemergent endoscopic evaluation to exclude underlying lesion.        < end of copied text >

## 2019-12-29 NOTE — PROGRESS NOTE ADULT - SUBJECTIVE AND OBJECTIVE BOX
OVERNIGHT EVENTS / SUBJECTIVE: Patient seen and examined at bedside.     OBJECTIVE:    VITAL SIGNS:  ICU Vital Signs Last 24 Hrs  T(C): 36.9 (29 Dec 2019 04:00), Max: 37.6 (29 Dec 2019 00:00)  T(F): 98.4 (29 Dec 2019 04:00), Max: 99.6 (29 Dec 2019 00:00)  HR: 67 (29 Dec 2019 07:00) (67 - 86)  BP: 117/79 (29 Dec 2019 07:00) (102/67 - 137/71)  BP(mean): 87 (29 Dec 2019 07:00) (74 - 89)  ABP: --  ABP(mean): --  RR: 13 (29 Dec 2019 07:00) (10 - 15)  SpO2: 100% (29 Dec 2019 07:00) (98% - 100%)        12-28 @ 07:01  -  12-29 @ 07:00  --------------------------------------------------------  IN: 6892 mL / OUT: 1475 mL / NET: 5417 mL      CAPILLARY BLOOD GLUCOSE      POCT Blood Glucose.: 109 mg/dL (29 Dec 2019 05:57)      PHYSICAL EXAM:  GENERAL: Pt appear to be in discomfort.   HEAD:  Atraumatic, Normocephalic  EYES: EOMI, PERRLA, conjunctiva and sclera clear  NECK: Supple, No JVD  CHEST/LUNG: Clear to auscultation bilaterally; No wheeze  HEART: Regular rate and rhythm; No murmurs, rubs, or gallops  ABDOMEN: Soft, mild diffuse tenderness in all 4 quadrants   EXTREMITIES:  2+ Peripheral Pulses, No clubbing, cyanosis, or edema  PSYCH: AAOx3  NEUROLOGY: non-focal  SKIN: No rashes or lesions    MEDICATIONS:  MEDICATIONS  (STANDING):  dextrose 5% + lactated ringers 1000 milliLiter(s) (200 mL/Hr) IV Continuous <Continuous>  dextrose 5%. 1000 milliLiter(s) (50 mL/Hr) IV Continuous <Continuous>  dextrose 50% Injectable 12.5 Gram(s) IV Push once  dextrose 50% Injectable 25 Gram(s) IV Push once  dextrose 50% Injectable 25 Gram(s) IV Push once  famotidine  IVPB 20 milliGRAM(s) IV Intermittent two times a day  influenza   Vaccine 0.5 milliLiter(s) IntraMuscular once  insulin regular Infusion 1 Unit(s)/Hr (1 mL/Hr) IV Continuous <Continuous>    MEDICATIONS  (PRN):  aluminum hydroxide/magnesium hydroxide/simethicone Suspension 30 milliLiter(s) Oral every 4 hours PRN Dyspepsia  dextrose 40% Gel 15 Gram(s) Oral once PRN Blood Glucose LESS THAN 70 milliGRAM(s)/deciliter  glucagon  Injectable 1 milliGRAM(s) IntraMuscular once PRN Glucose LESS THAN 70 milligrams/deciliter  metoclopramide Injectable 10 milliGRAM(s) IV Push every 6 hours PRN Nausea  ondansetron Injectable 4 milliGRAM(s) IV Push every 8 hours PRN Nausea and/or Vomiting      ALLERGIES:  Allergies    No Known Allergies    Intolerances        LABS:                        11.9   7.95  )-----------( 195      ( 29 Dec 2019 02:10 )             36.9     Hemoglobin: 11.9 g/dL (12-29 @ 02:10)  Hemoglobin: 13.3 g/dL (12-28 @ 02:05)  Hemoglobin: 13.7 g/dL (12-27 @ 05:30)  Hemoglobin: 16.9 g/dL (12-26 @ 15:41)    CBC Full  -  ( 29 Dec 2019 02:10 )  WBC Count : 7.95 K/uL  RBC Count : 3.99 M/uL  Hemoglobin : 11.9 g/dL  Hematocrit : 36.9 %  Platelet Count - Automated : 195 K/uL  Mean Cell Volume : 92.5 fL  Mean Cell Hemoglobin : 29.8 pg  Mean Cell Hemoglobin Concentration : 32.2 %  Auto Neutrophil # : 5.49 K/uL  Auto Lymphocyte # : 1.71 K/uL  Auto Monocyte # : 0.67 K/uL  Auto Eosinophil # : 0.00 K/uL  Auto Basophil # : 0.03 K/uL  Auto Neutrophil % : 69.1 %  Auto Lymphocyte % : 21.5 %  Auto Monocyte % : 8.4 %  Auto Eosinophil % : 0.0 %  Auto Basophil % : 0.4 %    12-29    139  |  110<H>  |  8   ----------------------------<  242<H>  4.0   |  18<L>  |  0.74    Ca    8.6      29 Dec 2019 02:10  Phos  1.9     12-29  Mg     1.4     12-29    TPro  7.1  /  Alb  3.6  /  TBili  0.8  /  DBili  x   /  AST  60<H>  /  ALT  79<H>  /  AlkPhos  107  12-28    Creatinine Trend: 0.74<--, 0.71<--, 0.73<--, 0.78<--, 0.80<--, 0.87<--  LIVER FUNCTIONS - ( 28 Dec 2019 02:05 )  Alb: 3.6 g/dL / Pro: 7.1 g/dL / ALK PHOS: 107 u/L / ALT: 79 u/L / AST: 60 u/L / GGT: x           hs Troponin:    15:00 - VBG - pH: 7.39  | pCO2: 43    | pO2: 36    | Lactate: 1.5    10:44 - VBG - pH: 7.37  | pCO2: 43    | pO2: 47    | Lactate: 1.4 OVERNIGHT EVENTS / SUBJECTIVE: Patient seen and examined at bedside.     OBJECTIVE: No acute events overnight. Pt reports persistent nausea, epigatric discomfort, and inability to tolerate PO intake. Has received reglan, zofran, tylenol prn with minimal relief. No fevers/chills, chest pain, SOB, diarrhea, urinary sxs, rashes.    VITAL SIGNS:  ICU Vital Signs Last 24 Hrs  T(C): 36.9 (29 Dec 2019 04:00), Max: 37.6 (29 Dec 2019 00:00)  T(F): 98.4 (29 Dec 2019 04:00), Max: 99.6 (29 Dec 2019 00:00)  HR: 67 (29 Dec 2019 07:00) (67 - 86)  BP: 117/79 (29 Dec 2019 07:00) (102/67 - 137/71)  BP(mean): 87 (29 Dec 2019 07:00) (74 - 89)  ABP: --  ABP(mean): --  RR: 13 (29 Dec 2019 07:00) (10 - 15)  SpO2: 100% (29 Dec 2019 07:00) (98% - 100%)      12-28 @ 07:01  -  12-29 @ 07:00  --------------------------------------------------------  IN: 6892 mL / OUT: 1475 mL / NET: 5417 mL      CAPILLARY BLOOD GLUCOSE      POCT Blood Glucose.: 109 mg/dL (29 Dec 2019 05:57)      PHYSICAL EXAM:  GENERAL: Laying in stretcher appears uncomfortable secondary to pain  HEAD:  Atraumatic, Normocephalic  EYES: EOMI, PERRLA, conjunctiva and sclera clear  NECK: Supple, No JVD  CHEST/LUNG: Clear to auscultation bilaterally; No wheeze  HEART: Regular rate and rhythm; No murmurs, rubs, or gallops  ABDOMEN: Soft, mild diffuse tenderness in all 4 quadrants, no rebound or guarding  EXTREMITIES:  2+ Peripheral Pulses, No clubbing, cyanosis, or edema  PSYCH: AAOx3  NEUROLOGY: non-focal  SKIN: No rashes or lesions    MEDICATIONS:  MEDICATIONS  (STANDING):  dextrose 5% + lactated ringers 1000 milliLiter(s) (200 mL/Hr) IV Continuous <Continuous>  dextrose 5%. 1000 milliLiter(s) (50 mL/Hr) IV Continuous <Continuous>  dextrose 50% Injectable 12.5 Gram(s) IV Push once  dextrose 50% Injectable 25 Gram(s) IV Push once  dextrose 50% Injectable 25 Gram(s) IV Push once  famotidine  IVPB 20 milliGRAM(s) IV Intermittent two times a day  influenza   Vaccine 0.5 milliLiter(s) IntraMuscular once  insulin regular Infusion 1 Unit(s)/Hr (1 mL/Hr) IV Continuous <Continuous>    MEDICATIONS  (PRN):  aluminum hydroxide/magnesium hydroxide/simethicone Suspension 30 milliLiter(s) Oral every 4 hours PRN Dyspepsia  dextrose 40% Gel 15 Gram(s) Oral once PRN Blood Glucose LESS THAN 70 milliGRAM(s)/deciliter  glucagon  Injectable 1 milliGRAM(s) IntraMuscular once PRN Glucose LESS THAN 70 milligrams/deciliter  metoclopramide Injectable 10 milliGRAM(s) IV Push every 6 hours PRN Nausea  ondansetron Injectable 4 milliGRAM(s) IV Push every 8 hours PRN Nausea and/or Vomiting      ALLERGIES:  Allergies    No Known Allergies    Intolerances        LABS:                        11.9   7.95  )-----------( 195      ( 29 Dec 2019 02:10 )             36.9     Hemoglobin: 11.9 g/dL (12-29 @ 02:10)  Hemoglobin: 13.3 g/dL (12-28 @ 02:05)  Hemoglobin: 13.7 g/dL (12-27 @ 05:30)  Hemoglobin: 16.9 g/dL (12-26 @ 15:41)    CBC Full  -  ( 29 Dec 2019 02:10 )  WBC Count : 7.95 K/uL  RBC Count : 3.99 M/uL  Hemoglobin : 11.9 g/dL  Hematocrit : 36.9 %  Platelet Count - Automated : 195 K/uL  Mean Cell Volume : 92.5 fL  Mean Cell Hemoglobin : 29.8 pg  Mean Cell Hemoglobin Concentration : 32.2 %  Auto Neutrophil # : 5.49 K/uL  Auto Lymphocyte # : 1.71 K/uL  Auto Monocyte # : 0.67 K/uL  Auto Eosinophil # : 0.00 K/uL  Auto Basophil # : 0.03 K/uL  Auto Neutrophil % : 69.1 %  Auto Lymphocyte % : 21.5 %  Auto Monocyte % : 8.4 %  Auto Eosinophil % : 0.0 %  Auto Basophil % : 0.4 %    12-29    139  |  110<H>  |  8   ----------------------------<  242<H>  4.0   |  18<L>  |  0.74    Ca    8.6      29 Dec 2019 02:10  Phos  1.9     12-29  Mg     1.4     12-29    TPro  7.1  /  Alb  3.6  /  TBili  0.8  /  DBili  x   /  AST  60<H>  /  ALT  79<H>  /  AlkPhos  107  12-28    Creatinine Trend: 0.74<--, 0.71<--, 0.73<--, 0.78<--, 0.80<--, 0.87<--  LIVER FUNCTIONS - ( 28 Dec 2019 02:05 )  Alb: 3.6 g/dL / Pro: 7.1 g/dL / ALK PHOS: 107 u/L / ALT: 79 u/L / AST: 60 u/L / GGT: x           hs Troponin:    15:00 - VBG - pH: 7.39  | pCO2: 43    | pO2: 36    | Lactate: 1.5    10:44 - VBG - pH: 7.37  | pCO2: 43    | pO2: 47    | Lactate: 1.4

## 2019-12-29 NOTE — CONSULT NOTE ADULT - ATTENDING COMMENTS
GI consulted for nausea, vomiting and abnormal CT findings.   Patient reports ongoing GERD and nausea/vomiting despite improvements in glucose control. Denies prior history of GI symptoms.   CT with esophageal thickening, likely c/w esophagitis. May have underlying gastroparesis.   Will tentatively plan for EGD tomorrow to further evaluate (patient amenable). Continue PPI.   Can have NM GES as outpatient pending clinical course.
Patient admitted with DKA, high AG, improving with management on floor with lantus overnight.  AG improving, would c/w IVF and SSI.  Will reach out to endocrine, may need NPH at this time. until he gets lantus later.
Type 1 DM now in DKA with worsened labs and looking clinically unwell.  Recommend transfer to MICU and insulin drip protocol. If delay in obtaining MICU transfer please increase frequency of moderate scale to q3h, add dextrose to IVF and raise Lantus to 20 units qhs. Check HbA1c.

## 2019-12-29 NOTE — PROGRESS NOTE ADULT - ASSESSMENT
This is a 30 y/o male with hx of type 1DM who presents to the ED with 2 days of nausea/vomiting (NBNB) and abdominal pain which started all of a sudden.   Consulted for T1DM and DKA , a1c 14.5%    1) Type 1 DM, severely uncontrolled with DKA  - target fs is 140-180 mg/dl in ICU setting  - Beta hyrdroxybutyrate is now 0.0  - Can transition patient to basal bolus once tolerating PO    - When transitioning off of insulin gtt, would give lantus dose (based on insulin gtt calculations at time of transition) then insulin gtt would be discontinued 2 hours after lantus administered.  - Please call endocrine if planning to transition off of insulin gtt for dose recs.  If planning to transition now, would give Lantus 18 units, turn gtt off 2 hours later, and also start humalog 6 units sq tid ac- HOLD for skips meals- and start a low humalog correction scale ac and hs.    - will continue to trend and follow    For discharge patient DOES NOT have insurance until February and will need to be on a cost effective plan for patient.  Will involve transition iof care pharmacist to assist with dc planning and education.

## 2019-12-29 NOTE — PROGRESS NOTE ADULT - ASSESSMENT
28 y/o male with hx of type 1DM who presents to the ED with 2 days of nausea/vomiting (NBNB) and abdominal pain a/w DKA; MICU consulted for insulin gtt treatment.    #Neuro  - No acute issues; Pt awake and alert. A&Ox3    #Cards  - No acute issues    #Pulm  - No acute issues.    #GI  - NPO    #Endo  T1DM: Pt on Lantus 18U qhs and 10U pre-meals at home. Pt reported that he missed his evening dose two days prior to admission because he was came home tired from work and fell asleep.   DKA  - AG now closed on two consecutive blood draws.   - Pt not tolerateing PO   - Start insulin gtt  - FS q1hr  - BMP q4hr  - A1c: 14.5  - Will plan to transition to lantus once AG is normal on 2 repeat labs.   - Will overlap insulin gtt with lantus by 2 hours    #Heme  No acute issues    #Renal/  No acute issues. 30 y/o male with hx of type 1DM who presents to the ED with 2 days of nausea/vomiting (NBNB) and abdominal pain a/w DKA; admitted to MICU for insulin gtt and further management.    #Neuro  - No acute issues; Pt awake and alert. A&Ox3    #Cards  - No acute issues  - daily EKG while on standing reglan    #Pulm  - No acute issues.    #GI  - Will attempt clear liquid diet today  - Will start standing reglan 10mg TID, protonix qday, pepcid qhs for sxs 2/2 possible GERD/gatroparesis  - F/u GI recs    #Endo  T1DM: Pt on Lantus 18U qhs and 10U pre-meals at home. Pt reported that he missed his evening dose two days prior to admission because he was came home tired from work and fell asleep.   DKA  - AG now closed on two consecutive blood draws.   - Pt not tolerating PO   - C/w insulin gtt, will transition to lantus when pt tolerating PO  - FS q1hr  - BMP qd  - A1c: 14.5    #Heme  No acute issues    #Renal/  No acute issues.    #PPX  - lovenox

## 2019-12-30 LAB
ALBUMIN SERPL ELPH-MCNC: 3 G/DL — LOW (ref 3.3–5)
ALP SERPL-CCNC: 87 U/L — SIGNIFICANT CHANGE UP (ref 40–120)
ALT FLD-CCNC: 45 U/L — HIGH (ref 4–41)
ANION GAP SERPL CALC-SCNC: 9 MMO/L — SIGNIFICANT CHANGE UP (ref 7–14)
AST SERPL-CCNC: 26 U/L — SIGNIFICANT CHANGE UP (ref 4–40)
BASOPHILS # BLD AUTO: 0.03 K/UL — SIGNIFICANT CHANGE UP (ref 0–0.2)
BASOPHILS NFR BLD AUTO: 0.5 % — SIGNIFICANT CHANGE UP (ref 0–2)
BILIRUB SERPL-MCNC: 1.2 MG/DL — SIGNIFICANT CHANGE UP (ref 0.2–1.2)
BUN SERPL-MCNC: 3 MG/DL — LOW (ref 7–23)
CALCIUM SERPL-MCNC: 8.8 MG/DL — SIGNIFICANT CHANGE UP (ref 8.4–10.5)
CHLORIDE SERPL-SCNC: 102 MMOL/L — SIGNIFICANT CHANGE UP (ref 98–107)
CO2 SERPL-SCNC: 25 MMOL/L — SIGNIFICANT CHANGE UP (ref 22–31)
CREAT SERPL-MCNC: 0.67 MG/DL — SIGNIFICANT CHANGE UP (ref 0.5–1.3)
EOSINOPHIL # BLD AUTO: 0.03 K/UL — SIGNIFICANT CHANGE UP (ref 0–0.5)
EOSINOPHIL NFR BLD AUTO: 0.5 % — SIGNIFICANT CHANGE UP (ref 0–6)
GLUCOSE SERPL-MCNC: 129 MG/DL — HIGH (ref 70–99)
HCT VFR BLD CALC: 38.2 % — LOW (ref 39–50)
HGB BLD-MCNC: 12.6 G/DL — LOW (ref 13–17)
IMM GRANULOCYTES NFR BLD AUTO: 0.2 % — SIGNIFICANT CHANGE UP (ref 0–1.5)
LYMPHOCYTES # BLD AUTO: 2.8 K/UL — SIGNIFICANT CHANGE UP (ref 1–3.3)
LYMPHOCYTES # BLD AUTO: 47.1 % — HIGH (ref 13–44)
MAGNESIUM SERPL-MCNC: 1.6 MG/DL — SIGNIFICANT CHANGE UP (ref 1.6–2.6)
MCHC RBC-ENTMCNC: 30.1 PG — SIGNIFICANT CHANGE UP (ref 27–34)
MCHC RBC-ENTMCNC: 33 % — SIGNIFICANT CHANGE UP (ref 32–36)
MCV RBC AUTO: 91.4 FL — SIGNIFICANT CHANGE UP (ref 80–100)
MONOCYTES # BLD AUTO: 0.5 K/UL — SIGNIFICANT CHANGE UP (ref 0–0.9)
MONOCYTES NFR BLD AUTO: 8.4 % — SIGNIFICANT CHANGE UP (ref 2–14)
NEUTROPHILS # BLD AUTO: 2.58 K/UL — SIGNIFICANT CHANGE UP (ref 1.8–7.4)
NEUTROPHILS NFR BLD AUTO: 43.3 % — SIGNIFICANT CHANGE UP (ref 43–77)
NRBC # FLD: 0 K/UL — SIGNIFICANT CHANGE UP (ref 0–0)
PHOSPHATE SERPL-MCNC: 1.9 MG/DL — LOW (ref 2.5–4.5)
PLATELET # BLD AUTO: 192 K/UL — SIGNIFICANT CHANGE UP (ref 150–400)
PMV BLD: 10.3 FL — SIGNIFICANT CHANGE UP (ref 7–13)
POTASSIUM SERPL-MCNC: 3.9 MMOL/L — SIGNIFICANT CHANGE UP (ref 3.5–5.3)
POTASSIUM SERPL-SCNC: 3.9 MMOL/L — SIGNIFICANT CHANGE UP (ref 3.5–5.3)
PROT SERPL-MCNC: 5.9 G/DL — LOW (ref 6–8.3)
RBC # BLD: 4.18 M/UL — LOW (ref 4.2–5.8)
RBC # FLD: 12.2 % — SIGNIFICANT CHANGE UP (ref 10.3–14.5)
SODIUM SERPL-SCNC: 136 MMOL/L — SIGNIFICANT CHANGE UP (ref 135–145)
WBC # BLD: 5.95 K/UL — SIGNIFICANT CHANGE UP (ref 3.8–10.5)
WBC # FLD AUTO: 5.95 K/UL — SIGNIFICANT CHANGE UP (ref 3.8–10.5)

## 2019-12-30 PROCEDURE — 99222 1ST HOSP IP/OBS MODERATE 55: CPT | Mod: GC

## 2019-12-30 PROCEDURE — 99291 CRITICAL CARE FIRST HOUR: CPT | Mod: 25

## 2019-12-30 PROCEDURE — 99232 SBSQ HOSP IP/OBS MODERATE 35: CPT

## 2019-12-30 RX ORDER — POTASSIUM PHOSPHATE, MONOBASIC POTASSIUM PHOSPHATE, DIBASIC 236; 224 MG/ML; MG/ML
30 INJECTION, SOLUTION INTRAVENOUS ONCE
Refills: 0 | Status: COMPLETED | OUTPATIENT
Start: 2019-12-30 | End: 2019-12-30

## 2019-12-30 RX ADMIN — FAMOTIDINE 20 MILLIGRAM(S): 10 INJECTION INTRAVENOUS at 05:40

## 2019-12-30 RX ADMIN — PANTOPRAZOLE SODIUM 40 MILLIGRAM(S): 20 TABLET, DELAYED RELEASE ORAL at 14:23

## 2019-12-30 RX ADMIN — Medication 10 MILLIGRAM(S): at 22:11

## 2019-12-30 RX ADMIN — Medication 10 MILLIGRAM(S): at 14:23

## 2019-12-30 RX ADMIN — INSULIN HUMAN 2 UNIT(S)/HR: 100 INJECTION, SOLUTION SUBCUTANEOUS at 06:38

## 2019-12-30 RX ADMIN — SODIUM CHLORIDE 125 MILLILITER(S): 9 INJECTION, SOLUTION INTRAVENOUS at 08:11

## 2019-12-30 RX ADMIN — INSULIN HUMAN 1 UNIT(S)/HR: 100 INJECTION, SOLUTION SUBCUTANEOUS at 03:36

## 2019-12-30 RX ADMIN — SODIUM CHLORIDE 125 MILLILITER(S): 9 INJECTION, SOLUTION INTRAVENOUS at 20:00

## 2019-12-30 RX ADMIN — POTASSIUM PHOSPHATE, MONOBASIC POTASSIUM PHOSPHATE, DIBASIC 83.33 MILLIMOLE(S): 236; 224 INJECTION, SOLUTION INTRAVENOUS at 04:08

## 2019-12-30 RX ADMIN — INSULIN HUMAN 1.5 UNIT(S)/HR: 100 INJECTION, SOLUTION SUBCUTANEOUS at 20:05

## 2019-12-30 RX ADMIN — ENOXAPARIN SODIUM 40 MILLIGRAM(S): 100 INJECTION SUBCUTANEOUS at 14:23

## 2019-12-30 RX ADMIN — Medication 10 MILLIGRAM(S): at 05:40

## 2019-12-30 RX ADMIN — INSULIN HUMAN 2 UNIT(S)/HR: 100 INJECTION, SOLUTION SUBCUTANEOUS at 08:11

## 2019-12-30 RX ADMIN — Medication 30 MILLILITER(S): at 03:45

## 2019-12-30 RX ADMIN — INSULIN HUMAN 1.5 UNIT(S)/HR: 100 INJECTION, SOLUTION SUBCUTANEOUS at 12:22

## 2019-12-30 NOTE — PROGRESS NOTE ADULT - ASSESSMENT
This is a 28 y/o male with hx of type 1DM who presents to the ED with 2 days of nausea/vomiting (NBNB) and abdominal pain which started all of a sudden.   Consulted for T1DM and DKA , a1c 14.5%    1) Type 1 DM, severely uncontrolled with DKA  - target fs is 140-180 mg/dl in ICU setting  - Beta hyrdroxybutyrate is now 0.0  - If continuing insulin infusion would c.w dextrose IVF.  If for any reason FS trend lower, do not abruptly stop insulin infusion, please increase dextrose IVF as patient is type 1 DM.  If infusion abruptly stopped, patient is at risk for DKA    Consider transitioning off insulin gtt  - When transitioning off of insulin gtt, would give lantus dose (based on insulin gtt calculations at time of transition) then insulin gtt would be discontinued 2 hours after lantus administered.   If planning to transition now, would give Lantus 28 units, turn gtt off 2 hours later,  and start a low humalog correction scale q 4 hours.  - While patient is NPO, please continue on dextrose infusion.  Once patient is able to eat and tolerate food, would need lantus dose adjusted and humalog premeal added.  Please let endocrine know when patient is able to tolerate food and when there is a plan to DC dextrose IV.    - will continue to trend and follow    For discharge patient DOES NOT have insurance until February and will need to be on a cost effective plan for patient.  Will involve transition of care pharmacist to assist with dc planning and education.

## 2019-12-30 NOTE — CHART NOTE - NSCHARTNOTEFT_GEN_A_CORE
Given CT findings, plan for EGD; please keep NPO at midnight for tentative schedule tmro.    D/w attending

## 2019-12-30 NOTE — PROGRESS NOTE ADULT - SUBJECTIVE AND OBJECTIVE BOX
Pt seen and examined by the bedside. No acute events overnight. Still feeling a little nauseous. Having some abdominal pain. Otherwise denies fevers, chills, chest pain, SOB, b/l LE edema. Unable to eat last night, but feeling like he may be able to eat this morning.    REVIEW OF SYSTEMS:  Constitutional: [ ] negative [ ] fevers [ ] chills [ ] weight loss [ ] weight gain  HEENT: [ ] negative [ ] dry eyes [ ] eye irritation [ ] postnasal drip [ ] nasal congestion  CV: [ ] negative  [ ] chest pain [ ] orthopnea [ ] palpitations [ ] murmur  Resp: [ ] negative [ ] cough [ ] shortness of breath [ ] dyspnea [ ] wheezing [ ] sputum [ ] hemoptysis  GI: [ ] negative [x] nausea [ ] vomiting [ ] diarrhea [ ] constipation [x] abd pain [ ] dysphagia   : [ ] negative [ ] dysuria [ ] nocturia [ ] hematuria [ ] increased urinary frequency  Musculoskeletal: [ ] negative [ ] back pain [ ] myalgias [ ] arthralgias [ ] fracture  Skin: [ ] negative [ ] rash [ ] itch  Neurological: [ ] negative [ ] headache [ ] dizziness [ ] syncope [ ] weakness [ ] numbness  Psychiatric: [ ] negative [ ] anxiety [ ] depression  Endocrine: [ ] negative [ ] diabetes [ ] thyroid problem  Hematologic/Lymphatic: [ ] negative [ ] anemia [ ] bleeding problem  Allergic/Immunologic: [ ] negative [ ] itchy eyes [ ] nasal discharge [ ] hives [ ] angioedema  [x] All other systems negative    OBJECTIVE:  ICU Vital Signs Last 24 Hrs  T(C): 36.9 (30 Dec 2019 08:00), Max: 37.3 (29 Dec 2019 12:00)  T(F): 98.5 (30 Dec 2019 08:00), Max: 99.1 (29 Dec 2019 12:00)  HR: 65 (30 Dec 2019 09:00) (64 - 85)  BP: 118/82 (30 Dec 2019 09:00) (106/75 - 129/84)  BP(mean): 91 (30 Dec 2019 09:00) (77 - 94)  ABP: --  ABP(mean): --  RR: 14 (30 Dec 2019 09:00) (10 - 19)  SpO2: 100% (30 Dec 2019 09:00) (99% - 100%)        12-29 @ 07:01  -  12-30 @ 07:00  --------------------------------------------------------  IN: 4889 mL / OUT: 2150 mL / NET: 2739 mL    12-30 @ 07:01 - 12-30 @ 09:26  --------------------------------------------------------  IN: 379 mL / OUT: 700 mL / NET: -321 mL      CAPILLARY BLOOD GLUCOSE  POCT Blood Glucose.: 152 mg/dL (30 Dec 2019 08:01)      PHYSICAL EXAM:  General: NAD, pleasant   HEENT: NCAt, moist mucosal membranes  Respiratory: CTAB  Cardiovascular: S1/S2 normal, RRR, no murmurs/rubs/gallops appreciated  Abdomen: soft, mildly tender, no distension  Extremities: no b/l LE edema, no cyanosis/clubbing  Neurological: AAOx3, answering questions appropriately, no focal deficits    HOSPITAL MEDICATIONS:  enoxaparin Injectable 40 milliGRAM(s) SubCutaneous daily    dextrose 40% Gel 15 Gram(s) Oral once PRN  dextrose 50% Injectable 12.5 Gram(s) IV Push once  dextrose 50% Injectable 25 Gram(s) IV Push once  dextrose 50% Injectable 25 Gram(s) IV Push once  glucagon  Injectable 1 milliGRAM(s) IntraMuscular once PRN  insulin regular Infusion 2 Unit(s)/Hr IV Continuous <Continuous>    metoclopramide Injectable 10 milliGRAM(s) IV Push every 8 hours  ondansetron Injectable 4 milliGRAM(s) IV Push every 8 hours PRN    aluminum hydroxide/magnesium hydroxide/simethicone Suspension 30 milliLiter(s) Oral every 4 hours PRN  famotidine Injectable 20 milliGRAM(s) IV Push daily  pantoprazole  Injectable 40 milliGRAM(s) IV Push daily    dextrose 5% + lactated ringers 1000 milliLiter(s) IV Continuous <Continuous>  dextrose 5%. 1000 milliLiter(s) IV Continuous <Continuous>    influenza   Vaccine 0.5 milliLiter(s) IntraMuscular once      LABS:                        12.6   5.95  )-----------( 192      ( 30 Dec 2019 02:16 )             38.2     Hgb Trend: 12.6<--, 11.9<--, 13.3<--, 13.7<--, 16.9<--  12-30    136  |  102  |  3<L>  ----------------------------<  129<H>  3.9   |  25  |  0.67    Ca    8.8      30 Dec 2019 02:16  Phos  1.9     12-30  Mg     1.6     12-30    TPro  5.9<L>  /  Alb  3.0<L>  /  TBili  1.2  /  DBili  x   /  AST  26  /  ALT  45<H>  /  AlkPhos  87  12-30    Creatinine Trend: 0.67<--, 0.71<--, 0.74<--, 0.74<--, 0.71<--, 0.73<--    Venous Blood Gas:  12-28 @ 15:00  7.39/43/36/25/68.2  VBG Lactate: 1.5  Venous Blood Gas:  12-28 @ 10:44  7.37/43/47/23/80.4  VBG Lactate: 1.4    MICROBIOLOGY:   RVP neg

## 2019-12-30 NOTE — PROGRESS NOTE ADULT - ASSESSMENT
29M hx DM1 p/w nausea/vomiting (NBNB) x2 days and abdominal pain. Found to be in DKA.     #Neuro  No acute issues. Pt awake and alert. A&Ox3    #Cards  No acute issues. Qtc 391.   - daily EKG while on standing reglan    #Pulm  - No acute issues    #Endo  Hx of T1DM. HbA1c 14.5. P/w abdominal pain, nausea/vomiting which is a similar presentation to prev episodes of DKA. AG initially was 25, glu 630, BHB 5.2, pH 7.2. At home, pt is on Lantus 18U qhs and 10U pre-meals at home. Prior to admission, missed evening dose because he was tired from work and fell asleep - likely noncompliance was trigger for current episode of DKA. Currently on insulin gtt at 2U/hr. AG closed, bicarb 25, FS in 150s. But he has not eaten yet. Endocrine following.   - will start diet, once eating will give lantus 18U, lispro 6U tidac and stop insulin gtt 2 hours afterwards; otherwise cont insulin at current rate of 2U/hr  - cont D5 LR @ 125 cc/hr for now  - f/u FS q1h  - f/u BMP  - appreciate endocrine recs    #GI  Pt c/o abdominal pain. Given uncontrolled DM, may have gastroparesis - can also consider GERD. Attempting soft, mechanical diet today. 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 GI following.   - cont reglan 10mg TID, protonix qday, zofran prn, pepcid qhs for sxs 2/2 possible GERD/gastroparesis  - F/u GI recs, ?upper endoscopy    #ID  No active issues    #Heme  No active issues    #Renal/  K is 3.9. Repleting through fluids. Phos also low at 1.9, received repletion  - f/u BMP today    #ppx  - lovenox 40mg qd    ----------------------------------------  Jasmyn Hooper MD PGY-6  Pulmonary/Critical Care Fellow  Pager # [phone] ([place]), [phone] ([facility])

## 2019-12-31 LAB
ALBUMIN SERPL ELPH-MCNC: 2.8 G/DL — LOW (ref 3.3–5)
ALP SERPL-CCNC: 83 U/L — SIGNIFICANT CHANGE UP (ref 40–120)
ALT FLD-CCNC: 32 U/L — SIGNIFICANT CHANGE UP (ref 4–41)
ANION GAP SERPL CALC-SCNC: 10 MMO/L — SIGNIFICANT CHANGE UP (ref 7–14)
ANION GAP SERPL CALC-SCNC: 9 MMO/L — SIGNIFICANT CHANGE UP (ref 7–14)
AST SERPL-CCNC: 22 U/L — SIGNIFICANT CHANGE UP (ref 4–40)
BASOPHILS # BLD AUTO: 0.03 K/UL — SIGNIFICANT CHANGE UP (ref 0–0.2)
BASOPHILS NFR BLD AUTO: 0.7 % — SIGNIFICANT CHANGE UP (ref 0–2)
BILIRUB SERPL-MCNC: 0.7 MG/DL — SIGNIFICANT CHANGE UP (ref 0.2–1.2)
BUN SERPL-MCNC: 4 MG/DL — LOW (ref 7–23)
BUN SERPL-MCNC: 5 MG/DL — LOW (ref 7–23)
CALCIUM SERPL-MCNC: 9 MG/DL — SIGNIFICANT CHANGE UP (ref 8.4–10.5)
CALCIUM SERPL-MCNC: 9.6 MG/DL — SIGNIFICANT CHANGE UP (ref 8.4–10.5)
CHLORIDE SERPL-SCNC: 100 MMOL/L — SIGNIFICANT CHANGE UP (ref 98–107)
CHLORIDE SERPL-SCNC: 96 MMOL/L — LOW (ref 98–107)
CO2 SERPL-SCNC: 27 MMOL/L — SIGNIFICANT CHANGE UP (ref 22–31)
CO2 SERPL-SCNC: 30 MMOL/L — SIGNIFICANT CHANGE UP (ref 22–31)
CREAT SERPL-MCNC: 0.73 MG/DL — SIGNIFICANT CHANGE UP (ref 0.5–1.3)
CREAT SERPL-MCNC: 0.86 MG/DL — SIGNIFICANT CHANGE UP (ref 0.5–1.3)
EOSINOPHIL # BLD AUTO: 0.03 K/UL — SIGNIFICANT CHANGE UP (ref 0–0.5)
EOSINOPHIL NFR BLD AUTO: 0.7 % — SIGNIFICANT CHANGE UP (ref 0–6)
GLUCOSE SERPL-MCNC: 121 MG/DL — HIGH (ref 70–99)
GLUCOSE SERPL-MCNC: 196 MG/DL — HIGH (ref 70–99)
HCT VFR BLD CALC: 36.5 % — LOW (ref 39–50)
HGB BLD-MCNC: 12 G/DL — LOW (ref 13–17)
IMM GRANULOCYTES NFR BLD AUTO: 0.2 % — SIGNIFICANT CHANGE UP (ref 0–1.5)
LYMPHOCYTES # BLD AUTO: 2.52 K/UL — SIGNIFICANT CHANGE UP (ref 1–3.3)
LYMPHOCYTES # BLD AUTO: 57 % — HIGH (ref 13–44)
MAGNESIUM SERPL-MCNC: 1.5 MG/DL — LOW (ref 1.6–2.6)
MAGNESIUM SERPL-MCNC: 2.1 MG/DL — SIGNIFICANT CHANGE UP (ref 1.6–2.6)
MCHC RBC-ENTMCNC: 30.5 PG — SIGNIFICANT CHANGE UP (ref 27–34)
MCHC RBC-ENTMCNC: 32.9 % — SIGNIFICANT CHANGE UP (ref 32–36)
MCV RBC AUTO: 92.6 FL — SIGNIFICANT CHANGE UP (ref 80–100)
MONOCYTES # BLD AUTO: 0.34 K/UL — SIGNIFICANT CHANGE UP (ref 0–0.9)
MONOCYTES NFR BLD AUTO: 7.7 % — SIGNIFICANT CHANGE UP (ref 2–14)
NEUTROPHILS # BLD AUTO: 1.49 K/UL — LOW (ref 1.8–7.4)
NEUTROPHILS NFR BLD AUTO: 33.7 % — LOW (ref 43–77)
NRBC # FLD: 0 K/UL — SIGNIFICANT CHANGE UP (ref 0–0)
PHOSPHATE SERPL-MCNC: 2.9 MG/DL — SIGNIFICANT CHANGE UP (ref 2.5–4.5)
PHOSPHATE SERPL-MCNC: 3.1 MG/DL — SIGNIFICANT CHANGE UP (ref 2.5–4.5)
PLATELET # BLD AUTO: 178 K/UL — SIGNIFICANT CHANGE UP (ref 150–400)
PMV BLD: 10.8 FL — SIGNIFICANT CHANGE UP (ref 7–13)
POTASSIUM SERPL-MCNC: 4.5 MMOL/L — SIGNIFICANT CHANGE UP (ref 3.5–5.3)
POTASSIUM SERPL-MCNC: 4.8 MMOL/L — SIGNIFICANT CHANGE UP (ref 3.5–5.3)
POTASSIUM SERPL-SCNC: 4.5 MMOL/L — SIGNIFICANT CHANGE UP (ref 3.5–5.3)
POTASSIUM SERPL-SCNC: 4.8 MMOL/L — SIGNIFICANT CHANGE UP (ref 3.5–5.3)
PROT SERPL-MCNC: 5.6 G/DL — LOW (ref 6–8.3)
RBC # BLD: 3.94 M/UL — LOW (ref 4.2–5.8)
RBC # FLD: 11.9 % — SIGNIFICANT CHANGE UP (ref 10.3–14.5)
SODIUM SERPL-SCNC: 135 MMOL/L — SIGNIFICANT CHANGE UP (ref 135–145)
SODIUM SERPL-SCNC: 137 MMOL/L — SIGNIFICANT CHANGE UP (ref 135–145)
WBC # BLD: 4.42 K/UL — SIGNIFICANT CHANGE UP (ref 3.8–10.5)
WBC # FLD AUTO: 4.42 K/UL — SIGNIFICANT CHANGE UP (ref 3.8–10.5)

## 2019-12-31 PROCEDURE — 99232 SBSQ HOSP IP/OBS MODERATE 35: CPT | Mod: GC

## 2019-12-31 PROCEDURE — 99232 SBSQ HOSP IP/OBS MODERATE 35: CPT

## 2019-12-31 PROCEDURE — 99291 CRITICAL CARE FIRST HOUR: CPT | Mod: 25

## 2019-12-31 RX ORDER — MAGNESIUM SULFATE 500 MG/ML
1 VIAL (ML) INJECTION ONCE
Refills: 0 | Status: COMPLETED | OUTPATIENT
Start: 2019-12-31 | End: 2019-12-31

## 2019-12-31 RX ORDER — SODIUM CHLORIDE 9 MG/ML
1000 INJECTION, SOLUTION INTRAVENOUS
Refills: 0 | Status: DISCONTINUED | OUTPATIENT
Start: 2019-12-31 | End: 2020-01-01

## 2019-12-31 RX ORDER — INSULIN LISPRO 100/ML
VIAL (ML) SUBCUTANEOUS
Refills: 0 | Status: DISCONTINUED | OUTPATIENT
Start: 2019-12-31 | End: 2020-01-02

## 2019-12-31 RX ORDER — INSULIN GLARGINE 100 [IU]/ML
18 INJECTION, SOLUTION SUBCUTANEOUS EVERY MORNING
Refills: 0 | Status: DISCONTINUED | OUTPATIENT
Start: 2019-12-31 | End: 2019-12-31

## 2019-12-31 RX ORDER — INSULIN LISPRO 100/ML
VIAL (ML) SUBCUTANEOUS AT BEDTIME
Refills: 0 | Status: DISCONTINUED | OUTPATIENT
Start: 2019-12-31 | End: 2020-01-02

## 2019-12-31 RX ORDER — SODIUM CHLORIDE 9 MG/ML
1000 INJECTION, SOLUTION INTRAVENOUS
Refills: 0 | Status: DISCONTINUED | OUTPATIENT
Start: 2019-12-31 | End: 2019-12-31

## 2019-12-31 RX ORDER — INSULIN GLARGINE 100 [IU]/ML
20 INJECTION, SOLUTION SUBCUTANEOUS EVERY MORNING
Refills: 0 | Status: DISCONTINUED | OUTPATIENT
Start: 2019-12-31 | End: 2020-01-03

## 2019-12-31 RX ORDER — MAGNESIUM SULFATE 500 MG/ML
2 VIAL (ML) INJECTION ONCE
Refills: 0 | Status: COMPLETED | OUTPATIENT
Start: 2019-12-31 | End: 2019-12-31

## 2019-12-31 RX ORDER — INSULIN GLARGINE 100 [IU]/ML
28 INJECTION, SOLUTION SUBCUTANEOUS ONCE
Refills: 0 | Status: COMPLETED | OUTPATIENT
Start: 2019-12-31 | End: 2019-12-31

## 2019-12-31 RX ADMIN — Medication 10 MILLIGRAM(S): at 21:00

## 2019-12-31 RX ADMIN — ENOXAPARIN SODIUM 40 MILLIGRAM(S): 100 INJECTION SUBCUTANEOUS at 13:09

## 2019-12-31 RX ADMIN — Medication 10 MILLIGRAM(S): at 13:09

## 2019-12-31 RX ADMIN — Medication 50 GRAM(S): at 11:57

## 2019-12-31 RX ADMIN — INSULIN HUMAN 1 UNIT(S)/HR: 100 INJECTION, SOLUTION SUBCUTANEOUS at 10:57

## 2019-12-31 RX ADMIN — PANTOPRAZOLE SODIUM 40 MILLIGRAM(S): 20 TABLET, DELAYED RELEASE ORAL at 13:09

## 2019-12-31 RX ADMIN — FAMOTIDINE 20 MILLIGRAM(S): 10 INJECTION INTRAVENOUS at 05:32

## 2019-12-31 RX ADMIN — Medication 2: at 13:08

## 2019-12-31 RX ADMIN — SODIUM CHLORIDE 50 MILLILITER(S): 9 INJECTION, SOLUTION INTRAVENOUS at 17:10

## 2019-12-31 RX ADMIN — Medication 100 GRAM(S): at 04:06

## 2019-12-31 RX ADMIN — INSULIN GLARGINE 28 UNIT(S): 100 INJECTION, SOLUTION SUBCUTANEOUS at 10:57

## 2019-12-31 RX ADMIN — Medication 2: at 17:07

## 2019-12-31 RX ADMIN — Medication 10 MILLIGRAM(S): at 05:32

## 2019-12-31 NOTE — DIETITIAN INITIAL EVALUATION ADULT. - PROBLEM SELECTOR PLAN 2
Likely 2/2 hyperglycemia/dka. No fevers or diarrhea reported. On differential includes gastroparesis given mult episodes of n/v and suspected poor control of DM  -Will check a lipase and continue LR at 150cc/hr and keep NPO for now. endocrine consult in AM
none known

## 2019-12-31 NOTE — DIETITIAN INITIAL EVALUATION ADULT. - OTHER INFO
RD visited with patient for LOS nutritional assessment.  Patient a 30 y/o male with hx of type 1DM who presents to the ED with 2 days of nausea/vomiting (NBNB) and abdominal pain  In the ED pt was found hyperglycemic in DKA.      PO intake reported as fair by patient and RN.  As per patient, he was educated during a previous hospitalization, but was not seeing a RD in the outpatient setting; did not recall when last endocrinology visit was PTA.  RD provided diet education on CHO food sources and recommended serving sizes.  Reviewed recommended regimen of 45gm CHO at bfst / 60 gm CHO at lunch and dinner. RD initiated education on CHO counting and provided sample meal plan; additional written materials were provided. As per patient, he was eating breakfast at home, consisting of avocado + 2 eggs; no CHO intake in the morning PTA.  As per patient, was eating lunch and dinner as prepared by family.  Patient stated he thought he was doing "ok" and that all of this happened because he missed a dose of his insulin.  Educated patient on the importance of regular meals/consistent CHO for glycemic control and importance of regular meal planning.      Encouraged patient to consider visiting with a RD and/or CDE in the outpatient setting to ensure long-term adherence to nutrition recommendations/diet prescription given severely uncontrolled DM (A1c 14.5%).      Reports of nausea, without vomiting; denies diarrhea.  Receiving Odansetron PRN.  As per RN, mechanical soft diet being provided for sore throat at this time.  usual body weight reported at 145 pounds.  Questionable weights 12/28 & 12/29 in flowsheets - 63/57.4kg respectively noted.  Weight 12/31/19 65.2kg which is more consistent with usual body weight.  Denies recent weight loss PTA.

## 2019-12-31 NOTE — DIETITIAN INITIAL EVALUATION ADULT. - PERTINENT MEDS FT
_________CAPILLARY BLOOD GLUCOSE_________  POCT Blood Glucose.: 165 mg/dL (31 Dec 2019 14:52)  POCT Blood Glucose.: 204 mg/dL (31 Dec 2019 13:07)  POCT Blood Glucose.: 162 mg/dL (31 Dec 2019 10:38)  POCT Blood Glucose.: 157 mg/dL (31 Dec 2019 08:16)  POCT Blood Glucose.: 156 mg/dL (31 Dec 2019 05:45)  POCT Blood Glucose.: 139 mg/dL (31 Dec 2019 04:05)  POCT Blood Glucose.: 125 mg/dL (31 Dec 2019 02:22)  POCT Blood Glucose.: 121 mg/dL (31 Dec 2019 00:14)  POCT Blood Glucose.: 130 mg/dL (30 Dec 2019 22:07)  POCT Blood Glucose.: 163 mg/dL (30 Dec 2019 20:01)  POCT Blood Glucose.: 138 mg/dL (30 Dec 2019 17:55)    MEDICATIONS  (STANDING):  dextrose 5% + lactated ringers 1000 milliLiter(s) (50 mL/Hr) IV Continuous <Continuous>  dextrose 5%. 1000 milliLiter(s) (50 mL/Hr) IV Continuous <Continuous>  dextrose 50% Injectable 12.5 Gram(s) IV Push once  dextrose 50% Injectable 25 Gram(s) IV Push once  dextrose 50% Injectable 25 Gram(s) IV Push once  enoxaparin Injectable 40 milliGRAM(s) SubCutaneous daily  famotidine Injectable 20 milliGRAM(s) IV Push daily  influenza   Vaccine 0.5 milliLiter(s) IntraMuscular once  insulin glargine Injectable (LANTUS) 20 Unit(s) SubCutaneous every morning  insulin lispro (HumaLOG) corrective regimen sliding scale   SubCutaneous three times a day before meals  insulin lispro (HumaLOG) corrective regimen sliding scale   SubCutaneous at bedtime  insulin regular Infusion 1 Unit(s)/Hr (1 mL/Hr) IV Continuous <Continuous>  metoclopramide Injectable 10 milliGRAM(s) IV Push every 8 hours  pantoprazole  Injectable 40 milliGRAM(s) IV Push daily

## 2019-12-31 NOTE — PROGRESS NOTE ADULT - ASSESSMENT
Impression:  # Nausea/vomiting: Likely element of gastroparesis vs severe GERD. Pt symptoms appear acute but has long standing uncontroleld DM1 with A1c of 14.5.  # Esophageal thickening: Likely inflammatory changes from n/v with esophagitis and GERD. No risk factors of esophgeal cancer/neoplasm  # T1DM with DKA: resolving    Recommendation:  - continue with reglan 10mg TID and zofran prn for now  - ppi 40mg po daily  - pepcid in the evening as needed  - monitor Qtc  - small frequent meals and encourage ambulation  - minimize narcotics with aggressive electrolyte repletion  - will plan for EGD once off of insulin gtt, consult with endocrine for insulin regimen in prep for procedure  - plan for EGD Thursday, 1/2  - keep NPO midnight 1/1 evening  - supportive care Impression:  # Nausea/vomiting: Likely element of gastroparesis vs severe GERD. Pt symptoms appear acute but has long standing uncontroleld DM1 with A1c of 14.5.  # Esophageal thickening: Likely inflammatory changes from n/v with esophagitis and GERD. No risk factors of esophgeal cancer/neoplasm  # T1DM with DKA: resolving    Recommendation:  - continue with reglan 10mg TID and zofran prn for now  - ppi 40mg po daily  - pepcid in the evening as needed  - monitor Qtc  - small frequent meals and encourage ambulation  - minimize narcotics with aggressive electrolyte repletion  - will plan for EGD once off of insulin gtt, consult with endocrine for insulin regimen in prep for procedure  - tentatively plan for EGD Thursday, 1/2  - keep NPO midnight 1/1 evening  - supportive care

## 2019-12-31 NOTE — CHART NOTE - NSCHARTNOTEFT_GEN_A_CORE
MICU Transfer Note    Transfer from: MICU  Transfer to:  (x) Medicine    (  ) Telemetry    (  ) RCU    (  ) Palliative    (  ) Stroke Unit    (  ) _______________  Accepting physican:      MICU COURSE:  29M hx Type DM1 p/w nausea/vomiting, abdominal pain x 2 days. Usually on 18U Lantus and 10U lispro tidac. Denies fevers chills, shortness of breath, cough, diarrhea, dysuria. Per mother his sugars have been very elevated recently, however did not clarify how high. Reports good compliance however did not take one dose two nights ago and instead took it this AM. Reports no alcohol use or other ingestion. Also reporting hiccups. No known gastroparesis. Does not seen an endocrinologist currently. no sick contacts or recent travel. In the ED pt was found hyperglycemic in DKA, given total humalog of 20 units and 4L of LR as well as reglan and zofran    Patient admitted to MICU for DKA. Insulin gtt started. Pt continued to have nausea/vomiting. Initially had AG 25, glucose 600s, Beta-hydroxy burate of 5.2, pH 7.2. Endocrine consulted. Pt able to eat on 12/31 and transitioned with Lantus 28U and NALINI. FS stable. D5 LR maintained as pt still not eating full diet. GI evaluated patient's nausea/vomiting and abdominal pain. 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 Started on reglan, protonix, pepcid and zofran for possible GERD/gastroparesis.     EGD tentatively planned for Thursday.     ASSESSMENT & PLAN:   29M hx DM1 p/w nausea/vomiting (NBNB) x2 days and abdominal pain. Found to be in DKA.     #Neuro  No acute issues. Pt awake and alert. A&Ox3    #Cards  No acute issues. Qtc 391.   - daily EKG while on standing reglan    #Pulm  - No acute issues    #Endo  Hx of T1DM. HbA1c 14.5. P/w abdominal pain, nausea/vomiting which is a similar presentation to prev episodes of DKA. AG initially was 25, glu 630, BHB 5.2, pH 7.2. At home, pt is on Lantus 18U qhs and 10U pre-meals at home. Prior to admission, missed evening dose because he was tired from work and fell asleep - likely noncompliance was trigger for current episode of DKA. Currently on insulin gtt at 1U/hr, lantus given this morning, will d/c insulin at 1PM. AG closed, bicarb 25, FS in 150s. Endocrine following.   - will start diet today; will lantus 28U with NALINI  - cont D5 LR for now  - f/u FS q1h  - f/u BMP  - appreciate endocrine recs    #GI  Pt c/o abdominal pain. Given uncontrolled DM, may have gastroparesis - can also consider GERD. Attempting soft, mechanical diet today. 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 GI following.   - cont reglan 10mg TID, protonix qday, zofran prn, pepcid qhs for sxs 2/2 possible GERD/gastroparesis  - F/u GI recs, ?upper endoscopy on Thursday tentatively    #ID  No active issues    #Heme  No active issues    #Renal/  K 4.5. Repleting through fluids.   - f/u BMP today    #ppx  - lovenox 40mg qd      For Follow-Up:  []Endocrine follow up  []order lantus in AM  []GI follow up for endoscopy, tentatively scheduled for Thursday, keep NPO MN on Wednesday        Vital Signs Last 24 Hrs  T(C): 37.1 (31 Dec 2019 12:00), Max: 37.4 (31 Dec 2019 04:00)  T(F): 98.7 (31 Dec 2019 12:00), Max: 99.3 (31 Dec 2019 04:00)  HR: 68 (31 Dec 2019 15:00) (56 - 75)  BP: 100/68 (31 Dec 2019 14:00) (100/63 - 120/83)  BP(mean): 75 (31 Dec 2019 14:00) (72 - 92)  RR: 11 (31 Dec 2019 15:00) (11 - 16)  SpO2: 100% (31 Dec 2019 15:00) (99% - 100%)  I&O's Summary    30 Dec 2019 07:01  -  31 Dec 2019 07:00  --------------------------------------------------------  IN: 3109 mL / OUT: 3775 mL / NET: -666 mL    31 Dec 2019 07:01  -  31 Dec 2019 15:21  --------------------------------------------------------  IN: 505 mL / OUT: 1150 mL / NET: -645 mL    MEDICATIONS  (STANDING):  dextrose 5% + lactated ringers 1000 milliLiter(s) (100 mL/Hr) IV Continuous <Continuous>  dextrose 5%. 1000 milliLiter(s) (50 mL/Hr) IV Continuous <Continuous>  dextrose 50% Injectable 12.5 Gram(s) IV Push once  dextrose 50% Injectable 25 Gram(s) IV Push once  dextrose 50% Injectable 25 Gram(s) IV Push once  enoxaparin Injectable 40 milliGRAM(s) SubCutaneous daily  famotidine Injectable 20 milliGRAM(s) IV Push daily  influenza   Vaccine 0.5 milliLiter(s) IntraMuscular once  insulin lispro (HumaLOG) corrective regimen sliding scale   SubCutaneous three times a day before meals  insulin lispro (HumaLOG) corrective regimen sliding scale   SubCutaneous at bedtime  insulin regular Infusion 1 Unit(s)/Hr (1 mL/Hr) IV Continuous <Continuous>  metoclopramide Injectable 10 milliGRAM(s) IV Push every 8 hours  pantoprazole  Injectable 40 milliGRAM(s) IV Push daily    MEDICATIONS  (PRN):  aluminum hydroxide/magnesium hydroxide/simethicone Suspension 30 milliLiter(s) Oral every 4 hours PRN Dyspepsia  dextrose 40% Gel 15 Gram(s) Oral once PRN Blood Glucose LESS THAN 70 milliGRAM(s)/deciliter  glucagon  Injectable 1 milliGRAM(s) IntraMuscular once PRN Glucose LESS THAN 70 milligrams/deciliter  ondansetron Injectable 4 milliGRAM(s) IV Push every 8 hours PRN Nausea and/or Vomiting        LABS                                            12.0                  Neurophils% (auto):   33.7   (12-31 @ 02:22):    4.42 )-----------(178          Lymphocytes% (auto):  57.0                                          36.5                   Eosinphils% (auto):   0.7      Manual%: Neutrophils x    ; Lymphocytes x    ; Eosinophils x    ; Bands%: x    ; Blasts x                                    137    |  100    |  4                   Calcium: 9.0   / iCa: x      (12-31 @ 02:22)    ----------------------------<  121       Magnesium: 1.5                              4.5     |  27     |  0.73             Phosphorous: 2.9      TPro  5.6    /  Alb  2.8    /  TBili  0.7    /  DBili  x      /  AST  22     /  ALT  32     /  AlkPhos  83     31 Dec 2019 02:22 MICU Transfer Note    Transfer from: MICU  Transfer to:  (x) Medicine    (  ) Telemetry    (  ) RCU    (  ) Palliative    (  ) Stroke Unit    (  ) _______________  Accepting physican: [email or web address]      MICU COURSE:  29M hx Type DM1 p/w nausea/vomiting, abdominal pain x 2 days. Usually on 18U Lantus and 10U lispro tidac. Denies fevers chills, shortness of breath, cough, diarrhea, dysuria. Per mother his sugars have been very elevated recently, however did not clarify how high. Reports good compliance however did not take one dose two nights ago and instead took it this AM. Reports no alcohol use or other ingestion. Also reporting hiccups. No known gastroparesis. Does not seen an endocrinologist currently. no sick contacts or recent travel. In the ED pt was found hyperglycemic in DKA, given total humalog of 20 units and 4L of LR as well as reglan and zofran    Patient admitted to MICU for DKA. Insulin gtt started. Pt continued to have nausea/vomiting. Initially had AG 25, glucose 600s, Beta-hydroxy burate of 5.2, pH 7.2. Endocrine consulted. Pt able to eat on 12/31 and transitioned with Lantus 28U and NALINI. FS stable. D5 LR maintained as pt still not eating full diet. GI evaluated patient's nausea/vomiting and abdominal pain. 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 Started on reglan, protonix, pepcid and zofran for possible GERD/gastroparesis.     EGD tentatively planned for Thursday.     ASSESSMENT & PLAN:   29M hx DM1 p/w nausea/vomiting (NBNB) x2 days and abdominal pain. Found to be in DKA.     #Neuro  No acute issues. Pt awake and alert. A&Ox3    #Cards  No acute issues. Qtc 391.   - daily EKG while on standing reglan    #Pulm  - No acute issues    #Endo  Hx of T1DM. HbA1c 14.5. P/w abdominal pain, nausea/vomiting which is a similar presentation to prev episodes of DKA. AG initially was 25, glu 630, BHB 5.2, pH 7.2. At home, pt is on Lantus 18U qhs and 10U pre-meals at home. Prior to admission, missed evening dose because he was tired from work and fell asleep - likely noncompliance was trigger for current episode of DKA. Currently on insulin gtt at 1U/hr, lantus given this morning, will d/c insulin at 1PM. AG closed, bicarb 25, FS in 150s. Endocrine following.   - will start diet today; will lantus 28U with NALINI  - cont D5 LR for now  - f/u FS q1h  - f/u BMP  - appreciate endocrine recs    #GI  Pt c/o abdominal pain. Given uncontrolled DM, may have gastroparesis - can also consider GERD. Attempting soft, mechanical diet today. 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 GI following.   - cont reglan 10mg TID, protonix qday, zofran prn, pepcid qhs for sxs 2/2 possible GERD/gastroparesis  - F/u GI recs, ?upper endoscopy on Thursday tentatively    #ID  No active issues    #Heme  No active issues    #Renal/  K 4.5. Repleting through fluids.   - f/u BMP today    #ppx  - lovenox 40mg qd      For Follow-Up:  []Endocrine follow up  []order lantus in AM  []GI follow up for endoscopy, tentatively scheduled for Thursday, keep NPO MN on Wednesday        Vital Signs Last 24 Hrs  T(C): 37.1 (31 Dec 2019 12:00), Max: 37.4 (31 Dec 2019 04:00)  T(F): 98.7 (31 Dec 2019 12:00), Max: 99.3 (31 Dec 2019 04:00)  HR: 68 (31 Dec 2019 15:00) (56 - 75)  BP: 100/68 (31 Dec 2019 14:00) (100/63 - 120/83)  BP(mean): 75 (31 Dec 2019 14:00) (72 - 92)  RR: 11 (31 Dec 2019 15:00) (11 - 16)  SpO2: 100% (31 Dec 2019 15:00) (99% - 100%)  I&O's Summary    30 Dec 2019 07:01  -  31 Dec 2019 07:00  --------------------------------------------------------  IN: 3109 mL / OUT: 3775 mL / NET: -666 mL    31 Dec 2019 07:01  -  31 Dec 2019 15:21  --------------------------------------------------------  IN: 505 mL / OUT: 1150 mL / NET: -645 mL    MEDICATIONS  (STANDING):  dextrose 5% + lactated ringers 1000 milliLiter(s) (100 mL/Hr) IV Continuous <Continuous>  dextrose 5%. 1000 milliLiter(s) (50 mL/Hr) IV Continuous <Continuous>  dextrose 50% Injectable 12.5 Gram(s) IV Push once  dextrose 50% Injectable 25 Gram(s) IV Push once  dextrose 50% Injectable 25 Gram(s) IV Push once  enoxaparin Injectable 40 milliGRAM(s) SubCutaneous daily  famotidine Injectable 20 milliGRAM(s) IV Push daily  influenza   Vaccine 0.5 milliLiter(s) IntraMuscular once  insulin lispro (HumaLOG) corrective regimen sliding scale   SubCutaneous three times a day before meals  insulin lispro (HumaLOG) corrective regimen sliding scale   SubCutaneous at bedtime  insulin regular Infusion 1 Unit(s)/Hr (1 mL/Hr) IV Continuous <Continuous>  metoclopramide Injectable 10 milliGRAM(s) IV Push every 8 hours  pantoprazole  Injectable 40 milliGRAM(s) IV Push daily    MEDICATIONS  (PRN):  aluminum hydroxide/magnesium hydroxide/simethicone Suspension 30 milliLiter(s) Oral every 4 hours PRN Dyspepsia  dextrose 40% Gel 15 Gram(s) Oral once PRN Blood Glucose LESS THAN 70 milliGRAM(s)/deciliter  glucagon  Injectable 1 milliGRAM(s) IntraMuscular once PRN Glucose LESS THAN 70 milligrams/deciliter  ondansetron Injectable 4 milliGRAM(s) IV Push every 8 hours PRN Nausea and/or Vomiting        LABS                                            12.0                  Neurophils% (auto):   33.7   (12-31 @ 02:22):    4.42 )-----------(178          Lymphocytes% (auto):  57.0                                          36.5                   Eosinphils% (auto):   0.7      Manual%: Neutrophils x    ; Lymphocytes x    ; Eosinophils x    ; Bands%: x    ; Blasts x                                    137    |  100    |  4                   Calcium: 9.0   / iCa: x      (12-31 @ 02:22)    ----------------------------<  121       Magnesium: 1.5                              4.5     |  27     |  0.73             Phosphorous: 2.9      TPro  5.6    /  Alb  2.8    /  TBili  0.7    /  DBili  x      /  AST  22     /  ALT  32     /  AlkPhos  83     31 Dec 2019 02:22

## 2019-12-31 NOTE — PROGRESS NOTE ADULT - ASSESSMENT
This is a 28 y/o male with hx of type 1DM who presents to the ED with 2 days of nausea/vomiting (NBNB) and abdominal pain which started all of a sudden.   Consulted for T1DM and DKA , a1c 14.5%    1) Type 1 DM, severely uncontrolled with DKA  -Target BG is 140-180 mg/dl in ICU setting  -Labs from this AM improved with , AG 10, C02 27  -As stated above, patient was titrated off insulin gtt, yesterday and overnight requiring 1 - 1.5 unit/hr, received Lantus 28 units at 10 AM, insulin drip stopped at 12 PM.   -Patient tolerating small amounts of PO, c/o mild nausea with no vomiting. Continued on dextrose IVF at 100 ml/hr  -Recommend titrating off dextrose IVF. Consider 50 ml/hr to start given higher basal dose on board currently  -Lantus 28 units may be high for patient considering total insulin requirement over last 24h, recommend decreasing Lantus to 20 units daily at 10 AM, starting tomorrow  -Ok to continue Humalog LOW dose correctional scale before meals and Humalog LOW scale before bed   -For now, monitor off pre-meal Humalog. Last  mg/dl  -Can consider adding low dose Humalog 4 units SQ TID before meals if prandial BG trending up.   -Reviewed plan with primary team MICU [id number], will follow      For discharge patient DOES NOT have insurance until February and will need to be on a cost effective plan for patient.  Will involve transition of care pharmacist to assist with dc planning and education.

## 2019-12-31 NOTE — DIETITIAN INITIAL EVALUATION ADULT. - PROBLEM SELECTOR PLAN 4
likely reactive, however having low grade fever and unclear opacity on cxr. CT chest pending. Will start tx for CAP if indicated

## 2019-12-31 NOTE — DIETITIAN INITIAL EVALUATION ADULT. - CONTINUE CURRENT NUTRITION CARE PLAN
Consider Glucerna Therapeutic Nutrition 240mls 2x daily (440kcals, 20g protein) should PO intake remain fair./yes

## 2019-12-31 NOTE — DIETITIAN INITIAL EVALUATION ADULT. - ADD RECOMMEND
1) Monitor weights, PO intake/diet tolerance, skin integrity, pertinent labs. 2) May benefit from outpatient RD services for ongoing nutrition education and intervention to ensure long term adherence to diet recommendations.

## 2019-12-31 NOTE — PROGRESS NOTE ADULT - SUBJECTIVE AND OBJECTIVE BOX
CHIEF COMPLAINT:    Interval Events:    REVIEW OF SYSTEMS:  Constitutional: [ ] negative [ ] fevers [ ] chills [ ] weight loss [ ] weight gain  HEENT: [ ] negative [ ] dry eyes [ ] eye irritation [ ] postnasal drip [ ] nasal congestion  CV: [ ] negative  [ ] chest pain [ ] orthopnea [ ] palpitations [ ] murmur  Resp: [ ] negative [ ] cough [ ] shortness of breath [ ] dyspnea [ ] wheezing [ ] sputum [ ] hemoptysis  GI: [ ] negative [ ] nausea [ ] vomiting [ ] diarrhea [ ] constipation [ ] abd pain [ ] dysphagia   : [ ] negative [ ] dysuria [ ] nocturia [ ] hematuria [ ] increased urinary frequency  Musculoskeletal: [ ] negative [ ] back pain [ ] myalgias [ ] arthralgias [ ] fracture  Skin: [ ] negative [ ] rash [ ] itch  Neurological: [ ] negative [ ] headache [ ] dizziness [ ] syncope [ ] weakness [ ] numbness  Psychiatric: [ ] negative [ ] anxiety [ ] depression  Endocrine: [ ] negative [ ] diabetes [ ] thyroid problem  Hematologic/Lymphatic: [ ] negative [ ] anemia [ ] bleeding problem  Allergic/Immunologic: [ ] negative [ ] itchy eyes [ ] nasal discharge [ ] hives [ ] angioedema  [ ] All other systems negative  [ ] Unable to assess ROS because ________    OBJECTIVE:  ICU Vital Signs Last 24 Hrs  T(C): 36.9 (31 Dec 2019 08:00), Max: 37.4 (31 Dec 2019 04:00)  T(F): 98.4 (31 Dec 2019 08:00), Max: 99.3 (31 Dec 2019 04:00)  HR: 63 (31 Dec 2019 08:00) (58 - 82)  BP: 106/78 (31 Dec 2019 08:00) (100/63 - 121/84)  BP(mean): 83 (31 Dec 2019 08:00) (72 - 92)  ABP: --  ABP(mean): --  RR: 12 (31 Dec 2019 08:00) (11 - 19)  SpO2: 100% (31 Dec 2019 08:00) (98% - 100%)        12-30 @ 07:01  -  12-31 @ 07:00  --------------------------------------------------------  IN: 3109 mL / OUT: 3775 mL / NET: -666 mL      CAPILLARY BLOOD GLUCOSE      POCT Blood Glucose.: 157 mg/dL (31 Dec 2019 08:16)      PHYSICAL EXAM:  General:   HEENT:   Lymph Nodes:  Neck:   Respiratory:   Cardiovascular:   Abdomen:   Extremities:   Skin:   Neurological:  Psychiatry:    LINES:    HOSPITAL MEDICATIONS:  enoxaparin Injectable 40 milliGRAM(s) SubCutaneous daily        dextrose 40% Gel 15 Gram(s) Oral once PRN  dextrose 50% Injectable 12.5 Gram(s) IV Push once  dextrose 50% Injectable 25 Gram(s) IV Push once  dextrose 50% Injectable 25 Gram(s) IV Push once  glucagon  Injectable 1 milliGRAM(s) IntraMuscular once PRN  insulin regular Infusion 1 Unit(s)/Hr IV Continuous <Continuous>      metoclopramide Injectable 10 milliGRAM(s) IV Push every 8 hours  ondansetron Injectable 4 milliGRAM(s) IV Push every 8 hours PRN    aluminum hydroxide/magnesium hydroxide/simethicone Suspension 30 milliLiter(s) Oral every 4 hours PRN  famotidine Injectable 20 milliGRAM(s) IV Push daily  pantoprazole  Injectable 40 milliGRAM(s) IV Push daily        dextrose 5% + lactated ringers 1000 milliLiter(s) IV Continuous <Continuous>  dextrose 5%. 1000 milliLiter(s) IV Continuous <Continuous>  magnesium sulfate  IVPB 2 Gram(s) IV Intermittent once    influenza   Vaccine 0.5 milliLiter(s) IntraMuscular once          LABS:                        12.0   4.42  )-----------( 178      ( 31 Dec 2019 02:22 )             36.5     Hgb Trend: 12.0<--, 12.6<--, 11.9<--, 13.3<--, 13.7<--  12-31    137  |  100  |  4<L>  ----------------------------<  121<H>  4.5   |  27  |  0.73    Ca    9.0      31 Dec 2019 02:22  Phos  2.9     12-31  Mg     1.5     12-31    TPro  5.6<L>  /  Alb  2.8<L>  /  TBili  0.7  /  DBili  x   /  AST  22  /  ALT  32  /  AlkPhos  83  12-31    Creatinine Trend: 0.73<--, 0.67<--, 0.71<--, 0.74<--, 0.74<--, 0.71<--            MICROBIOLOGY:     RADIOLOGY:  [ ] Reviewed and interpreted by me    EKG: Pt seen and examined by the bedside. No acute events overnight. Feeling better and hungry. Nausea better.    REVIEW OF SYSTEMS:  Constitutional: [ ] negative [ ] fevers [ ] chills [ ] weight loss [ ] weight gain  HEENT: [ ] negative [ ] dry eyes [ ] eye irritation [ ] postnasal drip [ ] nasal congestion  CV: [ ] negative  [ ] chest pain [ ] orthopnea [ ] palpitations [ ] murmur  Resp: [ ] negative [ ] cough [ ] shortness of breath [ ] dyspnea [ ] wheezing [ ] sputum [ ] hemoptysis  GI: [ ] negative [x] nausea [ ] vomiting [ ] diarrhea [ ] constipation [ ] abd pain [ ] dysphagia   : [ ] negative [ ] dysuria [ ] nocturia [ ] hematuria [ ] increased urinary frequency  Musculoskeletal: [ ] negative [ ] back pain [ ] myalgias [ ] arthralgias [ ] fracture  Skin: [ ] negative [ ] rash [ ] itch  Neurological: [ ] negative [ ] headache [ ] dizziness [ ] syncope [ ] weakness [ ] numbness  Psychiatric: [ ] negative [ ] anxiety [ ] depression  Endocrine: [ ] negative [ ] diabetes [ ] thyroid problem  Hematologic/Lymphatic: [ ] negative [ ] anemia [ ] bleeding problem  Allergic/Immunologic: [ ] negative [ ] itchy eyes [ ] nasal discharge [ ] hives [ ] angioedema  [x]All other systems negative    OBJECTIVE:  ICU Vital Signs Last 24 Hrs  T(C): 36.9 (31 Dec 2019 08:00), Max: 37.4 (31 Dec 2019 04:00)  T(F): 98.4 (31 Dec 2019 08:00), Max: 99.3 (31 Dec 2019 04:00)  HR: 63 (31 Dec 2019 08:00) (58 - 82)  BP: 106/78 (31 Dec 2019 08:00) (100/63 - 121/84)  BP(mean): 83 (31 Dec 2019 08:00) (72 - 92)  ABP: --  ABP(mean): --  RR: 12 (31 Dec 2019 08:00) (11 - 19)  SpO2: 100% (31 Dec 2019 08:00) (98% - 100%)      12-30 @ 07:01  -  12-31 @ 07:00  --------------------------------------------------------  IN: 3109 mL / OUT: 3775 mL / NET: -666 mL    CAPILLARY BLOOD GLUCOSE  POCT Blood Glucose.: 157 mg/dL (31 Dec 2019 08:16)      PHYSICAL EXAM:  General: NAD, pleasant   HEENT: NCAT, moist mucosal membranes  Respiratory: CTAB  Cardiovascular: S1/S2 normal, RRR, no murmurs/rubs/gallops appreciated  Abdomen: soft, mildly tender, no distension  Extremities: no b/l LE edema, no cyanosis/clubbing  Neurological: AAOx3, answering questions appropriately, no focal deficits    HOSPITAL MEDICATIONS:  enoxaparin Injectable 40 milliGRAM(s) SubCutaneous daily    dextrose 40% Gel 15 Gram(s) Oral once PRN  dextrose 50% Injectable 12.5 Gram(s) IV Push once  dextrose 50% Injectable 25 Gram(s) IV Push once  dextrose 50% Injectable 25 Gram(s) IV Push once  glucagon  Injectable 1 milliGRAM(s) IntraMuscular once PRN  insulin regular Infusion 1 Unit(s)/Hr IV Continuous <Continuous>    metoclopramide Injectable 10 milliGRAM(s) IV Push every 8 hours  ondansetron Injectable 4 milliGRAM(s) IV Push every 8 hours PRN    aluminum hydroxide/magnesium hydroxide/simethicone Suspension 30 milliLiter(s) Oral every 4 hours PRN  famotidine Injectable 20 milliGRAM(s) IV Push daily  pantoprazole  Injectable 40 milliGRAM(s) IV Push daily    dextrose 5% + lactated ringers 1000 milliLiter(s) IV Continuous <Continuous>  dextrose 5%. 1000 milliLiter(s) IV Continuous <Continuous>  magnesium sulfate  IVPB 2 Gram(s) IV Intermittent once    influenza   Vaccine 0.5 milliLiter(s) IntraMuscular once        LABS:                        12.0   4.42  )-----------( 178      ( 31 Dec 2019 02:22 )             36.5     Hgb Trend: 12.0<--, 12.6<--, 11.9<--, 13.3<--, 13.7<--  12-31    137  |  100  |  4<L>  ----------------------------<  121<H>  4.5   |  27  |  0.73    Ca    9.0      31 Dec 2019 02:22  Phos  2.9     12-31  Mg     1.5     12-31    TPro  5.6<L>  /  Alb  2.8<L>  /  TBili  0.7  /  DBili  x   /  AST  22  /  ALT  32  /  AlkPhos  83  12-31    Creatinine Trend: 0.73<--, 0.67<--, 0.71<--, 0.74<--, 0.74<--, 0.71<--

## 2019-12-31 NOTE — DIETITIAN INITIAL EVALUATION ADULT. - PERTINENT LABORATORY DATA
12-31 Na135 mmol/L Glu 196 mg/dL<H> K+ 4.8 mmol/L Cr  0.86 mg/dL BUN 5 mg/dL<L>  12-31 Phos 3.1 mg/dL 12-31 Alb 2.8 g/dL<L> 12-28 [id number] 14.5 %<H>   12-27 Chol 142 mg/dL LDL 74 mg/dL HDL 55 mg/dL Trig 86 mg/dL

## 2019-12-31 NOTE — PROGRESS NOTE ADULT - SUBJECTIVE AND OBJECTIVE BOX
Chief Complaint: DM 1, s/p DKA    History: Patient seen at bedside. Transitioned off insulin gtt at 12 PM today, Lantus 28 units given at 10 AM.   Patient reports he is tolerating small amount of PO intake. Mild nausea, no vomiting. Remains on supplemental dextrose IVF.   Denies s/s of hypoglycemia.    MEDICATIONS  (STANDING):  dextrose 5% + lactated ringers 1000 milliLiter(s) (50 mL/Hr) IV Continuous <Continuous>  dextrose 5%. 1000 milliLiter(s) (50 mL/Hr) IV Continuous <Continuous>  dextrose 50% Injectable 12.5 Gram(s) IV Push once  dextrose 50% Injectable 25 Gram(s) IV Push once  dextrose 50% Injectable 25 Gram(s) IV Push once  enoxaparin Injectable 40 milliGRAM(s) SubCutaneous daily  famotidine Injectable 20 milliGRAM(s) IV Push daily  influenza   Vaccine 0.5 milliLiter(s) IntraMuscular once  insulin glargine Injectable (LANTUS) 20 Unit(s) SubCutaneous every morning  insulin lispro (HumaLOG) corrective regimen sliding scale   SubCutaneous three times a day before meals  insulin lispro (HumaLOG) corrective regimen sliding scale   SubCutaneous at bedtime  insulin regular Infusion 1 Unit(s)/Hr (1 mL/Hr) IV Continuous <Continuous>  metoclopramide Injectable 10 milliGRAM(s) IV Push every 8 hours  pantoprazole  Injectable 40 milliGRAM(s) IV Push daily    MEDICATIONS  (PRN):  aluminum hydroxide/magnesium hydroxide/simethicone Suspension 30 milliLiter(s) Oral every 4 hours PRN Dyspepsia  dextrose 40% Gel 15 Gram(s) Oral once PRN Blood Glucose LESS THAN 70 milliGRAM(s)/deciliter  glucagon  Injectable 1 milliGRAM(s) IntraMuscular once PRN Glucose LESS THAN 70 milligrams/deciliter  ondansetron Injectable 4 milliGRAM(s) IV Push every 8 hours PRN Nausea and/or Vomiting      No Known Allergies    Review of Systems:  HEENT: No pain  Cardiovascular: No chest pain  Respiratory: No SOB  GI: No nausea, vomiting    PHYSICAL EXAM:  VITALS: T(C): 37.1 (12-31-19 @ 12:00)  T(F): 98.7 (12-31-19 @ 12:00), Max: 99.3 (12-31-19 @ 04:00)  HR: 68 (12-31-19 @ 15:00) (56 - 75)  BP: 100/68 (12-31-19 @ 14:00) (100/63 - 120/83)  RR:  (11 - 16)  SpO2:  (99% - 100%)  Wt(kg): --  GENERAL: NAD  EYES: No proptosis, anicteric  HEENT:  Atraumatic, Normocephalic  RESPIRATORY: unlabored respirations   PSYCH: Alert and oriented x 3, normal affect, normal mood    CAPILLARY BLOOD GLUCOSE    POCT Blood Glucose.: 165 mg/dL (31 Dec 2019 14:52)  POCT Blood Glucose.: 204 mg/dL (31 Dec 2019 13:07)  POCT Blood Glucose.: 162 mg/dL (31 Dec 2019 10:38)  POCT Blood Glucose.: 157 mg/dL (31 Dec 2019 08:16)  POCT Blood Glucose.: 156 mg/dL (31 Dec 2019 05:45)  POCT Blood Glucose.: 139 mg/dL (31 Dec 2019 04:05)  POCT Blood Glucose.: 125 mg/dL (31 Dec 2019 02:22)  POCT Blood Glucose.: 121 mg/dL (31 Dec 2019 00:14)  POCT Blood Glucose.: 130 mg/dL (30 Dec 2019 22:07)  POCT Blood Glucose.: 163 mg/dL (30 Dec 2019 20:01)  POCT Blood Glucose.: 138 mg/dL (30 Dec 2019 17:55)  POCT Blood Glucose.: 166 mg/dL (30 Dec 2019 16:10)      12-31    137  |  100  |  4<L>  ----------------------------<  121<H>  4.5   |  27  |  0.73    EGFR if : 146  EGFR if non : 126    Ca    9.0      12-31  Mg     1.5     12-31  Phos  2.9     12-31    TPro  5.6<L>  /  Alb  2.8<L>  /  TBili  0.7  /  DBili  x   /  AST  22  /  ALT  32  /  AlkPhos  83  12-31      Thyroid Function Tests:  12-27 @ 05:30 TSH 1.79 FreeT4 -- T3 -- Anti TPO -- Anti Thyroglobulin Ab -- TSI --      Hemoglobin A1C, Whole Blood: 14.5 % <H> [4.0 - 5.6] (12-28-19 @ 02:05)

## 2019-12-31 NOTE — CHART NOTE - NSCHARTNOTEFT_GEN_A_CORE
· Note Type	MICU Transfer Note      MICU Transfer Note    Transfer from: MICU  Transfer to:  (x) Medicine    (  ) Telemetry    (  ) RCU    (  ) Palliative    (  ) Stroke Unit    (  ) _______________  Accepting physican: PapiAndrae blankkelly      MICU COURSE:  29M hx Type DM1 p/w nausea/vomiting, abdominal pain x 2 days. Usually on 18U Lantus and 10U lispro tidac. Denies fevers chills, shortness of breath, cough, diarrhea, dysuria. Per mother his sugars have been very elevated recently, however did not clarify how high. Reports good compliance however did not take one dose two nights ago and instead took it this AM. Reports no alcohol use or other ingestion. Also reporting hiccups. No known gastroparesis. Does not seen an endocrinologist currently. no sick contacts or recent travel. In the ED pt was found hyperglycemic in DKA, given total humalog of 20 units and 4L of LR as well as reglan and zofran    Patient admitted to MICU for DKA. Insulin gtt started. Pt continued to have nausea/vomiting. Initially had AG 25, glucose 600s, Beta-hydroxy burate of 5.2, pH 7.2. Endocrine consulted. Pt able to eat on 12/31 and transitioned with Lantus 28U and NALINI. FS stable. D5 LR maintained as pt still not eating full diet. GI evaluated patient's nausea/vomiting and abdominal pain. 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 Started on reglan, protonix, pepcid and zofran for possible GERD/gastroparesis.     EGD tentatively planned for Thursday.       For Follow-Up:  []Endocrine follow up  []ADAT; start pre-meal Humalog when taking more PO  []GI follow up for endoscopy, tentatively scheduled for Thursday, keep NPO MN on Wednesday      Eugene Avila D.O.  PGY-3 EM/IM  Pager #[phone]

## 2019-12-31 NOTE — PROGRESS NOTE ADULT - SUBJECTIVE AND OBJECTIVE BOX
Chief Complaint:  Patient is a 29y old  Male who presents with a chief complaint of abdominal pain and hyperglycemia (31 Dec 2019 09:07)      Interval Events: Patient denies any nausea or vomiting for the past 1 day. Denies fever or chills.     Allergies:  No Known Allergies      Hospital Medications:  aluminum hydroxide/magnesium hydroxide/simethicone Suspension 30 milliLiter(s) Oral every 4 hours PRN  dextrose 40% Gel 15 Gram(s) Oral once PRN  dextrose 5% + lactated ringers 1000 milliLiter(s) IV Continuous <Continuous>  dextrose 5%. 1000 milliLiter(s) IV Continuous <Continuous>  dextrose 50% Injectable 12.5 Gram(s) IV Push once  dextrose 50% Injectable 25 Gram(s) IV Push once  dextrose 50% Injectable 25 Gram(s) IV Push once  enoxaparin Injectable 40 milliGRAM(s) SubCutaneous daily  famotidine Injectable 20 milliGRAM(s) IV Push daily  glucagon  Injectable 1 milliGRAM(s) IntraMuscular once PRN  influenza   Vaccine 0.5 milliLiter(s) IntraMuscular once  insulin regular Infusion 1 Unit(s)/Hr IV Continuous <Continuous>  magnesium sulfate  IVPB 2 Gram(s) IV Intermittent once  metoclopramide Injectable 10 milliGRAM(s) IV Push every 8 hours  ondansetron Injectable 4 milliGRAM(s) IV Push every 8 hours PRN  pantoprazole  Injectable 40 milliGRAM(s) IV Push daily      PMHX/PSHX:  Diabetes  No significant past surgical history      Family history:  FH: diabetes mellitus      ROS:     General:  No wt loss, fevers, chills, night sweats, fatigue,   Eyes:  Good vision, no reported pain  ENT:  No sore throat, pain, runny nose, dysphagia  CV:  No pain, palpitations, hypo/hypertension  Resp:  No dyspnea, cough, tachypnea, wheezing  GI:  See HPI  :  No pain, bleeding, incontinence, nocturia  Muscle:  No pain, weakness  Neuro:  No weakness, tingling, memory problems  Psych:  No fatigue, insomnia, mood problems, depression  Endocrine:  No polyuria, polydipsia, cold/heat intolerance  Heme:  No petechiae, ecchymosis, easy bruisability  Skin:  No rash, edema      PHYSICAL EXAM:     Vital Signs:  Vital Signs Last 24 Hrs  T(C): 36.9 (31 Dec 2019 08:00), Max: 37.4 (31 Dec 2019 04:00)  T(F): 98.4 (31 Dec 2019 08:00), Max: 99.3 (31 Dec 2019 04:00)  HR: 56 (31 Dec 2019 10:00) (56 - 82)  BP: 100/76 (31 Dec 2019 09:00) (100/63 - 121/84)  BP(mean): 80 (31 Dec 2019 09:00) (72 - 92)  RR: 9 (31 Dec 2019 10:00) (9 - 19)  SpO2: 100% (31 Dec 2019 10:00) (98% - 100%)  Daily     Daily Weight in k.2 (31 Dec 2019 04:00)    GENERAL:  appears comfortable, no acute distress  HEENT:  NC/AT,  conjunctivae clear, sclera -anicteric  CHEST:  no increased effort  HEART:  Regular rate and rhythm  ABDOMEN:  Soft, non-tender, non-distended,  no masses ,no hepato-splenomegaly,   EXTREMITIES:  no cyanosis, clubbing or edema  SKIN:  No rash/erythema/ecchymoses/petechiae/wounds  NEURO:  Alert, oriented    LABS:                        12.0   4.42  )-----------( 178      ( 31 Dec 2019 02:22 )             36.5     12    137  |  100  |  4<L>  ----------------------------<  121<H>  4.5   |  27  |  0.73    Ca    9.0      31 Dec 2019 02:22  Phos  2.9       Mg     1.5         TPro  5.6<L>  /  Alb  2.8<L>  /  TBili  0.7  /  DBili  x   /  AST  22  /  ALT  32  /  AlkPhos  83      LIVER FUNCTIONS - ( 31 Dec 2019 02:22 )  Alb: 2.8 g/dL / Pro: 5.6 g/dL / ALK PHOS: 83 u/L / ALT: 32 u/L / AST: 22 u/L / GGT: x                   Imaging:  < from: CT Chest w/ IV Cont (19 @ 01:36) >    FINDINGS:    LUNGS AND AIRWAYS: Patentcentral airways.  No acute infiltrates or consolidative opacity.    PLEURA: No pleural effusion.    MEDIASTINUM AND SHERON: No lymphadenopathy.    VESSELS: Within normal limits.    HEART: Heart size is normal. No pericardial effusion.    CHEST WALL ANDLOWER NECK: Within normal limits.    VISUALIZED UPPER ABDOMEN: Bochdalek hernia with herniation of portion of the liver into the lower right hemithorax. There is mild wall thickening of the thoracic esophagus. This may be related to gastroesophageal reflux disease or esophagitis. Consider nonemergent endoscopic evaluation to exclude underlying lesion.    BONES: Within normal limits.    IMPRESSION:     No consolidation or pleural effusion.    Right sided Bochdalek hernia containing portion of liver.    Mild wall thickening of the thoracic esophagus. This may be related to gastroesophageal reflux disease or esophagitis. Consider nonemergent endoscopic evaluation to exclude underlying lesion.          < end of copied text > Chief Complaint:  Patient is a 29y old  Male who presents with a chief complaint of abdominal pain and hyperglycemia (31 Dec 2019 09:07)      Interval Events: Patient denies any nausea or vomiting for the past 1 day. Denies fever or chills. Patient restarted on insulin drip.    Allergies:  No Known Allergies      Hospital Medications:  aluminum hydroxide/magnesium hydroxide/simethicone Suspension 30 milliLiter(s) Oral every 4 hours PRN  dextrose 40% Gel 15 Gram(s) Oral once PRN  dextrose 5% + lactated ringers 1000 milliLiter(s) IV Continuous <Continuous>  dextrose 5%. 1000 milliLiter(s) IV Continuous <Continuous>  dextrose 50% Injectable 12.5 Gram(s) IV Push once  dextrose 50% Injectable 25 Gram(s) IV Push once  dextrose 50% Injectable 25 Gram(s) IV Push once  enoxaparin Injectable 40 milliGRAM(s) SubCutaneous daily  famotidine Injectable 20 milliGRAM(s) IV Push daily  glucagon  Injectable 1 milliGRAM(s) IntraMuscular once PRN  influenza   Vaccine 0.5 milliLiter(s) IntraMuscular once  insulin regular Infusion 1 Unit(s)/Hr IV Continuous <Continuous>  magnesium sulfate  IVPB 2 Gram(s) IV Intermittent once  metoclopramide Injectable 10 milliGRAM(s) IV Push every 8 hours  ondansetron Injectable 4 milliGRAM(s) IV Push every 8 hours PRN  pantoprazole  Injectable 40 milliGRAM(s) IV Push daily      PMHX/PSHX:  Diabetes  No significant past surgical history      Family history:  FH: diabetes mellitus      ROS:     General:  No wt loss, fevers, chills, night sweats, fatigue,   Eyes:  Good vision, no reported pain  ENT:  No sore throat, pain, runny nose, dysphagia  CV:  No pain, palpitations, hypo/hypertension  Resp:  No dyspnea, cough, tachypnea, wheezing  GI:  See HPI  :  No pain, bleeding, incontinence, nocturia  Muscle:  No pain, weakness  Neuro:  No weakness, tingling, memory problems  Psych:  No fatigue, insomnia, mood problems, depression  Endocrine:  No polyuria, polydipsia, cold/heat intolerance  Heme:  No petechiae, ecchymosis, easy bruisability  Skin:  No rash, edema      PHYSICAL EXAM:     Vital Signs:  Vital Signs Last 24 Hrs  T(C): 36.9 (31 Dec 2019 08:00), Max: 37.4 (31 Dec 2019 04:00)  T(F): 98.4 (31 Dec 2019 08:00), Max: 99.3 (31 Dec 2019 04:00)  HR: 56 (31 Dec 2019 10:00) (56 - 82)  BP: 100/76 (31 Dec 2019 09:00) (100/63 - 121/84)  BP(mean): 80 (31 Dec 2019 09:00) (72 - 92)  RR: 9 (31 Dec 2019 10:00) (9 - 19)  SpO2: 100% (31 Dec 2019 10:00) (98% - 100%)  Daily     Daily Weight in k.2 (31 Dec 2019 04:00)    GENERAL:  appears comfortable, no acute distress  HEENT:  NC/AT,  conjunctivae clear, sclera -anicteric  CHEST:  no increased effort  HEART:  Regular rate and rhythm  ABDOMEN:  Soft, non-tender, non-distended,  no masses ,no hepato-splenomegaly,   EXTREMITIES:  no cyanosis, clubbing or edema  SKIN:  No rash/erythema/ecchymoses/petechiae/wounds  NEURO:  Alert, oriented    LABS:                        12.0   4.42  )-----------( 178      ( 31 Dec 2019 02:22 )             36.5         137  |  100  |  4<L>  ----------------------------<  121<H>  4.5   |  27  |  0.73    Ca    9.0      31 Dec 2019 02:22  Phos  2.9       Mg     1.5         TPro  5.6<L>  /  Alb  2.8<L>  /  TBili  0.7  /  DBili  x   /  AST  22  /  ALT  32  /  AlkPhos  83      LIVER FUNCTIONS - ( 31 Dec 2019 02:22 )  Alb: 2.8 g/dL / Pro: 5.6 g/dL / ALK PHOS: 83 u/L / ALT: 32 u/L / AST: 22 u/L / GGT: x                   Imaging:  < from: CT Chest w/ IV Cont (19 @ 01:36) >    FINDINGS:    LUNGS AND AIRWAYS: Patentcentral airways.  No acute infiltrates or consolidative opacity.    PLEURA: No pleural effusion.    MEDIASTINUM AND SHERON: No lymphadenopathy.    VESSELS: Within normal limits.    HEART: Heart size is normal. No pericardial effusion.    CHEST WALL ANDLOWER NECK: Within normal limits.    VISUALIZED UPPER ABDOMEN: Bochdalek hernia with herniation of portion of the liver into the lower right hemithorax. There is mild wall thickening of the thoracic esophagus. This may be related to gastroesophageal reflux disease or esophagitis. Consider nonemergent endoscopic evaluation to exclude underlying lesion.    BONES: Within normal limits.    IMPRESSION:     No consolidation or pleural effusion.    Right sided Bochdalek hernia containing portion of liver.    Mild wall thickening of the thoracic esophagus. This may be related to gastroesophageal reflux disease or esophagitis. Consider nonemergent endoscopic evaluation to exclude underlying lesion.          < end of copied text >

## 2019-12-31 NOTE — PROGRESS NOTE ADULT - ASSESSMENT
----------------------------------------  Jasmyn Hooper MD PGY-6  Pulmonary/Critical Care Fellow  Pager # [phone] (NS), [phone] ([facility]) 29M hx DM1 p/w nausea/vomiting (NBNB) x2 days and abdominal pain. Found to be in DKA.     #Neuro  No acute issues. Pt awake and alert. A&Ox3    #Cards  No acute issues. Qtc 391.   - daily EKG while on standing reglan    #Pulm  - No acute issues    #Endo  Hx of T1DM. HbA1c 14.5. P/w abdominal pain, nausea/vomiting which is a similar presentation to prev episodes of DKA. AG initially was 25, glu 630, BHB 5.2, pH 7.2. At home, pt is on Lantus 18U qhs and 10U pre-meals at home. Prior to admission, missed evening dose because he was tired from work and fell asleep - likely noncompliance was trigger for current episode of DKA. Currently on insulin gtt at 1U/hr, lantus given this morning, will d/c insulin at 1PM. AG closed, bicarb 25, FS in 150s. Endocrine following.   - will start diet today; will lantus 28U with NALINI  - cont D5 LR for now  - f/u FS q1h  - f/u BMP  - appreciate endocrine recs    #GI  Pt c/o abdominal pain. Given uncontrolled DM, may have gastroparesis - can also consider GERD. Attempting soft, mechanical diet today. 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 GI following.   - cont reglan 10mg TID, protonix qday, zofran prn, pepcid qhs for sxs 2/2 possible GERD/gastroparesis  - F/u GI recs, ?upper endoscopy on Thursday tentatively    #ID  No active issues    #Heme  No active issues    #Renal/  K 4.5. Repleting through fluids.   - f/u BMP today    #ppx  - lovenox 40mg qd    #Dispo  - transfer to floors when off insulin gtt    ----------------------------------------  Jasmyn Hooper MD PGY-6  Pulmonary/Critical Care Fellow  Pager # [phone] ([facility]), [phone] ([facility])

## 2019-12-31 NOTE — DIETITIAN INITIAL EVALUATION ADULT. - ENERGY NEEDS
Ht: 170.18cm Wt: 65.2kg 12/31/19 BMI: 22.6  IBW: 148 pounds +/- 10%  Edema: Dependent edema noted per flowsheet  Pressure Injuries: None noted

## 2020-01-01 DIAGNOSIS — E87.1 HYPO-OSMOLALITY AND HYPONATREMIA: ICD-10-CM

## 2020-01-01 DIAGNOSIS — R11.2 NAUSEA WITH VOMITING, UNSPECIFIED: ICD-10-CM

## 2020-01-01 LAB
ALBUMIN SERPL ELPH-MCNC: 3 G/DL — LOW (ref 3.3–5)
ALP SERPL-CCNC: 81 U/L — SIGNIFICANT CHANGE UP (ref 40–120)
ALT FLD-CCNC: 29 U/L — SIGNIFICANT CHANGE UP (ref 4–41)
ANION GAP SERPL CALC-SCNC: 10 MMO/L — SIGNIFICANT CHANGE UP (ref 7–14)
AST SERPL-CCNC: 25 U/L — SIGNIFICANT CHANGE UP (ref 4–40)
BASOPHILS # BLD AUTO: 0.03 K/UL — SIGNIFICANT CHANGE UP (ref 0–0.2)
BASOPHILS NFR BLD AUTO: 0.5 % — SIGNIFICANT CHANGE UP (ref 0–2)
BILIRUB SERPL-MCNC: 0.6 MG/DL — SIGNIFICANT CHANGE UP (ref 0.2–1.2)
BUN SERPL-MCNC: 8 MG/DL — SIGNIFICANT CHANGE UP (ref 7–23)
CALCIUM SERPL-MCNC: 9.1 MG/DL — SIGNIFICANT CHANGE UP (ref 8.4–10.5)
CHLORIDE SERPL-SCNC: 95 MMOL/L — LOW (ref 98–107)
CO2 SERPL-SCNC: 29 MMOL/L — SIGNIFICANT CHANGE UP (ref 22–31)
CREAT SERPL-MCNC: 0.81 MG/DL — SIGNIFICANT CHANGE UP (ref 0.5–1.3)
EOSINOPHIL # BLD AUTO: 0.15 K/UL — SIGNIFICANT CHANGE UP (ref 0–0.5)
EOSINOPHIL NFR BLD AUTO: 2.7 % — SIGNIFICANT CHANGE UP (ref 0–6)
GLUCOSE SERPL-MCNC: 117 MG/DL — HIGH (ref 70–99)
HCT VFR BLD CALC: 37.2 % — LOW (ref 39–50)
HGB BLD-MCNC: 12.2 G/DL — LOW (ref 13–17)
IMM GRANULOCYTES NFR BLD AUTO: 0.4 % — SIGNIFICANT CHANGE UP (ref 0–1.5)
LYMPHOCYTES # BLD AUTO: 3.18 K/UL — SIGNIFICANT CHANGE UP (ref 1–3.3)
LYMPHOCYTES # BLD AUTO: 58 % — HIGH (ref 13–44)
MAGNESIUM SERPL-MCNC: 1.7 MG/DL — SIGNIFICANT CHANGE UP (ref 1.6–2.6)
MCHC RBC-ENTMCNC: 30.1 PG — SIGNIFICANT CHANGE UP (ref 27–34)
MCHC RBC-ENTMCNC: 32.8 % — SIGNIFICANT CHANGE UP (ref 32–36)
MCV RBC AUTO: 91.9 FL — SIGNIFICANT CHANGE UP (ref 80–100)
MONOCYTES # BLD AUTO: 0.38 K/UL — SIGNIFICANT CHANGE UP (ref 0–0.9)
MONOCYTES NFR BLD AUTO: 6.9 % — SIGNIFICANT CHANGE UP (ref 2–14)
NEUTROPHILS # BLD AUTO: 1.72 K/UL — LOW (ref 1.8–7.4)
NEUTROPHILS NFR BLD AUTO: 31.5 % — LOW (ref 43–77)
NRBC # FLD: 0 K/UL — SIGNIFICANT CHANGE UP (ref 0–0)
PHOSPHATE SERPL-MCNC: 4.4 MG/DL — SIGNIFICANT CHANGE UP (ref 2.5–4.5)
PLATELET # BLD AUTO: 199 K/UL — SIGNIFICANT CHANGE UP (ref 150–400)
PMV BLD: 11.2 FL — SIGNIFICANT CHANGE UP (ref 7–13)
POTASSIUM SERPL-MCNC: 4.6 MMOL/L — SIGNIFICANT CHANGE UP (ref 3.5–5.3)
POTASSIUM SERPL-SCNC: 4.6 MMOL/L — SIGNIFICANT CHANGE UP (ref 3.5–5.3)
PROT SERPL-MCNC: 5.8 G/DL — LOW (ref 6–8.3)
RBC # BLD: 4.05 M/UL — LOW (ref 4.2–5.8)
RBC # FLD: 11.8 % — SIGNIFICANT CHANGE UP (ref 10.3–14.5)
SODIUM SERPL-SCNC: 134 MMOL/L — LOW (ref 135–145)
WBC # BLD: 5.48 K/UL — SIGNIFICANT CHANGE UP (ref 3.8–10.5)
WBC # FLD AUTO: 5.48 K/UL — SIGNIFICANT CHANGE UP (ref 3.8–10.5)

## 2020-01-01 PROCEDURE — 99233 SBSQ HOSP IP/OBS HIGH 50: CPT | Mod: GC

## 2020-01-01 RX ADMIN — ENOXAPARIN SODIUM 40 MILLIGRAM(S): 100 INJECTION SUBCUTANEOUS at 11:44

## 2020-01-01 RX ADMIN — Medication 1: at 12:32

## 2020-01-01 RX ADMIN — Medication 10 MILLIGRAM(S): at 05:19

## 2020-01-01 RX ADMIN — FAMOTIDINE 20 MILLIGRAM(S): 10 INJECTION INTRAVENOUS at 08:42

## 2020-01-01 RX ADMIN — INSULIN GLARGINE 20 UNIT(S): 100 INJECTION, SOLUTION SUBCUTANEOUS at 08:42

## 2020-01-01 RX ADMIN — Medication 10 MILLIGRAM(S): at 14:26

## 2020-01-01 RX ADMIN — Medication 2: at 17:45

## 2020-01-01 RX ADMIN — PANTOPRAZOLE SODIUM 40 MILLIGRAM(S): 20 TABLET, DELAYED RELEASE ORAL at 11:43

## 2020-01-01 NOTE — PROGRESS NOTE ADULT - PROBLEM SELECTOR PLAN 2
Likely gastroparesis given mult episodes of n/v and poor control of DM  - plan for endoscopy tmrw   - NPO at midnight  - c/w reglan, zofran, pepcid, maalox, PPI with N/V, likely 2/2 gastroparesis given mult episodes of n/v and poor control of DM, also found to have Bochdolek hernia  - plan for endoscopy tmrw   - c/w reglan, zofran, pepcid, maalox, PPI  - NPO at midnight

## 2020-01-01 NOTE — PROGRESS NOTE ADULT - ASSESSMENT
30 y/o male with hx of type 1DM who presented for nausea/vomiting (NBNB) and abdominal pain, found to be in DKA, was admitted to MICU for insulin gtt, now transferred to medicine for further management. Currently pending endoscopy for thickened esophageal wall seen in CT. 28 y/o male with hx of type 1DM who presented for nausea/vomiting (NBNB) and abdominal pain, found to be in DKA, was admitted to MICU for insulin gtt, now transferred to medicine for further management. 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

## 2020-01-01 NOTE — PROGRESS NOTE ADULT - PROBLEM SELECTOR PLAN 3
Likely gastroparesis given mult episodes of n/v and poor control of DM  - plan for endoscopy tmrw   - NPO at midnight -mild hyponatremia, will repeat with AM labs

## 2020-01-01 NOTE — CHART NOTE - NSCHARTNOTEFT_GEN_A_CORE
Plan for EGD tmro to assess esophogeal thickening and reasons for n/v. Please keep NPO at midnight. Sugar control/insulin regimen per primary team and endocrine.

## 2020-01-01 NOTE — PROGRESS NOTE ADULT - PROBLEM SELECTOR PLAN 1
presented for abd pain and n/v; MICU stay for insulin gtt due to DKA. A1c 14.5. N/V likely due to gastroparesis given poorly controlled diabetes. Home regimen: lantus 18u, 10u premeal.   - lantus 20u daily with ISS  - tolerating meals now but still with poor PO intake -> will keep d5 @ 50 cc/hr.   - per endocrine, keep lantus 20u with NPO at MN -in setting of med non-compliance  -presented for abd pain and n/v; MICU admit for insulin gtt due to DKA. A1c 14.5. -N/V likely due to gastroparesis given poorly controlled diabetes. Home regimen: lantus 18u, 10u premeal.   - c/w lantus 20u daily with ISS  - tolerating meals now but still with poor PO intake. FS with increasing glucose while on D5 + LR at 50 cc/hr. will d/c D5 and monitor FS  - per endocrine, keep lantus 20u with NPO at MN

## 2020-01-01 NOTE — PROGRESS NOTE ADULT - SUBJECTIVE AND OBJECTIVE BOX
Ese Borjas PGY1  -[id number] | [facility] [id number]  =========================    Patient is a 29y old  Male who presents with a chief complaint of abdominal pain and hyperglycemia (31 Dec 2019 15:43)      INTERVAL HPI/OVERNIGHT EVENTS:  No acute events overnight. Pt reports nausea is improved. No emesis overnight or this morning. Denies chest pain, abdominal pain, SOB.         MEDICATIONS  (STANDING):  dextrose 5% + lactated ringers 1000 milliLiter(s) (50 mL/Hr) IV Continuous <Continuous>  dextrose 5%. 1000 milliLiter(s) (50 mL/Hr) IV Continuous <Continuous>  dextrose 50% Injectable 12.5 Gram(s) IV Push once  dextrose 50% Injectable 25 Gram(s) IV Push once  dextrose 50% Injectable 25 Gram(s) IV Push once  enoxaparin Injectable 40 milliGRAM(s) SubCutaneous daily  famotidine Injectable 20 milliGRAM(s) IV Push daily  influenza   Vaccine 0.5 milliLiter(s) IntraMuscular once  insulin glargine Injectable (LANTUS) 20 Unit(s) SubCutaneous every morning  insulin lispro (HumaLOG) corrective regimen sliding scale   SubCutaneous three times a day before meals  insulin lispro (HumaLOG) corrective regimen sliding scale   SubCutaneous at bedtime  metoclopramide Injectable 10 milliGRAM(s) IV Push every 8 hours  pantoprazole  Injectable 40 milliGRAM(s) IV Push daily    MEDICATIONS  (PRN):  aluminum hydroxide/magnesium hydroxide/simethicone Suspension 30 milliLiter(s) Oral every 4 hours PRN Dyspepsia  dextrose 40% Gel 15 Gram(s) Oral once PRN Blood Glucose LESS THAN 70 milliGRAM(s)/deciliter  glucagon  Injectable 1 milliGRAM(s) IntraMuscular once PRN Glucose LESS THAN 70 milligrams/deciliter  ondansetron Injectable 4 milliGRAM(s) IV Push every 8 hours PRN Nausea and/or Vomiting      Allergies    No Known Allergies    Intolerances        Vital Signs Last 24 Hrs  T(C): 36.8 (01 Jan 2020 05:05), Max: 37.6 (31 Dec 2019 22:06)  T(F): 98.2 (01 Jan 2020 05:05), Max: 99.6 (31 Dec 2019 22:06)  HR: 80 (01 Jan 2020 05:05) (68 - 80)  BP: 119/79 (01 Jan 2020 05:05) (100/68 - 119/79)  BP(mean): 76 (31 Dec 2019 18:00) (72 - 82)  RR: 18 (01 Jan 2020 05:05) (11 - 18)  SpO2: 100% (01 Jan 2020 05:05) (99% - 100%)    PHYSICAL EXAM:  GENERAL: NAD. comfortable appearing  CHEST/LUNG: Clear to auscultation; No rales, rhonchi, or wheezing.  Respiratory effort does not appear labored.  HEART: Regular rate and rhythm; S1 and S2,  no murmurs, rubs, or gallops.  ABDOMEN: Soft, not tender to palpation.  No masses or HSM appreciated.  No distension.  Bowel sounds present.  EXTREMITIES:  No clubbing, cyanosis, or edema.  Moves all extremities with strength 5/5.  SKIN: No obvious rashes or lesions.  Turgor okay.  NEURO:  Alert and oriented x 3, no focal sensory or  motor deficit    LABS:                        12.2   5.48  )-----------( 199      ( 01 Jan 2020 06:00 )             37.2     01-01    134<L>  |  95<L>  |  8   ----------------------------<  117<H>  4.6   |  29  |  0.81    Ca    9.1      01 Jan 2020 06:00  Phos  4.4     01-01  Mg     1.7     01-01    TPro  5.8<L>  /  Alb  3.0<L>  /  TBili  0.6  /  DBili  x   /  AST  25  /  ALT  29  /  AlkPhos  81  01-01        CAPILLARY BLOOD GLUCOSE      POCT Blood Glucose.: 104 mg/dL (01 Jan 2020 08:24)  POCT Blood Glucose.: 134 mg/dL (31 Dec 2019 22:49)  POCT Blood Glucose.: 228 mg/dL (31 Dec 2019 17:03)  POCT Blood Glucose.: 165 mg/dL (31 Dec 2019 14:52)  POCT Blood Glucose.: 204 mg/dL (31 Dec 2019 13:07)

## 2020-01-01 NOTE — PROGRESS NOTE ADULT - PROBLEM SELECTOR PLAN 5
lovenox  Diet: CC with evening snack. NPO at MN dvt ppx: improve score 0 and will monitor off dvt ppx  Diet: CC with evening snack. NPO at MN  dispo: For discharge patient DOES NOT have insurance until February and will need to be on a cost effective plan for patient.  Will involve transition of care pharmacist to assist with dc planning and education.

## 2020-01-02 ENCOUNTER — RESULT REVIEW (OUTPATIENT)
Age: 30
End: 2020-01-02

## 2020-01-02 LAB
ANION GAP SERPL CALC-SCNC: 7 MMO/L — SIGNIFICANT CHANGE UP (ref 7–14)
BASOPHILS # BLD AUTO: 0.04 K/UL — SIGNIFICANT CHANGE UP (ref 0–0.2)
BASOPHILS NFR BLD AUTO: 0.8 % — SIGNIFICANT CHANGE UP (ref 0–2)
BUN SERPL-MCNC: 11 MG/DL — SIGNIFICANT CHANGE UP (ref 7–23)
CALCIUM SERPL-MCNC: 9.2 MG/DL — SIGNIFICANT CHANGE UP (ref 8.4–10.5)
CHLORIDE SERPL-SCNC: 96 MMOL/L — LOW (ref 98–107)
CO2 SERPL-SCNC: 29 MMOL/L — SIGNIFICANT CHANGE UP (ref 22–31)
CREAT SERPL-MCNC: 0.85 MG/DL — SIGNIFICANT CHANGE UP (ref 0.5–1.3)
EOSINOPHIL # BLD AUTO: 0.13 K/UL — SIGNIFICANT CHANGE UP (ref 0–0.5)
EOSINOPHIL NFR BLD AUTO: 2.7 % — SIGNIFICANT CHANGE UP (ref 0–6)
GLUCOSE SERPL-MCNC: 169 MG/DL — HIGH (ref 70–99)
HCT VFR BLD CALC: 38.4 % — LOW (ref 39–50)
HGB BLD-MCNC: 12.6 G/DL — LOW (ref 13–17)
IMM GRANULOCYTES NFR BLD AUTO: 0.2 % — SIGNIFICANT CHANGE UP (ref 0–1.5)
LYMPHOCYTES # BLD AUTO: 2.6 K/UL — SIGNIFICANT CHANGE UP (ref 1–3.3)
LYMPHOCYTES # BLD AUTO: 54.7 % — HIGH (ref 13–44)
MAGNESIUM SERPL-MCNC: 1.7 MG/DL — SIGNIFICANT CHANGE UP (ref 1.6–2.6)
MCHC RBC-ENTMCNC: 29.9 PG — SIGNIFICANT CHANGE UP (ref 27–34)
MCHC RBC-ENTMCNC: 32.8 % — SIGNIFICANT CHANGE UP (ref 32–36)
MCV RBC AUTO: 91.2 FL — SIGNIFICANT CHANGE UP (ref 80–100)
MONOCYTES # BLD AUTO: 0.34 K/UL — SIGNIFICANT CHANGE UP (ref 0–0.9)
MONOCYTES NFR BLD AUTO: 7.2 % — SIGNIFICANT CHANGE UP (ref 2–14)
NEUTROPHILS # BLD AUTO: 1.63 K/UL — LOW (ref 1.8–7.4)
NEUTROPHILS NFR BLD AUTO: 34.4 % — LOW (ref 43–77)
NRBC # FLD: 0 K/UL — SIGNIFICANT CHANGE UP (ref 0–0)
PHOSPHATE SERPL-MCNC: 3.9 MG/DL — SIGNIFICANT CHANGE UP (ref 2.5–4.5)
PLATELET # BLD AUTO: 235 K/UL — SIGNIFICANT CHANGE UP (ref 150–400)
PMV BLD: 11.4 FL — SIGNIFICANT CHANGE UP (ref 7–13)
POTASSIUM SERPL-MCNC: 4.4 MMOL/L — SIGNIFICANT CHANGE UP (ref 3.5–5.3)
POTASSIUM SERPL-SCNC: 4.4 MMOL/L — SIGNIFICANT CHANGE UP (ref 3.5–5.3)
RBC # BLD: 4.21 M/UL — SIGNIFICANT CHANGE UP (ref 4.2–5.8)
RBC # FLD: 11.7 % — SIGNIFICANT CHANGE UP (ref 10.3–14.5)
SODIUM SERPL-SCNC: 132 MMOL/L — LOW (ref 135–145)
WBC # BLD: 4.75 K/UL — SIGNIFICANT CHANGE UP (ref 3.8–10.5)
WBC # FLD AUTO: 4.75 K/UL — SIGNIFICANT CHANGE UP (ref 3.8–10.5)

## 2020-01-02 PROCEDURE — 88305 TISSUE EXAM BY PATHOLOGIST: CPT | Mod: 26

## 2020-01-02 PROCEDURE — 88312 SPECIAL STAINS GROUP 1: CPT | Mod: 26

## 2020-01-02 PROCEDURE — 99233 SBSQ HOSP IP/OBS HIGH 50: CPT | Mod: GC

## 2020-01-02 PROCEDURE — 43239 EGD BIOPSY SINGLE/MULTIPLE: CPT | Mod: GC

## 2020-01-02 RX ORDER — INSULIN LISPRO 100/ML
VIAL (ML) SUBCUTANEOUS EVERY 6 HOURS
Refills: 0 | Status: DISCONTINUED | OUTPATIENT
Start: 2020-01-02 | End: 2020-01-02

## 2020-01-02 RX ORDER — PANTOPRAZOLE SODIUM 20 MG/1
40 TABLET, DELAYED RELEASE ORAL
Refills: 0 | Status: DISCONTINUED | OUTPATIENT
Start: 2020-01-02 | End: 2020-01-03

## 2020-01-02 RX ORDER — INSULIN LISPRO 100/ML
VIAL (ML) SUBCUTANEOUS
Refills: 0 | Status: DISCONTINUED | OUTPATIENT
Start: 2020-01-02 | End: 2020-01-03

## 2020-01-02 RX ORDER — SODIUM CHLORIDE 9 MG/ML
1000 INJECTION, SOLUTION INTRAVENOUS
Refills: 0 | Status: DISCONTINUED | OUTPATIENT
Start: 2020-01-02 | End: 2020-01-03

## 2020-01-02 RX ORDER — INSULIN LISPRO 100/ML
VIAL (ML) SUBCUTANEOUS AT BEDTIME
Refills: 0 | Status: DISCONTINUED | OUTPATIENT
Start: 2020-01-02 | End: 2020-01-03

## 2020-01-02 RX ADMIN — INSULIN GLARGINE 20 UNIT(S): 100 INJECTION, SOLUTION SUBCUTANEOUS at 07:25

## 2020-01-02 RX ADMIN — FAMOTIDINE 20 MILLIGRAM(S): 10 INJECTION INTRAVENOUS at 06:31

## 2020-01-02 RX ADMIN — SODIUM CHLORIDE 50 MILLILITER(S): 9 INJECTION, SOLUTION INTRAVENOUS at 09:16

## 2020-01-02 RX ADMIN — Medication 3: at 17:41

## 2020-01-02 RX ADMIN — Medication 10 MILLIGRAM(S): at 22:39

## 2020-01-02 RX ADMIN — Medication 10 MILLIGRAM(S): at 15:03

## 2020-01-02 RX ADMIN — PANTOPRAZOLE SODIUM 40 MILLIGRAM(S): 20 TABLET, DELAYED RELEASE ORAL at 15:03

## 2020-01-02 NOTE — PROGRESS NOTE ADULT - PROBLEM SELECTOR PLAN 2
- with N/V, likely 2/2 gastroparesis given mult episodes of n/v and poor control of DM, also found to have Bochdolek hernia and thickening of thoracic esophagus  - plan for endoscopy today  - c/w reglan, zofran PRN, pepcid, maalox, PPI  - pain improved

## 2020-01-02 NOTE — PROGRESS NOTE ADULT - PROBLEM SELECTOR PLAN 1
-in setting of med non-compliance  -presented for abd pain and n/v; MICU admit for insulin gtt due to DKA. A1c 14.5. -N/V likely due to gastroparesis given poorly controlled diabetes. Home regimen: lantus 18u, 10u premeal.   - c/w lantus 20u daily with ISS  - tolerating meals now but still with poor PO intake  - c/w D5NS for now while NPO

## 2020-01-02 NOTE — PROGRESS NOTE ADULT - ASSESSMENT
30 y/o male with hx of type 1DM who presented for nausea/vomiting (NBNB) and abdominal pain, found to be in DKA, was admitted to MICU for insulin gtt, now transferred to medicine for further management. 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

## 2020-01-02 NOTE — PROVIDER CONTACT NOTE (OTHER) - ACTION/TREATMENT ORDERED:
Md made aware and states that this is pt's normal range. No interventions ordered at this time. Will continue to monitor

## 2020-01-02 NOTE — PROGRESS NOTE ADULT - SUBJECTIVE AND OBJECTIVE BOX
Mame Schultz PGY 3  Pager: [phone]/ [phone]    Patient is a 29y old  Male who presents with a chief complaint of abdominal pain and hyperglycemia (01 Jan 2020 11:58)      SUBJECTIVE / OVERNIGHT EVENTS:  No acute events overnight. Patient seen and evaluated at bedside. No fever/chills.  Denies SOB at rest, chest pain, palpitations, abdominal pain, nausea/vomiting    ADDITIONAL REVIEW OF SYSTEMS:    MEDICATIONS  (STANDING):  dextrose 5%. 1000 milliLiter(s) (50 mL/Hr) IV Continuous <Continuous>  dextrose 50% Injectable 12.5 Gram(s) IV Push once  dextrose 50% Injectable 25 Gram(s) IV Push once  dextrose 50% Injectable 25 Gram(s) IV Push once  famotidine Injectable 20 milliGRAM(s) IV Push daily  influenza   Vaccine 0.5 milliLiter(s) IntraMuscular once  insulin glargine Injectable (LANTUS) 20 Unit(s) SubCutaneous every morning  insulin lispro (HumaLOG) corrective regimen sliding scale   SubCutaneous at bedtime  insulin lispro (HumaLOG) corrective regimen sliding scale   SubCutaneous every 6 hours  metoclopramide Injectable 10 milliGRAM(s) IV Push every 8 hours  pantoprazole  Injectable 40 milliGRAM(s) IV Push daily    MEDICATIONS  (PRN):  aluminum hydroxide/magnesium hydroxide/simethicone Suspension 30 milliLiter(s) Oral every 4 hours PRN Dyspepsia  dextrose 40% Gel 15 Gram(s) Oral once PRN Blood Glucose LESS THAN 70 milliGRAM(s)/deciliter  glucagon  Injectable 1 milliGRAM(s) IntraMuscular once PRN Glucose LESS THAN 70 milligrams/deciliter  ondansetron Injectable 4 milliGRAM(s) IV Push every 8 hours PRN Nausea and/or Vomiting      CAPILLARY BLOOD GLUCOSE      POCT Blood Glucose.: 149 mg/dL (02 Jan 2020 06:30)  POCT Blood Glucose.: 213 mg/dL (01 Jan 2020 21:20)  POCT Blood Glucose.: 243 mg/dL (01 Jan 2020 17:30)  POCT Blood Glucose.: 180 mg/dL (01 Jan 2020 12:04)    I&O's Summary      PHYSICAL EXAM:  Vital Signs Last 24 Hrs  T(C): 36.6 (02 Jan 2020 05:19), Max: 36.7 (01 Jan 2020 21:17)  T(F): 97.8 (02 Jan 2020 05:19), Max: 98 (01 Jan 2020 21:17)  HR: 66 (02 Jan 2020 05:19) (66 - 70)  BP: 104/58 (02 Jan 2020 05:19) (101/66 - 107/69)  BP(mean): --  RR: 17 (02 Jan 2020 05:19) (17 - 18)  SpO2: 98% (02 Jan 2020 05:19) (98% - 100%)    CONSTITUTIONAL: NAD, well-developed  RESPIRATORY: Normal respiratory effort; lungs are clear to auscultation bilaterally  CARDIOVASCULAR: Regular rate and rhythm, normal S1 and S2, no murmur/rub/gallop; No lower extremity edema; Peripheral pulses are 2+ bilaterally  ABDOMEN: Nontender to palpation, normoactive bowel sounds, no rebound/guarding; No hepatosplenomegaly  MUSCLOSKELETAL: no clubbing or cyanosis of digits; no joint swelling or tenderness to palpation  PSYCH: A+O to person, place, and time; affect appropriate    LABS:                        12.6   4.75  )-----------( 235      ( 02 Jan 2020 05:30 )             38.4     01-02    132<L>  |  96<L>  |  11  ----------------------------<  169<H>  4.4   |  29  |  0.85    Ca    9.2      02 Jan 2020 05:30  Phos  3.9     01-02  Mg     1.7     01-02    TPro  5.8<L>  /  Alb  3.0<L>  /  TBili  0.6  /  DBili  x   /  AST  25  /  ALT  29  /  AlkPhos  81  01-01                RADIOLOGY & ADDITIONAL TESTS:  Results Reviewed:    Imaging Personally Reviewed:  Electrocardiogram Personally Reviewed:    COORDINATION OF CARE:  Care Discussed with Consultants/Other Providers [Y/N]: gi and endo  Prior or Outpatient Records Reviewed [Y/N]:

## 2020-01-02 NOTE — PROGRESS NOTE ADULT - PROBLEM SELECTOR PLAN 5
dvt ppx: improve score 0 and will monitor off dvt ppx  Diet: CC with evening snack.   dispo: For discharge patient DOES NOT have insurance until February and will need to be on a cost effective plan for patient.  Will involve transition of care pharmacist to assist with dc planning and education.

## 2020-01-03 ENCOUNTER — TRANSCRIPTION ENCOUNTER (OUTPATIENT)
Age: 30
End: 2020-01-03

## 2020-01-03 VITALS
DIASTOLIC BLOOD PRESSURE: 58 MMHG | HEART RATE: 74 BPM | SYSTOLIC BLOOD PRESSURE: 92 MMHG | OXYGEN SATURATION: 100 % | TEMPERATURE: 98 F

## 2020-01-03 PROBLEM — E11.9 TYPE 2 DIABETES MELLITUS WITHOUT COMPLICATIONS: Chronic | Status: ACTIVE | Noted: 2019-12-26

## 2020-01-03 LAB
ANION GAP SERPL CALC-SCNC: 9 MMO/L — SIGNIFICANT CHANGE UP (ref 7–14)
BASOPHILS # BLD AUTO: 0.03 K/UL — SIGNIFICANT CHANGE UP (ref 0–0.2)
BASOPHILS NFR BLD AUTO: 0.7 % — SIGNIFICANT CHANGE UP (ref 0–2)
BUN SERPL-MCNC: 13 MG/DL — SIGNIFICANT CHANGE UP (ref 7–23)
CALCIUM SERPL-MCNC: 9.1 MG/DL — SIGNIFICANT CHANGE UP (ref 8.4–10.5)
CHLORIDE SERPL-SCNC: 100 MMOL/L — SIGNIFICANT CHANGE UP (ref 98–107)
CO2 SERPL-SCNC: 26 MMOL/L — SIGNIFICANT CHANGE UP (ref 22–31)
CREAT SERPL-MCNC: 0.92 MG/DL — SIGNIFICANT CHANGE UP (ref 0.5–1.3)
EOSINOPHIL # BLD AUTO: 0.09 K/UL — SIGNIFICANT CHANGE UP (ref 0–0.5)
EOSINOPHIL NFR BLD AUTO: 2 % — SIGNIFICANT CHANGE UP (ref 0–6)
GLUCOSE SERPL-MCNC: 211 MG/DL — HIGH (ref 70–99)
HCT VFR BLD CALC: 37.5 % — LOW (ref 39–50)
HGB BLD-MCNC: 12 G/DL — LOW (ref 13–17)
IMM GRANULOCYTES NFR BLD AUTO: 0.2 % — SIGNIFICANT CHANGE UP (ref 0–1.5)
LYMPHOCYTES # BLD AUTO: 2.55 K/UL — SIGNIFICANT CHANGE UP (ref 1–3.3)
LYMPHOCYTES # BLD AUTO: 55.3 % — HIGH (ref 13–44)
MAGNESIUM SERPL-MCNC: 1.7 MG/DL — SIGNIFICANT CHANGE UP (ref 1.6–2.6)
MCHC RBC-ENTMCNC: 29.5 PG — SIGNIFICANT CHANGE UP (ref 27–34)
MCHC RBC-ENTMCNC: 32 % — SIGNIFICANT CHANGE UP (ref 32–36)
MCV RBC AUTO: 92.1 FL — SIGNIFICANT CHANGE UP (ref 80–100)
MONOCYTES # BLD AUTO: 0.49 K/UL — SIGNIFICANT CHANGE UP (ref 0–0.9)
MONOCYTES NFR BLD AUTO: 10.6 % — SIGNIFICANT CHANGE UP (ref 2–14)
NEUTROPHILS # BLD AUTO: 1.44 K/UL — LOW (ref 1.8–7.4)
NEUTROPHILS NFR BLD AUTO: 31.2 % — LOW (ref 43–77)
NRBC # FLD: 0 K/UL — SIGNIFICANT CHANGE UP (ref 0–0)
PHOSPHATE SERPL-MCNC: 3.9 MG/DL — SIGNIFICANT CHANGE UP (ref 2.5–4.5)
PLATELET # BLD AUTO: 258 K/UL — SIGNIFICANT CHANGE UP (ref 150–400)
PMV BLD: 11 FL — SIGNIFICANT CHANGE UP (ref 7–13)
POTASSIUM SERPL-MCNC: 4.5 MMOL/L — SIGNIFICANT CHANGE UP (ref 3.5–5.3)
POTASSIUM SERPL-SCNC: 4.5 MMOL/L — SIGNIFICANT CHANGE UP (ref 3.5–5.3)
RBC # BLD: 4.07 M/UL — LOW (ref 4.2–5.8)
RBC # FLD: 11.6 % — SIGNIFICANT CHANGE UP (ref 10.3–14.5)
SODIUM SERPL-SCNC: 135 MMOL/L — SIGNIFICANT CHANGE UP (ref 135–145)
WBC # BLD: 4.61 K/UL — SIGNIFICANT CHANGE UP (ref 3.8–10.5)
WBC # FLD AUTO: 4.61 K/UL — SIGNIFICANT CHANGE UP (ref 3.8–10.5)

## 2020-01-03 PROCEDURE — 99233 SBSQ HOSP IP/OBS HIGH 50: CPT

## 2020-01-03 PROCEDURE — 99239 HOSP IP/OBS DSCHRG MGMT >30: CPT | Mod: GC

## 2020-01-03 RX ORDER — INSULIN DEGLUDEC 100 U/ML
20 INJECTION, SOLUTION SUBCUTANEOUS
Qty: 5 | Refills: 0
Start: 2020-01-03 | End: 2020-02-01

## 2020-01-03 RX ORDER — ENOXAPARIN SODIUM 100 MG/ML
0 INJECTION SUBCUTANEOUS
Qty: 0 | Refills: 0 | DISCHARGE

## 2020-01-03 RX ORDER — INSULIN ASPART 100 [IU]/ML
6 INJECTION, SOLUTION SUBCUTANEOUS
Qty: 5 | Refills: 0
Start: 2020-01-03 | End: 2020-02-01

## 2020-01-03 RX ORDER — INSULIN ASPART 100 [IU]/ML
6 INJECTION, SOLUTION SUBCUTANEOUS
Qty: 15 | Refills: 0
Start: 2020-01-03 | End: 2020-02-01

## 2020-01-03 RX ORDER — INSULIN DEGLUDEC 100 U/ML
20 INJECTION, SOLUTION SUBCUTANEOUS
Qty: 0 | Refills: 0 | DISCHARGE
Start: 2020-01-03

## 2020-01-03 RX ORDER — PANTOPRAZOLE SODIUM 20 MG/1
1 TABLET, DELAYED RELEASE ORAL
Qty: 30 | Refills: 0
Start: 2020-01-03 | End: 2020-02-01

## 2020-01-03 RX ORDER — ENOXAPARIN SODIUM 100 MG/ML
20 INJECTION SUBCUTANEOUS
Qty: 10 | Refills: 0
Start: 2020-01-03 | End: 2020-02-01

## 2020-01-03 RX ORDER — INSULIN ASPART 100 [IU]/ML
6 INJECTION, SOLUTION SUBCUTANEOUS
Qty: 5 | Refills: 0
Start: 2020-01-03

## 2020-01-03 RX ORDER — INSULIN ASPART 100 [IU]/ML
0 INJECTION, SOLUTION SUBCUTANEOUS
Qty: 0 | Refills: 0 | DISCHARGE

## 2020-01-03 RX ADMIN — Medication 10 MILLIGRAM(S): at 13:24

## 2020-01-03 RX ADMIN — PANTOPRAZOLE SODIUM 40 MILLIGRAM(S): 20 TABLET, DELAYED RELEASE ORAL at 06:40

## 2020-01-03 RX ADMIN — INSULIN GLARGINE 20 UNIT(S): 100 INJECTION, SOLUTION SUBCUTANEOUS at 08:27

## 2020-01-03 RX ADMIN — Medication 1: at 08:26

## 2020-01-03 RX ADMIN — Medication 1: at 12:27

## 2020-01-03 RX ADMIN — Medication 10 MILLIGRAM(S): at 06:40

## 2020-01-03 NOTE — PROGRESS NOTE ADULT - SUBJECTIVE AND OBJECTIVE BOX
Chief Complaint: DM 1, s/p DKA    History: Patient seen at bedside. Was NPO yesterday for EGD, re-started on diet at dinner last night, reports he is eating well with no n/v. Denies s/s of hypoglycemia. BG and insulin use reviewed, patient trending slightly above target today with last  mg/dl. Per primary team, patient due for discharge today.    MEDICATIONS  (STANDING):  dextrose 5%. 1000 milliLiter(s) (50 mL/Hr) IV Continuous <Continuous>  dextrose 50% Injectable 12.5 Gram(s) IV Push once  dextrose 50% Injectable 25 Gram(s) IV Push once  dextrose 50% Injectable 25 Gram(s) IV Push once  influenza   Vaccine 0.5 milliLiter(s) IntraMuscular once  insulin glargine Injectable (LANTUS) 20 Unit(s) SubCutaneous every morning  insulin lispro (HumaLOG) corrective regimen sliding scale   SubCutaneous at bedtime  insulin lispro (HumaLOG) corrective regimen sliding scale   SubCutaneous three times a day before meals  metoclopramide Injectable 10 milliGRAM(s) IV Push every 8 hours  pantoprazole    Tablet 40 milliGRAM(s) Oral before breakfast    MEDICATIONS  (PRN):  aluminum hydroxide/magnesium hydroxide/simethicone Suspension 30 milliLiter(s) Oral every 4 hours PRN Dyspepsia  dextrose 40% Gel 15 Gram(s) Oral once PRN Blood Glucose LESS THAN 70 milliGRAM(s)/deciliter  glucagon  Injectable 1 milliGRAM(s) IntraMuscular once PRN Glucose LESS THAN 70 milligrams/deciliter  ondansetron Injectable 4 milliGRAM(s) IV Push every 8 hours PRN Nausea and/or Vomiting    No Known Allergies    Review of Systems:  HEENT: No pain  Cardiovascular: No chest pain  Respiratory: No SOB  GI: No nausea, vomiting    PHYSICAL EXAM:  VITALS: T(C): 36.7 (01-03-20 @ 14:10)  T(F): 98 (01-03-20 @ 14:10), Max: 98.7 (01-02-20 @ 22:14)  HR: 74 (01-03-20 @ 14:10) (68 - 74)  BP: 92/58 (01-03-20 @ 14:10) (92/58 - 99/69)  RR:  (18 - 18)  SpO2:  (98% - 100%)  Wt(kg): --  GENERAL: NAD  EYES: No proptosis, anicteric  HEENT:  Atraumatic, Normocephalic  RESPIRATORY: unlabored respirations   PSYCH: Alert and oriented x 3, normal affect, normal mood    CAPILLARY BLOOD GLUCOSE    POCT Blood Glucose.: 189 mg/dL (03 Jan 2020 12:21)  POCT Blood Glucose.: 180 mg/dL (03 Jan 2020 08:23)  POCT Blood Glucose.: 237 mg/dL (02 Jan 2020 22:03)  POCT Blood Glucose.: 278 mg/dL (02 Jan 2020 17:19)      01-03    135  |  100  |  13  ----------------------------<  211<H>  4.5   |  26  |  0.92    EGFR if : 131  EGFR if non : 113    Ca    9.1      01-03  Mg     1.7     01-03  Phos  3.9     01-03    TPro  5.8<L>  /  Alb  3.0<L>  /  TBili  0.6  /  DBili  x   /  AST  25  /  ALT  29  /  AlkPhos  81  01-01      Thyroid Function Tests:  12-27 @ 05:30 TSH 1.79 FreeT4 -- T3 -- Anti TPO -- Anti Thyroglobulin Ab -- TSI --      Hemoglobin A1C, Whole Blood: 14.5 % <H> [4.0 - 5.6] (12-28-19 @ 02:05)

## 2020-01-03 NOTE — PROGRESS NOTE ADULT - PROBLEM SELECTOR PLAN 2
- with N/V, likely 2/2 gastroparesis given mult episodes of n/v and poor control of DM, also found to have Bochdolek hernia and thickening of thoracic esophagus  - c/w reglan, zofran PRN, pepcid, maalox, PPI  - pain improved - with N/V, likely 2/2 gastroparesis given mult episodes of n/v and poor control of DM, also found to have Bochdolek hernia and thickening of thoracic esophagus  - c/w reglan, zofran PRN, pepcid, maalox, PPI  - pain improved, will f/u with GI as outpt for GES

## 2020-01-03 NOTE — PROGRESS NOTE ADULT - PROBLEM SELECTOR PLAN 1
presented for abd pain and n/v; MICU admit for insulin gtt due to DKA. A1c 14.5. -N/V likely due to gastroparesis given poorly controlled diabetes. Home regimen: lantus 18u, 10u premeal.   - c/w lantus 20u daily with ISS  - tolerating meals now presented for abd pain and n/v; MICU admit for insulin gtt due to DKA. A1c 14.5. -N/V likely due to gastroparesis given poorly controlled diabetes. Home regimen: lantus 18u, 10u premeal.   - c/w lantus 20u daily with ISS  - tolerating meals now  -patient will continue tresiba as outpt in place of lantus. prescription sent to [facility]. will f/u in endo clinic and with GI. needs outpt GES.

## 2020-01-03 NOTE — DISCHARGE NOTE PROVIDER - HOSPITAL COURSE
29M hx Type DM1 p/w nausea/vomiting, abdominal pain x 2 days. In the ED initial workup was notable for hyperglycemia to 586, metabolic anion gap acidosis (AG 25), elevated beta-hydroxy-butrate (5.2), and ketones in urine. Imaging showed thickened esophagus and Bochdolek lesion. Pt was initially admitted to Medicine for DKA. On Medicine pt was lethargic and his anion gap continued to widen. Endocrine was consulted for DKA - per Endocrine recs, pt was transferred to MICU for insulin gtt. Pt was maintained on IVF with D5 LR while he was not eating. When pt was able to tolerate PO, he was transitioned to lantus 28u and low insulin sliding scale, and transferred to Medicine unit. GI was consulted for nausea/vomiting, and recommended endoscopy. On endoscopy pt was found to have esophagitis and residual food in the stomach. Nausea/vomiting was attributed to gastroparesis in the setting of poorly controlled diabetes (A1c 14.5). For his nausea/vomiting/abdominal pain, pt received reglan, zofran, and maalox. He also received protonix daily.         On discharge, pt is clinically stable and HDS. He will be discharged on protonix daily, tresiba 20u every morning, and novolog 6u three times a day before meals. Pt will have close follow up with endocrine. 29M hx Type DM1 p/w nausea/vomiting, abdominal pain x 2 days. In the ED initial workup was notable for hyperglycemia to 586, metabolic anion gap acidosis (AG 25), elevated beta-hydroxy-butrate (5.2), and ketones in urine. Imaging showed thickened esophagus and Bochdolek lesion. Pt was initially admitted to Medicine for DKA. On Medicine pt was lethargic and his anion gap continued to widen. Endocrine was consulted for DKA - per Endocrine recs, pt was transferred to MICU for insulin gtt. Pt was maintained on IVF with D5 LR while he was not eating. When pt was able to tolerate PO, he was transitioned to lantus 28u and low insulin sliding scale, and transferred to Medicine unit. GI was consulted for nausea/vomiting, and recommended endoscopy. On endoscopy pt was found to have esophagitis and residual food in the stomach. Nausea/vomiting was attributed to gastroparesis in the setting of poorly controlled diabetes (A1c 14.5). For his nausea/vomiting/abdominal pain, pt received reglan, zofran, and maalox, and protonix was started after which patient clinically improved and tolerated PO intake.         Medically optimized for discharge with protonix daily, tresiba 20u every morning (coupon sent to [facility]), and novolog 6u three times a day before meals. Pt will need to f/u with endocrine and GI for GES.

## 2020-01-03 NOTE — DISCHARGE NOTE PROVIDER - NSDCCPCAREPLAN_GEN_ALL_CORE_FT
PRINCIPAL DISCHARGE DIAGNOSIS  Diagnosis: DKA (diabetic ketoacidoses)  Assessment and Plan of Treatment: You presented for nausea/vomiting and abdominal pain. You were found to have very PRINCIPAL DISCHARGE DIAGNOSIS  Diagnosis: DKA (diabetic ketoacidoses)  Assessment and Plan of Treatment: You presented for nausea/vomiting and abdominal pain. You were found to have very elevated finger sticks. You were diagnosed with diabetic ketoacidosis - this means that because your body cannot make insulin and you were not getting insulin injections, your cells were starved of sugar (glucose). As a result of not having sugar, your body was breaking down fat and protein for energy and making ketones as a by product. You were admitted to Medical ICU for insulin drip which helped resolve DKA. You were seen by endocrinology. You will be discharged on a long acting insulin (Tresiba) and a short acting insulin Novolog. Take Tresiba 20 units every morning at 9AM, and take novolog 6 units three times a day before meals. If your glucose level on your finger stick is very high, you may need correctional novolog on top of the 6 units.   The correctional scale is as followin-200: 1 unit  201-250: 2 units  251-300: 3 units  301-350: 4 units - please contact your primary care doctor or endocrinologist if your glucose is this high.   Please  Tresiba at [facility] on [facility]. Also  [facility]. Please  Novolog and pen needles at [facility] at the [facility]. If you experience worsening nausea/vomiting, abdominal pain, dizziness, or any new concerning symptom, please return to the emregency room.

## 2020-01-03 NOTE — DISCHARGE NOTE PROVIDER - NSFOLLOWUPCLINICS_GEN_ALL_ED_FT
[facility]  Endocrinology  [address] [place] [address]  Phone: [phone]  Fax:   Follow Up Time:

## 2020-01-03 NOTE — PROGRESS NOTE ADULT - ASSESSMENT
This is a 28 y/o male with hx of DM 1 (uncontrolled, HbA1c 14.5%, in setting of medication non adherence) who presents to the ED with 2 days of nausea/vomiting and abdominal pain which started all of a sudden. Endocrine consulted for T1DM and DKA.    1) Type 1 DM, severely uncontrolled with DKA  -Target -180 mg/dl, patient slightly above target today, now on basal/bolus plan, eating solid food/tolerating well  -Continue Lantus 20 units SQ qAM (9 AM daily)  -Would add Humalog 6 units SQ TID before meals (Hold if NPO/not eating meal)  -Continue Humalog LOW dose correctional scale before meals and Humalog LOW scale before bed   -Check BG before meals and bedtime  -Consistent carbohydrate diet, registered dietitian consult done    Discharge Plan:  -Patient due for discharge today per primary team. Currently uninsured. Patient reports he expects to have [place] [employer] Medicaid by February 2020. For now, with collaboration from our team pharmacy liaison, we were able to secure coupon to assist with financial burden of basal/bolus insulin pens, to last patient until he is insured.   -Therefore, recommend Tresiba FlexTouch Pen 20 units SQ q AM (9 AM), and Novolog FlexPen 6 units SQ TID before meals (Hold if not eating) WITH Novolog low dose correctional scale before meals as follows (give correction IN ADDITION TO standing Novolog 6 units): 151-200 mg/dl: 1 unit, 201-250 mg/dl: 2 units, 251-300: 3 units, 301-350: 4 units and contact MD.   -Discussed plan with patient, reports he has previously used correctional scale and feels comfortable with use. Reports he has Levemir pen at home but was NOT taking prior to admission, admitted with DKA. Emphasized importance of taking insulin and especially basal insulin daily to prevent DKA. Would recommend utilizing longer acting basal insulin pen such as Tresiba or Lantus (as opposed to Levemir with slightly shorter action time). Reviewed discharge plan, hypoglycemia and hyperglycemia recognition and treatment and sick day rules. Also reviewed use of ketone strips after discharge. Prescription sent for ketone strips.  -Follow up with [facility], [address] [place] [address], [address], [facility] [address], [phone]  1. With diabetes educator: Monday February 3, 2020 at 12 PM  2. With MD Ramirez: Friday March 27, 2020 at 11 AM    Reviewed above with primary team MD Taylor, primary RN and patient    *Update: [facility] at [facility] with no supply of Tresiba (coupon only applicable to Tresiba/Levemir). Lantus coupon attempted with no success. Primary team will send Tresiba PEN to [facility] near patient home.   Greater than 35 minutes spent in assessment, education and coordination of care

## 2020-01-03 NOTE — PROGRESS NOTE ADULT - ATTENDING COMMENTS
Patient with DM admitted with DKA.  AG has resolved but he still requires an insulin gtt because of inability to tolerate PO.  Has CT findings suggestive of thickened esophagus - needs endoscopy with GI, likely tomorrow.  Will continue with insulin gtt, q1h finger stick glucoses, try to transition to basal/bolus regimen when tolerating PO.  NPO after midnight for endoscopy. c/w protonix.
DKA resolved, still with ongoing nausea, inability to take po. Appreciate GI recs and monitor QTc while receiving anti-emetics and promotility agents.
Patient with type 1 dM admitted with DKA, high AGMA, resolved on insulin gtt, wants to eat today, ready to transition to basal/bolus regimen.  Not nauseous anymore - awaiting endoscopy by GI, likely on Thursday.  recheck BMP four hours after basal insulin given.
Pt is a 30 yo BM admitted 2 to DKA. AG has resolved but pt does not feel well enough to take po. Cont IV hydration (D5LR) and Insulin drip; cont to follow FS. Once pt can take po overlap Insulin drip with Lantus
29 M T1DM admitted with DKA in setting of insulin non-adherence following bout of abdominal pain, nausea. Gap remains and patient is acidotic. Endocrine and ICU consultation placed to assess need for insulin drip. Patient is tired-appearing on exam, but awakes to voice and answers questions appropriately.
29 M T1DM admitted with DKA in setting of insulin non-adherence following bout of abdominal pain, nausea. Required MICU stay and insulin gtt. Still with nausea and minimal oral intake. EGD today with esophagitis. Will need outpatient GES. Starting PPI and attempting to advance diet.
I was physically present for the key portions of the evaluation and management (E/M) service provided.  I agree with the above history, physical, and plan which I have reviewed and edited where appropriate.     Plan discussed with resident.
I was physically present for the key portions of the evaluation and management (E/M) service provided.  I agree with the above history, physical, and plan which I have reviewed and edited where appropriate.  >35 minutes coordinating discharge planning.     Plan discussed with resident.

## 2020-01-03 NOTE — PROGRESS NOTE ADULT - SUBJECTIVE AND OBJECTIVE BOX
Ese Borjas PGY1  -[id number] | [facility] [id number]  =========================    Patient is a 29y old  Male who presents with a chief complaint of abdominal pain and hyperglycemia (02 Jan 2020 08:58)      INTERVAL HPI/OVERNIGHT EVENTS:  No acute event overnight. Pt reports feeling well this morning. No nausea/vomiting, abdominal pain. tolerating PO      MEDICATIONS  (STANDING):  dextrose 5%. 1000 milliLiter(s) (50 mL/Hr) IV Continuous <Continuous>  dextrose 50% Injectable 12.5 Gram(s) IV Push once  dextrose 50% Injectable 25 Gram(s) IV Push once  dextrose 50% Injectable 25 Gram(s) IV Push once  influenza   Vaccine 0.5 milliLiter(s) IntraMuscular once  insulin glargine Injectable (LANTUS) 20 Unit(s) SubCutaneous every morning  insulin lispro (HumaLOG) corrective regimen sliding scale   SubCutaneous at bedtime  insulin lispro (HumaLOG) corrective regimen sliding scale   SubCutaneous three times a day before meals  metoclopramide Injectable 10 milliGRAM(s) IV Push every 8 hours  pantoprazole    Tablet 40 milliGRAM(s) Oral before breakfast    MEDICATIONS  (PRN):  aluminum hydroxide/magnesium hydroxide/simethicone Suspension 30 milliLiter(s) Oral every 4 hours PRN Dyspepsia  dextrose 40% Gel 15 Gram(s) Oral once PRN Blood Glucose LESS THAN 70 milliGRAM(s)/deciliter  glucagon  Injectable 1 milliGRAM(s) IntraMuscular once PRN Glucose LESS THAN 70 milligrams/deciliter  ondansetron Injectable 4 milliGRAM(s) IV Push every 8 hours PRN Nausea and/or Vomiting      Allergies    No Known Allergies    Intolerances        Vital Signs Last 24 Hrs  T(C): 36.7 (03 Jan 2020 06:34), Max: 37.1 (02 Jan 2020 22:14)  T(F): 98.1 (03 Jan 2020 06:34), Max: 98.7 (02 Jan 2020 22:14)  HR: 68 (03 Jan 2020 06:34) (68 - 79)  BP: 99/69 (03 Jan 2020 06:34) (98/56 - 112/79)  BP(mean): --  RR: 18 (03 Jan 2020 06:34) (15 - 18)  SpO2: 98% (03 Jan 2020 06:34) (97% - 100%)    PHYSICAL EXAM:  GENERAL: NAD.  CHEST/LUNG: Clear to auscultation; No rales, rhonchi, or wheezing.  Respiratory effort does not appear labored.  HEART: Regular rate and rhythm; S1 and S2,  no murmurs, rubs, or gallops.  ABDOMEN: Soft, not tender to palpation.  No masses or HSM appreciated.  No distension.  Bowel sounds present.  EXTREMITIES:  No clubbing, cyanosis, or edema.  Moves all extremities  SKIN: No obvious rashes or lesions.  Turgor okay.  NEURO:  Alert and oriented x 3, no focal sensory or  motor deficit      LABS:                        12.0   4.61  )-----------( 258      ( 03 Jan 2020 06:35 )             37.5     01-03    135  |  100  |  13  ----------------------------<  211<H>  4.5   |  26  |  0.92    Ca    9.1      03 Jan 2020 06:35  Phos  3.9     01-03  Mg     1.7     01-03          CAPILLARY BLOOD GLUCOSE      POCT Blood Glucose.: 189 mg/dL (03 Jan 2020 12:21)  POCT Blood Glucose.: 180 mg/dL (03 Jan 2020 08:23)  POCT Blood Glucose.: 237 mg/dL (02 Jan 2020 22:03)  POCT Blood Glucose.: 278 mg/dL (02 Jan 2020 17:19)  POCT Blood Glucose.: 131 mg/dL (02 Jan 2020 13:37) Ese Borjas PGY1  -0135 | [facility] [id number]  =========================    Patient is a 29y old  Male who presents with a chief complaint of abdominal pain and hyperglycemia (02 Jan 2020 08:58)      INTERVAL HPI/OVERNIGHT EVENTS:  No acute event overnight. Pt reports feeling well this morning. No nausea/vomiting, abdominal pain. tolerating PO, no fever, odynophagia, dysphagia.       MEDICATIONS  (STANDING):  dextrose 5%. 1000 milliLiter(s) (50 mL/Hr) IV Continuous <Continuous>  dextrose 50% Injectable 12.5 Gram(s) IV Push once  dextrose 50% Injectable 25 Gram(s) IV Push once  dextrose 50% Injectable 25 Gram(s) IV Push once  influenza   Vaccine 0.5 milliLiter(s) IntraMuscular once  insulin glargine Injectable (LANTUS) 20 Unit(s) SubCutaneous every morning  insulin lispro (HumaLOG) corrective regimen sliding scale   SubCutaneous at bedtime  insulin lispro (HumaLOG) corrective regimen sliding scale   SubCutaneous three times a day before meals  metoclopramide Injectable 10 milliGRAM(s) IV Push every 8 hours  pantoprazole    Tablet 40 milliGRAM(s) Oral before breakfast    MEDICATIONS  (PRN):  aluminum hydroxide/magnesium hydroxide/simethicone Suspension 30 milliLiter(s) Oral every 4 hours PRN Dyspepsia  dextrose 40% Gel 15 Gram(s) Oral once PRN Blood Glucose LESS THAN 70 milliGRAM(s)/deciliter  glucagon  Injectable 1 milliGRAM(s) IntraMuscular once PRN Glucose LESS THAN 70 milligrams/deciliter  ondansetron Injectable 4 milliGRAM(s) IV Push every 8 hours PRN Nausea and/or Vomiting      Allergies    No Known Allergies    Intolerances        Vital Signs Last 24 Hrs  T(C): 36.7 (03 Jan 2020 06:34), Max: 37.1 (02 Jan 2020 22:14)  T(F): 98.1 (03 Jan 2020 06:34), Max: 98.7 (02 Jan 2020 22:14)  HR: 68 (03 Jan 2020 06:34) (68 - 79)  BP: 99/69 (03 Jan 2020 06:34) (98/56 - 112/79)  BP(mean): --  RR: 18 (03 Jan 2020 06:34) (15 - 18)  SpO2: 98% (03 Jan 2020 06:34) (97% - 100%)    PHYSICAL EXAM:  GENERAL: NAD.  CHEST/LUNG: Clear to auscultation; No rales, rhonchi, or wheezing.  Respiratory effort does not appear labored.  HEART: Regular rate and rhythm; S1 and S2,  no murmurs, rubs, or gallops.  ABDOMEN: Soft, not tender to palpation. No distension.  Bowel sounds present.  EXTREMITIES:  No clubbing, cyanosis, or edema.  Moves all extremities  SKIN: No obvious rashes or lesions.    NEURO:  Alert and oriented x 3, no focal sensory or  motor deficit      LABS:                        12.0   4.61  )-----------( 258      ( 03 Jan 2020 06:35 )             37.5     01-03    135  |  100  |  13  ----------------------------<  211<H>  4.5   |  26  |  0.92    Ca    9.1      03 Jan 2020 06:35  Phos  3.9     01-03  Mg     1.7     01-03          CAPILLARY BLOOD GLUCOSE      POCT Blood Glucose.: 189 mg/dL (03 Jan 2020 12:21)  POCT Blood Glucose.: 180 mg/dL (03 Jan 2020 08:23)  POCT Blood Glucose.: 237 mg/dL (02 Jan 2020 22:03)  POCT Blood Glucose.: 278 mg/dL (02 Jan 2020 17:19)  POCT Blood Glucose.: 131 mg/dL (02 Jan 2020 13:37)    consults: discussed diabetic insulin plan with endo and reviewed endo note

## 2020-01-03 NOTE — PROGRESS NOTE ADULT - ASSESSMENT
30 y/o male with hx of type 1DM who presented for nausea/vomiting (NBNB) and abdominal pain, found to be in DKA, was admitted to MICU for insulin gtt, now transferred to medicine for further management. 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 30 y/o male with hx of type 1DM who presented for nausea/vomiting (NBNB) and abdominal pain, found to be in DKA, was admitted to MICU for insulin gtt, now transferred to medicine for further management. S/p EGD showing esophagitis and started on protonix, optimized for d/c with outpt endo f/u and GES:

## 2020-01-03 NOTE — DISCHARGE NOTE PROVIDER - NSDCMRMEDTOKEN_GEN_ALL_CORE_FT
Insulin Pen Needles, 4mm: 1 application subcutaneously 4 times a day. ** Use with insulin pen ** , MED TO BED  ketone strips: 1 application subcutaneous prn, MED TO BED  Protonix 40 mg oral delayed release tablet: 1 tab(s) orally once a day (before a meal), MED TO BED  Tresiba FlexTouch 100 units/mL subcutaneous solution: 20 unit(s) subcutaneous once a day in the morning   Please use coupon   MED TO BED Insulin Pen Needles, 4mm: 1 application subcutaneously 4 times a day. ** Use with insulin pen ** , MED TO BED  ketone strips: 1 application subcutaneous prn, MED TO BED  Lantus Solostar Pen 100 units/mL subcutaneous solution: 20 unit(s) subcutaneous once a day, in the morning at 9AM  Protonix 40 mg oral delayed release tablet: 1 tab(s) orally once a day (before a meal), MED TO BED Insulin Pen Needles, 4mm: 1 application subcutaneously 4 times a day. ** Use with insulin pen ** , MED TO BED  ketone strips: 1 application subcutaneous prn  NovoLOG FlexPen 100 units/mL injectable solution: 6 unit(s) subcutaneous 3 times a day (before meals)   In addition to the 6 units, please use correctional scale as follows:  151-200: 1 unit  201-250: 2 units  251-300: 3 units  301-350: 4 units and contact MD  MED TO BED  Protonix 40 mg oral delayed release tablet: 1 tab(s) orally once a day (before a meal), MED TO BED  Tresiba FlexTouch 100 units/mL subcutaneous solution: 20 unit(s) subcutaneous once a day in the morning   Please use coupon

## 2020-01-03 NOTE — PROGRESS NOTE ADULT - PROBLEM SELECTOR PLAN 3
resolved 1.  Name of PCP:  2.  PCP Contacted on Admission: [ ] Y    [ ] N    3.  PCP contacted at Discharge: [ ] Y    [ ] N    [ ] N/A -discussed plan with endocrine and patient will f/u with endocrine as outpt   4.  Post-Discharge Appointment Date and Location:  5.  Summary of Handoff given to PCP:

## 2020-01-03 NOTE — PROGRESS NOTE ADULT - PROBLEM SELECTOR PLAN 4
1.  Name of PCP:  2.  PCP Contacted on Admission: [ ] Y    [ ] N    3.  PCP contacted at Discharge: [ ] Y    [ ] N    [ ] N/A  4.  Post-Discharge Appointment Date and Location:  5.  Summary of Handoff given to PCP: dvt ppx: improve score 0 and will monitor off dvt ppx  Diet: CC with evening snack.   dispo: diabetic meds sent to outpt pharmacy (coupon for tresiba). optimized for d/c with outpt endo and gi f/u

## 2020-01-03 NOTE — DISCHARGE NOTE PROVIDER - NSDCFUSCHEDAPPT_GEN_ALL_CORE_FT
IRON MUNIZ ; 02/03/2020 ; [facility] [address] [facility]  IRON MUNIZ ; 03/27/2020 ; [facility] [address] [facility] IRON MUNIZ ; 02/03/2020 ; [facility] [address] [facility]  IRON MUNIZ ; 03/27/2020 ; [facility] [address] [facility] IRON MUNIZ ; 02/03/2020 ; [facility] [address] [facility]  IRON MUNIZ ; 03/27/2020 ; [facility] [address] [facility] IRON MUNIZ ; 02/03/2020 ; [facility] [address] [facility]  IRON MUNIZ ; 03/27/2020 ; [facility] [address] [facility] IRON MUNIZ ; 02/03/2020 ; \[facility]"" Med Endocr [address] [facility]  IRON MUNIZ ; 03/27/2020 ; [facility] Med Endocr [address] [facility] IRON MUNIZ ; 02/03/2020 ; [facility] [address] [facility]  IRON MUNIZ ; 03/27/2020 ; [facility] [address] [facility]

## 2020-01-09 ENCOUNTER — CHART COPY (OUTPATIENT)
Age: 30
End: 2020-01-09

## 2020-01-09 DIAGNOSIS — A04.8 OTHER SPECIFIED BACTERIAL INTESTINAL INFECTIONS: ICD-10-CM

## 2020-01-09 PROBLEM — Z00.00 ENCOUNTER FOR PREVENTIVE HEALTH EXAMINATION: Status: ACTIVE | Noted: 2020-01-09

## 2020-01-20 NOTE — PROGRESS NOTE ADULT - REASON FOR ADMISSION
abdominal pain and hyperglycemia
Secondary Intention Text (Leave Blank If You Do Not Want): The defect will heal with secondary intention.

## 2020-02-03 ENCOUNTER — APPOINTMENT (OUTPATIENT)
Dept: ENDOCRINOLOGY | Facility: CLINIC | Age: 30
End: 2020-02-03

## 2020-02-12 ENCOUNTER — APPOINTMENT (OUTPATIENT)
Dept: INTERNAL MEDICINE | Facility: CLINIC | Age: 30
End: 2020-02-12

## 2020-02-28 ENCOUNTER — APPOINTMENT (OUTPATIENT)
Dept: ENDOCRINOLOGY | Facility: CLINIC | Age: 30
End: 2020-02-28

## 2020-03-05 ENCOUNTER — APPOINTMENT (OUTPATIENT)
Dept: GASTROENTEROLOGY | Facility: CLINIC | Age: 30
End: 2020-03-05

## 2020-03-20 ENCOUNTER — EMERGENCY (EMERGENCY)
Facility: HOSPITAL | Age: 30
LOS: 1 days | Discharge: HOME | End: 2020-03-20
Attending: STUDENT IN AN ORGANIZED HEALTH CARE EDUCATION/TRAINING PROGRAM | Admitting: EMERGENCY MEDICINE
Payer: MEDICAID

## 2020-03-20 VITALS
RESPIRATION RATE: 18 BRPM | HEART RATE: 102 BPM | OXYGEN SATURATION: 100 % | SYSTOLIC BLOOD PRESSURE: 110 MMHG | TEMPERATURE: 98 F | DIASTOLIC BLOOD PRESSURE: 73 MMHG

## 2020-03-20 PROCEDURE — 99285 EMERGENCY DEPT VISIT HI MDM: CPT

## 2020-03-20 NOTE — ED ADULT TRIAGE NOTE - CHIEF COMPLAINT QUOTE
abdominal pain    c/o mid-abd pain since last night, vomited several times, nausea... no diarrhea.... was admitted a few months ago for gastroparesis... states feels similar.

## 2020-03-21 ENCOUNTER — INPATIENT (INPATIENT)
Facility: HOSPITAL | Age: 30
LOS: 4 days | Discharge: HOME | End: 2020-03-26
Attending: INTERNAL MEDICINE | Admitting: INTERNAL MEDICINE
Payer: MEDICAID

## 2020-03-21 VITALS
HEART RATE: 76 BPM | DIASTOLIC BLOOD PRESSURE: 76 MMHG | SYSTOLIC BLOOD PRESSURE: 122 MMHG | OXYGEN SATURATION: 100 % | RESPIRATION RATE: 14 BRPM | HEIGHT: 71 IN | TEMPERATURE: 99 F | WEIGHT: 135.36 LBS

## 2020-03-21 VITALS
HEART RATE: 79 BPM | TEMPERATURE: 99 F | SYSTOLIC BLOOD PRESSURE: 113 MMHG | OXYGEN SATURATION: 100 % | RESPIRATION RATE: 16 BRPM | DIASTOLIC BLOOD PRESSURE: 72 MMHG

## 2020-03-21 DIAGNOSIS — E87.2 ACIDOSIS: ICD-10-CM

## 2020-03-21 DIAGNOSIS — K31.84 GASTROPARESIS: ICD-10-CM

## 2020-03-21 DIAGNOSIS — Z29.9 ENCOUNTER FOR PROPHYLACTIC MEASURES, UNSPECIFIED: ICD-10-CM

## 2020-03-21 DIAGNOSIS — E10.10 TYPE 1 DIABETES MELLITUS WITH KETOACIDOSIS WITHOUT COMA: ICD-10-CM

## 2020-03-21 DIAGNOSIS — Z02.9 ENCOUNTER FOR ADMINISTRATIVE EXAMINATIONS, UNSPECIFIED: ICD-10-CM

## 2020-03-21 LAB
ALBUMIN SERPL ELPH-MCNC: 3.6 G/DL — SIGNIFICANT CHANGE UP (ref 3.3–5)
ALBUMIN SERPL ELPH-MCNC: 4.2 G/DL — SIGNIFICANT CHANGE UP (ref 3.3–5)
ALP SERPL-CCNC: 56 U/L — SIGNIFICANT CHANGE UP (ref 40–120)
ALP SERPL-CCNC: 64 U/L — SIGNIFICANT CHANGE UP (ref 40–120)
ALT FLD-CCNC: 39 U/L — SIGNIFICANT CHANGE UP (ref 4–41)
ALT FLD-CCNC: 72 U/L — HIGH (ref 4–41)
ANION GAP SERPL CALC-SCNC: 14 MMO/L — SIGNIFICANT CHANGE UP (ref 7–14)
ANION GAP SERPL CALC-SCNC: 18 MMO/L — HIGH (ref 7–14)
ANION GAP SERPL CALC-SCNC: 18 MMO/L — HIGH (ref 7–14)
ANISOCYTOSIS BLD QL: SLIGHT — SIGNIFICANT CHANGE UP
APPEARANCE UR: CLEAR — SIGNIFICANT CHANGE UP
AST SERPL-CCNC: 49 U/L — HIGH (ref 4–40)
AST SERPL-CCNC: 92 U/L — HIGH (ref 4–40)
B-OH-BUTYR SERPL-SCNC: 3.9 MMOL/L — HIGH (ref 0–0.4)
BASE EXCESS BLDV CALC-SCNC: -5.9 MMOL/L — SIGNIFICANT CHANGE UP
BASOPHILS # BLD AUTO: 0.03 K/UL — SIGNIFICANT CHANGE UP (ref 0–0.2)
BASOPHILS NFR BLD AUTO: 0.9 % — SIGNIFICANT CHANGE UP (ref 0–2)
BASOPHILS NFR SPEC: 0 % — SIGNIFICANT CHANGE UP (ref 0–2)
BILIRUB SERPL-MCNC: 1 MG/DL — SIGNIFICANT CHANGE UP (ref 0.2–1.2)
BILIRUB SERPL-MCNC: 1.3 MG/DL — HIGH (ref 0.2–1.2)
BILIRUB UR-MCNC: NEGATIVE — SIGNIFICANT CHANGE UP
BLASTS # FLD: 0 % — SIGNIFICANT CHANGE UP (ref 0–0)
BLOOD UR QL VISUAL: NEGATIVE — SIGNIFICANT CHANGE UP
BUN SERPL-MCNC: 12 MG/DL — SIGNIFICANT CHANGE UP (ref 7–23)
BUN SERPL-MCNC: 13 MG/DL — SIGNIFICANT CHANGE UP (ref 7–23)
BUN SERPL-MCNC: 14 MG/DL — SIGNIFICANT CHANGE UP (ref 7–23)
CALCIUM SERPL-MCNC: 8.7 MG/DL — SIGNIFICANT CHANGE UP (ref 8.4–10.5)
CALCIUM SERPL-MCNC: 8.8 MG/DL — SIGNIFICANT CHANGE UP (ref 8.4–10.5)
CALCIUM SERPL-MCNC: 9.8 MG/DL — SIGNIFICANT CHANGE UP (ref 8.4–10.5)
CHLORIDE SERPL-SCNC: 102 MMOL/L — SIGNIFICANT CHANGE UP (ref 98–107)
CHLORIDE SERPL-SCNC: 104 MMOL/L — SIGNIFICANT CHANGE UP (ref 98–107)
CHLORIDE SERPL-SCNC: 96 MMOL/L — LOW (ref 98–107)
CO2 SERPL-SCNC: 14 MMOL/L — LOW (ref 22–31)
CO2 SERPL-SCNC: 16 MMOL/L — LOW (ref 22–31)
CO2 SERPL-SCNC: 18 MMOL/L — LOW (ref 22–31)
COLOR SPEC: YELLOW — SIGNIFICANT CHANGE UP
CREAT SERPL-MCNC: 0.74 MG/DL — SIGNIFICANT CHANGE UP (ref 0.5–1.3)
CREAT SERPL-MCNC: 0.75 MG/DL — SIGNIFICANT CHANGE UP (ref 0.5–1.3)
CREAT SERPL-MCNC: 0.8 MG/DL — SIGNIFICANT CHANGE UP (ref 0.5–1.3)
EOSINOPHIL # BLD AUTO: 0.02 K/UL — SIGNIFICANT CHANGE UP (ref 0–0.5)
EOSINOPHIL NFR BLD AUTO: 0.6 % — SIGNIFICANT CHANGE UP (ref 0–6)
EOSINOPHIL NFR FLD: 0 % — SIGNIFICANT CHANGE UP (ref 0–6)
GAS PNL BLDV: 135 MMOL/L — LOW (ref 136–146)
GIANT PLATELETS BLD QL SMEAR: PRESENT — SIGNIFICANT CHANGE UP
GLUCOSE BLDV-MCNC: 214 MG/DL — HIGH (ref 70–99)
GLUCOSE SERPL-MCNC: 212 MG/DL — HIGH (ref 70–99)
GLUCOSE SERPL-MCNC: 257 MG/DL — HIGH (ref 70–99)
GLUCOSE SERPL-MCNC: 277 MG/DL — HIGH (ref 70–99)
GLUCOSE UR-MCNC: >1000 — SIGNIFICANT CHANGE UP
HCO3 BLDV-SCNC: 19 MMOL/L — LOW (ref 20–27)
HCT VFR BLD CALC: 41.3 % — SIGNIFICANT CHANGE UP (ref 39–50)
HCT VFR BLDV CALC: 42.4 % — SIGNIFICANT CHANGE UP (ref 39–51)
HGB BLD-MCNC: 13.8 G/DL — SIGNIFICANT CHANGE UP (ref 13–17)
HGB BLDV-MCNC: 13.8 G/DL — SIGNIFICANT CHANGE UP (ref 13–17)
IMM GRANULOCYTES NFR BLD AUTO: 0 % — SIGNIFICANT CHANGE UP (ref 0–1.5)
KETONES UR-MCNC: 150 — SIGNIFICANT CHANGE UP
LACTATE BLDV-MCNC: 1.8 MMOL/L — SIGNIFICANT CHANGE UP (ref 0.5–2)
LEUKOCYTE ESTERASE UR-ACNC: NEGATIVE — SIGNIFICANT CHANGE UP
LYMPHOCYTES # BLD AUTO: 0.83 K/UL — LOW (ref 1–3.3)
LYMPHOCYTES # BLD AUTO: 26 % — SIGNIFICANT CHANGE UP (ref 13–44)
LYMPHOCYTES NFR SPEC AUTO: 15.2 % — SIGNIFICANT CHANGE UP (ref 13–44)
MACROCYTES BLD QL: SLIGHT — SIGNIFICANT CHANGE UP
MCHC RBC-ENTMCNC: 30.1 PG — SIGNIFICANT CHANGE UP (ref 27–34)
MCHC RBC-ENTMCNC: 33.4 % — SIGNIFICANT CHANGE UP (ref 32–36)
MCV RBC AUTO: 90 FL — SIGNIFICANT CHANGE UP (ref 80–100)
METAMYELOCYTES # FLD: 0 % — SIGNIFICANT CHANGE UP (ref 0–1)
MONOCYTES # BLD AUTO: 0.14 K/UL — SIGNIFICANT CHANGE UP (ref 0–0.9)
MONOCYTES NFR BLD AUTO: 4.4 % — SIGNIFICANT CHANGE UP (ref 2–14)
MONOCYTES NFR BLD: 3.6 % — SIGNIFICANT CHANGE UP (ref 2–9)
MYELOCYTES NFR BLD: 0 % — SIGNIFICANT CHANGE UP (ref 0–0)
NEUTROPHIL AB SER-ACNC: 79.4 % — HIGH (ref 43–77)
NEUTROPHILS # BLD AUTO: 2.17 K/UL — SIGNIFICANT CHANGE UP (ref 1.8–7.4)
NEUTROPHILS NFR BLD AUTO: 68.1 % — SIGNIFICANT CHANGE UP (ref 43–77)
NEUTS BAND # BLD: 0 % — SIGNIFICANT CHANGE UP (ref 0–6)
NITRITE UR-MCNC: NEGATIVE — SIGNIFICANT CHANGE UP
NRBC # FLD: 0 K/UL — SIGNIFICANT CHANGE UP (ref 0–0)
OTHER - HEMATOLOGY %: 0 — SIGNIFICANT CHANGE UP
PCO2 BLDV: 42 MMHG — SIGNIFICANT CHANGE UP (ref 41–51)
PH BLDV: 7.29 PH — LOW (ref 7.32–7.43)
PH UR: 6 — SIGNIFICANT CHANGE UP (ref 5–8)
PLATELET # BLD AUTO: 294 K/UL — SIGNIFICANT CHANGE UP (ref 150–400)
PLATELET COUNT - ESTIMATE: NORMAL — SIGNIFICANT CHANGE UP
PMV BLD: 10 FL — SIGNIFICANT CHANGE UP (ref 7–13)
PO2 BLDV: 48 MMHG — HIGH (ref 35–40)
POIKILOCYTOSIS BLD QL AUTO: SLIGHT — SIGNIFICANT CHANGE UP
POTASSIUM BLDV-SCNC: 4.9 MMOL/L — HIGH (ref 3.4–4.5)
POTASSIUM SERPL-MCNC: 4.7 MMOL/L — SIGNIFICANT CHANGE UP (ref 3.5–5.3)
POTASSIUM SERPL-MCNC: 4.9 MMOL/L — SIGNIFICANT CHANGE UP (ref 3.5–5.3)
POTASSIUM SERPL-MCNC: 5.8 MMOL/L — HIGH (ref 3.5–5.3)
POTASSIUM SERPL-SCNC: 4.7 MMOL/L — SIGNIFICANT CHANGE UP (ref 3.5–5.3)
POTASSIUM SERPL-SCNC: 4.9 MMOL/L — SIGNIFICANT CHANGE UP (ref 3.5–5.3)
POTASSIUM SERPL-SCNC: 5.8 MMOL/L — HIGH (ref 3.5–5.3)
PROMYELOCYTES # FLD: 0 % — SIGNIFICANT CHANGE UP (ref 0–0)
PROT SERPL-MCNC: 6.9 G/DL — SIGNIFICANT CHANGE UP (ref 6–8.3)
PROT SERPL-MCNC: 7.6 G/DL — SIGNIFICANT CHANGE UP (ref 6–8.3)
PROT UR-MCNC: 30 — SIGNIFICANT CHANGE UP
RBC # BLD: 4.59 M/UL — SIGNIFICANT CHANGE UP (ref 4.2–5.8)
RBC # FLD: 11.5 % — SIGNIFICANT CHANGE UP (ref 10.3–14.5)
RBC CASTS # UR COMP ASSIST: SIGNIFICANT CHANGE UP (ref 0–?)
SAO2 % BLDV: 78.5 % — SIGNIFICANT CHANGE UP (ref 60–85)
SODIUM SERPL-SCNC: 132 MMOL/L — LOW (ref 135–145)
SODIUM SERPL-SCNC: 134 MMOL/L — LOW (ref 135–145)
SODIUM SERPL-SCNC: 134 MMOL/L — LOW (ref 135–145)
SP GR SPEC: 1.04 — SIGNIFICANT CHANGE UP (ref 1–1.04)
UROBILINOGEN FLD QL: NORMAL — SIGNIFICANT CHANGE UP
VARIANT LYMPHS # BLD: 1.8 % — SIGNIFICANT CHANGE UP
WBC # BLD: 3.19 K/UL — LOW (ref 3.8–10.5)
WBC # FLD AUTO: 3.19 K/UL — LOW (ref 3.8–10.5)
WBC UR QL: SIGNIFICANT CHANGE UP (ref 0–?)

## 2020-03-21 PROCEDURE — 71046 X-RAY EXAM CHEST 2 VIEWS: CPT | Mod: 26

## 2020-03-21 PROCEDURE — 99223 1ST HOSP IP/OBS HIGH 75: CPT | Mod: AI

## 2020-03-21 RX ORDER — SODIUM CHLORIDE 9 MG/ML
1000 INJECTION INTRAMUSCULAR; INTRAVENOUS; SUBCUTANEOUS ONCE
Refills: 0 | Status: COMPLETED | OUTPATIENT
Start: 2020-03-21 | End: 2020-03-21

## 2020-03-21 RX ORDER — ENOXAPARIN SODIUM 100 MG/ML
40 INJECTION SUBCUTANEOUS DAILY
Refills: 0 | Status: DISCONTINUED | OUTPATIENT
Start: 2020-03-21 | End: 2020-03-24

## 2020-03-21 RX ORDER — KETOROLAC TROMETHAMINE 30 MG/ML
15 SYRINGE (ML) INJECTION ONCE
Refills: 0 | Status: DISCONTINUED | OUTPATIENT
Start: 2020-03-21 | End: 2020-03-21

## 2020-03-21 RX ORDER — DEXTROSE 50 % IN WATER 50 %
25 SYRINGE (ML) INTRAVENOUS ONCE
Refills: 0 | Status: DISCONTINUED | OUTPATIENT
Start: 2020-03-21 | End: 2020-03-24

## 2020-03-21 RX ORDER — DEXTROSE 50 % IN WATER 50 %
12.5 SYRINGE (ML) INTRAVENOUS ONCE
Refills: 0 | Status: DISCONTINUED | OUTPATIENT
Start: 2020-03-21 | End: 2020-03-24

## 2020-03-21 RX ORDER — SODIUM CHLORIDE 9 MG/ML
2000 INJECTION INTRAMUSCULAR; INTRAVENOUS; SUBCUTANEOUS ONCE
Refills: 0 | Status: COMPLETED | OUTPATIENT
Start: 2020-03-21 | End: 2020-03-21

## 2020-03-21 RX ORDER — METOCLOPRAMIDE HCL 10 MG
10 TABLET ORAL ONCE
Refills: 0 | Status: COMPLETED | OUTPATIENT
Start: 2020-03-21 | End: 2020-03-21

## 2020-03-21 RX ORDER — INFLUENZA VIRUS VACCINE 15; 15; 15; 15 UG/.5ML; UG/.5ML; UG/.5ML; UG/.5ML
0.5 SUSPENSION INTRAMUSCULAR ONCE
Refills: 0 | Status: DISCONTINUED | OUTPATIENT
Start: 2020-03-21 | End: 2020-03-26

## 2020-03-21 RX ORDER — INSULIN LISPRO 100/ML
VIAL (ML) SUBCUTANEOUS
Refills: 0 | Status: DISCONTINUED | OUTPATIENT
Start: 2020-03-21 | End: 2020-03-24

## 2020-03-21 RX ORDER — INSULIN GLARGINE 100 [IU]/ML
14 INJECTION, SOLUTION SUBCUTANEOUS AT BEDTIME
Refills: 0 | Status: DISCONTINUED | OUTPATIENT
Start: 2020-03-21 | End: 2020-03-24

## 2020-03-21 RX ORDER — PANTOPRAZOLE SODIUM 20 MG/1
40 TABLET, DELAYED RELEASE ORAL DAILY
Refills: 0 | Status: DISCONTINUED | OUTPATIENT
Start: 2020-03-21 | End: 2020-03-24

## 2020-03-21 RX ORDER — ONDANSETRON 8 MG/1
4 TABLET, FILM COATED ORAL EVERY 8 HOURS
Refills: 0 | Status: DISCONTINUED | OUTPATIENT
Start: 2020-03-21 | End: 2020-03-24

## 2020-03-21 RX ORDER — DEXTROSE 50 % IN WATER 50 %
15 SYRINGE (ML) INTRAVENOUS ONCE
Refills: 0 | Status: DISCONTINUED | OUTPATIENT
Start: 2020-03-21 | End: 2020-03-24

## 2020-03-21 RX ORDER — SODIUM CHLORIDE 9 MG/ML
1000 INJECTION, SOLUTION INTRAVENOUS ONCE
Refills: 0 | Status: COMPLETED | OUTPATIENT
Start: 2020-03-21 | End: 2020-03-21

## 2020-03-21 RX ORDER — GLUCAGON INJECTION, SOLUTION 0.5 MG/.1ML
1 INJECTION, SOLUTION SUBCUTANEOUS ONCE
Refills: 0 | Status: DISCONTINUED | OUTPATIENT
Start: 2020-03-21 | End: 2020-03-24

## 2020-03-21 RX ORDER — INSULIN LISPRO 100/ML
3 VIAL (ML) SUBCUTANEOUS
Refills: 0 | Status: DISCONTINUED | OUTPATIENT
Start: 2020-03-21 | End: 2020-03-24

## 2020-03-21 RX ORDER — SODIUM CHLORIDE 9 MG/ML
1000 INJECTION INTRAMUSCULAR; INTRAVENOUS; SUBCUTANEOUS
Refills: 0 | Status: DISCONTINUED | OUTPATIENT
Start: 2020-03-21 | End: 2020-03-24

## 2020-03-21 RX ORDER — SODIUM CHLORIDE 9 MG/ML
1000 INJECTION, SOLUTION INTRAVENOUS
Refills: 0 | Status: DISCONTINUED | OUTPATIENT
Start: 2020-03-21 | End: 2020-03-24

## 2020-03-21 RX ORDER — INSULIN HUMAN 100 [IU]/ML
6 INJECTION, SOLUTION SUBCUTANEOUS ONCE
Refills: 0 | Status: COMPLETED | OUTPATIENT
Start: 2020-03-21 | End: 2020-03-21

## 2020-03-21 RX ADMIN — Medication 10 MILLIGRAM(S): at 00:43

## 2020-03-21 RX ADMIN — SODIUM CHLORIDE 2000 MILLILITER(S): 9 INJECTION INTRAMUSCULAR; INTRAVENOUS; SUBCUTANEOUS at 00:45

## 2020-03-21 RX ADMIN — Medication 15 MILLIGRAM(S): at 00:43

## 2020-03-21 RX ADMIN — SODIUM CHLORIDE 1000 MILLILITER(S): 9 INJECTION, SOLUTION INTRAVENOUS at 13:33

## 2020-03-21 RX ADMIN — SODIUM CHLORIDE 1000 MILLILITER(S): 9 INJECTION INTRAMUSCULAR; INTRAVENOUS; SUBCUTANEOUS at 05:17

## 2020-03-21 RX ADMIN — SODIUM CHLORIDE 1000 MILLILITER(S): 9 INJECTION, SOLUTION INTRAVENOUS at 11:51

## 2020-03-21 RX ADMIN — INSULIN HUMAN 6 UNIT(S): 100 INJECTION, SOLUTION SUBCUTANEOUS at 06:11

## 2020-03-21 NOTE — ED ADULT NURSE NOTE - OBJECTIVE STATEMENT
Patient presents to results waiting c/o abdominal pain. Patient a&ox4, ambulatory, states he was recently dx with gastroparesis and is having an episode. Patient states he vomited unsure of how many times, denies blood in emesis. Denies diarrhea, chest pain, sob at this time. appears uncomfortable in bed. labs drawn and sent, left 20G placed with fluids running, hx of DM compliant with medication

## 2020-03-21 NOTE — ED PROVIDER NOTE - CARE PLAN
Principal Discharge DX:	Gastroparesis  Secondary Diagnosis:	Hyperglycemia  Secondary Diagnosis:	Metabolic acidosis

## 2020-03-21 NOTE — H&P ADULT - ASSESSMENT
30 yo M with h/o  type 1 diabetes on tresiba 20U qhs and novolog 8U premeal, recently diagnosed gastroparesis, no other medical conditions or medications, adherent to medication regimen, presenting with epigastric  abdominal pain, nausea and vomiting since yesterday. Presented last night to ED.  .  Patient denies fever/ cough / phlegm diarrhea.  Patient denies chest pain or SOB.  In Ed noted to have Anion gap of 18 and bicarb 16 which has improved to 14 and 16 respectivel with IVF and insulin.  Patient at this time notes no more epistatic pain or n/v but also noted he has not eaten anything.  patient seen by MICU and with gap starting close , deemed not an icu candidate    # DKA with DMI  # Epigastric pain most likely sec to Gastroparesis 28 yo M with h/o  type 1 diabetes on tresiba 20U qhs and novolog 8U premeal, recently diagnosed gastroparesis, no other medical conditions or medications, adherent to medication regimen, presenting with epigastric  abdominal pain, nausea and vomiting since yesterday. Presented last night to ED.  .  Patient denies fever/ cough / phlegm diarrhea.  Patient denies chest pain or SOB.  In Ed noted to have Anion gap of 18 and bicarb 16 which has improved to 14 and 16 respectivel with IVF and insulin.  Patient at this time notes no more epistatic pain or n/v but also noted he has not eaten anything.  patient seen by MICU and with gap starting close , deemed not an icu candidate    # DKA with DMI  # Epigastric pain most likely sec to Gastroparesis  # pseudohyponatremia sec to hyperglycemia  #Met Acidosis- anion gap now closing.

## 2020-03-21 NOTE — H&P ADULT - PROBLEM SELECTOR PLAN 2
Restart home Inulin regimen, but at mildly lower dose   tresiba 16 U qhs and novolog 4 U premeal  Iv zofran prn n/v Restart home Inulin regimen, but at mildly lower dose of   tresiba  qhs and novolog  U premeal.  Will change to Lantus 14 u QHS , novolog 2 units premeals and SS coverage.  monitor FS and BMP and BHB.  Iv zofran prn n/v

## 2020-03-21 NOTE — ED ADULT NURSE REASSESSMENT NOTE - GENERAL PATIENT STATE
resting/sleeping/improvement verbalized/comfortable appearance
comfortable appearance
comfortable appearance
improvement verbalized/comfortable appearance

## 2020-03-21 NOTE — ED PROVIDER NOTE - PROGRESS NOTE DETAILS
[employer]:  feels better at this time.  appetite returned.  wants to eat.  noted persistent anion gap and bicarb which is low.  will consider CDU for continued hydration and monitoring of electrolytes to ensure pt does not go into dka. [employer]:  pt signed out to me.  feels better at this time.  appetite returned.  wants to eat.  noted persistent anion gap and bicarb which is low.  will consider CDU for continued hydration and monitoring of electrolytes to ensure pt does not go into dka. Jose Francis D.O., PGY1 (Resident)  Hospitalist was called for admission for DKA. Hospitalist felt that because gap was not closing, an endo consult was needed as well as repeat labs again. Patient has sugars in the 200s, AG of 18, HCO3- of 14 (down from 18) likely due to fluid choice (normal saline). pH was 7.29 from 7.32. After discussion, hospitalist wanted MICU consult which was done. Jose Francis D.O., PGY1 (Resident)  MICU consulted. No current need for ICU. Will admit.

## 2020-03-21 NOTE — CONSULT NOTE ADULT - ATTENDING COMMENTS
29M with Type 1 Diabetes, recent hx of gastroparesis p/w nausea/vomiting and consulted for poss DKA. Pt denies fever/cough/sore throat/SOB. Denies diarrhea. Pt w/ mild AG and hyperglycemia. Pt received fluids and insulin in ER and repeat labs sent. AG significantly improved and pt clinically reports feeling much better. Does not need insulin gtt at this time. Does need ICU at this time.

## 2020-03-21 NOTE — H&P ADULT - HISTORY OF PRESENT ILLNESS
30 yo M with h/o gastroperesisi , DMI, presents with epigastric abd pain.  Patient denies fever/ cough / phlegm diarrhea.  Patient denies chest pain or SOB.  In Ed noted to have Anion gap of 18 and bicarb 16 which has imoroved to 14 and 16 respectivel with IVF and insulin.  Patient at this time notes no more epistatic pain or n/v but also noted he has not eaten anything. 30 yo M with h/o  type 1 diabetes on tresiba 20U qhs and novolog 8U premeal, recently diagnosed gastroparesis, no other medical conditions or medications, adherent to medication regimen, presenting with epigastric  abdominal pain, nausea and vomiting since yesterday. Presented last night to ED.  .  Patient denies fever/ cough / phlegm diarrhea.  Patient denies chest pain or SOB.  In Ed noted to have Anion gap of 18 and bicarb 16 which has improved to 14 and 16 respectivel with IVF and insulin.  Patient at this time notes no more epistatic pain or n/v but also noted he has not eaten anything.  patient seen by MICU and with gap starting close , deemed not an icu candidate

## 2020-03-21 NOTE — H&P ADULT - NSHPLABSRESULTS_GEN_ALL_CORE
LABS:                        13.8   3.19  )-----------( 294      ( 21 Mar 2020 00:25 )             41.3         134<L>  |  104  |  12  ----------------------------<  212<H>  5.8<H>   |  16<L>  |  0.75    Ca    8.8      21 Mar 2020 13:00    TPro  6.9  /  Alb  3.6  /  TBili  1.0  /  DBili  x   /  AST  92<H>  /  ALT  72<H>  /  AlkPhos  56              Urinalysis Basic - ( 21 Mar 2020 05:11 )    Color: YELLOW / Appearance: CLEAR / S.039 / pH: 6.0  Gluc: >1000 / Ketone: 150  / Bili: NEGATIVE / Urobili: NORMAL   Blood: NEGATIVE / Protein: 30 / Nitrite: NEGATIVE   Leuk Esterase: NEGATIVE / RBC: 0-2 / WBC 0-2   Sq Epi: x / Non Sq Epi: x / Bacteria: x        VBG  @ 11:45  pH: 7.29/pCO2: 42 /pO2: 48/HCO3: 19/lactate: --  VBG  @ 00:25  pH: 7.32/pCO2: 46 /pO2: 26/HCO3: 21/lactate: 1.8    Microbiology     EKG:    RADIOLOGY & ADDITIONAL TESTS:  < from: Xray Chest 2 Views PA/Lat (20 @ 11:15) >    IMPRESSION:   Clear lungs. LABS:                        13.8   3.19  )-----------( 294      ( 21 Mar 2020 00:25 )             41.3         134<L>  |  104  |  12  ----------------------------<  212<H>  5.8<H>   |  16<L>  |  0.75    Ca    8.8      21 Mar 2020 13:00    TPro  6.9  /  Alb  3.6  /  TBili  1.0  /  DBili  x   /  AST  92<H>  /  ALT  72<H>  /  AlkPhos  56              Urinalysis Basic - ( 21 Mar 2020 05:11 )    Color: YELLOW / Appearance: CLEAR / S.039 / pH: 6.0  Gluc: >1000 / Ketone: 150  / Bili: NEGATIVE / Urobili: NORMAL   Blood: NEGATIVE / Protein: 30 / Nitrite: NEGATIVE   Leuk Esterase: NEGATIVE / RBC: 0-2 / WBC 0-2   Sq Epi: x / Non Sq Epi: x / Bacteria: x        VBG  @ 11:45  pH: 7.29/pCO2: 42 /pO2: 48/HCO3: 19/lactate: --  VBG  @ 00:25  pH: 7.32/pCO2: 46 /pO2: 26/HCO3: 21/lactate: 1.8        RADIOLOGY & ADDITIONAL TESTS:  < from: Xray Chest 2 Views PA/Lat (20 @ 11:15) >  IMPRESSION:   Clear lungs.

## 2020-03-21 NOTE — H&P ADULT - NSHPPHYSICALEXAM_GEN_ALL_CORE
CAPILLARY BLOOD GLUCOSE      POCT Blood Glucose.: 285 mg/dL (21 Mar 2020 06:05)  POCT Blood Glucose.: 266 mg/dL (20 Mar 2020 22:30)    I&O's Summary    Vital Signs Last 24 Hrs  T(C): 37 (21 Mar 2020 15:40), Max: 37 (21 Mar 2020 10:48)  T(F): 98.6 (21 Mar 2020 15:40), Max: 98.6 (21 Mar 2020 10:48)  HR: 79 (21 Mar 2020 15:40) (79 - 106)  BP: 113/72 (21 Mar 2020 15:40) (99/61 - 115/75)  BP(mean): --  RR: 16 (21 Mar 2020 15:40) (15 - 18)  SpO2: 100% (21 Mar 2020 15:40) (100% - 100%)  PHYSICAL EXAM:  GENERAL: NAD, well-developed  HEAD:  Atraumatic, Normocephalic  EYES: EOMI, PERRLA, conjunctiva and sclera clear  NECK: Supple, no JVD  CHEST/LUNG: Clear to auscultation bilaterally; no wheeze  HEART: Regular rate and rhythm; no murmurs, rubs, or gallops  ABDOMEN: Soft, Nontender, Nondistended; Bowel sounds present  EXTREMITIES:  warm and well perfused, no clubbing, cyanosis, or edema  PSYCH: AAOx3  NEUROLOGY: non-focal  SKIN: no rashes or lesions

## 2020-03-21 NOTE — H&P ADULT - ATTENDING COMMENTS
Patient does not need MICU level of care at this time as repeat BMP shows improving anion gap to 14 currently after the 4L IVF and 6U regular insulin, and bicarbonate increased to 16. Continue to monitor fingersticks. Patient no longer nauseated, can tolerate PO intake, would resume basal bolus insulin. Patient does not need MICU level of care at this time as repeat BMP shows improving anion gap to 14 currently after the 4L IVF and 6U regular insulin, and bicarbonate increased to 16. Continue to monitor fingersticks. Patient no longer nauseated, can tolerate PO intake, would resume basal bolus insulin.  Patient with DKA now with improving anion gap  Will do NS at 100 cc/hr  will do clear liquid diet with  Lantus at 14 u qhs with premeal 3 u tid - hold if NPO and with low dose SS.  If patient is NOT tolerating -clear liquid/apple juice, will change  IVF to d5/ns to keep FS about 80 to 180 .  Endocrine consult in AM.

## 2020-03-21 NOTE — CONSULT NOTE ADULT - ASSESSMENT
29M with Type 1 Diabetes, recent hx of gastroparesis, presenting with abdominal pain, nausea and vomiting, diagnosed with DKA.    Patient does not need MICU level of care at this time as repeat BMP shows improving anion gap to 14 currently after the 4L IVF and 6U regular insulin, and bicarbonate increased to 16. Continue to monitor fingersticks. Patient no longer nauseated, can tolerate PO intake, would resume basal bolus insulin.       Discussed with MICU attending.         Sujata Mcmahan MD PGY3  MICU Resident  (currently in SICU- can call #[phone])

## 2020-03-21 NOTE — ED PROVIDER NOTE - CLINICAL SUMMARY MEDICAL DECISION MAKING FREE TEXT BOX
29M with type 1 diabetes on tresiba 20U qhs and novolog 8U premeal, no other medical conditions or medications, adherent to medication regimen, presenting with abdominal pain, nausea and vomiting since yesterday. Presented last night to ED. Recently diagnosed with gastroparesis. States he had recent admission for DKA in 12/2019. Currently with no abdominal pain or vomiting. Feeling well at this time. Denies fever, cough, or other URI symptoms. No diarrhea. No blood in emesis.   Current labs indicate hyperglycemia >200, elevated hydroxybutyrate, anion gap of 18. Patient given IVF, insulin 6u. Patient to be admitted.

## 2020-03-21 NOTE — ED ADULT NURSE NOTE - NSIMPLEMENTINTERV_GEN_ALL_ED
Implemented All Universal Safety Interventions:  [place] to call system. Call bell, personal items and telephone within reach. Instruct patient to call for assistance. Room bathroom lighting operational. Non-slip footwear when patient is off stretcher. Physically safe environment: no spills, clutter or unnecessary equipment. Stretcher in lowest position, wheels locked, appropriate side rails in place.

## 2020-03-21 NOTE — H&P ADULT - NSHPREVIEWOFSYSTEMS_GEN_ALL_CORE
CONSTITUTIONAL: No fever, weight loss, or fatigue  EYES: No eye pain, visual disturbances, or discharge  ENMT:  No difficulty hearing, tinnitus, vertigo; No sinus or throat pain  NECK: No pain or stiffness  BREASTS: No pain, masses, or nipple discharge  RESPIRATORY: No cough, wheezing, chills or hemoptysis; No shortness of breath  CARDIOVASCULAR: No chest pain, palpitations, dizziness, or leg swelling  GASTROINTESTINAL: POSITIVE abdominal- epigastric pain.   POSITIVE  nausea with vomiting- no BLOOD,  No hematemesis; No diarrhea or constipation. No melena or hematochezia.  GENITOURINARY: No dysuria, frequency, hematuria, or incontinence  NEUROLOGICAL: No headaches, memory loss, loss of strength, numbness, or tremors  SKIN: No itching, burning, rashes, or lesions   LYMPH NODES: No enlarged glands  ENDOCRINE: No heat or cold intolerance; No hair loss  MUSCULOSKELETAL: No muscle or back pain  PSYCHIATRIC: No depression, anxiety, mood swings, or difficulty sleeping  HEME/LYMPH: No easy bruising, or bleeding gums  ALLERGY AND IMMUNOLOGIC: No hives or eczema

## 2020-03-21 NOTE — CONSULT NOTE ADULT - SUBJECTIVE AND OBJECTIVE BOX
CHIEF COMPLAINT:     HPI:      FAMILY HISTORY:  FH: diabetes mellitus      SOCIAL HISTORY:  Smoking: __ packs x ___ years  EtOH Use:  Marital Status:  Occupation:  Recent Travel:  Country of Birth:  Advance Directives:    Allergies    No Known Allergies    Intolerances        HOME MEDICATIONS:    REVIEW OF SYSTEMS:  Constitutional:   Eyes:  ENT:  CV:  Resp:  GI:  :  MSK:  Integumentary:  Neurological:  Psychiatric:  Endocrine:  Hematologic/Lymphatic:  Allergic/Immunologic:  [ ] All other systems negative  [ ] Unable to assess ROS because ________    OBJECTIVE:  ICU Vital Signs Last 24 Hrs  T(C): 37 (21 Mar 2020 12:48), Max: 37 (21 Mar 2020 10:48)  T(F): 98.6 (21 Mar 2020 12:48), Max: 98.6 (21 Mar 2020 10:48)  HR: 92 (21 Mar 2020 14:03) (84 - 106)  BP: 115/75 (21 Mar 2020 14:03) (99/61 - 115/75)  BP(mean): --  ABP: --  ABP(mean): --  RR: 18 (21 Mar 2020 14:03) (15 - 18)  SpO2: 100% (21 Mar 2020 12:48) (100% - 100%)        CAPILLARY BLOOD GLUCOSE      POCT Blood Glucose.: 285 mg/dL (21 Mar 2020 06:05)      PHYSICAL EXAM:  General:   HEENT:   Lymph Nodes:  Neck:   Respiratory:   Cardiovascular:   Abdomen:   Extremities:   Skin:   Neurological:  Psychiatry:    HOSPITAL MEDICATIONS:  MEDICATIONS  (STANDING):    MEDICATIONS  (PRN):      LABS:                        13.8   3.19  )-----------( 294      ( 21 Mar 2020 00:25 )             41.3     03-21    134<L>  |  104  |  12  ----------------------------<  212<H>  5.8<H>   |  16<L>  |  0.75    Ca    8.8      21 Mar 2020 13:00    TPro  6.9  /  Alb  3.6  /  TBili  1.0  /  DBili  x   /  AST  92<H>  /  ALT  72<H>  /  AlkPhos  56  03-21      Urinalysis Basic - ( 21 Mar 2020 05:11 )    Color: YELLOW / Appearance: CLEAR / S.039 / pH: 6.0  Gluc: >1000 / Ketone: 150  / Bili: NEGATIVE / Urobili: NORMAL   Blood: NEGATIVE / Protein: 30 / Nitrite: NEGATIVE   Leuk Esterase: NEGATIVE / RBC: 0-2 / WBC 0-2   Sq Epi: x / Non Sq Epi: x / Bacteria: x        Venous Blood Gas:   @ 11:45  7.29/42/48/19/78.5  VBG Lactate: --  Venous Blood Gas:   @ 00:25  7.32/46/26/21/37.4  VBG Lactate: 1.8      MICROBIOLOGY:     RADIOLOGY:  [ ] Reviewed and interpreted by me    EKG: CHIEF COMPLAINT:     HPI: 29M with type 1 diabetes on tresiba 20U qhs and novolog 8U premeal, no other medical conditions or medications, adherent to medication regimen, presenting with abdominal pain, nausea and vomiting since yesterday. Presented last night to ED. Recently diagnosed with gastroparesis. States he had recent admission for DKA in 2019.   Currently with no abdominal pain or vomiting. Feeling well at this time. Denies fever, cough, or other URI symptoms. No diarrhea. No blood in emesis.     MICU consulted for DKA, with AG elevated to 18, bicarb low to 14. Pt received 4L IV fluids as well as 6U regular insulin in ED.       FAMILY HISTORY:  FH: diabetes mellitus      SOCIAL HISTORY:  Smoking: __ packs x ___ years  EtOH Use:  Marital Status:  Occupation:  Recent Travel:  Country of Birth:  Advance Directives:    Allergies    No Known Allergies    Intolerances        HOME MEDICATIONS:    REVIEW OF SYSTEMS:  Constitutional:   Eyes:  ENT:  CV:  Resp:  GI:  :  MSK:  Integumentary:  Neurological:  Psychiatric:  Endocrine:  Hematologic/Lymphatic:  Allergic/Immunologic:  [ ] All other systems negative  [ ] Unable to assess ROS because ________    OBJECTIVE:  ICU Vital Signs Last 24 Hrs  T(C): 37 (21 Mar 2020 12:48), Max: 37 (21 Mar 2020 10:48)  T(F): 98.6 (21 Mar 2020 12:48), Max: 98.6 (21 Mar 2020 10:48)  HR: 92 (21 Mar 2020 14:03) (84 - 106)  BP: 115/75 (21 Mar 2020 14:03) (99/61 - 115/75)  BP(mean): --  ABP: --  ABP(mean): --  RR: 18 (21 Mar 2020 14:03) (15 - 18)  SpO2: 100% (21 Mar 2020 12:48) (100% - 100%)        CAPILLARY BLOOD GLUCOSE      POCT Blood Glucose.: 285 mg/dL (21 Mar 2020 06:05)      PHYSICAL EXAM:  General:   HEENT:   Lymph Nodes:  Neck:   Respiratory:   Cardiovascular:   Abdomen:   Extremities:   Skin:   Neurological:  Psychiatry:    HOSPITAL MEDICATIONS:  MEDICATIONS  (STANDING):    MEDICATIONS  (PRN):      LABS:                        13.8   3.19  )-----------( 294      ( 21 Mar 2020 00:25 )             41.3     03-21    134<L>  |  104  |  12  ----------------------------<  212<H>  5.8<H>   |  16<L>  |  0.75    Ca    8.8      21 Mar 2020 13:00    TPro  6.9  /  Alb  3.6  /  TBili  1.0  /  DBili  x   /  AST  92<H>  /  ALT  72<H>  /  AlkPhos  56        Urinalysis Basic - ( 21 Mar 2020 05:11 )    Color: YELLOW / Appearance: CLEAR / S.039 / pH: 6.0  Gluc: >1000 / Ketone: 150  / Bili: NEGATIVE / Urobili: NORMAL   Blood: NEGATIVE / Protein: 30 / Nitrite: NEGATIVE   Leuk Esterase: NEGATIVE / RBC: 0-2 / WBC 0-2   Sq Epi: x / Non Sq Epi: x / Bacteria: x        Venous Blood Gas:   @ 11:45  7.29/42/48/19/78.5  VBG Lactate: --  Venous Blood Gas:   @ 00:25  7.32/46/26/21/37.4  VBG Lactate: 1.8      MICROBIOLOGY:     RADIOLOGY:  [ ] Reviewed and interpreted by me    EKG: CHIEF COMPLAINT:     HPI: 29M with type 1 diabetes on tresiba 20U qhs and novolog 8U premeal, no other medical conditions or medications, adherent to medication regimen, presenting with abdominal pain, nausea and vomiting since yesterday. Presented last night to ED. Recently diagnosed with gastroparesis. States he had recent admission for DKA in 2019.   Currently with no abdominal pain or vomiting. Feeling well at this time. Denies fever, cough, or other URI symptoms. No diarrhea. No blood in emesis.     MICU consulted for DKA, with AG elevated to 18, bicarb low to 14. Pt received 4L IV fluids as well as 6U regular insulin in ED.       FAMILY HISTORY:  FH: diabetes mellitus      SOCIAL HISTORY:  Smoking: nonsmoker  EtOH Use: no alcohol use.   No drug use.     Allergies    No Known Allergies    Intolerances        HOME MEDICATIONS:    REVIEW OF SYSTEMS:  Constitutional: No fevers, chills, or weakness  Eyes: No vision changes  ENT: No nasal congestion or sore throat  CV: No chest pain or palpitations.   Resp: No dyspnea, cough, or wheezing.   GI: +Nausea and vomiting on presentation, epigastric pain on presentation (not currently), no diarrhea or bloody stool.   : No dysuria.   Neurological: No focal weakness.   Hematologic/Lymphatic: No bleeding.     [ ] All other systems negative  [ ] Unable to assess ROS because ________    OBJECTIVE:  ICU Vital Signs Last 24 Hrs  T(C): 37 (21 Mar 2020 12:48), Max: 37 (21 Mar 2020 10:48)  T(F): 98.6 (21 Mar 2020 12:48), Max: 98.6 (21 Mar 2020 10:48)  HR: 92 (21 Mar 2020 14:03) (84 - 106)  BP: 115/75 (21 Mar 2020 14:03) (99/61 - 115/75)  BP(mean): --  ABP: --  ABP(mean): --  RR: 18 (21 Mar 2020 14:03) (15 - 18)  SpO2: 100% (21 Mar 2020 12:48) (100% - 100%)        CAPILLARY BLOOD GLUCOSE      POCT Blood Glucose.: 285 mg/dL (21 Mar 2020 06:05)      PHYSICAL EXAM:  General: In no acute distress; well appearing  HEENT: dry mucous membranes  Respiratory: lungs clear to auscultation bilaterally; no wheezing, rales or rhonchi   Cardiovascular: heart sounds regular rate and rhythm; no murmurs.   Abdomen: mild epigastric tenderness but soft and no rebound/guarding; nondistended.   Extremities: warm; nonedematous.   Skin: warm/dry/intact. No rashes.   Neurological: nonfocal.   Psychiatry: A&O x 4    HOSPITAL MEDICATIONS:  MEDICATIONS  (STANDING):    MEDICATIONS  (PRN):      LABS:                        13.8   3.19  )-----------( 294      ( 21 Mar 2020 00:25 )             41.3         134<L>  |  104  |  12  ----------------------------<  212<H>  5.8<H>   |  16<L>  |  0.75    Ca    8.8      21 Mar 2020 13:00    TPro  6.9  /  Alb  3.6  /  TBili  1.0  /  DBili  x   /  AST  92<H>  /  ALT  72<H>  /  AlkPhos  56        Urinalysis Basic - ( 21 Mar 2020 05:11 )    Color: YELLOW / Appearance: CLEAR / S.039 / pH: 6.0  Gluc: >1000 / Ketone: 150  / Bili: NEGATIVE / Urobili: NORMAL   Blood: NEGATIVE / Protein: 30 / Nitrite: NEGATIVE   Leuk Esterase: NEGATIVE / RBC: 0-2 / WBC 0-2   Sq Epi: x / Non Sq Epi: x / Bacteria: x        Venous Blood Gas:   @ 11:45  7.29/42/48/19/78.5  VBG Lactate: --  Venous Blood Gas:   @ 00:25  7.32/46/26/21/37.4  VBG Lactate: 1.8      MICROBIOLOGY:     RADIOLOGY:  [X ] Reviewed and interpreted by me    CXR 3/21: clear lungs      EKG:

## 2020-03-21 NOTE — ED PROVIDER NOTE - ATTENDING CONTRIBUTION TO CARE
29M with type 1 diabetes on tresiba 20U qhs and novolog 8U premeal, no other medical conditions or medications, adherent to medication regimen, presenting with abdominal pain, nausea and vomiting since yesterday. Presented last night to ED. Recently diagnosed with gastroparesis. States he had recent admission for DKA in 12/2019. Currently with no abdominal pain or vomiting. Feeling well at this time. Denies fever, cough, or other URI symptoms. No diarrhea. No blood in emesis.     Current labs indicate hyperglycemia >200, elevated hydroxybutyrate, anion gap of 18. Patient given IVF, insulin 6u. Patient to be admitted.       LYNN Pavon: I have personally performed a face to face bedside history and physical examination of this patient. I have discussed the history, examination, review of systems, assessment and plan of management with the resident. I have reviewed the electronic medical record and amended it to reflect my history, review of systems, physical exam, assessment and plan.

## 2020-03-21 NOTE — ED PROVIDER NOTE - OBJECTIVE STATEMENT
29M with type 1 diabetes on tresiba 20U qhs and novolog 8U premeal, no other medical conditions or medications, adherent to medication regimen, presenting with abdominal pain, nausea and vomiting since yesterday. Presents to the ED with abdominal pain, n/v and concern for recurrent gastroparesis. Recently diagnosed with gastroparesis. States he had recent admission for DKA in 12/2019.

## 2020-03-21 NOTE — ED ADULT NURSE REASSESSMENT NOTE - SYMPTOMS
states it started yesterday. no vomiting at this time. abd soft and non tender/abdominal pain/vomiting/nausea
offers no complaints
pt offers no complaints, states he feels alittle better, fluids infusing
offers no complaints, states he feels "fine"

## 2020-03-22 DIAGNOSIS — I10 ESSENTIAL (PRIMARY) HYPERTENSION: ICD-10-CM

## 2020-03-22 DIAGNOSIS — E10.10 TYPE 1 DIABETES MELLITUS WITH KETOACIDOSIS WITHOUT COMA: ICD-10-CM

## 2020-03-22 DIAGNOSIS — E78.5 HYPERLIPIDEMIA, UNSPECIFIED: ICD-10-CM

## 2020-03-22 LAB
ANION GAP SERPL CALC-SCNC: 15 MMO/L — HIGH (ref 7–14)
ANION GAP SERPL CALC-SCNC: 17 MMO/L — HIGH (ref 7–14)
ANION GAP SERPL CALC-SCNC: 20 MMO/L — HIGH (ref 7–14)
B-OH-BUTYR SERPL-SCNC: 3.6 MMOL/L — HIGH (ref 0–0.4)
BUN SERPL-MCNC: 14 MG/DL — SIGNIFICANT CHANGE UP (ref 7–23)
BUN SERPL-MCNC: 17 MG/DL — SIGNIFICANT CHANGE UP (ref 7–23)
BUN SERPL-MCNC: 17 MG/DL — SIGNIFICANT CHANGE UP (ref 7–23)
CALCIUM SERPL-MCNC: 9 MG/DL — SIGNIFICANT CHANGE UP (ref 8.4–10.5)
CALCIUM SERPL-MCNC: 9 MG/DL — SIGNIFICANT CHANGE UP (ref 8.4–10.5)
CALCIUM SERPL-MCNC: 9.3 MG/DL — SIGNIFICANT CHANGE UP (ref 8.4–10.5)
CHLORIDE SERPL-SCNC: 102 MMOL/L — SIGNIFICANT CHANGE UP (ref 98–107)
CHLORIDE SERPL-SCNC: 96 MMOL/L — LOW (ref 98–107)
CHLORIDE SERPL-SCNC: 99 MMOL/L — SIGNIFICANT CHANGE UP (ref 98–107)
CO2 SERPL-SCNC: 13 MMOL/L — LOW (ref 22–31)
CO2 SERPL-SCNC: 15 MMOL/L — LOW (ref 22–31)
CO2 SERPL-SCNC: 15 MMOL/L — LOW (ref 22–31)
CREAT SERPL-MCNC: 0.8 MG/DL — SIGNIFICANT CHANGE UP (ref 0.5–1.3)
CREAT SERPL-MCNC: 0.8 MG/DL — SIGNIFICANT CHANGE UP (ref 0.5–1.3)
CREAT SERPL-MCNC: 0.81 MG/DL — SIGNIFICANT CHANGE UP (ref 0.5–1.3)
GLUCOSE BLDC GLUCOMTR-MCNC: 177 MG/DL — HIGH (ref 70–99)
GLUCOSE BLDC GLUCOMTR-MCNC: 206 MG/DL — HIGH (ref 70–99)
GLUCOSE BLDC GLUCOMTR-MCNC: 214 MG/DL — HIGH (ref 70–99)
GLUCOSE BLDC GLUCOMTR-MCNC: 239 MG/DL — HIGH (ref 70–99)
GLUCOSE BLDC GLUCOMTR-MCNC: 278 MG/DL — HIGH (ref 70–99)
GLUCOSE SERPL-MCNC: 234 MG/DL — HIGH (ref 70–99)
GLUCOSE SERPL-MCNC: 244 MG/DL — HIGH (ref 70–99)
GLUCOSE SERPL-MCNC: 286 MG/DL — HIGH (ref 70–99)
HCT VFR BLD CALC: 41 % — SIGNIFICANT CHANGE UP (ref 39–50)
HGB BLD-MCNC: 13.5 G/DL — SIGNIFICANT CHANGE UP (ref 13–17)
MAGNESIUM SERPL-MCNC: 1.8 MG/DL — SIGNIFICANT CHANGE UP (ref 1.6–2.6)
MAGNESIUM SERPL-MCNC: 1.9 MG/DL — SIGNIFICANT CHANGE UP (ref 1.6–2.6)
MAGNESIUM SERPL-MCNC: 1.9 MG/DL — SIGNIFICANT CHANGE UP (ref 1.6–2.6)
MCHC RBC-ENTMCNC: 29.7 PG — SIGNIFICANT CHANGE UP (ref 27–34)
MCHC RBC-ENTMCNC: 32.9 % — SIGNIFICANT CHANGE UP (ref 32–36)
MCV RBC AUTO: 90.3 FL — SIGNIFICANT CHANGE UP (ref 80–100)
NRBC # FLD: 0 K/UL — SIGNIFICANT CHANGE UP (ref 0–0)
PHOSPHATE SERPL-MCNC: 2.6 MG/DL — SIGNIFICANT CHANGE UP (ref 2.5–4.5)
PHOSPHATE SERPL-MCNC: 3.1 MG/DL — SIGNIFICANT CHANGE UP (ref 2.5–4.5)
PHOSPHATE SERPL-MCNC: 3.8 MG/DL — SIGNIFICANT CHANGE UP (ref 2.5–4.5)
PLATELET # BLD AUTO: 294 K/UL — SIGNIFICANT CHANGE UP (ref 150–400)
PMV BLD: 10.3 FL — SIGNIFICANT CHANGE UP (ref 7–13)
POTASSIUM SERPL-MCNC: 4.4 MMOL/L — SIGNIFICANT CHANGE UP (ref 3.5–5.3)
POTASSIUM SERPL-MCNC: 4.4 MMOL/L — SIGNIFICANT CHANGE UP (ref 3.5–5.3)
POTASSIUM SERPL-MCNC: 4.5 MMOL/L — SIGNIFICANT CHANGE UP (ref 3.5–5.3)
POTASSIUM SERPL-SCNC: 4.4 MMOL/L — SIGNIFICANT CHANGE UP (ref 3.5–5.3)
POTASSIUM SERPL-SCNC: 4.4 MMOL/L — SIGNIFICANT CHANGE UP (ref 3.5–5.3)
POTASSIUM SERPL-SCNC: 4.5 MMOL/L — SIGNIFICANT CHANGE UP (ref 3.5–5.3)
RBC # BLD: 4.54 M/UL — SIGNIFICANT CHANGE UP (ref 4.2–5.8)
RBC # FLD: 11.6 % — SIGNIFICANT CHANGE UP (ref 10.3–14.5)
SODIUM SERPL-SCNC: 129 MMOL/L — LOW (ref 135–145)
SODIUM SERPL-SCNC: 129 MMOL/L — LOW (ref 135–145)
SODIUM SERPL-SCNC: 134 MMOL/L — LOW (ref 135–145)
WBC # BLD: 6.99 K/UL — SIGNIFICANT CHANGE UP (ref 3.8–10.5)
WBC # FLD AUTO: 6.99 K/UL — SIGNIFICANT CHANGE UP (ref 3.8–10.5)

## 2020-03-22 PROCEDURE — 99255 IP/OBS CONSLTJ NEW/EST HI 80: CPT | Mod: GC

## 2020-03-22 RX ORDER — INSULIN LISPRO 100/ML
VIAL (ML) SUBCUTANEOUS
Refills: 0 | Status: DISCONTINUED | OUTPATIENT
Start: 2020-03-22 | End: 2020-03-22

## 2020-03-22 RX ORDER — INSULIN GLARGINE 100 [IU]/ML
8 INJECTION, SOLUTION SUBCUTANEOUS AT BEDTIME
Refills: 0 | Status: DISCONTINUED | OUTPATIENT
Start: 2020-03-22 | End: 2020-03-22

## 2020-03-22 RX ORDER — ONDANSETRON 8 MG/1
4 TABLET, FILM COATED ORAL ONCE
Refills: 0 | Status: COMPLETED | OUTPATIENT
Start: 2020-03-22 | End: 2020-03-22

## 2020-03-22 RX ORDER — DEXTROSE 50 % IN WATER 50 %
12.5 SYRINGE (ML) INTRAVENOUS ONCE
Refills: 0 | Status: DISCONTINUED | OUTPATIENT
Start: 2020-03-22 | End: 2020-03-26

## 2020-03-22 RX ORDER — INSULIN LISPRO 100/ML
VIAL (ML) SUBCUTANEOUS EVERY 4 HOURS
Refills: 0 | Status: DISCONTINUED | OUTPATIENT
Start: 2020-03-22 | End: 2020-03-23

## 2020-03-22 RX ORDER — INSULIN GLARGINE 100 [IU]/ML
14 INJECTION, SOLUTION SUBCUTANEOUS AT BEDTIME
Refills: 0 | Status: DISCONTINUED | OUTPATIENT
Start: 2020-03-22 | End: 2020-03-22

## 2020-03-22 RX ORDER — DEXTROSE 50 % IN WATER 50 %
25 SYRINGE (ML) INTRAVENOUS ONCE
Refills: 0 | Status: DISCONTINUED | OUTPATIENT
Start: 2020-03-22 | End: 2020-03-26

## 2020-03-22 RX ORDER — SODIUM CHLORIDE 9 MG/ML
1000 INJECTION, SOLUTION INTRAVENOUS
Refills: 0 | Status: DISCONTINUED | OUTPATIENT
Start: 2020-03-22 | End: 2020-03-26

## 2020-03-22 RX ORDER — GLUCAGON INJECTION, SOLUTION 0.5 MG/.1ML
1 INJECTION, SOLUTION SUBCUTANEOUS ONCE
Refills: 0 | Status: DISCONTINUED | OUTPATIENT
Start: 2020-03-22 | End: 2020-03-26

## 2020-03-22 RX ORDER — INSULIN GLARGINE 100 [IU]/ML
20 INJECTION, SOLUTION SUBCUTANEOUS EVERY MORNING
Refills: 0 | Status: DISCONTINUED | OUTPATIENT
Start: 2020-03-23 | End: 2020-03-23

## 2020-03-22 RX ORDER — SODIUM CHLORIDE 9 MG/ML
1000 INJECTION INTRAMUSCULAR; INTRAVENOUS; SUBCUTANEOUS
Refills: 0 | Status: DISCONTINUED | OUTPATIENT
Start: 2020-03-22 | End: 2020-03-23

## 2020-03-22 RX ORDER — INSULIN LISPRO 100/ML
5 VIAL (ML) SUBCUTANEOUS
Refills: 0 | Status: DISCONTINUED | OUTPATIENT
Start: 2020-03-22 | End: 2020-03-23

## 2020-03-22 RX ORDER — INSULIN GLARGINE 100 [IU]/ML
14 INJECTION, SOLUTION SUBCUTANEOUS ONCE
Refills: 0 | Status: COMPLETED | OUTPATIENT
Start: 2020-03-22 | End: 2020-03-22

## 2020-03-22 RX ORDER — INSULIN GLARGINE 100 [IU]/ML
16 INJECTION, SOLUTION SUBCUTANEOUS AT BEDTIME
Refills: 0 | Status: DISCONTINUED | OUTPATIENT
Start: 2020-03-22 | End: 2020-03-22

## 2020-03-22 RX ORDER — INSULIN GLARGINE 100 [IU]/ML
6 INJECTION, SOLUTION SUBCUTANEOUS AT BEDTIME
Refills: 0 | Status: COMPLETED | OUTPATIENT
Start: 2020-03-22 | End: 2020-03-22

## 2020-03-22 RX ORDER — INSULIN LISPRO 100/ML
3 VIAL (ML) SUBCUTANEOUS
Refills: 0 | Status: DISCONTINUED | OUTPATIENT
Start: 2020-03-22 | End: 2020-03-22

## 2020-03-22 RX ORDER — INSULIN LISPRO 100/ML
VIAL (ML) SUBCUTANEOUS AT BEDTIME
Refills: 0 | Status: DISCONTINUED | OUTPATIENT
Start: 2020-03-22 | End: 2020-03-22

## 2020-03-22 RX ORDER — PANTOPRAZOLE SODIUM 20 MG/1
40 TABLET, DELAYED RELEASE ORAL DAILY
Refills: 0 | Status: DISCONTINUED | OUTPATIENT
Start: 2020-03-22 | End: 2020-03-26

## 2020-03-22 RX ORDER — DEXTROSE 50 % IN WATER 50 %
15 SYRINGE (ML) INTRAVENOUS ONCE
Refills: 0 | Status: DISCONTINUED | OUTPATIENT
Start: 2020-03-22 | End: 2020-03-26

## 2020-03-22 RX ADMIN — Medication 1: at 22:13

## 2020-03-22 RX ADMIN — ONDANSETRON 4 MILLIGRAM(S): 8 TABLET, FILM COATED ORAL at 04:35

## 2020-03-22 RX ADMIN — SODIUM CHLORIDE 100 MILLILITER(S): 9 INJECTION INTRAMUSCULAR; INTRAVENOUS; SUBCUTANEOUS at 04:35

## 2020-03-22 RX ADMIN — Medication 3 UNIT(S): at 08:53

## 2020-03-22 RX ADMIN — Medication 3: at 08:52

## 2020-03-22 RX ADMIN — PANTOPRAZOLE SODIUM 40 MILLIGRAM(S): 20 TABLET, DELAYED RELEASE ORAL at 12:35

## 2020-03-22 RX ADMIN — Medication 2: at 12:28

## 2020-03-22 RX ADMIN — Medication 2: at 18:33

## 2020-03-22 RX ADMIN — INSULIN GLARGINE 14 UNIT(S): 100 INJECTION, SOLUTION SUBCUTANEOUS at 05:28

## 2020-03-22 NOTE — CONSULT NOTE ADULT - ATTENDING COMMENTS
Agree with fellow's findings and plan above.   Syl Zavaleta (pager [phone])  On evenings and weekends, please call [phone] or page endocrine fellow on call.   Please note that this patient may be followed by different provider tomorrow. If no answer, contact endocrine fellow on call.

## 2020-03-22 NOTE — CONSULT NOTE ADULT - SUBJECTIVE AND OBJECTIVE BOX
HPI:      PAST MEDICAL & SURGICAL HISTORY:  Diabetes: type 1  No significant past surgical history      FAMILY HISTORY:  FH: diabetes mellitus      Social History:  Tobacco  ETOH  Illicits  Occupation    Outpatient Medications:    MEDICATIONS  (STANDING):  dextrose 5%. 1000 milliLiter(s) (50 mL/Hr) IV Continuous <Continuous>  dextrose 50% Injectable 12.5 Gram(s) IV Push once  dextrose 50% Injectable 25 Gram(s) IV Push once  dextrose 50% Injectable 25 Gram(s) IV Push once  influenza   Vaccine 0.5 milliLiter(s) IntraMuscular once  insulin glargine Injectable (LANTUS) 16 Unit(s) SubCutaneous at bedtime  insulin lispro (HumaLOG) corrective regimen sliding scale   SubCutaneous three times a day before meals  insulin lispro (HumaLOG) corrective regimen sliding scale   SubCutaneous at bedtime  insulin lispro Injectable (HumaLOG) 5 Unit(s) SubCutaneous three times a day before meals  pantoprazole  Injectable 40 milliGRAM(s) IV Push daily  sodium chloride 0.9%. 1000 milliLiter(s) (100 mL/Hr) IV Continuous <Continuous>    MEDICATIONS  (PRN):  dextrose 40% Gel 15 Gram(s) Oral once PRN Blood Glucose LESS THAN 70 milliGRAM(s)/deciliter  glucagon  Injectable 1 milliGRAM(s) IntraMuscular once PRN Glucose LESS THAN 70 milligrams/deciliter      Allergies  No Known Allergies      Review of Systems:  Constitutional: No fever, good appetite/po intake  Eyes: No blurry vision, diplopia  Neuro: No tremors  HEENT: No pain  Cardiovascular: No chest pain, palpitations  Respiratory: No SOB, no cough  GI: + nausea, + vomiting, + abdominal pain  : No dysuria, hematuria  Skin: no rash  Psych: no depression  Endocrine: no polyuria, polydipsia  Hem/lymph: no swelling    ALL OTHER SYSTEMS REVIEWED AND NEGATIVE      PHYSICAL EXAM:  VITALS: T(C): 36.7 (03-22-20 @ 05:30)  T(F): 98 (03-22-20 @ 05:30), Max: 98.6 (03-21-20 @ 12:48)  HR: 91 (03-22-20 @ 05:30) (76 - 94)  BP: 110/64 (03-22-20 @ 05:30) (99/61 - 122/76)  RR:  (14 - 18)  SpO2:  (100% - 100%)  Wt(kg): --  GENERAL: NAD, well-groomed, well-developed  EYES: No proptosis, anicteric  HEENT:  Atraumatic, Normocephalic, moist mucous membranes  THYROID: Normal size, no palpable nodules  RESPIRATORY: not in respiratory distress, no accessory muscle use.   CARDIOVASCULAR: Regular rate and rhythm; no peripheral edema  GI: Soft, nontender, non distended, normal bowel sounds  SKIN: Dry, intact, No rashes or lesions  NEURO: AAO x 3, no gross or focal deficit   PSYCH: reactive affect, euthymic mood      POCT Blood Glucose.: 278 mg/dL (03-22-20 @ 08:37)  POCT Blood Glucose.: 239 mg/dL (03-22-20 @ 05:26)  POCT Blood Glucose.: 285 mg/dL (03-21-20 @ 06:05)  POCT Blood Glucose.: 266 mg/dL (03-20-20 @ 22:30)                            13.5   6.99  )-----------( 294      ( 22 Mar 2020 05:31 )             41.0       03-22    129<L>  |  96<L>  |  17  ----------------------------<  286<H>  4.5   |  13<L>  |  0.80    EGFR if : 141  EGFR if non : 122    Ca    9.3      03-22  Mg     1.9     03-22  Phos  3.8     03-22    TPro  6.9  /  Alb  3.6  /  TBili  1.0  /  DBili  x   /  AST  92<H>  /  ALT  72<H>  /  AlkPhos  56  03-21      Hemoglobin A1C, Whole Blood: 14.5 % <H> [4.0 - 5.6] (12-28-19 @ 02:05) HPI:  28 yo M with h/o  type 1 diabetes on tresiba 20U qhs and novolog 8U premeal, recently diagnosed gastroparesis, no other medical conditions or medications, adherent to medication regimen, presented to ED on 3/20 with epigastric  abdominal pain, nausea and vomiting since 1 day. Patient was noted to be in DKA on admission, was seen by MICU but as gap improved on repeat labs, was admitted to medicine floor.     Endocrine history:  Patient is a 29 year old male with history of T1DM diagnosed in 2015. Follows with PCP, never saw Endo as outpatient. Was seen by inpatient DM team on last admission in Dec 2019 and on discharge his outpatient appointments were set up at [address] [place] [address] but he did not came for his  appointment with CDE and is still pending MD appointment. Currently reports taking Tresiba 20 units qhs and Novolog 8 units TIDAC. Endorses compliance with insulin. Checks FS 2-3 times a day and says ranges from 150s-240s. Reports hypoglycemia rarely and occasional hyperglycemia >250. Says watch his carb intake and avoid sugars items. No known microvascular or macrovascular complications. Was recently diagnosed with gastroparesis. History of DKA in past.     On arrival to ED, he was noted to have blood sugars in 270s, anion gap of 18 and bicarb of 18, later bicarb was worsened to 14. MICU was consulted. He received 4L IV fluids as well as 6U regular insulin in ED. Repeat labs showed improving bicarb to 16 and anion gap of 14. Patient was admitted to medicine. Received Lantus 14 units this morning and nothing last night. Labs from this am consistent with DKA again, anion gap of 20 and bicarb of 13. Labs repeated this noon and showed improving anion gap to 15 and bicarb to 15. Beta hydroxy butyrate noted to be 3.5. Currently patient reports improved nausea, vomiting and abdominal pain but feeling tired and appetite is poor.       PAST MEDICAL & SURGICAL HISTORY:  Diabetes: type 1  No significant past surgical history      FAMILY HISTORY:  FH: diabetes mellitus: Grandmother and uncle.       Social History:  No history of tobacco, etoh or illicit drug use.        Outpatient Medications:  Tresiba 20 units sq qhs  Novolog 8 units sq ac TID.       MEDICATIONS  (STANDING):  dextrose 5%. 1000 milliLiter(s) (50 mL/Hr) IV Continuous <Continuous>  dextrose 50% Injectable 12.5 Gram(s) IV Push once  dextrose 50% Injectable 25 Gram(s) IV Push once  dextrose 50% Injectable 25 Gram(s) IV Push once  influenza   Vaccine 0.5 milliLiter(s) IntraMuscular once  insulin glargine Injectable (LANTUS) 16 Unit(s) SubCutaneous at bedtime  insulin lispro (HumaLOG) corrective regimen sliding scale   SubCutaneous three times a day before meals  insulin lispro (HumaLOG) corrective regimen sliding scale   SubCutaneous at bedtime  insulin lispro Injectable (HumaLOG) 5 Unit(s) SubCutaneous three times a day before meals  pantoprazole  Injectable 40 milliGRAM(s) IV Push daily  sodium chloride 0.9%. 1000 milliLiter(s) (100 mL/Hr) IV Continuous <Continuous>    MEDICATIONS  (PRN):  dextrose 40% Gel 15 Gram(s) Oral once PRN Blood Glucose LESS THAN 70 milliGRAM(s)/deciliter  glucagon  Injectable 1 milliGRAM(s) IntraMuscular once PRN Glucose LESS THAN 70 milligrams/deciliter      Allergies  No Known Allergies      Review of Systems:  Constitutional: No fever, good appetite/po intake  Eyes: No blurry vision, diplopia  Neuro: No tremors  HEENT: No pain  Cardiovascular: No chest pain, palpitations  Respiratory: No SOB, no cough  GI: + nausea, + vomiting, + abdominal pain  : No dysuria, hematuria  Endocrine: no polyuria, polydipsia  Hem/lymph: no swelling    ALL OTHER SYSTEMS REVIEWED AND NEGATIVE      PHYSICAL EXAM:  VITALS: T(C): 36.7 (03-22-20 @ 05:30)  T(F): 98 (03-22-20 @ 05:30), Max: 98.6 (03-21-20 @ 12:48)  HR: 91 (03-22-20 @ 05:30) (76 - 94)  BP: 110/64 (03-22-20 @ 05:30) (99/61 - 122/76)  RR:  (14 - 18)  SpO2:  (100% - 100%)  Wt(kg): --  GENERAL: NAD, well-groomed, well-developed  EYES: No proptosis, anicteric  HEENT:  Atraumatic, Normocephalic, moist mucous membranes  THYROID: Normal size, no palpable nodules  RESPIRATORY: not in respiratory distress, no accessory muscle use.   CARDIOVASCULAR: Regular rate and rhythm; no peripheral edema  GI: Soft, nontender, non distended, normal bowel sounds  SKIN: Dry, intact, No rashes or lesions  NEURO: AAO x 3, no gross or focal deficit   PSYCH: reactive affect, euthymic mood      POCT Blood Glucose.: 278 mg/dL (03-22-20 @ 08:37)  POCT Blood Glucose.: 239 mg/dL (03-22-20 @ 05:26)  POCT Blood Glucose.: 285 mg/dL (03-21-20 @ 06:05)  POCT Blood Glucose.: 266 mg/dL (03-20-20 @ 22:30)                            13.5   6.99  )-----------( 294      ( 22 Mar 2020 05:31 )             41.0       03-22    129<L>  |  96<L>  |  17  ----------------------------<  286<H>  4.5   |  13<L>  |  0.80    EGFR if : 141  EGFR if non : 122    Ca    9.3      03-22  Mg     1.9     03-22  Phos  3.8     03-22    TPro  6.9  /  Alb  3.6  /  TBili  1.0  /  DBili  x   /  AST  92<H>  /  ALT  72<H>  /  AlkPhos  56  03-21      Hemoglobin A1C, Whole Blood: 14.5 % <H> [4.0 - 5.6] (12-28-19 @ 02:05)

## 2020-03-22 NOTE — CHART NOTE - NSCHARTNOTEFT_GEN_A_CORE
Repeat BMP this evening with Anion Gap of 17, Bicarb - 15-->  case d/w Endocrine fellow on call --> will hold off on lantus 6Units tonight given pt is NPO and will repeat labs in am, if Anion gap improving will resume patients diet and am Lantus, will c/t monitor closely.

## 2020-03-22 NOTE — PROGRESS NOTE ADULT - SUBJECTIVE AND OBJECTIVE BOX
Patient is a 29y old  Male who presents with a chief complaint of Mild DKA (22 Mar 2020 12:11)      SUBJECTIVE / OVERNIGHT EVENTS:    Events noted.  CONSTITUTIONAL: No fever,  or fatigue  RESPIRATORY: No cough, wheezing,  No shortness of breath  CARDIOVASCULAR: No chest pain, palpitations, dizziness, or leg swelling  GASTROINTESTINAL: No abdominal or epigastric pain.   NEUROLOGICAL: No headaches,     MEDICATIONS  (STANDING):  dextrose 5%. 1000 milliLiter(s) (50 mL/Hr) IV Continuous <Continuous>  dextrose 50% Injectable 12.5 Gram(s) IV Push once  dextrose 50% Injectable 25 Gram(s) IV Push once  dextrose 50% Injectable 25 Gram(s) IV Push once  influenza   Vaccine 0.5 milliLiter(s) IntraMuscular once  insulin glargine Injectable (LANTUS) 20 Unit(s) SubCutaneous every morning  insulin lispro (HumaLOG) corrective regimen sliding scale   SubCutaneous every 4 hours  insulin lispro Injectable (HumaLOG) 5 Unit(s) SubCutaneous three times a day before meals  pantoprazole  Injectable 40 milliGRAM(s) IV Push daily  sodium chloride 0.9%. 1000 milliLiter(s) (100 mL/Hr) IV Continuous <Continuous>    MEDICATIONS  (PRN):  dextrose 40% Gel 15 Gram(s) Oral once PRN Blood Glucose LESS THAN 70 milliGRAM(s)/deciliter  glucagon  Injectable 1 milliGRAM(s) IntraMuscular once PRN Glucose LESS THAN 70 milligrams/deciliter        CAPILLARY BLOOD GLUCOSE      POCT Blood Glucose.: 177 mg/dL (22 Mar 2020 22:06)  POCT Blood Glucose.: 214 mg/dL (22 Mar 2020 18:14)  POCT Blood Glucose.: 206 mg/dL (22 Mar 2020 12:19)  POCT Blood Glucose.: 278 mg/dL (22 Mar 2020 08:37)  POCT Blood Glucose.: 239 mg/dL (22 Mar 2020 05:26)    I&O's Summary      PHYSICAL EXAM:  GENERAL: NAD  NECK: Supple, No JVD  CHEST/LUNG: Clear to auscultation bilaterally; No wheezing.  HEART: Regular rate and rhythm; No murmurs, rubs, or gallops  ABDOMEN: Soft, Nontender, Nondistended; Bowel sounds present  EXTREMITIES:   No edema  NEUROLOGY: AAO X 3      LABS:                        13.5   6.99  )-----------( 294      ( 22 Mar 2020 05:31 )             41.0     03-    134<L>  |  102  |  14  ----------------------------<  234<H>  4.4   |  15<L>  |  0.80    Ca    9.0      22 Mar 2020 18:25  Phos  2.6     -  Mg     1.8     -    TPro  6.9  /  Alb  3.6  /  TBili  1.0  /  DBili  x   /  AST  92<H>  /  ALT  72<H>  /  AlkPhos  56  03-21          Urinalysis Basic - ( 21 Mar 2020 05:11 )    Color: YELLOW / Appearance: CLEAR / S.039 / pH: 6.0  Gluc: >1000 / Ketone: 150  / Bili: NEGATIVE / Urobili: NORMAL   Blood: NEGATIVE / Protein: 30 / Nitrite: NEGATIVE   Leuk Esterase: NEGATIVE / RBC: 0-2 / WBC 0-2   Sq Epi: x / Non Sq Epi: x / Bacteria: x      CAPILLARY BLOOD GLUCOSE      POCT Blood Glucose.: 177 mg/dL (22 Mar 2020 22:06)  POCT Blood Glucose.: 214 mg/dL (22 Mar 2020 18:14)  POCT Blood Glucose.: 206 mg/dL (22 Mar 2020 12:19)  POCT Blood Glucose.: 278 mg/dL (22 Mar 2020 08:37)  POCT Blood Glucose.: 239 mg/dL (22 Mar 2020 05:26)                RADIOLOGY & ADDITIONAL TESTS:    Imaging Personally Reviewed:    Consultant(s) Notes Reviewed:      Care Discussed with Consultants/Other Providers:

## 2020-03-22 NOTE — CONSULT NOTE ADULT - ASSESSMENT
28 yo M with h/o T1Dm (uncontrolled, Hba1c 14.5% Dec 2019, on Tresiba 20U qhs and Novolog 8U premeal at home), recently diagnosed gastroparesis presented to ED on 3/20 with epigastric  abdominal pain, nausea and vomiting since 1 day. Patient was noted to be in DKA on admission, was seen by MICU but as gap improved on repeat labs, was admitted to medicine floor. Endocrine team consulted for uncontrolled T1Dm management with DKA.

## 2020-03-22 NOTE — PROGRESS NOTE ADULT - ASSESSMENT
30 yo M with h/o  type 1 diabetes on tresiba 20U qhs and novolog 8U premeal, recently diagnosed gastroparesis, no other medical conditions or medications, adherent to medication regimen, presenting with epigastric  abdominal pain, nausea and vomiting since yesterday. Presented last night to ED.  .  Patient denies fever/ cough / phlegm diarrhea.  Patient denies chest pain or SOB.  In Ed noted to have Anion gap of 18 and bicarb 16 which has improved to 14 and 16 respectivel with IVF and insulin.  Patient at this time notes no more epistatic pain or n/v but also noted he has not eaten anything.  patient seen by MICU and with gap starting close , deemed not an icu candidate    # DKA with DMI  # Epigastric pain most likely sec to Gastroparesis  # pseudohyponatremia sec to hyperglycemia  #Met Acidosis- anion gap now closing.

## 2020-03-22 NOTE — CONSULT NOTE ADULT - PROBLEM SELECTOR RECOMMENDATION 3
- LDL goal <70, was noted to be 74 from 12/2019.   - Not on statin, continue to monitor lipid profile routinely.

## 2020-03-22 NOTE — CONSULT NOTE ADULT - PROBLEM SELECTOR RECOMMENDATION 9
- Hba1c 14.5% from 12/2019, repeat now, goal is <7%  - FS ranging in 200s but labs from this am consistent with DKA, repeat labs now shows improving dka.  - Recommend to keep NPO with IV hydration , low dose Humalog correctional scale q4h, repeat labs at 6 pm to ensure resolution of dka, then can change to ac and hs   - Once DKA resolved, can resume consistent carb diet  - Patient received Lantus 14 units sq this am, recommend to give additional Lantus 8 units tonight and then Lantus 22 units sq at bedtime starting tomorrow  - Hold off on pre meals Humalog for now as NPO, once on diet can resume Humalog 4 units sq ac TID and adjust as appetite improves  - Monitor FS q4h for now, once on diet ac and hs  - FS goal 100-180  Discharge plan: Patient to be discharged on insulin pens basal + bolus (doses TBD), please call endocrine prior to discharge for final recs.   If patient wishes to follow up with [facility] Endocrinology   [facility]  [address] [place] [address], [address], [place] [address]  [phone] - Hba1c 14.5% from 12/2019, repeat now, goal is <7%  - FS ranging in 200s but labs from this am consistent with DKA, repeat labs now shows improving dka.  - Recommend to keep NPO with IV hydration , low dose Humalog correctional scale q4h, repeat labs at 6 pm to ensure resolution of dka, then can change to ac and hs   - Once DKA resolved, can resume consistent carb diet  - Patient received Lantus 14 units sq this am, recommend to give additional Lantus 6 units tonight and then Lantus 20 units sq every morning starting tomorrow  - Hold off on pre meals Humalog for now as NPO, once on diet can resume Humalog 4 units sq ac TID and adjust as appetite improves  - Monitor FS q4h for now, once on diet ac and hs  - FS goal 100-180  Discharge plan: Patient to be discharged on insulin pens basal + bolus (doses TBD), please call endocrine prior to discharge for final recs.   If patient wishes to follow up with [facility] Endocrinology   Endocrine [facility]  [address] [place] [address], [address], [place], [place] [id number] [phone]

## 2020-03-23 LAB
ANION GAP SERPL CALC-SCNC: 12 MMO/L — SIGNIFICANT CHANGE UP (ref 7–14)
ANION GAP SERPL CALC-SCNC: 13 MMO/L — SIGNIFICANT CHANGE UP (ref 7–14)
B-OH-BUTYR SERPL-SCNC: 3.6 MMOL/L — HIGH (ref 0–0.4)
B-OH-BUTYR SERPL-SCNC: 4.4 MMOL/L — HIGH (ref 0–0.4)
BUN SERPL-MCNC: 12 MG/DL — SIGNIFICANT CHANGE UP (ref 7–23)
BUN SERPL-MCNC: 13 MG/DL — SIGNIFICANT CHANGE UP (ref 7–23)
CALCIUM SERPL-MCNC: 8.7 MG/DL — SIGNIFICANT CHANGE UP (ref 8.4–10.5)
CALCIUM SERPL-MCNC: 9 MG/DL — SIGNIFICANT CHANGE UP (ref 8.4–10.5)
CHLORIDE SERPL-SCNC: 103 MMOL/L — SIGNIFICANT CHANGE UP (ref 98–107)
CHLORIDE SERPL-SCNC: 104 MMOL/L — SIGNIFICANT CHANGE UP (ref 98–107)
CO2 SERPL-SCNC: 19 MMOL/L — LOW (ref 22–31)
CO2 SERPL-SCNC: 21 MMOL/L — LOW (ref 22–31)
CREAT SERPL-MCNC: 0.74 MG/DL — SIGNIFICANT CHANGE UP (ref 0.5–1.3)
CREAT SERPL-MCNC: 0.75 MG/DL — SIGNIFICANT CHANGE UP (ref 0.5–1.3)
GLUCOSE BLDC GLUCOMTR-MCNC: 118 MG/DL — HIGH (ref 70–99)
GLUCOSE BLDC GLUCOMTR-MCNC: 128 MG/DL — HIGH (ref 70–99)
GLUCOSE BLDC GLUCOMTR-MCNC: 158 MG/DL — HIGH (ref 70–99)
GLUCOSE BLDC GLUCOMTR-MCNC: 165 MG/DL — HIGH (ref 70–99)
GLUCOSE BLDC GLUCOMTR-MCNC: 168 MG/DL — HIGH (ref 70–99)
GLUCOSE BLDC GLUCOMTR-MCNC: 181 MG/DL — HIGH (ref 70–99)
GLUCOSE BLDC GLUCOMTR-MCNC: 185 MG/DL — HIGH (ref 70–99)
GLUCOSE BLDC GLUCOMTR-MCNC: 189 MG/DL — HIGH (ref 70–99)
GLUCOSE BLDC GLUCOMTR-MCNC: 274 MG/DL — HIGH (ref 70–99)
GLUCOSE SERPL-MCNC: 172 MG/DL — HIGH (ref 70–99)
GLUCOSE SERPL-MCNC: 186 MG/DL — HIGH (ref 70–99)
HBA1C BLD-MCNC: 10 % — HIGH (ref 4–5.6)
HCT VFR BLD CALC: 36.6 % — LOW (ref 39–50)
HGB BLD-MCNC: 12.4 G/DL — LOW (ref 13–17)
MAGNESIUM SERPL-MCNC: 1.7 MG/DL — SIGNIFICANT CHANGE UP (ref 1.6–2.6)
MAGNESIUM SERPL-MCNC: 1.7 MG/DL — SIGNIFICANT CHANGE UP (ref 1.6–2.6)
MCHC RBC-ENTMCNC: 30 PG — SIGNIFICANT CHANGE UP (ref 27–34)
MCHC RBC-ENTMCNC: 33.9 % — SIGNIFICANT CHANGE UP (ref 32–36)
MCV RBC AUTO: 88.4 FL — SIGNIFICANT CHANGE UP (ref 80–100)
NRBC # FLD: 0 K/UL — SIGNIFICANT CHANGE UP (ref 0–0)
PHOSPHATE SERPL-MCNC: 1.8 MG/DL — LOW (ref 2.5–4.5)
PLATELET # BLD AUTO: 274 K/UL — SIGNIFICANT CHANGE UP (ref 150–400)
PMV BLD: 10.4 FL — SIGNIFICANT CHANGE UP (ref 7–13)
POTASSIUM SERPL-MCNC: 3.9 MMOL/L — SIGNIFICANT CHANGE UP (ref 3.5–5.3)
POTASSIUM SERPL-MCNC: 4 MMOL/L — SIGNIFICANT CHANGE UP (ref 3.5–5.3)
POTASSIUM SERPL-SCNC: 3.9 MMOL/L — SIGNIFICANT CHANGE UP (ref 3.5–5.3)
POTASSIUM SERPL-SCNC: 4 MMOL/L — SIGNIFICANT CHANGE UP (ref 3.5–5.3)
RBC # BLD: 4.14 M/UL — LOW (ref 4.2–5.8)
RBC # FLD: 11.7 % — SIGNIFICANT CHANGE UP (ref 10.3–14.5)
SODIUM SERPL-SCNC: 135 MMOL/L — SIGNIFICANT CHANGE UP (ref 135–145)
SODIUM SERPL-SCNC: 137 MMOL/L — SIGNIFICANT CHANGE UP (ref 135–145)
WBC # BLD: 5.66 K/UL — SIGNIFICANT CHANGE UP (ref 3.8–10.5)
WBC # FLD AUTO: 5.66 K/UL — SIGNIFICANT CHANGE UP (ref 3.8–10.5)

## 2020-03-23 PROCEDURE — 93010 ELECTROCARDIOGRAM REPORT: CPT

## 2020-03-23 RX ORDER — INSULIN LISPRO 100/ML
5 VIAL (ML) SUBCUTANEOUS
Refills: 0 | Status: DISCONTINUED | OUTPATIENT
Start: 2020-03-23 | End: 2020-03-26

## 2020-03-23 RX ORDER — INSULIN LISPRO 100/ML
VIAL (ML) SUBCUTANEOUS AT BEDTIME
Refills: 0 | Status: DISCONTINUED | OUTPATIENT
Start: 2020-03-23 | End: 2020-03-26

## 2020-03-23 RX ORDER — ONDANSETRON 8 MG/1
4 TABLET, FILM COATED ORAL ONCE
Refills: 0 | Status: COMPLETED | OUTPATIENT
Start: 2020-03-23 | End: 2020-03-23

## 2020-03-23 RX ORDER — INSULIN LISPRO 100/ML
VIAL (ML) SUBCUTANEOUS
Refills: 0 | Status: DISCONTINUED | OUTPATIENT
Start: 2020-03-23 | End: 2020-03-26

## 2020-03-23 RX ORDER — SODIUM,POTASSIUM PHOSPHATES 278-250MG
1 POWDER IN PACKET (EA) ORAL
Refills: 0 | Status: COMPLETED | OUTPATIENT
Start: 2020-03-23 | End: 2020-03-24

## 2020-03-23 RX ORDER — INSULIN GLARGINE 100 [IU]/ML
17 INJECTION, SOLUTION SUBCUTANEOUS EVERY MORNING
Refills: 0 | Status: DISCONTINUED | OUTPATIENT
Start: 2020-03-23 | End: 2020-03-26

## 2020-03-23 RX ADMIN — SODIUM CHLORIDE 100 MILLILITER(S): 9 INJECTION INTRAMUSCULAR; INTRAVENOUS; SUBCUTANEOUS at 14:48

## 2020-03-23 RX ADMIN — Medication 1: at 14:48

## 2020-03-23 RX ADMIN — INSULIN GLARGINE 17 UNIT(S): 100 INJECTION, SOLUTION SUBCUTANEOUS at 11:27

## 2020-03-23 RX ADMIN — ONDANSETRON 4 MILLIGRAM(S): 8 TABLET, FILM COATED ORAL at 21:52

## 2020-03-23 RX ADMIN — Medication 1 PACKET(S): at 17:14

## 2020-03-23 RX ADMIN — Medication 1: at 11:27

## 2020-03-23 RX ADMIN — PANTOPRAZOLE SODIUM 40 MILLIGRAM(S): 20 TABLET, DELAYED RELEASE ORAL at 11:28

## 2020-03-23 RX ADMIN — Medication 1: at 02:29

## 2020-03-23 RX ADMIN — Medication 1: at 06:09

## 2020-03-23 NOTE — PHARMACOTHERAPY INTERVENTION NOTE - COMMENTS
Confirmed medication history with patient and pharmacies ([facility] and [facility]). Per patient, uses 20 units Tresiba at bedtime and Novolog 6 to 8 units three times a day before meals. No longer taking pantoprazole at home (only used for one month from January to February 2020).    Outpatient Medication Review has been updated.    Jerad Coker, PharmD  PGY-1 Pharmacy Resident  [facility]  .

## 2020-03-23 NOTE — CHART NOTE - NSCHARTNOTEFT_GEN_A_CORE
Discussed case with endo likely not DKA, possible poor PO. resume diet. lantus 17U. resume humalog 5U premeal. If patient unable to tolerate PO then will consider D5

## 2020-03-23 NOTE — PROGRESS NOTE ADULT - ASSESSMENT
30 yo M with h/o T1Dm (uncontrolled, Hba1c 14.5% Dec 2019, on Tresiba 20U qhs and Novolog 8U premeal at home), recently diagnosed gastroparesis presented to ED on 3/20 with epigastric  abdominal pain, nausea and vomiting since 1 day. Patient was noted to be in DKA on admission, was seen by MICU but as gap improved on repeat labs, was admitted to medicine floor. Endocrine team consulted for uncontrolled T1Dm management with DKA.         1): DKA, type 1, not at goal.   Recommendation: - Hba1c 10%  - goal a1c 6-7%  - FS goal 100-180  - Patient had AG last night at6pm of 17 with BHB 4.4, now 13 and bhb 3.5.  - this is more consistent with starvation ketosis in type 1 dm than DKA    while NPO  - would increase lantus to 17 units sq qAM 1st dose given this AM  and humalog correction scale q 4 hours    Once patient diet is advanced   -can start humalog 5 units sq tid ac and change correction scale to low dose ac and hs.    If unable to tolerate po/having further gastropareis episodes, would start dextrose IVF and change scale to q 4hours.  d/c premeal humalog and c/w lantus dose as ordered    will follow up    Discharge plan: Patient to be discharged on insulin pens basal + bolus (doses TBD), please call endocrine prior to discharge for final recs.   If patient wishes to follow up with [facility] Endocrinology   Endocrine [facility]  [address] [place] [address], [address], [place] [address]  [phone].      2) Essential hypertension.    Recommendation: - BP at goal , not requiring any meds, continue to monitor.       3) Hyperlipidemia, unspecified hyperlipidemia type.    Recommendation: - LDL goal <70, was noted to be 74 from 12/2019.   - Not on statin, continue to monitor lipid profile routinely.         Lary Davidson  Nurse Practitioner  Division of Endocrinology & Diabetes  Pager # [phone]      If after 6PM or before 9AM, or on weekends/holidays, please call endocrine answering service for assistance ([phone]).  For nonurgent matters email [email or web address] for assistance.

## 2020-03-23 NOTE — PROGRESS NOTE ADULT - SUBJECTIVE AND OBJECTIVE BOX
Patient is a 29y old  Male who presents with a chief complaint of Mild DKA (22 Mar 2020 12:11)      SUBJECTIVE / OVERNIGHT EVENTS:    Events noted.  CONSTITUTIONAL: No fever,  or fatigue  RESPIRATORY: No cough, wheezing,  No shortness of breath  CARDIOVASCULAR: No chest pain, palpitations, dizziness, or leg swelling  GASTROINTESTINAL: No abdominal or epigastric pain. No nausea, vomiting.  NEUROLOGICAL: No headaches,     MEDICATIONS  (STANDING):  dextrose 5%. 1000 milliLiter(s) (50 mL/Hr) IV Continuous <Continuous>  dextrose 50% Injectable 12.5 Gram(s) IV Push once  dextrose 50% Injectable 25 Gram(s) IV Push once  dextrose 50% Injectable 25 Gram(s) IV Push once  influenza   Vaccine 0.5 milliLiter(s) IntraMuscular once  insulin glargine Injectable (LANTUS) 17 Unit(s) SubCutaneous every morning  insulin lispro (HumaLOG) corrective regimen sliding scale   SubCutaneous three times a day before meals  insulin lispro (HumaLOG) corrective regimen sliding scale   SubCutaneous at bedtime  insulin lispro Injectable (HumaLOG) 5 Unit(s) SubCutaneous three times a day before meals  pantoprazole  Injectable 40 milliGRAM(s) IV Push daily  potassium phosphate / sodium phosphate powder 1 Packet(s) Oral two times a day with meals    MEDICATIONS  (PRN):  dextrose 40% Gel 15 Gram(s) Oral once PRN Blood Glucose LESS THAN 70 milliGRAM(s)/deciliter  glucagon  Injectable 1 milliGRAM(s) IntraMuscular once PRN Glucose LESS THAN 70 milligrams/deciliter        CAPILLARY BLOOD GLUCOSE      POCT Blood Glucose.: 118 mg/dL (23 Mar 2020 21:24)  POCT Blood Glucose.: 128 mg/dL (23 Mar 2020 18:04)  POCT Blood Glucose.: 168 mg/dL (23 Mar 2020 14:41)  POCT Blood Glucose.: 158 mg/dL (23 Mar 2020 12:49)  POCT Blood Glucose.: 181 mg/dL (23 Mar 2020 11:26)  POCT Blood Glucose.: 165 mg/dL (23 Mar 2020 09:28)  POCT Blood Glucose.: 185 mg/dL (23 Mar 2020 05:47)  POCT Blood Glucose.: 189 mg/dL (23 Mar 2020 02:11)    I&O's Summary    22 Mar 2020 07:01  -  23 Mar 2020 07:00  --------------------------------------------------------  IN: 0 mL / OUT: 300 mL / NET: -300 mL        PHYSICAL EXAM:  GENERAL: NAD  NECK: Supple, No JVD  CHEST/LUNG: Clear to auscultation bilaterally; No wheezing.  HEART: Regular rate and rhythm; No murmurs, rubs, or gallops  ABDOMEN: Soft, Nontender, Nondistended; Bowel sounds present  EXTREMITIES:   No edema  NEUROLOGY: AAO X 3      LABS:                        12.4   5.66  )-----------( 274      ( 23 Mar 2020 05:45 )             36.6     03-23    137  |  103  |  12  ----------------------------<  172<H>  3.9   |  21<L>  |  0.75    Ca    9.0      23 Mar 2020 14:30  Phos  1.8     03-23  Mg     1.7     03-23              CAPILLARY BLOOD GLUCOSE      POCT Blood Glucose.: 118 mg/dL (23 Mar 2020 21:24)  POCT Blood Glucose.: 128 mg/dL (23 Mar 2020 18:04)  POCT Blood Glucose.: 168 mg/dL (23 Mar 2020 14:41)  POCT Blood Glucose.: 158 mg/dL (23 Mar 2020 12:49)  POCT Blood Glucose.: 181 mg/dL (23 Mar 2020 11:26)  POCT Blood Glucose.: 165 mg/dL (23 Mar 2020 09:28)  POCT Blood Glucose.: 185 mg/dL (23 Mar 2020 05:47)  POCT Blood Glucose.: 189 mg/dL (23 Mar 2020 02:11)                RADIOLOGY & ADDITIONAL TESTS:    Imaging Personally Reviewed:    Consultant(s) Notes Reviewed:      Care Discussed with Consultants/Other Providers:

## 2020-03-23 NOTE — PROGRESS NOTE ADULT - SUBJECTIVE AND OBJECTIVE BOX
Chief Complaint: t1dm    History:  pt NPO due to gastropresis.  last vomited yesterday and now feels hungry.  Has been NPO for at least 3 days.  No d5 infusion    MEDICATIONS  (STANDING):  dextrose 5%. 1000 milliLiter(s) (50 mL/Hr) IV Continuous <Continuous>  dextrose 50% Injectable 12.5 Gram(s) IV Push once  dextrose 50% Injectable 25 Gram(s) IV Push once  dextrose 50% Injectable 25 Gram(s) IV Push once  influenza   Vaccine 0.5 milliLiter(s) IntraMuscular once  insulin glargine Injectable (LANTUS) 17 Unit(s) SubCutaneous every morning  insulin lispro (HumaLOG) corrective regimen sliding scale   SubCutaneous three times a day before meals  insulin lispro (HumaLOG) corrective regimen sliding scale   SubCutaneous at bedtime  insulin lispro Injectable (HumaLOG) 5 Unit(s) SubCutaneous three times a day before meals  pantoprazole  Injectable 40 milliGRAM(s) IV Push daily  potassium phosphate / sodium phosphate powder 1 Packet(s) Oral two times a day with meals    MEDICATIONS  (PRN):  dextrose 40% Gel 15 Gram(s) Oral once PRN Blood Glucose LESS THAN 70 milliGRAM(s)/deciliter  glucagon  Injectable 1 milliGRAM(s) IntraMuscular once PRN Glucose LESS THAN 70 milligrams/deciliter      Allergies    No Known Allergies    Intolerances      Review of Systems:  Constitutional: No fever        ALL OTHER SYSTEMS REVIEWED AND NEGATIVE      PHYSICAL EXAM:  VITALS: T(C): 37 (03-23-20 @ 17:15)  T(F): 98.6 (03-23-20 @ 17:15), Max: 99.2 (03-22-20 @ 21:56)  HR: 69 (03-23-20 @ 17:15) (69 - 89)  BP: 112/72 (03-23-20 @ 17:15) (100/60 - 130/69)  RR:  (17 - 18)  SpO2:  (100% - 100%)  Wt(kg): --  GENERAL: NAD, well-developed  GI: Soft, nontender, non distended  SKIN: Dry, intact, No rashes or lesions  PSYCH: Alert and oriented x 3, normal affect, normal mood      CAPILLARY BLOOD GLUCOSE    POCT Blood Glucose.: 168 mg/dL (23 Mar 2020 14:41)  POCT Blood Glucose.: 158 mg/dL (23 Mar 2020 12:49)  POCT Blood Glucose.: 181 mg/dL (23 Mar 2020 11:26)  POCT Blood Glucose.: 165 mg/dL (23 Mar 2020 09:28)  POCT Blood Glucose.: 185 mg/dL (23 Mar 2020 05:47)  POCT Blood Glucose.: 189 mg/dL (23 Mar 2020 02:11)  POCT Blood Glucose.: 177 mg/dL (22 Mar 2020 22:06)  POCT Blood Glucose.: 214 mg/dL (22 Mar 2020 18:14)      03-23    137  |  103  |  12  ----------------------------<  172<H>  3.9   |  21<L>  |  0.75    EGFR if : 145  EGFR if non : 125    Ca    9.0      03-23  Mg     1.7     03-23  Phos  1.8     03-23    TPro  6.9  /  Alb  3.6  /  TBili  1.0  /  DBili  x   /  AST  92<H>  /  ALT  72<H>  /  AlkPhos  56  03-21      Hemoglobin A1C, Whole Blood: 10.0 % <H> [4.0 - 5.6] (03-23-20 @ 05:45)  Hemoglobin A1C, Whole Blood: 14.5 % <H> [4.0 - 5.6] (12-28-19 @ 02:05)

## 2020-03-23 NOTE — PHARMACOTHERAPY INTERVENTION NOTE - COMMENTS
Discussed medication indication, dose, and administration. Per patient, experiences hypoglycemic episodes approx. 6 to 7 times in a 3 month period. Counseled on hypoglycemia symptoms and management. Reinforced the importance of checking fingersticks, recording blood sugars in a logbook, and keeping his providers informed to optimize his insulin therapy. When asked how he injects his insulin at home, patient states that he self-injects the Novolog into his upper arms and the Tresiba into his abdomen. Patient admits to sometimes injecting into his bicep muscle rather than the area in the back of his arm with more fat. Counseled on the importance of injecting into subcutaneous tissue (e.g., abdomen) instead of the muscle due to absorption issues and explained that it can be difficult to self-inject into the correct area behind the upper arm. Counseled patient to administer insulin subcutaneously in his abdomen if self-injecting and how to rotate sites appropriately. Understanding verbalized by patient. Also discussed the healthy plate method for his diet.    Jerad Coker, PharmD  PGY-1 Pharmacy Resident  [facility]  .

## 2020-03-24 LAB
GLUCOSE BLDC GLUCOMTR-MCNC: 122 MG/DL — HIGH (ref 70–99)
GLUCOSE BLDC GLUCOMTR-MCNC: 136 MG/DL — HIGH (ref 70–99)
GLUCOSE BLDC GLUCOMTR-MCNC: 163 MG/DL — HIGH (ref 70–99)
GLUCOSE BLDC GLUCOMTR-MCNC: 231 MG/DL — HIGH (ref 70–99)

## 2020-03-24 RX ADMIN — Medication 5 UNIT(S): at 13:07

## 2020-03-24 RX ADMIN — Medication 1: at 13:07

## 2020-03-24 RX ADMIN — INSULIN GLARGINE 17 UNIT(S): 100 INJECTION, SOLUTION SUBCUTANEOUS at 09:50

## 2020-03-24 RX ADMIN — PANTOPRAZOLE SODIUM 40 MILLIGRAM(S): 20 TABLET, DELAYED RELEASE ORAL at 13:06

## 2020-03-24 NOTE — PROGRESS NOTE ADULT - SUBJECTIVE AND OBJECTIVE BOX
Patient is a 29y old  Male who presents with a chief complaint of Mild DKA (22 Mar 2020 12:11)      SUBJECTIVE / OVERNIGHT EVENTS:    Events noted.  CONSTITUTIONAL: No fever,  or fatigue  RESPIRATORY: No cough, wheezing,  No shortness of breath  CARDIOVASCULAR: No chest pain, palpitations.   GASTROINTESTINAL: No abdominal or epigastric pain.   NEUROLOGICAL: No headaches,     MEDICATIONS  (STANDING):  dextrose 5%. 1000 milliLiter(s) (50 mL/Hr) IV Continuous <Continuous>  dextrose 50% Injectable 12.5 Gram(s) IV Push once  dextrose 50% Injectable 25 Gram(s) IV Push once  dextrose 50% Injectable 25 Gram(s) IV Push once  influenza   Vaccine 0.5 milliLiter(s) IntraMuscular once  insulin glargine Injectable (LANTUS) 17 Unit(s) SubCutaneous every morning  insulin lispro (HumaLOG) corrective regimen sliding scale   SubCutaneous three times a day before meals  insulin lispro (HumaLOG) corrective regimen sliding scale   SubCutaneous at bedtime  insulin lispro Injectable (HumaLOG) 5 Unit(s) SubCutaneous three times a day before meals  pantoprazole  Injectable 40 milliGRAM(s) IV Push daily    MEDICATIONS  (PRN):  dextrose 40% Gel 15 Gram(s) Oral once PRN Blood Glucose LESS THAN 70 milliGRAM(s)/deciliter  glucagon  Injectable 1 milliGRAM(s) IntraMuscular once PRN Glucose LESS THAN 70 milligrams/deciliter        CAPILLARY BLOOD GLUCOSE      POCT Blood Glucose.: 231 mg/dL (24 Mar 2020 22:05)  POCT Blood Glucose.: 122 mg/dL (24 Mar 2020 17:24)  POCT Blood Glucose.: 163 mg/dL (24 Mar 2020 12:23)  POCT Blood Glucose.: 136 mg/dL (24 Mar 2020 08:52)    I&O's Summary      PHYSICAL EXAM:    NECK: Supple, No JVD  CHEST/LUNG: Clear to auscultation bilaterally; No wheezing.  HEART: Regular rate and rhythm; No murmurs, rubs, or gallops  ABDOMEN: Soft, Nontender, Nondistended; Bowel sounds present  EXTREMITIES:   No edema  NEUROLOGY: AAO      LABS:                        12.4   5.66  )-----------( 274      ( 23 Mar 2020 05:45 )             36.6     03-23    137  |  103  |  12  ----------------------------<  172<H>  3.9   |  21<L>  |  0.75    Ca    9.0      23 Mar 2020 14:30  Phos  1.8     03-23  Mg     1.7     03-23              CAPILLARY BLOOD GLUCOSE      POCT Blood Glucose.: 231 mg/dL (24 Mar 2020 22:05)  POCT Blood Glucose.: 122 mg/dL (24 Mar 2020 17:24)  POCT Blood Glucose.: 163 mg/dL (24 Mar 2020 12:23)  POCT Blood Glucose.: 136 mg/dL (24 Mar 2020 08:52)                RADIOLOGY & ADDITIONAL TESTS:    Imaging Personally Reviewed:    Consultant(s) Notes Reviewed:      Care Discussed with Consultants/Other Providers:

## 2020-03-24 NOTE — PHARMACOTHERAPY INTERVENTION NOTE - COMMENTS
Patient successfully taught back administration and storage of a Novolin 70/30 Relion pen. Reinforced the importance of gently moving the pen up and down 20 times and priming with 2 units every time before injecting.    Jerad Coker, PharmD  PGY-1 Pharmacy Resident  [facility]  .

## 2020-03-24 NOTE — CHART NOTE - NSCHARTNOTEFT_GEN_A_CORE
· Assessment	  28 yo M with h/o T1Dm (uncontrolled, Hba1c 14.5% Dec 2019, on Tresiba 20U qhs and Novolog 8U premeal at home), recently diagnosed gastroparesis presented to ED on 3/20 with epigastric  abdominal pain, nausea and vomiting since 1 day. Patient was noted to be in DKA on admission, was seen by MICU but as gap improved on repeat labs, was admitted to medicine floor. Endocrine team consulted for uncontrolled T1Dm management with DKA.     CAPILLARY BLOOD GLUCOSE      POCT Blood Glucose.: 136 mg/dL (24 Mar 2020 08:52)  POCT Blood Glucose.: 118 mg/dL (23 Mar 2020 21:24)  POCT Blood Glucose.: 128 mg/dL (23 Mar 2020 18:04)  POCT Blood Glucose.: 168 mg/dL (23 Mar 2020 14:41)  POCT Blood Glucose.: 158 mg/dL (23 Mar 2020 12:49)  POCT Blood Glucose.: 181 mg/dL (23 Mar 2020 11:26)      1): DKA, type 1, not at goal.   Recommendation: - Hba1c 10%  - goal a1c 6-7%  - FS goal 100-180  - continues not to have anion gap  - c/w lantus to 17 units sq qAM   - c/w humalog low correction scales for ac and for hs  - can c/w humalog 5 units sq tid ac - can give once patient has ate 50% of meal    If unable to tolerate po/having further gastropareis episodes, would start dextrose IVF and change scale to low dose q 4hours.  d/c premeal humalog and c/w lantus dose as ordered    Discharge plan: Patient to be discharged on insulin pens basal + bolus (doses TBD), please call endocrine prior to discharge for final recs.   If patient wishes to follow up with [facility] Endocrinology   Endocrine [facility]  [address] [place] [address], [address], [place] [address]  [phone]. 28 yo M with h/o T1Dm (uncontrolled, Hba1c 14.5% Dec 2019, on Tresiba 20U qhs and Novolog 8U premeal at home), recently diagnosed gastroparesis presented to ED on 3/20 with epigastric  abdominal pain, nausea and vomiting since 1 day. Patient was noted to be in DKA on admission, was seen by MICU but as gap improved on repeat labs, was admitted to medicine floor. Endocrine team consulted for uncontrolled T1Dm management with DKA.     CAPILLARY BLOOD GLUCOSE  POCT Blood Glucose.: 136 mg/dL (24 Mar 2020 08:52)  POCT Blood Glucose.: 118 mg/dL (23 Mar 2020 21:24)  POCT Blood Glucose.: 128 mg/dL (23 Mar 2020 18:04)  POCT Blood Glucose.: 168 mg/dL (23 Mar 2020 14:41)  POCT Blood Glucose.: 158 mg/dL (23 Mar 2020 12:49)  POCT Blood Glucose.: 181 mg/dL (23 Mar 2020 11:26)      1): DKA, type 1, not at goal.   Recommendation: - Hba1c 10%  - goal a1c 6-7%  - FS goal 100-180  - continues not to have anion gap  - c/w lantus to 17 units sq qAM   - c/w humalog low correction scales for ac and for hs  - can c/w humalog 5 units sq tid ac - can give once patient has ate 50% of meal    If unable to tolerate po/having further gastropareis episodes, would start dextrose IVF and change scale to low dose q 4hours.  d/c premeal humalog and c/w lantus dose as ordered    Discharge plan: Patient to be discharged on 70/30 pens- relion novolin brand at [facility].  Patient does not have insurance to cover medications at this time.  please call endocrine prior to discharge for final recs as patient has poor appetite, and would need to see FS once diet resumes and patient is tolerating  If patient wishes to follow up with [facility] Endocrinology   Endocrine Faculty [facility]  [address] [place] [address], [address], [place] [address]  [phone].

## 2020-03-25 LAB
ANION GAP SERPL CALC-SCNC: 10 MMO/L — SIGNIFICANT CHANGE UP (ref 7–14)
BUN SERPL-MCNC: 11 MG/DL — SIGNIFICANT CHANGE UP (ref 7–23)
CALCIUM SERPL-MCNC: 9.1 MG/DL — SIGNIFICANT CHANGE UP (ref 8.4–10.5)
CHLORIDE SERPL-SCNC: 99 MMOL/L — SIGNIFICANT CHANGE UP (ref 98–107)
CO2 SERPL-SCNC: 25 MMOL/L — SIGNIFICANT CHANGE UP (ref 22–31)
CREAT SERPL-MCNC: 0.7 MG/DL — SIGNIFICANT CHANGE UP (ref 0.5–1.3)
GLUCOSE BLDC GLUCOMTR-MCNC: 119 MG/DL — HIGH (ref 70–99)
GLUCOSE BLDC GLUCOMTR-MCNC: 141 MG/DL — HIGH (ref 70–99)
GLUCOSE BLDC GLUCOMTR-MCNC: 182 MG/DL — HIGH (ref 70–99)
GLUCOSE BLDC GLUCOMTR-MCNC: 213 MG/DL — HIGH (ref 70–99)
GLUCOSE SERPL-MCNC: 133 MG/DL — HIGH (ref 70–99)
HCT VFR BLD CALC: 36.1 % — LOW (ref 39–50)
HGB BLD-MCNC: 12.6 G/DL — LOW (ref 13–17)
MAGNESIUM SERPL-MCNC: 1.7 MG/DL — SIGNIFICANT CHANGE UP (ref 1.6–2.6)
MCHC RBC-ENTMCNC: 29.9 PG — SIGNIFICANT CHANGE UP (ref 27–34)
MCHC RBC-ENTMCNC: 34.9 % — SIGNIFICANT CHANGE UP (ref 32–36)
MCV RBC AUTO: 85.7 FL — SIGNIFICANT CHANGE UP (ref 80–100)
NRBC # FLD: 0 K/UL — SIGNIFICANT CHANGE UP (ref 0–0)
PHOSPHATE SERPL-MCNC: 2.8 MG/DL — SIGNIFICANT CHANGE UP (ref 2.5–4.5)
PLATELET # BLD AUTO: 244 K/UL — SIGNIFICANT CHANGE UP (ref 150–400)
PMV BLD: 10.3 FL — SIGNIFICANT CHANGE UP (ref 7–13)
POTASSIUM SERPL-MCNC: 3.1 MMOL/L — LOW (ref 3.5–5.3)
POTASSIUM SERPL-SCNC: 3.1 MMOL/L — LOW (ref 3.5–5.3)
RBC # BLD: 4.21 M/UL — SIGNIFICANT CHANGE UP (ref 4.2–5.8)
RBC # FLD: 11.3 % — SIGNIFICANT CHANGE UP (ref 10.3–14.5)
SODIUM SERPL-SCNC: 134 MMOL/L — LOW (ref 135–145)
WBC # BLD: 5.23 K/UL — SIGNIFICANT CHANGE UP (ref 3.8–10.5)
WBC # FLD AUTO: 5.23 K/UL — SIGNIFICANT CHANGE UP (ref 3.8–10.5)

## 2020-03-25 RX ORDER — SODIUM CHLORIDE 9 MG/ML
1000 INJECTION INTRAMUSCULAR; INTRAVENOUS; SUBCUTANEOUS
Refills: 0 | Status: DISCONTINUED | OUTPATIENT
Start: 2020-03-25 | End: 2020-03-26

## 2020-03-25 RX ORDER — POTASSIUM CHLORIDE 20 MEQ
10 PACKET (EA) ORAL
Refills: 0 | Status: COMPLETED | OUTPATIENT
Start: 2020-03-25 | End: 2020-03-25

## 2020-03-25 RX ORDER — INSULIN NPH HUM/REG INSULIN HM 70-30/ML
20 VIAL (ML) SUBCUTANEOUS
Qty: 5 | Refills: 3
Start: 2020-03-25 | End: 2020-07-22

## 2020-03-25 RX ADMIN — PANTOPRAZOLE SODIUM 40 MILLIGRAM(S): 20 TABLET, DELAYED RELEASE ORAL at 12:28

## 2020-03-25 RX ADMIN — Medication 5 UNIT(S): at 12:27

## 2020-03-25 RX ADMIN — SODIUM CHLORIDE 60 MILLILITER(S): 9 INJECTION INTRAMUSCULAR; INTRAVENOUS; SUBCUTANEOUS at 12:51

## 2020-03-25 RX ADMIN — Medication 100 MILLIEQUIVALENT(S): at 14:01

## 2020-03-25 RX ADMIN — Medication 100 MILLIEQUIVALENT(S): at 15:22

## 2020-03-25 RX ADMIN — INSULIN GLARGINE 17 UNIT(S): 100 INJECTION, SOLUTION SUBCUTANEOUS at 10:36

## 2020-03-25 RX ADMIN — Medication 5 UNIT(S): at 18:34

## 2020-03-25 RX ADMIN — Medication 100 MILLIEQUIVALENT(S): at 10:38

## 2020-03-25 NOTE — PROGRESS NOTE ADULT - ASSESSMENT
28 yo M with h/o  type 1 diabetes on tresiba 20U qhs and novolog 8U premeal, recently diagnosed gastroparesis, no other medical conditions or medications, adherent to medication regimen, presenting with epigastric  abdominal pain, nausea and vomiting since yesterday. Presented last night to ED.  .  Patient denies fever/ cough / phlegm diarrhea.  Patient denies chest pain or SOB.  In Ed noted to have Anion gap of 18 and bicarb 16 which has improved to 14 and 16 respectivel with IVF and insulin.  Patient at this time notes no more epistatic pain or n/v but also noted he has not eaten anything.  patient seen by MICU and with gap starting close , deemed not an icu candidate    # DKA with DMI  # Epigastric pain most likely sec to Gastroparesis  # pseudohyponatremia sec to hyperglycemia

## 2020-03-25 NOTE — CHART NOTE - NSCHARTNOTEFT_GEN_A_CORE
30 yo M with h/o T1Dm (uncontrolled, Hba1c 14.5% Dec 2019, on Tresiba 20U qhs and Novolog 8U premeal at home), recently diagnosed gastroparesis presented to ED on 3/20 with epigastric  abdominal pain, nausea and vomiting since 1 day. Patient was noted to be in DKA on admission, was seen by MICU but as gap improved on repeat labs, was admitted to medicine floor. Endocrine team consulted for uncontrolled T1Dm management with DKA.     CAPILLARY BLOOD GLUCOSE  POCT Blood Glucose.: 213 mg/dL (25 Mar 2020 12:02)  POCT Blood Glucose.: 119 mg/dL (25 Mar 2020 08:25)  POCT Blood Glucose.: 231 mg/dL (24 Mar 2020 22:05)  POCT Blood Glucose.: 122 mg/dL (24 Mar 2020 17:24)  POCT Blood Glucose.: 136 mg/dL (24 Mar 2020 08:52)  POCT Blood Glucose.: 118 mg/dL (23 Mar 2020 21:24)  POCT Blood Glucose.: 128 mg/dL (23 Mar 2020 18:04)  POCT Blood Glucose.: 168 mg/dL (23 Mar 2020 14:41)  POCT Blood Glucose.: 158 mg/dL (23 Mar 2020 12:49)  POCT Blood Glucose.: 181 mg/dL (23 Mar 2020 11:26)    Discussed with primary team PA- Pt ate dinner last night but refused premeal humalog.. This AM did not eat eggs, drank juice, ate very little.  Per PA, waiting to see if patient tolerates food to dc patient  1): DKA, type 1, not at goal.   Recommendation: - Hba1c 10%  - goal a1c 6-7%  - FS goal 100-180  - continues not to have anion gap  - c/w lantus to 17 units sq qAM   - c/w humalog low correction scales for ac and for hs  - can c/w humalog 5 units sq tid ac - can give once patient has ate 50% of meal  If unable to tolerate po/having further gastropareis episodes, would start dextrose IVF and change scale to low dose q 4hours.  d/c premeal humalog and c/w lantus dose as ordered    Discharge plan: Patient to be discharged on 70/30 pens- 20 units pre breakfast and 10 units pre dinner- relion novolin brand at [facility].  Patient does not have insurance to cover medications at this time.  please call endocrine prior to discharge for final recs as patient has poor appetite, and would need to see FS once diet resumes and patient is tolerating  If patient wishes to follow up with [facility] Endocrinology clinic  [phone]  [id number] flavio rosas

## 2020-03-26 ENCOUNTER — TRANSCRIPTION ENCOUNTER (OUTPATIENT)
Age: 30
End: 2020-03-26

## 2020-03-26 VITALS
RESPIRATION RATE: 16 BRPM | TEMPERATURE: 98 F | SYSTOLIC BLOOD PRESSURE: 114 MMHG | HEART RATE: 88 BPM | OXYGEN SATURATION: 100 % | DIASTOLIC BLOOD PRESSURE: 71 MMHG

## 2020-03-26 LAB
ANION GAP SERPL CALC-SCNC: 11 MMO/L — SIGNIFICANT CHANGE UP (ref 7–14)
BUN SERPL-MCNC: 13 MG/DL — SIGNIFICANT CHANGE UP (ref 7–23)
CALCIUM SERPL-MCNC: 9.3 MG/DL — SIGNIFICANT CHANGE UP (ref 8.4–10.5)
CHLORIDE SERPL-SCNC: 99 MMOL/L — SIGNIFICANT CHANGE UP (ref 98–107)
CO2 SERPL-SCNC: 27 MMOL/L — SIGNIFICANT CHANGE UP (ref 22–31)
CREAT SERPL-MCNC: 0.71 MG/DL — SIGNIFICANT CHANGE UP (ref 0.5–1.3)
GLUCOSE BLDC GLUCOMTR-MCNC: 214 MG/DL — HIGH (ref 70–99)
GLUCOSE BLDC GLUCOMTR-MCNC: 259 MG/DL — HIGH (ref 70–99)
GLUCOSE BLDC GLUCOMTR-MCNC: 273 MG/DL — HIGH (ref 70–99)
GLUCOSE SERPL-MCNC: 212 MG/DL — HIGH (ref 70–99)
HCT VFR BLD CALC: 36.9 % — LOW (ref 39–50)
HGB BLD-MCNC: 12.9 G/DL — LOW (ref 13–17)
MAGNESIUM SERPL-MCNC: 1.7 MG/DL — SIGNIFICANT CHANGE UP (ref 1.6–2.6)
MCHC RBC-ENTMCNC: 30 PG — SIGNIFICANT CHANGE UP (ref 27–34)
MCHC RBC-ENTMCNC: 35 % — SIGNIFICANT CHANGE UP (ref 32–36)
MCV RBC AUTO: 85.8 FL — SIGNIFICANT CHANGE UP (ref 80–100)
NRBC # FLD: 0 K/UL — SIGNIFICANT CHANGE UP (ref 0–0)
PHOSPHATE SERPL-MCNC: 2.2 MG/DL — LOW (ref 2.5–4.5)
PLATELET # BLD AUTO: 225 K/UL — SIGNIFICANT CHANGE UP (ref 150–400)
PMV BLD: 9.7 FL — SIGNIFICANT CHANGE UP (ref 7–13)
POTASSIUM SERPL-MCNC: 3.2 MMOL/L — LOW (ref 3.5–5.3)
POTASSIUM SERPL-SCNC: 3.2 MMOL/L — LOW (ref 3.5–5.3)
RBC # BLD: 4.3 M/UL — SIGNIFICANT CHANGE UP (ref 4.2–5.8)
RBC # FLD: 11.3 % — SIGNIFICANT CHANGE UP (ref 10.3–14.5)
SODIUM SERPL-SCNC: 137 MMOL/L — SIGNIFICANT CHANGE UP (ref 135–145)
WBC # BLD: 3.68 K/UL — LOW (ref 3.8–10.5)
WBC # FLD AUTO: 3.68 K/UL — LOW (ref 3.8–10.5)

## 2020-03-26 PROCEDURE — 99238 HOSP IP/OBS DSCHRG MGMT 30/<: CPT

## 2020-03-26 PROCEDURE — 99232 SBSQ HOSP IP/OBS MODERATE 35: CPT

## 2020-03-26 RX ORDER — PANTOPRAZOLE SODIUM 20 MG/1
1 TABLET, DELAYED RELEASE ORAL
Qty: 30 | Refills: 0
Start: 2020-03-26 | End: 2020-04-24

## 2020-03-26 RX ORDER — INSULIN LISPRO 100/ML
7 VIAL (ML) SUBCUTANEOUS
Refills: 0 | Status: DISCONTINUED | OUTPATIENT
Start: 2020-03-26 | End: 2020-03-26

## 2020-03-26 RX ORDER — INSULIN NPH HUM/REG INSULIN HM 70-30/ML
20 VIAL (ML) SUBCUTANEOUS
Qty: 5 | Refills: 3
Start: 2020-03-26 | End: 2020-07-23

## 2020-03-26 RX ORDER — INSULIN NPH HUM/REG INSULIN HM 70-30/ML
20 VIAL (ML) SUBCUTANEOUS
Qty: 3 | Refills: 0
Start: 2020-03-26 | End: 2020-04-24

## 2020-03-26 RX ADMIN — Medication 3: at 12:44

## 2020-03-26 RX ADMIN — Medication 5 UNIT(S): at 12:44

## 2020-03-26 RX ADMIN — Medication 3: at 09:09

## 2020-03-26 RX ADMIN — INSULIN GLARGINE 17 UNIT(S): 100 INJECTION, SOLUTION SUBCUTANEOUS at 09:10

## 2020-03-26 RX ADMIN — Medication 2: at 17:39

## 2020-03-26 RX ADMIN — Medication 7 UNIT(S): at 17:39

## 2020-03-26 RX ADMIN — PANTOPRAZOLE SODIUM 40 MILLIGRAM(S): 20 TABLET, DELAYED RELEASE ORAL at 12:44

## 2020-03-26 RX ADMIN — Medication 5 UNIT(S): at 09:09

## 2020-03-26 NOTE — DISCHARGE NOTE PROVIDER - HOSPITAL COURSE
Pt. is a 28 yo male with uncontrolled Type I DM.  Due to pt's lack of insurance.  A more affordable Insulin was ordered until pt. has insurance.  Novolin 70/30, 20 units SQ in AM before breakfast, 10 units SQ in PM before dinner. Upon admission pt. had abdominal pain with N/V.  Pt. was diagnosed with gastroparesis.  The pain has since subsided. Pt. is a 30 yo male with uncontrolled Type I DM. In Ed noted to have Anion gap of 18 and bicarb 16 which has improved to 14 and 16 respectively with IVF and insulin. A more affordable Insulin was ordered until pt. has insurance.  Novolin 70/30, 20 units SQ in AM before breakfast, 10 units SQ in PM before dinner. Upon admission pt. had abdominal pain with N/V.  Pt. was diagnosed with gastroparesis.  The pain has since subsided.      Case discussed with attending, Pt is stable for Discharge Home.

## 2020-03-26 NOTE — PROGRESS NOTE ADULT - ASSESSMENT
30 yo M with h/o T1Dm (uncontrolled, Hba1c 14.5% Dec 2019, on Tresiba 20U qhs and Novolog 8U premeal at home), recently diagnosed gastroparesis presented to ED on 3/20 with epigastric  abdominal pain, nausea and vomiting since 1 day. Patient was noted to be in DKA on admission, was seen by MICU but as gap improved on repeat labs, was admitted to medicine floor. Endocrine team consulted for uncontrolled T1DM management with DKA.     1. DKA, type 1, not at goal  -Patient with uncontrolled DM 1, Hba1c 10%, goal less than 7%  -Inpatient BG target 100-180 mg/dl, patient above target today, no AG. Patient now with improved PO intake  -Recommend continuing Lantus 17 units SQ qAM  -Increase Humalog to 7 units SQ TID before meals (Hold if NPO/not eating meal)   -Continue Humalog low dose correctional scale SQ TID before meals and Humalog low dose bedtime correctional scale SQ qHS   -Check BG before meals and bedtime  -Consistent carbohydrate diet; may benefit from registered dietician consult (although he was seen on recent admission)  -DM education- reinforce mixed insulin education, patient seen by pharmacy liason (see pharmacy intervention notes)      Discharge Plan:   -Given insurance status (Medicaid pending), recommend ReliON Novolin 70/30 PENS, available at [facility] for approximately $40 per month. This is not optimal given DM 1 status, recommend patient transition to basal/bolus regimen once insurance is obtained  -Anticipate Novolin 70/30, 20 units SQ in AM before breakfast, 10 units SQ in PM before dinner - confirm dose with endocrine on day of discharge  -Patient reports he has glucometer, glucose test strips, lancets, alcohol swabs, insulin pen needles   -Follow up: [facility] at [place], Endocrine Clinic [address] [facility] [id number], [phone]  Appointment scheduled: May 19, 2020 at 9:00 AM    2. HTN  -BP goal less than 130/80, patient currently at goal, not on antihypertensives  -Continue to monitor BP    3. Hyperlipidemia, unspecified hyperlipidemia type.   -LDL goal <70, was noted to be 74 from 12/2019.   -Consider statin if no contraindication, given presence of DM  -Low cholesterol/DASH diet  -Monitor lipid panel routinely outpatient     Discussed DM discharge plan with primary team, BORA Franco  Nurse Practitioner  Division of Endocrinology & Diabetes  In house pager #[phone]/long range pager #[phone]    If before 9AM or after 6PM, or on weekends/holidays, please call endocrine answering service for assistance ([phone]).  For nonurgent matters email [email or web address] for assistance. 28 yo M with h/o T1Dm (uncontrolled, Hba1c 14.5% Dec 2019, on Tresiba 20U qhs and Novolog 8U premeal at home), recently diagnosed gastroparesis presented to ED on 3/20 with epigastric  abdominal pain, nausea and vomiting since 1 day. Patient was noted to be in DKA on admission, was seen by MICU but as gap improved on repeat labs, was admitted to medicine floor. Endocrine team consulted for uncontrolled T1DM management with DKA.     1. DKA, type 1, not at goal  -Patient with uncontrolled DM 1, Hba1c 10%, goal less than 7%  -Inpatient BG target 100-180 mg/dl, patient above target today, no AG. Patient now with improved PO intake  -Recommend continuing Lantus 17 units SQ qAM  -Increase Humalog to 7 units SQ TID before meals (Hold if NPO/not eating meal)   -Continue Humalog low dose correctional scale SQ TID before meals and Humalog low dose bedtime correctional scale SQ qHS   -Check BG before meals and bedtime  -Consistent carbohydrate diet; may benefit from registered dietician consult (although he was seen on recent admission)  -DM education- reinforce mixed insulin education, patient seen by pharmacy liason (see pharmacy intervention notes)      Discharge Plan:   -Given insurance status (Medicaid pending), recommend ReliON Novolin 70/30 PENS, available at [facility] for approximately $40 per month. This is not optimal given DM 1 status, recommend patient transition to basal/bolus regimen once insurance is obtained  -Anticipate Novolin 70/30, 20 units SQ in AM before breakfast, 10 units SQ in PM before dinner - confirm dose with endocrine on day of discharge. If discharged today, proceed with these doses.  -Patient reports he has glucometer, glucose test strips, lancets, alcohol swabs, insulin pen needles   -Follow up: [facility] at [place], Endocrine Clinic [address] [facility] [address], [phone]  Appointment scheduled: May 19, 2020 at 9:00 AM    2. HTN  -BP goal less than 130/80, patient currently at goal, not on antihypertensives  -Continue to monitor BP    3. Hyperlipidemia, unspecified hyperlipidemia type.   -LDL goal <70, was noted to be 74 from 12/2019.   -Consider statin if no contraindication, given presence of DM  -Low cholesterol/DASH diet  -Monitor lipid panel routinely outpatient     Discussed DM discharge plan with primary team, BORA Franco  Nurse Practitioner  Division of Endocrinology & Diabetes  In house pager #[phone]/long range pager #[phone]    If before 9AM or after 6PM, or on weekends/holidays, please call endocrine answering service for assistance ([phone]).  For nonurgent matters email [email or web address] for assistance.

## 2020-03-26 NOTE — PROGRESS NOTE ADULT - PROBLEM SELECTOR PROBLEM 2
Essential hypertension
Essential hypertension
Type 1 diabetes mellitus with ketoacidosis without coma

## 2020-03-26 NOTE — DISCHARGE NOTE PROVIDER - NSDCFUADDAPPT_GEN_ALL_CORE_FT
Follow up: [facility] at [place], Endocrine Clinic [address] [facility] [email or web address]] [id number], [phone]  Appointment scheduled: May 19, 2020 at 9:00 AM

## 2020-03-26 NOTE — DISCHARGE NOTE PROVIDER - NSDCFUSCHEDAPPT_GEN_ALL_CORE_FT
IRON MUNIZ ; 05/19/2020 ; NPP Endocrin OP [id number] [facility] IRON MUNIZ ; 05/19/2020 ; NPP Endocrin OP [id number] [facility] IRON MUNIZ ; 05/19/2020 ; NPP Endocrin OP [id number] [facility]

## 2020-03-26 NOTE — PROGRESS NOTE ADULT - SUBJECTIVE AND OBJECTIVE BOX
Chief Complaint: DM 1    History: Patient seen at bedside. Patient reports he is feeling better; eating full meals, denies n/v, denies s/s of hypoglycemia. BG trending up, most recent before lunch 259 mg/dl.   Patient remains uninsured at this time- Medicaid pending. Discussed with patient, he would prefer to continue insulin pens but basal/bolus will be difficult without insurance. Plan is for 70/30 PENS (ReliON Brand from [employer]). Patient verbalizes understanding of mixed insulin regimen and agreement with plan.     MEDICATIONS  (STANDING):  dextrose 5%. 1000 milliLiter(s) (50 mL/Hr) IV Continuous <Continuous>  dextrose 50% Injectable 12.5 Gram(s) IV Push once  dextrose 50% Injectable 25 Gram(s) IV Push once  dextrose 50% Injectable 25 Gram(s) IV Push once  influenza   Vaccine 0.5 milliLiter(s) IntraMuscular once  insulin glargine Injectable (LANTUS) 17 Unit(s) SubCutaneous every morning  insulin lispro (HumaLOG) corrective regimen sliding scale   SubCutaneous three times a day before meals  insulin lispro (HumaLOG) corrective regimen sliding scale   SubCutaneous at bedtime  insulin lispro Injectable (HumaLOG) 5 Unit(s) SubCutaneous three times a day before meals  pantoprazole  Injectable 40 milliGRAM(s) IV Push daily    MEDICATIONS  (PRN):  dextrose 40% Gel 15 Gram(s) Oral once PRN Blood Glucose LESS THAN 70 milliGRAM(s)/deciliter  glucagon  Injectable 1 milliGRAM(s) IntraMuscular once PRN Glucose LESS THAN 70 milligrams/deciliter    No Known Allergies    Review of Systems:  Cardiovascular: No chest pain  Respiratory: No SOB  GI: No nausea, vomiting    PHYSICAL EXAM:  VITALS: T(C): 36.7 (03-26-20 @ 12:50)  T(F): 98 (03-26-20 @ 12:50), Max: 98.4 (03-25-20 @ 15:00)  HR: 88 (03-26-20 @ 12:50) (78 - 88)  BP: 114/71 (03-26-20 @ 12:50) (98/65 - 114/72)  RR:  (16 - 17)  SpO2:  (100% - 100%)  Wt(kg): --  GENERAL: NAD  EYES: No proptosis, anicteric  HEENT:  Atraumatic, Normocephalic, moist mucous membranes  RESPIRATORY: unlabored respirations   PSYCH: Alert and oriented x 3, normal affect, normal mood    CAPILLARY BLOOD GLUCOSE    POCT Blood Glucose.: 259 mg/dL (26 Mar 2020 12:26)  POCT Blood Glucose.: 273 mg/dL (26 Mar 2020 08:46)  POCT Blood Glucose.: 182 mg/dL (25 Mar 2020 22:11)  POCT Blood Glucose.: 141 mg/dL (25 Mar 2020 17:49)      03-26    137  |  99  |  13  ----------------------------<  212<H>  3.2<L>   |  27  |  0.71    EGFR if : 148  EGFR if non : 128    Ca    9.3      03-26  Mg     1.7     03-26  Phos  2.2     03-26        Hemoglobin A1C, Whole Blood: 10.0 % <H> [4.0 - 5.6] (03-23-20 @ 05:45)  Hemoglobin A1C, Whole Blood: 14.5 % <H> [4.0 - 5.6] (12-28-19 @ 02:05)

## 2020-03-26 NOTE — DISCHARGE NOTE NURSING/CASE MANAGEMENT/SOCIAL WORK - PATIENT PORTAL LINK FT
No
You can access the FollowMyHealth Patient Portal offered by [facility] by registering at the following website: [email or web address]. By joining [employer]’s FollowMyHealth portal, you will also be able to view your health information using other applications (apps) compatible with our system.

## 2020-03-26 NOTE — DISCHARGE NOTE PROVIDER - NSDCMRMEDTOKEN_GEN_ALL_CORE_FT
glucometer (per patient&#x27;s insurance): Test blood sugars four times a day. Dispense #1 glucometer.  Insulin Pen Needles, 4mm: 1 application subcutaneously 4 times a day. ** Use with insulin pen **   lancets: 1 application subcutaneously 4 times a day   ReliOn/NovLIN 70/30 FlexPen subcutaneous suspension: 20 unit(s) subcutaneous before breakfast  10 units before dinner   test strips (per patient&#x27;s insurance): 1 application subcutaneously 4 times a day. ** Compatible with patient&#x27;s glucometer ** HumuLIN 70/30 KwikPen 70 units-30 units/mL subcutaneous suspension: 20 unit(s) subcutaneous in the morning  10 Units in the evening  Protonix 40 mg oral delayed release tablet: 1 tab(s) orally once a day

## 2020-03-26 NOTE — PROGRESS NOTE ADULT - PROBLEM SELECTOR PROBLEM 3
Hyperlipidemia, unspecified hyperlipidemia type
Hyperlipidemia, unspecified hyperlipidemia type
Metabolic acidosis

## 2020-03-26 NOTE — DISCHARGE NOTE NURSING/CASE MANAGEMENT/SOCIAL WORK - NSDCFUADDAPPT_GEN_ALL_CORE_FT
Follow up: Medicine Specialties at [employer], Endocrine Clinic [address] [facility] [address], [phone]  Appointment scheduled: May 19, 2020 at 9:00 AM

## 2020-03-26 NOTE — DISCHARGE NOTE PROVIDER - NSDCCPCAREPLAN_GEN_ALL_CORE_FT
PRINCIPAL DISCHARGE DIAGNOSIS  Diagnosis: Uncontrolled insulin dependent type 1 diabetes mellitus  Assessment and Plan of Treatment: Pt. to start Novolin 70/30 20 Units in the morning and 10 Units at night.  Pt. to follow a diabetic diet.      SECONDARY DISCHARGE DIAGNOSES  Diagnosis: Gastroparesis  Assessment and Plan of Treatment: Pt. not to lie down after eating.  Don't eat large meals late at night.

## 2020-03-27 ENCOUNTER — APPOINTMENT (OUTPATIENT)
Dept: ENDOCRINOLOGY | Facility: CLINIC | Age: 30
End: 2020-03-27

## 2020-05-12 ENCOUNTER — INPATIENT (INPATIENT)
Facility: HOSPITAL | Age: 30
LOS: 3 days | Discharge: HOME | End: 2020-05-16
Attending: INTERNAL MEDICINE | Admitting: INTERNAL MEDICINE
Payer: MEDICAID

## 2020-05-12 VITALS
TEMPERATURE: 98 F | OXYGEN SATURATION: 100 % | RESPIRATION RATE: 20 BRPM | DIASTOLIC BLOOD PRESSURE: 72 MMHG | HEART RATE: 126 BPM | SYSTOLIC BLOOD PRESSURE: 131 MMHG

## 2020-05-12 LAB
ALBUMIN SERPL ELPH-MCNC: 4.7 G/DL — SIGNIFICANT CHANGE UP (ref 3.3–5)
ALP SERPL-CCNC: 78 U/L — SIGNIFICANT CHANGE UP (ref 40–120)
ALT FLD-CCNC: 24 U/L — SIGNIFICANT CHANGE UP (ref 4–41)
ANION GAP SERPL CALC-SCNC: 12 MMO/L — SIGNIFICANT CHANGE UP (ref 7–14)
ANION GAP SERPL CALC-SCNC: 16 MMO/L — HIGH (ref 7–14)
ANION GAP SERPL CALC-SCNC: 19 MMO/L — HIGH (ref 7–14)
APPEARANCE UR: CLEAR — SIGNIFICANT CHANGE UP
AST SERPL-CCNC: 18 U/L — SIGNIFICANT CHANGE UP (ref 4–40)
B-OH-BUTYR SERPL-SCNC: 2.9 MMOL/L — HIGH (ref 0–0.4)
B-OH-BUTYR SERPL-SCNC: 4.3 MMOL/L — HIGH (ref 0–0.4)
BACTERIA # UR AUTO: NEGATIVE — SIGNIFICANT CHANGE UP
BASE EXCESS BLDV CALC-SCNC: -3.5 MMOL/L — SIGNIFICANT CHANGE UP
BASE EXCESS BLDV CALC-SCNC: -3.8 MMOL/L — SIGNIFICANT CHANGE UP
BASOPHILS # BLD AUTO: 0.04 K/UL — SIGNIFICANT CHANGE UP (ref 0–0.2)
BASOPHILS NFR BLD AUTO: 0.2 % — SIGNIFICANT CHANGE UP (ref 0–2)
BILIRUB SERPL-MCNC: 2 MG/DL — HIGH (ref 0.2–1.2)
BILIRUB UR-MCNC: NEGATIVE — SIGNIFICANT CHANGE UP
BLOOD GAS VENOUS - CREATININE: 0.89 MG/DL — SIGNIFICANT CHANGE UP (ref 0.5–1.3)
BLOOD GAS VENOUS - CREATININE: 1.01 MG/DL — SIGNIFICANT CHANGE UP (ref 0.5–1.3)
BLOOD UR QL VISUAL: NEGATIVE — SIGNIFICANT CHANGE UP
BUN SERPL-MCNC: 21 MG/DL — SIGNIFICANT CHANGE UP (ref 7–23)
BUN SERPL-MCNC: 22 MG/DL — SIGNIFICANT CHANGE UP (ref 7–23)
BUN SERPL-MCNC: 28 MG/DL — HIGH (ref 7–23)
CALCIUM SERPL-MCNC: 10 MG/DL — SIGNIFICANT CHANGE UP (ref 8.4–10.5)
CALCIUM SERPL-MCNC: 10.6 MG/DL — HIGH (ref 8.4–10.5)
CALCIUM SERPL-MCNC: 9.7 MG/DL — SIGNIFICANT CHANGE UP (ref 8.4–10.5)
CHLORIDE BLDV-SCNC: 103 MMOL/L — SIGNIFICANT CHANGE UP (ref 96–108)
CHLORIDE BLDV-SCNC: 99 MMOL/L — SIGNIFICANT CHANGE UP (ref 96–108)
CHLORIDE SERPL-SCNC: 100 MMOL/L — SIGNIFICANT CHANGE UP (ref 98–107)
CHLORIDE SERPL-SCNC: 102 MMOL/L — SIGNIFICANT CHANGE UP (ref 98–107)
CHLORIDE SERPL-SCNC: 97 MMOL/L — LOW (ref 98–107)
CO2 SERPL-SCNC: 17 MMOL/L — LOW (ref 22–31)
CO2 SERPL-SCNC: 19 MMOL/L — LOW (ref 22–31)
CO2 SERPL-SCNC: 22 MMOL/L — SIGNIFICANT CHANGE UP (ref 22–31)
COLOR SPEC: SIGNIFICANT CHANGE UP
CREAT SERPL-MCNC: 0.82 MG/DL — SIGNIFICANT CHANGE UP (ref 0.5–1.3)
CREAT SERPL-MCNC: 0.86 MG/DL — SIGNIFICANT CHANGE UP (ref 0.5–1.3)
CREAT SERPL-MCNC: 1 MG/DL — SIGNIFICANT CHANGE UP (ref 0.5–1.3)
EOSINOPHIL # BLD AUTO: 0 K/UL — SIGNIFICANT CHANGE UP (ref 0–0.5)
EOSINOPHIL NFR BLD AUTO: 0 % — SIGNIFICANT CHANGE UP (ref 0–6)
GAS PNL BLDV: 135 MMOL/L — LOW (ref 136–146)
GAS PNL BLDV: 136 MMOL/L — SIGNIFICANT CHANGE UP (ref 136–146)
GLUCOSE BLDV-MCNC: 211 MG/DL — HIGH (ref 70–99)
GLUCOSE BLDV-MCNC: 275 MG/DL — HIGH (ref 70–99)
GLUCOSE SERPL-MCNC: 140 MG/DL — HIGH (ref 70–99)
GLUCOSE SERPL-MCNC: 235 MG/DL — HIGH (ref 70–99)
GLUCOSE SERPL-MCNC: 288 MG/DL — HIGH (ref 70–99)
GLUCOSE UR-MCNC: >1000 — HIGH
HCO3 BLDV-SCNC: 20 MMOL/L — SIGNIFICANT CHANGE UP (ref 20–27)
HCO3 BLDV-SCNC: 21 MMOL/L — SIGNIFICANT CHANGE UP (ref 20–27)
HCT VFR BLD CALC: 42.8 % — SIGNIFICANT CHANGE UP (ref 39–50)
HCT VFR BLDV CALC: 42.3 % — SIGNIFICANT CHANGE UP (ref 39–51)
HCT VFR BLDV CALC: 45.9 % — SIGNIFICANT CHANGE UP (ref 39–51)
HGB BLD-MCNC: 14.6 G/DL — SIGNIFICANT CHANGE UP (ref 13–17)
HGB BLDV-MCNC: 13.8 G/DL — SIGNIFICANT CHANGE UP (ref 13–17)
HGB BLDV-MCNC: 15 G/DL — SIGNIFICANT CHANGE UP (ref 13–17)
HYALINE CASTS # UR AUTO: NEGATIVE — SIGNIFICANT CHANGE UP
IMM GRANULOCYTES NFR BLD AUTO: 0.6 % — SIGNIFICANT CHANGE UP (ref 0–1.5)
KETONES UR-MCNC: >150 — HIGH
LACTATE BLDV-MCNC: 1.6 MMOL/L — SIGNIFICANT CHANGE UP (ref 0.5–2)
LACTATE BLDV-MCNC: 1.9 MMOL/L — SIGNIFICANT CHANGE UP (ref 0.5–2)
LEUKOCYTE ESTERASE UR-ACNC: NEGATIVE — SIGNIFICANT CHANGE UP
LIDOCAIN IGE QN: 5.1 U/L — LOW (ref 7–60)
LYMPHOCYTES # BLD AUTO: 1.14 K/UL — SIGNIFICANT CHANGE UP (ref 1–3.3)
LYMPHOCYTES # BLD AUTO: 6.7 % — LOW (ref 13–44)
MCHC RBC-ENTMCNC: 30 PG — SIGNIFICANT CHANGE UP (ref 27–34)
MCHC RBC-ENTMCNC: 34.1 % — SIGNIFICANT CHANGE UP (ref 32–36)
MCV RBC AUTO: 87.9 FL — SIGNIFICANT CHANGE UP (ref 80–100)
MONOCYTES # BLD AUTO: 0.73 K/UL — SIGNIFICANT CHANGE UP (ref 0–0.9)
MONOCYTES NFR BLD AUTO: 4.3 % — SIGNIFICANT CHANGE UP (ref 2–14)
NEUTROPHILS # BLD AUTO: 15.1 K/UL — HIGH (ref 1.8–7.4)
NEUTROPHILS NFR BLD AUTO: 88.2 % — HIGH (ref 43–77)
NITRITE UR-MCNC: NEGATIVE — SIGNIFICANT CHANGE UP
NRBC # FLD: 0 K/UL — SIGNIFICANT CHANGE UP (ref 0–0)
PCO2 BLDV: 42 MMHG — SIGNIFICANT CHANGE UP (ref 41–51)
PCO2 BLDV: 44 MMHG — SIGNIFICANT CHANGE UP (ref 41–51)
PH BLDV: 7.31 PH — LOW (ref 7.32–7.43)
PH BLDV: 7.32 PH — SIGNIFICANT CHANGE UP (ref 7.32–7.43)
PH UR: 5.5 — SIGNIFICANT CHANGE UP (ref 5–8)
PLATELET # BLD AUTO: 339 K/UL — SIGNIFICANT CHANGE UP (ref 150–400)
PMV BLD: 10.1 FL — SIGNIFICANT CHANGE UP (ref 7–13)
PO2 BLDV: 29 MMHG — LOW (ref 35–40)
PO2 BLDV: 55 MMHG — HIGH (ref 35–40)
POTASSIUM BLDV-SCNC: 5.2 MMOL/L — HIGH (ref 3.4–4.5)
POTASSIUM BLDV-SCNC: 5.2 MMOL/L — HIGH (ref 3.4–4.5)
POTASSIUM SERPL-MCNC: 5 MMOL/L — SIGNIFICANT CHANGE UP (ref 3.5–5.3)
POTASSIUM SERPL-MCNC: 5.2 MMOL/L — SIGNIFICANT CHANGE UP (ref 3.5–5.3)
POTASSIUM SERPL-MCNC: 5.4 MMOL/L — HIGH (ref 3.5–5.3)
POTASSIUM SERPL-SCNC: 5 MMOL/L — SIGNIFICANT CHANGE UP (ref 3.5–5.3)
POTASSIUM SERPL-SCNC: 5.2 MMOL/L — SIGNIFICANT CHANGE UP (ref 3.5–5.3)
POTASSIUM SERPL-SCNC: 5.4 MMOL/L — HIGH (ref 3.5–5.3)
PROT SERPL-MCNC: 8.5 G/DL — HIGH (ref 6–8.3)
PROT UR-MCNC: 30 — SIGNIFICANT CHANGE UP
RBC # BLD: 4.87 M/UL — SIGNIFICANT CHANGE UP (ref 4.2–5.8)
RBC # FLD: 13 % — SIGNIFICANT CHANGE UP (ref 10.3–14.5)
RBC CASTS # UR COMP ASSIST: SIGNIFICANT CHANGE UP (ref 0–?)
SAO2 % BLDV: 45 % — LOW (ref 60–85)
SAO2 % BLDV: 85.5 % — HIGH (ref 60–85)
SARS-COV-2 RNA SPEC QL NAA+PROBE: SIGNIFICANT CHANGE UP
SODIUM SERPL-SCNC: 133 MMOL/L — LOW (ref 135–145)
SODIUM SERPL-SCNC: 135 MMOL/L — SIGNIFICANT CHANGE UP (ref 135–145)
SODIUM SERPL-SCNC: 136 MMOL/L — SIGNIFICANT CHANGE UP (ref 135–145)
SP GR SPEC: > 1.04 — HIGH (ref 1–1.04)
SQUAMOUS # UR AUTO: SIGNIFICANT CHANGE UP
TSH SERPL-MCNC: 1.04 UIU/ML — SIGNIFICANT CHANGE UP (ref 0.27–4.2)
UROBILINOGEN FLD QL: NORMAL — SIGNIFICANT CHANGE UP
WBC # BLD: 17.12 K/UL — HIGH (ref 3.8–10.5)
WBC # FLD AUTO: 17.12 K/UL — HIGH (ref 3.8–10.5)
WBC UR QL: SIGNIFICANT CHANGE UP (ref 0–?)

## 2020-05-12 PROCEDURE — 74177 CT ABD & PELVIS W/CONTRAST: CPT | Mod: 26

## 2020-05-12 PROCEDURE — 71046 X-RAY EXAM CHEST 2 VIEWS: CPT | Mod: 26

## 2020-05-12 RX ORDER — DEXTROSE 50 % IN WATER 50 %
15 SYRINGE (ML) INTRAVENOUS ONCE
Refills: 0 | Status: DISCONTINUED | OUTPATIENT
Start: 2020-05-12 | End: 2020-05-13

## 2020-05-12 RX ORDER — SODIUM CHLORIDE 9 MG/ML
1000 INJECTION, SOLUTION INTRAVENOUS ONCE
Refills: 0 | Status: DISCONTINUED | OUTPATIENT
Start: 2020-05-12 | End: 2020-05-12

## 2020-05-12 RX ORDER — INSULIN HUMAN 100 [IU]/ML
5 INJECTION, SOLUTION SUBCUTANEOUS
Qty: 100 | Refills: 0 | Status: DISCONTINUED | OUTPATIENT
Start: 2020-05-12 | End: 2020-05-12

## 2020-05-12 RX ORDER — SODIUM CHLORIDE 9 MG/ML
1000 INJECTION, SOLUTION INTRAVENOUS
Refills: 0 | Status: DISCONTINUED | OUTPATIENT
Start: 2020-05-12 | End: 2020-05-13

## 2020-05-12 RX ORDER — INSULIN HUMAN 100 [IU]/ML
2 INJECTION, SOLUTION SUBCUTANEOUS
Qty: 100 | Refills: 0 | Status: DISCONTINUED | OUTPATIENT
Start: 2020-05-12 | End: 2020-05-13

## 2020-05-12 RX ORDER — INSULIN GLARGINE 100 [IU]/ML
12 INJECTION, SOLUTION SUBCUTANEOUS ONCE
Refills: 0 | Status: COMPLETED | OUTPATIENT
Start: 2020-05-12 | End: 2020-05-12

## 2020-05-12 RX ORDER — SODIUM CHLORIDE 9 MG/ML
1000 INJECTION INTRAMUSCULAR; INTRAVENOUS; SUBCUTANEOUS ONCE
Refills: 0 | Status: COMPLETED | OUTPATIENT
Start: 2020-05-12 | End: 2020-05-12

## 2020-05-12 RX ORDER — GLUCAGON INJECTION, SOLUTION 0.5 MG/.1ML
1 INJECTION, SOLUTION SUBCUTANEOUS ONCE
Refills: 0 | Status: DISCONTINUED | OUTPATIENT
Start: 2020-05-12 | End: 2020-05-13

## 2020-05-12 RX ORDER — PIPERACILLIN AND TAZOBACTAM 4; .5 G/20ML; G/20ML
3.38 INJECTION, POWDER, LYOPHILIZED, FOR SOLUTION INTRAVENOUS ONCE
Refills: 0 | Status: COMPLETED | OUTPATIENT
Start: 2020-05-12 | End: 2020-05-12

## 2020-05-12 RX ORDER — DEXTROSE 50 % IN WATER 50 %
25 SYRINGE (ML) INTRAVENOUS ONCE
Refills: 0 | Status: DISCONTINUED | OUTPATIENT
Start: 2020-05-12 | End: 2020-05-13

## 2020-05-12 RX ORDER — SODIUM CHLORIDE 9 MG/ML
1000 INJECTION INTRAMUSCULAR; INTRAVENOUS; SUBCUTANEOUS ONCE
Refills: 0 | Status: DISCONTINUED | OUTPATIENT
Start: 2020-05-12 | End: 2020-05-12

## 2020-05-12 RX ORDER — INSULIN LISPRO 100/ML
VIAL (ML) SUBCUTANEOUS
Refills: 0 | Status: DISCONTINUED | OUTPATIENT
Start: 2020-05-12 | End: 2020-05-13

## 2020-05-12 RX ORDER — INSULIN GLARGINE 100 [IU]/ML
12 INJECTION, SOLUTION SUBCUTANEOUS AT BEDTIME
Refills: 0 | Status: DISCONTINUED | OUTPATIENT
Start: 2020-05-13 | End: 2020-05-13

## 2020-05-12 RX ORDER — PANTOPRAZOLE SODIUM 20 MG/1
40 TABLET, DELAYED RELEASE ORAL
Refills: 0 | Status: DISCONTINUED | OUTPATIENT
Start: 2020-05-12 | End: 2020-05-13

## 2020-05-12 RX ORDER — INSULIN HUMAN 100 [IU]/ML
10 INJECTION, SOLUTION SUBCUTANEOUS ONCE
Refills: 0 | Status: DISCONTINUED | OUTPATIENT
Start: 2020-05-12 | End: 2020-05-12

## 2020-05-12 RX ORDER — INSULIN LISPRO 100/ML
VIAL (ML) SUBCUTANEOUS AT BEDTIME
Refills: 0 | Status: DISCONTINUED | OUTPATIENT
Start: 2020-05-12 | End: 2020-05-13

## 2020-05-12 RX ORDER — DEXTROSE 50 % IN WATER 50 %
12.5 SYRINGE (ML) INTRAVENOUS ONCE
Refills: 0 | Status: DISCONTINUED | OUTPATIENT
Start: 2020-05-12 | End: 2020-05-13

## 2020-05-12 RX ORDER — INSULIN GLARGINE 100 [IU]/ML
12 INJECTION, SOLUTION SUBCUTANEOUS AT BEDTIME
Refills: 0 | Status: DISCONTINUED | OUTPATIENT
Start: 2020-05-12 | End: 2020-05-12

## 2020-05-12 RX ORDER — INSULIN LISPRO 100/ML
4 VIAL (ML) SUBCUTANEOUS
Refills: 0 | Status: DISCONTINUED | OUTPATIENT
Start: 2020-05-12 | End: 2020-05-13

## 2020-05-12 RX ORDER — ONDANSETRON 8 MG/1
4 TABLET, FILM COATED ORAL ONCE
Refills: 0 | Status: COMPLETED | OUTPATIENT
Start: 2020-05-12 | End: 2020-05-12

## 2020-05-12 RX ADMIN — INSULIN GLARGINE 12 UNIT(S): 100 INJECTION, SOLUTION SUBCUTANEOUS at 21:28

## 2020-05-12 RX ADMIN — PIPERACILLIN AND TAZOBACTAM 200 GRAM(S): 4; .5 INJECTION, POWDER, LYOPHILIZED, FOR SOLUTION INTRAVENOUS at 12:58

## 2020-05-12 RX ADMIN — ONDANSETRON 4 MILLIGRAM(S): 8 TABLET, FILM COATED ORAL at 10:33

## 2020-05-12 RX ADMIN — INSULIN HUMAN 5 UNIT(S)/HR: 100 INJECTION, SOLUTION SUBCUTANEOUS at 12:59

## 2020-05-12 RX ADMIN — SODIUM CHLORIDE 250 MILLILITER(S): 9 INJECTION, SOLUTION INTRAVENOUS at 17:08

## 2020-05-12 RX ADMIN — SODIUM CHLORIDE 200 MILLILITER(S): 9 INJECTION, SOLUTION INTRAVENOUS at 23:14

## 2020-05-12 RX ADMIN — INSULIN HUMAN 5 UNIT(S)/HR: 100 INJECTION, SOLUTION SUBCUTANEOUS at 17:07

## 2020-05-12 RX ADMIN — SODIUM CHLORIDE 1000 MILLILITER(S): 9 INJECTION INTRAMUSCULAR; INTRAVENOUS; SUBCUTANEOUS at 10:34

## 2020-05-12 NOTE — ED PROVIDER NOTE - PROGRESS NOTE DETAILS
improved, pt refused rectal temp pt feeling better. no vomiting in ED. ct wnl. hr improved. beta hydroxy butyrate improved. AG improved. IVF ordered. will admit. Endorsed to Dr Mueller. danna moore: pt seen by MICU as pt was on insulin drip, pt labs improving. Glucose < 200 so d5 1/2 was started. MICU requesting repeat BMP and if improved can admit to floor- signed out to night team to followup results and dispo accordingly, pt stable.

## 2020-05-12 NOTE — ED ADULT NURSE REASSESSMENT NOTE - NS ED NURSE REASSESS COMMENT FT1
Pt sleeping in stretcher. Vitals as noted. Will recheck finger stick at 11. Comfort measures provided. Will continue to monitor.

## 2020-05-12 NOTE — CONSULT NOTE ADULT - SUBJECTIVE AND OBJECTIVE BOX
PHYSICAL EXAM:  General: NAD  HEENT: NCAT, clear conjunctiva. Dry oral membranes.  Neck: Supple  Respiratory: CTABL  Cardiovascular: s1s2, RRR  Abdomen: Soft, nondistended  Extremities: wwp, 2+ pulses  Skin: Intact  Neurological: Nonfocal

## 2020-05-12 NOTE — ED PROVIDER NOTE - OBJECTIVE STATEMENT
29 y.o male pmhx of Type 1 DM, gastroparesis coming in with 3 days of nausea/vomiting and hyperglycemia. Pt states he was switched to Novolin 70/30 in March, takes 10 units TID and feels his sugars have not been controlled. Over the last 3 days has had NBNB vomiting and FS in 400s. Does not follow with Endocrinologist. Denies fevers, chills, chest pain, sob, cough, abdominal pain, numbness, tingling, weakness. Last admission in March.   Denies smoking, ETOH or drugs.

## 2020-05-12 NOTE — H&P ADULT - NSICDXFAMILYHX_GEN_ALL_CORE_FT
FAMILY HISTORY:  Mother  Still living? Unknown  FH: diabetes mellitus, Age at diagnosis: Age Unknown

## 2020-05-12 NOTE — H&P ADULT - NSHPPHYSICALEXAM_GEN_ALL_CORE
T(C): 36.6 (12 May 2020 20:30), Max: 36.9 (12 May 2020 10:35)  T(F): 97.8 (12 May 2020 20:30), Max: 98.5 (12 May 2020 10:35)  HR: 87 (12 May 2020 20:30) (69 - 126)  BP: 123/75 (12 May 2020 20:30) (110/67 - 132/78)  BP(mean): 79 (12 May 2020 20:08) (79 - 79)  RR: 15 (12 May 2020 20:30) (12 - 20)  SpO2: 100% (12 May 2020 20:30) (100% - 100%)    PHYSICAL EXAM:    GENERAL: Comfortable, no acute distress   HEAD:  Normocephalic, atraumatic  EYES: EOMI, PERRLA  HEENT: Dry mucous membranes  NECK: Supple, No JVD  NERVOUS SYSTEM:  Alert & Oriented X3, Motor Strength 5/5 B/L upper and lower extremities  CHEST/LUNG: Clear to auscultation bilaterally  HEART: Regular rate and rhythm, no murmur   ABDOMEN: Soft, Nontender, Nondistended, Bowel sounds present  EXTREMITIES:   No clubbing, cyanosis, or edema  MUSCULOSKELETAL: No muscle tenderness, no joint tenderness  SKIN:  warm and dry, no rash

## 2020-05-12 NOTE — H&P ADULT - NSHPLABSRESULTS_GEN_ALL_CORE
LABS                        14.6   17.12 )-----------( 339      ( 12 May 2020 10:15 )             42.8     05-12    136  |  102  |  21  ----------------------------<  140<H>  5.0   |  22  |  0.86  05-12    133<L>  |  100  |  22  ----------------------------<  235<H>  5.4<H>   |  17<L>  |  0.82    Ca    10.0      12 May 2020 19:10  Ca    9.7      12 May 2020 15:25    TPro  8.5<H>  /  Alb  4.7  /  TBili  2.0<H>  /  DBili  x   /  AST  18  /  ALT  24  /  AlkPhos  78  05-12          Urinalysis Basic - ( 12 May 2020 15:07 )    Color: LIGHT YELLOW / Appearance: CLEAR / SG: > 1.040 / pH: 5.5  Gluc: >1000 / Ketone: >150  / Bili: NEGATIVE / Urobili: NORMAL   Blood: NEGATIVE / Protein: 30 / Nitrite: NEGATIVE   Leuk Esterase: NEGATIVE / RBC: 0-2 / WBC 0-2   Sq Epi: OCC / Non Sq Epi: x / Bacteria: NEGATIVE          05-12-20 @ 15:25 - VBG - pH: 7.32  | pCO2: 42    | pO2: 55    | Lactate: 1.6    05-12-20 @ 10:15 - VBG - pH: 7.31  | pCO2: 44    | pO2: 29    | Lactate: 1.9        RADIOLOGY & ADDITIONAL TESTS:    < from: Xray Chest 2 Views PA/Lat (05.12.20 @ 10:42) >    INTERPRETATION:  CLINICAL INDICATION: tachycardia; vomiting; type I diabetes    EXAM:  Frontal and lateral chest from 5/12/2020 at 1042. Reviewed in conjunction with chest CT from 12/27/2019.     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    Clear lungs. No pleural effusions or pneumothorax.    Cardiac and mediastinal silhouettes within normal limits.    Trachea midline.    Unremarkable osseous structures.      < end of copied text >

## 2020-05-12 NOTE — CONSULT NOTE ADULT - ASSESSMENT
Pt is 29 year old insulin dependent male who presented to the ED complaining of nausea/vomiting x 3 days and high fingerstick readings at home. Pt says he was switched to a new insulin regimen in March (per pt, Novilin 70/30 10U TID, per chart, humulin 70/30 20U AM, 10UPM). Pt denies sick contacts, chest pain, sob, cough, fever, urinary symptoms.     At time of this note, pt currently awaiting covid swab result. Pt has received 2+ L of isotonic fluids, now being transitioned to D5/.45% NaCl in light of fingerstick of 132 at 17:20. Pt is on insulin gtt 5U/hr. With these measures pt's glucose has come down from 299 to 132. Anion gap has gone from 19->16. Pt's pH at time of first lab draw was 7.31, now 7.32. Pt's vitals are stable. Will advise another bmp draw, if gap closed pt can likely be transitioned to basal insulin/ the floor.     Mo Maldonado DO, PGY-3

## 2020-05-12 NOTE — H&P ADULT - HISTORY OF PRESENT ILLNESS
Pt is 29 year old insulin dependent male w/ possible gastroparesis who presented to the ED complaining of nausea/vomiting x 3 days and high fingerstick readings at home. Pt says he was switched to a new insulin regimen in March (per pt, Novilin 70/30 10U TID, per chart, humulin 70/30 20U AM, 10UPM). Pt denies sick contacts, chest pain, sob, cough, fever, urinary symptoms.     At time of this note, pt currently awaiting covid swab result. Pt has received 2+ L of isotonic fluids, now being transitioned to D5/.45% NaCl in light of fingerstick of 132 at 17:20. Pt is on insulin gtt 5U/hr. With these measures pt's glucose has come down from 299 to 132. Anion gap has gone from 19->16. Pt's pH at time of first lab draw was 7.31, now 7.32. Pt's vitals are stable. Will advise another bmp draw, if gap closed pt can likely be transitioned to basal insulin/ the floor.

## 2020-05-12 NOTE — H&P ADULT - ATTENDING COMMENTS
as above , pt with hyperglycemia, elevated anion gap, status  post 2 liters ivf, iv insulin infusion, now anion gap closed and  glucose normal, no source of infection, pt on 70/30 at home,  will switch to lantus  and sliding scale, needs endocrine  evaluation. Pt can be transferred to floor once iv insulin  is dcd and on sliding scale,

## 2020-05-12 NOTE — ED ADULT TRIAGE NOTE - CHIEF COMPLAINT QUOTE
pt. dx w/ Type 1 diabetes, states his blood sugar is not being properly controlled despite taking Novolog as prescribed, c/o abd pain, nausea and vomiting x a few days, subsided today, reports a hx of gastroparesis. Denies cough/fevers. No known COVID exposure.

## 2020-05-12 NOTE — ED PROVIDER NOTE - ATTENDING CONTRIBUTION TO CARE
Attending Statement: I have reviewed and agree with all pertinent clinical information, including history and physical exam and agree with treatment plan of the PA, except as noted.  30yo M hx DM type 1 on insulin, gastroparesis,  pw nausea and vomit x 3 days. NVNB Vomit Denies abdominal pain, dec po intake. Using novolin x 3 a day, 10unit each time since last admission. Denies fever/chills. No cough No sob no chest pain. Had hiccups last night none today. no recent sick contact. no other complaints.   Vital signs noted.  dry mmm thin male. looks uncomfortable. non icteric. no juandice. tachycardic  No resp distress. able to speak in full and clear sentences. no wheeze, rales or stridor.. thin male, soft nontender abdomen. no  rebound. no guarding. no sign of trauma. no CVAT no pedal edema. no calf tenderness. normal pulses bilateral feet. no rash   plan labs, urine, COVID, cxr, ekg, gentle IVF, admit.

## 2020-05-12 NOTE — ED ADULT NURSE REASSESSMENT NOTE - NS ED NURSE REASSESS COMMENT FT1
Pt received from day shift RN. Vitals as noted. Pt on insulin drip at this time running at 2 units/hr. Pharmacy called to have Lantus sent up as per MD orders. As per MD, after Lantus is given, drip will continue to run for 2 hours. Will check fingerstick at 9pm. Pt in no apparent distress at this time. Will continue antonio monitor.

## 2020-05-12 NOTE — ED PROVIDER NOTE - CLINICAL SUMMARY MEDICAL DECISION MAKING FREE TEXT BOX
29 y.o male pmhx of Type 1 DM, gastroparesis coming in with 3 days of nausea/vomiting and hyperglycemia. Pt tachycardic , abdomen is nontender.   Will check labs to evaluate for DKA, xray chest, ua eval for infectious causes, obtain EKG, give fluids, antiemetics, Admit. 29 y.o male pmhx of Type 1 DM, gastroparesis coming in with 3 days of nausea/vomiting and hyperglycemia. Pt tachycardic , abdomen is nontender.   Will check labs to evaluate for DKA, xray chest, ua, COVID to eval for infectious causes, obtain EKG, give fluids, antiemetics, Admit.

## 2020-05-12 NOTE — ED ADULT NURSE NOTE - OBJECTIVE STATEMENT
Receive pt. in ER room 29 alert and oriented x4, presenting to the ER with complaints of weakness, nausea , vomiting and elevated blood sugar. Pt. have a history of DM and stated " for three days I have nausea and vomiting, and I feel weak". Medicated as ordered lab sent, placed on cardiac monitor. Will continue to monitor.

## 2020-05-12 NOTE — H&P ADULT - NSHPREVIEWOFSYSTEMS_GEN_ALL_CORE
CONSTITUTIONAL:  No weight loss, fever, chills, weakness or fatigue.  HEENT:  Eyes:  No visual loss, blurred vision, double vision or yellow sclerae. Ears, Nose, Throat:  No hearing loss, sneezing, congestion, runny nose or sore throat.  SKIN:  No rash or itching.  CARDIOVASCULAR:  No chest pain, chest pressure or chest discomfort. No palpitations or edema.  RESPIRATORY:  No shortness of breath, cough or sputum.  GASTROINTESTINAL:  nausea, vomiting  GENITOURINARY:  Denies hematuria, dysuria.   NEUROLOGICAL:  No headache, dizziness, syncope, paralysis, ataxia, numbness or tingling in the extremities. No change in bowel or bladder control.  MUSCULOSKELETAL:  No muscle, back pain, joint pain or stiffness.  HEMATOLOGIC:  No anemia, bleeding or bruising.  ENDOCRINOLOGIC:  +DM

## 2020-05-12 NOTE — H&P ADULT - ASSESSMENT
Pt is 29 year old insulin dependent male w/ possible gastroparesis who presented to the ED complaining of nausea/vomiting x 3 days and high fingerstick readings at home found to be in mild DKA, gap closed in ED w/ insulin gtt now transitioned to basal/bolus and stable for floor admission.    #Neuro  -A&Ox3, no derangements    #CV  -Hemodynamically stable    #Pulm  -No hx of parenchymal disease. Saturating well on RA.    #GI  -Questionable hx of gastroparesis. With fluids and insulin, pt's nausea resolved.   -ppx with protonix    #Renal  -No underlying renal disease  -CTM on BMPs     #ID  -Afebrile, leukocytosis likely reactive in setting of DKA  -Covid neg    #Heme  -H/H stable    #Endocrine  -Pt with glucose 299 w/ anion gap 19, slightly acidotic on VBG.  -Started on insulin gtt and transitioned to basal bolus while in ED w/ lantus 12U and humalog 4U TID with low dose ISS. Gap now closed.  -Transition to NS for IVF repletion  -Endocrine c/s in AM  -CC diet    #PPx  -GI: protonix  -DVT: lovenox    Dispo: Pt stable for transfer to floors.

## 2020-05-13 ENCOUNTER — TRANSCRIPTION ENCOUNTER (OUTPATIENT)
Age: 30
End: 2020-05-13

## 2020-05-13 DIAGNOSIS — E78.5 HYPERLIPIDEMIA, UNSPECIFIED: ICD-10-CM

## 2020-05-13 DIAGNOSIS — E11.9 TYPE 2 DIABETES MELLITUS WITHOUT COMPLICATIONS: ICD-10-CM

## 2020-05-13 DIAGNOSIS — I10 ESSENTIAL (PRIMARY) HYPERTENSION: ICD-10-CM

## 2020-05-13 DIAGNOSIS — E10.10 TYPE 1 DIABETES MELLITUS WITH KETOACIDOSIS WITHOUT COMA: ICD-10-CM

## 2020-05-13 DIAGNOSIS — E10.65 TYPE 1 DIABETES MELLITUS WITH HYPERGLYCEMIA: ICD-10-CM

## 2020-05-13 LAB
ALBUMIN SERPL ELPH-MCNC: 3.9 G/DL — SIGNIFICANT CHANGE UP (ref 3.3–5)
ALP SERPL-CCNC: 66 U/L — SIGNIFICANT CHANGE UP (ref 40–120)
ALT FLD-CCNC: 19 U/L — SIGNIFICANT CHANGE UP (ref 4–41)
ANION GAP SERPL CALC-SCNC: 13 MMO/L — SIGNIFICANT CHANGE UP (ref 7–14)
ANION GAP SERPL CALC-SCNC: 14 MMO/L — SIGNIFICANT CHANGE UP (ref 7–14)
ANION GAP SERPL CALC-SCNC: 15 MMO/L — HIGH (ref 7–14)
AST SERPL-CCNC: 15 U/L — SIGNIFICANT CHANGE UP (ref 4–40)
BASOPHILS # BLD AUTO: 0.03 K/UL — SIGNIFICANT CHANGE UP (ref 0–0.2)
BASOPHILS NFR BLD AUTO: 0.3 % — SIGNIFICANT CHANGE UP (ref 0–2)
BILIRUB SERPL-MCNC: 2.1 MG/DL — HIGH (ref 0.2–1.2)
BUN SERPL-MCNC: 17 MG/DL — SIGNIFICANT CHANGE UP (ref 7–23)
BUN SERPL-MCNC: 17 MG/DL — SIGNIFICANT CHANGE UP (ref 7–23)
BUN SERPL-MCNC: 18 MG/DL — SIGNIFICANT CHANGE UP (ref 7–23)
CALCIUM SERPL-MCNC: 9.6 MG/DL — SIGNIFICANT CHANGE UP (ref 8.4–10.5)
CALCIUM SERPL-MCNC: 9.7 MG/DL — SIGNIFICANT CHANGE UP (ref 8.4–10.5)
CALCIUM SERPL-MCNC: 9.9 MG/DL — SIGNIFICANT CHANGE UP (ref 8.4–10.5)
CHLORIDE SERPL-SCNC: 101 MMOL/L — SIGNIFICANT CHANGE UP (ref 98–107)
CHLORIDE SERPL-SCNC: 101 MMOL/L — SIGNIFICANT CHANGE UP (ref 98–107)
CHLORIDE SERPL-SCNC: 107 MMOL/L — SIGNIFICANT CHANGE UP (ref 98–107)
CO2 SERPL-SCNC: 19 MMOL/L — LOW (ref 22–31)
CO2 SERPL-SCNC: 20 MMOL/L — LOW (ref 22–31)
CO2 SERPL-SCNC: 20 MMOL/L — LOW (ref 22–31)
CREAT SERPL-MCNC: 0.78 MG/DL — SIGNIFICANT CHANGE UP (ref 0.5–1.3)
CREAT SERPL-MCNC: 0.8 MG/DL — SIGNIFICANT CHANGE UP (ref 0.5–1.3)
CREAT SERPL-MCNC: 0.92 MG/DL — SIGNIFICANT CHANGE UP (ref 0.5–1.3)
CULTURE RESULTS: NO GROWTH — SIGNIFICANT CHANGE UP
EOSINOPHIL # BLD AUTO: 0 K/UL — SIGNIFICANT CHANGE UP (ref 0–0.5)
EOSINOPHIL NFR BLD AUTO: 0 % — SIGNIFICANT CHANGE UP (ref 0–6)
GLUCOSE BLDC GLUCOMTR-MCNC: 122 MG/DL — HIGH (ref 70–99)
GLUCOSE BLDC GLUCOMTR-MCNC: 130 MG/DL — HIGH (ref 70–99)
GLUCOSE BLDC GLUCOMTR-MCNC: 157 MG/DL — HIGH (ref 70–99)
GLUCOSE BLDC GLUCOMTR-MCNC: 163 MG/DL — HIGH (ref 70–99)
GLUCOSE BLDC GLUCOMTR-MCNC: 180 MG/DL — HIGH (ref 70–99)
GLUCOSE BLDC GLUCOMTR-MCNC: 219 MG/DL — HIGH (ref 70–99)
GLUCOSE BLDC GLUCOMTR-MCNC: 237 MG/DL — HIGH (ref 70–99)
GLUCOSE BLDC GLUCOMTR-MCNC: 259 MG/DL — HIGH (ref 70–99)
GLUCOSE SERPL-MCNC: 172 MG/DL — HIGH (ref 70–99)
GLUCOSE SERPL-MCNC: 239 MG/DL — HIGH (ref 70–99)
GLUCOSE SERPL-MCNC: 245 MG/DL — HIGH (ref 70–99)
HBA1C BLD-MCNC: 10.2 % — HIGH (ref 4–5.6)
HCT VFR BLD CALC: 37.6 % — LOW (ref 39–50)
HGB BLD-MCNC: 12.8 G/DL — LOW (ref 13–17)
IMM GRANULOCYTES NFR BLD AUTO: 0.3 % — SIGNIFICANT CHANGE UP (ref 0–1.5)
LYMPHOCYTES # BLD AUTO: 1.41 K/UL — SIGNIFICANT CHANGE UP (ref 1–3.3)
LYMPHOCYTES # BLD AUTO: 12.4 % — LOW (ref 13–44)
MAGNESIUM SERPL-MCNC: 1.8 MG/DL — SIGNIFICANT CHANGE UP (ref 1.6–2.6)
MAGNESIUM SERPL-MCNC: 1.9 MG/DL — SIGNIFICANT CHANGE UP (ref 1.6–2.6)
MAGNESIUM SERPL-MCNC: 1.9 MG/DL — SIGNIFICANT CHANGE UP (ref 1.6–2.6)
MCHC RBC-ENTMCNC: 30 PG — SIGNIFICANT CHANGE UP (ref 27–34)
MCHC RBC-ENTMCNC: 34 % — SIGNIFICANT CHANGE UP (ref 32–36)
MCV RBC AUTO: 88.1 FL — SIGNIFICANT CHANGE UP (ref 80–100)
MONOCYTES # BLD AUTO: 0.76 K/UL — SIGNIFICANT CHANGE UP (ref 0–0.9)
MONOCYTES NFR BLD AUTO: 6.7 % — SIGNIFICANT CHANGE UP (ref 2–14)
NEUTROPHILS # BLD AUTO: 9.15 K/UL — HIGH (ref 1.8–7.4)
NEUTROPHILS NFR BLD AUTO: 80.3 % — HIGH (ref 43–77)
NRBC # FLD: 0 K/UL — SIGNIFICANT CHANGE UP (ref 0–0)
PHOSPHATE SERPL-MCNC: 1.5 MG/DL — LOW (ref 2.5–4.5)
PHOSPHATE SERPL-MCNC: 2.5 MG/DL — SIGNIFICANT CHANGE UP (ref 2.5–4.5)
PHOSPHATE SERPL-MCNC: 2.5 MG/DL — SIGNIFICANT CHANGE UP (ref 2.5–4.5)
PLATELET # BLD AUTO: 290 K/UL — SIGNIFICANT CHANGE UP (ref 150–400)
PMV BLD: 10 FL — SIGNIFICANT CHANGE UP (ref 7–13)
POTASSIUM SERPL-MCNC: 3.9 MMOL/L — SIGNIFICANT CHANGE UP (ref 3.5–5.3)
POTASSIUM SERPL-MCNC: 4 MMOL/L — SIGNIFICANT CHANGE UP (ref 3.5–5.3)
POTASSIUM SERPL-MCNC: 4 MMOL/L — SIGNIFICANT CHANGE UP (ref 3.5–5.3)
POTASSIUM SERPL-SCNC: 3.9 MMOL/L — SIGNIFICANT CHANGE UP (ref 3.5–5.3)
POTASSIUM SERPL-SCNC: 4 MMOL/L — SIGNIFICANT CHANGE UP (ref 3.5–5.3)
POTASSIUM SERPL-SCNC: 4 MMOL/L — SIGNIFICANT CHANGE UP (ref 3.5–5.3)
PROT SERPL-MCNC: 6.9 G/DL — SIGNIFICANT CHANGE UP (ref 6–8.3)
RBC # BLD: 4.27 M/UL — SIGNIFICANT CHANGE UP (ref 4.2–5.8)
RBC # FLD: 13 % — SIGNIFICANT CHANGE UP (ref 10.3–14.5)
SODIUM SERPL-SCNC: 135 MMOL/L — SIGNIFICANT CHANGE UP (ref 135–145)
SODIUM SERPL-SCNC: 135 MMOL/L — SIGNIFICANT CHANGE UP (ref 135–145)
SODIUM SERPL-SCNC: 140 MMOL/L — SIGNIFICANT CHANGE UP (ref 135–145)
SPECIMEN SOURCE: SIGNIFICANT CHANGE UP
WBC # BLD: 11.38 K/UL — HIGH (ref 3.8–10.5)
WBC # FLD AUTO: 11.38 K/UL — HIGH (ref 3.8–10.5)

## 2020-05-13 PROCEDURE — 99255 IP/OBS CONSLTJ NEW/EST HI 80: CPT

## 2020-05-13 PROCEDURE — 99222 1ST HOSP IP/OBS MODERATE 55: CPT | Mod: GC

## 2020-05-13 RX ORDER — ONDANSETRON 8 MG/1
4 TABLET, FILM COATED ORAL EVERY 6 HOURS
Refills: 0 | Status: DISCONTINUED | OUTPATIENT
Start: 2020-05-13 | End: 2020-05-13

## 2020-05-13 RX ORDER — INSULIN LISPRO 100/ML
5 VIAL (ML) SUBCUTANEOUS
Refills: 0 | Status: DISCONTINUED | OUTPATIENT
Start: 2020-05-13 | End: 2020-05-14

## 2020-05-13 RX ORDER — INSULIN GLARGINE 100 [IU]/ML
15 INJECTION, SOLUTION SUBCUTANEOUS AT BEDTIME
Refills: 0 | Status: DISCONTINUED | OUTPATIENT
Start: 2020-05-13 | End: 2020-05-15

## 2020-05-13 RX ORDER — INSULIN LISPRO 100/ML
VIAL (ML) SUBCUTANEOUS AT BEDTIME
Refills: 0 | Status: DISCONTINUED | OUTPATIENT
Start: 2020-05-13 | End: 2020-05-13

## 2020-05-13 RX ORDER — POTASSIUM PHOSPHATE, MONOBASIC POTASSIUM PHOSPHATE, DIBASIC 236; 224 MG/ML; MG/ML
30 INJECTION, SOLUTION INTRAVENOUS ONCE
Refills: 0 | Status: COMPLETED | OUTPATIENT
Start: 2020-05-13 | End: 2020-05-13

## 2020-05-13 RX ORDER — SUCRALFATE 1 G
1 TABLET ORAL
Refills: 0 | Status: DISCONTINUED | OUTPATIENT
Start: 2020-05-13 | End: 2020-05-13

## 2020-05-13 RX ORDER — NYSTATIN 500MM UNIT
500000 POWDER (EA) MISCELLANEOUS EVERY 6 HOURS
Refills: 0 | Status: DISCONTINUED | OUTPATIENT
Start: 2020-05-13 | End: 2020-05-13

## 2020-05-13 RX ORDER — BENZOCAINE AND MENTHOL 5; 1 G/100ML; G/100ML
1 LIQUID ORAL
Refills: 0 | Status: DISCONTINUED | OUTPATIENT
Start: 2020-05-13 | End: 2020-05-13

## 2020-05-13 RX ORDER — PANTOPRAZOLE SODIUM 20 MG/1
40 TABLET, DELAYED RELEASE ORAL DAILY
Refills: 0 | Status: DISCONTINUED | OUTPATIENT
Start: 2020-05-13 | End: 2020-05-13

## 2020-05-13 RX ORDER — ONDANSETRON 8 MG/1
4 TABLET, FILM COATED ORAL ONCE
Refills: 0 | Status: COMPLETED | OUTPATIENT
Start: 2020-05-13 | End: 2020-05-13

## 2020-05-13 RX ORDER — ENOXAPARIN SODIUM 100 MG/ML
15 INJECTION SUBCUTANEOUS
Qty: 1 | Refills: 0
Start: 2020-05-13 | End: 2020-06-11

## 2020-05-13 RX ORDER — INSULIN LISPRO 100/ML
5 VIAL (ML) SUBCUTANEOUS ONCE
Refills: 0 | Status: COMPLETED | OUTPATIENT
Start: 2020-05-13 | End: 2020-05-13

## 2020-05-13 RX ORDER — SODIUM CHLORIDE 9 MG/ML
1000 INJECTION INTRAMUSCULAR; INTRAVENOUS; SUBCUTANEOUS
Refills: 0 | Status: DISCONTINUED | OUTPATIENT
Start: 2020-05-13 | End: 2020-05-13

## 2020-05-13 RX ADMIN — Medication 500000 UNIT(S): at 12:46

## 2020-05-13 RX ADMIN — Medication 500000 UNIT(S): at 23:02

## 2020-05-13 RX ADMIN — SODIUM CHLORIDE 75 MILLILITER(S): 9 INJECTION, SOLUTION INTRAVENOUS at 02:31

## 2020-05-13 RX ADMIN — Medication 5 UNIT(S): at 06:11

## 2020-05-13 RX ADMIN — INSULIN HUMAN 2 UNIT(S)/HR: 100 INJECTION, SOLUTION SUBCUTANEOUS at 00:11

## 2020-05-13 RX ADMIN — ONDANSETRON 4 MILLIGRAM(S): 8 TABLET, FILM COATED ORAL at 00:08

## 2020-05-13 RX ADMIN — Medication 1: at 08:47

## 2020-05-13 RX ADMIN — POTASSIUM PHOSPHATE, MONOBASIC POTASSIUM PHOSPHATE, DIBASIC 83.33 MILLIMOLE(S): 236; 224 INJECTION, SOLUTION INTRAVENOUS at 16:52

## 2020-05-13 RX ADMIN — INSULIN GLARGINE 15 UNIT(S): 100 INJECTION, SOLUTION SUBCUTANEOUS at 21:34

## 2020-05-13 RX ADMIN — SODIUM CHLORIDE 100 MILLILITER(S): 9 INJECTION INTRAMUSCULAR; INTRAVENOUS; SUBCUTANEOUS at 06:12

## 2020-05-13 RX ADMIN — Medication 500000 UNIT(S): at 16:52

## 2020-05-13 RX ADMIN — Medication 3: at 17:25

## 2020-05-13 NOTE — DISCHARGE NOTE PROVIDER - HOSPITAL COURSE
9 year old man with uncontrolled DM1, HLD, possible gastroparesis, admitted with DKA in setting of uncontrolled DM1 with inappropriate dosing of insulin. BG goal 100-180 mg/dL. Patient with multiple admissions for DKA. He is a high risk patient with high level decision making, at high risk of worsening microvascular and macrovascular complications.         DKA Type I:    Resolved    Endocrine consulted npatient BG target 100-180 mg/dl    -Recommend to decrease Humalog to 3 units before breakfast and c/w Humalog 4 units before lunch and dinner (Hold if NPO/not eating meal) - it is ok to hold dose if he is not eating meal, also would hold if eating less than 25% of meal.    -Decrease Lantus slightly to 15 units SQ qHSPlan    discharge dosing for Lantus is ________________    Premeal __________________________    -will need  supplies including glucometer, glucose test strips, lancets, alcohol swabs and insulin PEN needles (enough for 4x per day injections)     -Patient is being followed by our team pharmacy liaison. See pharmacy intervention notes for medication counseling & DM education provided.     -Follow up with [facility] at [place], Endocrine Clinic, [address] [facility] [address], [phone]    Appointment scheduled for Tuesday 5/19/20 at 9:00 AM        2. DKA    Lantus-     Humalog-         Oral thrush:    Nystatin liquid        Esophagitis:    House GI consulted-     As symptoms are improving and recent EGD performed, no role for repeat EGD     -Continue with supportive care with Reglan PRN    -Small frequent, low fat meals as tolerated    -Can consider carafate 1g TID    -Continue PPI    -Please check H pylori stool Ag (though may be falsely negative while taking PPI)    -Pt will benefit from outpatient gastric emptying study for further evaluation     -Please call back with questions 29 year old man with uncontrolled DM1, HLD, possible gastroparesis, admitted with DKA in setting of uncontrolled DM1 with inappropriate dosing of insulin. BG goal 100-180 mg/dL. Patient with multiple admissions for DKA. He is a high risk patient with high level decision making, at high risk of worsening microvascular and macrovascular complications.         DKA --> DM Type I    Resolved after insulin gtt infusion     Endocrine consulted    Spoke with Chantell Garner NP from [facility].     Patient to be discharged on Lantus 14 units daily at bedtime, humalog 4 units before meals.     Follow up with [facility] at [place], Endocrine Clinic, [address] [facility] [address], [phone]    Appointment scheduled for Tuesday 5/19/20 at 9:00 AM        Oral thrush    Nystatin liquid        Esophagitis    House GI consulted    As symptoms are improving and recent EGD performed, no role for repeat EGD     carafate and PPI    nystatin         medically cleared by Dr Clark for discharge.

## 2020-05-13 NOTE — CHART NOTE - NSCHARTNOTEFT_GEN_A_CORE
MAR ACCEPT NOTE    Please refer to MICU transfer note for full details.    Briefly, this is a 28 yo M with T1DM (c/b DKA in the past, most recently 3/2020), gastroparesis who presented to the ED with N/V and found to be in DKA. On presentation, , AG 19, elevated BHB. Started on insulin gtt and MICU consulted. Pt's AG closed on insulin and he was transitioned to basal bolus, given 15 units Lantus and insulin gtt d/c'd. Pt had poor appetite and ate very little so was also continued on D5 gtt to avoid hypoglycemia.      ASSESSMENT & PLAN:   28 yo M with T1DM (c/b DKA in the past, most recently 3/2020), gastroparesis who presented to the ED complaining of nausea/vomiting x 3 days and high fingerstick readings at home found to be in mild DKA, gap closed in ED w/ insulin gtt now transitioned to basal/bolus and stable for floor admission.    #DKA:  - Resolved.  - C/w D5 IVF and d/c if FS increasing or pt taking PO  - Endo c/s in AM      FOR FOLLOW UP:  [ ] Monitor FS and d/c D5 if FS uptrending  [ ] Endo c/s in AM  [ ] Q6H BMP    Soledad Kim MD  MAR  [id number]      Vital Signs Last 24 Hrs  T(C): 36.7 (13 May 2020 00:43), Max: 36.9 (12 May 2020 10:35)  T(F): 98.1 (13 May 2020 00:43), Max: 98.5 (12 May 2020 10:35)  HR: 77 (13 May 2020 00:43) (69 - 126)  BP: 110/76 (13 May 2020 00:43) (110/67 - 132/78)  BP(mean): 79 (12 May 2020 20:08) (79 - 79)  RR: 18 (13 May 2020 00:43) (12 - 20)  SpO2: 98% (13 May 2020 00:43) (98% - 100%)  I&O's Summary    Allergies    No Known Allergies    Intolerances      MEDICATIONS  (STANDING):  dextrose 5% + sodium chloride 0.45%. 1000 milliLiter(s) (75 mL/Hr) IV Continuous <Continuous>  dextrose 5%. 1000 milliLiter(s) (50 mL/Hr) IV Continuous <Continuous>  dextrose 50% Injectable 12.5 Gram(s) IV Push once  dextrose 50% Injectable 25 Gram(s) IV Push once  dextrose 50% Injectable 25 Gram(s) IV Push once  insulin glargine Injectable (LANTUS) 12 Unit(s) SubCutaneous at bedtime  insulin lispro (HumaLOG) corrective regimen sliding scale   SubCutaneous three times a day before meals  insulin lispro (HumaLOG) corrective regimen sliding scale   SubCutaneous at bedtime  insulin lispro Injectable (HumaLOG) 4 Unit(s) SubCutaneous three times a day before meals  pantoprazole    Tablet 40 milliGRAM(s) Oral before breakfast    MEDICATIONS  (PRN):  dextrose 40% Gel 15 Gram(s) Oral once PRN Blood Glucose LESS THAN 70 milliGRAM(s)/deciliter  glucagon  Injectable 1 milliGRAM(s) IntraMuscular once PRN Glucose LESS THAN 70 milligrams/deciliter                                  14.6   17.12 )-----------( 339      ( 12 May 2020 10:15 )             42.8     05-12    136  |  102  |  21  ----------------------------<  140<H>  5.0   |  22  |  0.86    Ca    10.0      12 May 2020 19:10    TPro  8.5<H>  /  Alb  4.7  /  TBili  2.0<H>  /  DBili  x   /  AST  18  /  ALT  24  /  AlkPhos  78  05-12 MAR ACCEPT NOTE    Please refer to MICU transfer note for full details.    Briefly, this is a 28 yo M with T1DM (c/b DKA in the past, most recently 3/2020), gastroparesis who presented to the ED with N/V and found to be in DKA. On presentation, , AG 19, elevated BHB. Started on insulin gtt and MICU consulted. Pt's AG closed on insulin and he was transitioned to basal bolus, given 15 units Lantus and insulin gtt d/c'd.      ASSESSMENT & PLAN:   28 yo M with T1DM (c/b DKA in the past, most recently 3/2020), gastroparesis who presented to the ED complaining of nausea/vomiting x 3 days and high fingerstick readings at home found to be in mild DKA, gap closed in ED w/ insulin gtt now transitioned to basal/bolus and stable for floor admission.    #DKA:  - Resolved  - C/w lantus 15 at bedtime, humalog 4u premeal, ISS (on 70/30 20u AM, 10u PM at home)  - Endo c/s in AM      FOR FOLLOW UP:  [ ] Monitor FS and adjust insulin as needed  [ ] Endo c/s in AM. Previously seen by endo inpatient but had insurance issues, unable to get basal-bolus insulin for home    Soledad Kim MD  MAR  [id number]      Vital Signs Last 24 Hrs  T(C): 36.7 (13 May 2020 00:43), Max: 36.9 (12 May 2020 10:35)  T(F): 98.1 (13 May 2020 00:43), Max: 98.5 (12 May 2020 10:35)  HR: 77 (13 May 2020 00:43) (69 - 126)  BP: 110/76 (13 May 2020 00:43) (110/67 - 132/78)  BP(mean): 79 (12 May 2020 20:08) (79 - 79)  RR: 18 (13 May 2020 00:43) (12 - 20)  SpO2: 98% (13 May 2020 00:43) (98% - 100%)  I&O's Summary    Allergies    No Known Allergies    Intolerances      MEDICATIONS  (STANDING):  dextrose 5% + sodium chloride 0.45%. 1000 milliLiter(s) (75 mL/Hr) IV Continuous <Continuous>  dextrose 5%. 1000 milliLiter(s) (50 mL/Hr) IV Continuous <Continuous>  dextrose 50% Injectable 12.5 Gram(s) IV Push once  dextrose 50% Injectable 25 Gram(s) IV Push once  dextrose 50% Injectable 25 Gram(s) IV Push once  insulin glargine Injectable (LANTUS) 12 Unit(s) SubCutaneous at bedtime  insulin lispro (HumaLOG) corrective regimen sliding scale   SubCutaneous three times a day before meals  insulin lispro (HumaLOG) corrective regimen sliding scale   SubCutaneous at bedtime  insulin lispro Injectable (HumaLOG) 4 Unit(s) SubCutaneous three times a day before meals  pantoprazole    Tablet 40 milliGRAM(s) Oral before breakfast    MEDICATIONS  (PRN):  dextrose 40% Gel 15 Gram(s) Oral once PRN Blood Glucose LESS THAN 70 milliGRAM(s)/deciliter  glucagon  Injectable 1 milliGRAM(s) IntraMuscular once PRN Glucose LESS THAN 70 milligrams/deciliter                                  14.6   17.12 )-----------( 339      ( 12 May 2020 10:15 )             42.8     05-12    136  |  102  |  21  ----------------------------<  140<H>  5.0   |  22  |  0.86    Ca    10.0      12 May 2020 19:10    TPro  8.5<H>  /  Alb  4.7  /  TBili  2.0<H>  /  DBili  x   /  AST  18  /  ALT  24  /  AlkPhos  78  05-12 MAR ACCEPT NOTE    Please refer to MICU transfer note for full details.    Briefly, this is a 30 yo M with T1DM (c/b DKA in the past, most recently 3/2020), gastroparesis who presented to the ED with N/V and found to be in DKA. On presentation, , AG 19, elevated BHB. Started on insulin gtt and MICU consulted. Pt's AG closed on insulin and he was transitioned to basal bolus, given 15 units Lantus and insulin gtt d/c'd.      ASSESSMENT & PLAN:   30 yo M with T1DM (c/b DKA in the past, most recently 3/2020), gastroparesis who presented to the ED complaining of nausea/vomiting x 3 days and high fingerstick readings at home found to be in mild DKA, gap closed in ED w/ insulin gtt now transitioned to basal/bolus. Repeat chemistry showing AG opening up again with return of hyperglycemia.    #DKA:  - DKA with AG closed, now with increase in AG again and hyperglycemic to glucose 250.  - Humalog subq 5u q2h until gap re-closes and FS better controlled  - Once gap closed, restart on basal-bolus: lantus 15 at bedtime, humalog 5u premeal, ISS (on 70/30 20u AM, 10u PM at home)  - Endo c/s in AM      FOR FOLLOW UP:  [ ] Monitor FS q2h until AG closes again and FS improved. Would doseo Humalog subq 5u q2h for hyperglycemia  [ ] Endo c/s in AM. Previously seen by endo inpatient but had insurance issues, unable to get basal-bolus insulin for home  [ ] BMP q6h  [ ] Reconsult MICU if AG still open on repeat BMP at 10am    Soledad Kim MD  MAR  [id number]      Vital Signs Last 24 Hrs  T(C): 36.7 (13 May 2020 00:43), Max: 36.9 (12 May 2020 10:35)  T(F): 98.1 (13 May 2020 00:43), Max: 98.5 (12 May 2020 10:35)  HR: 77 (13 May 2020 00:43) (69 - 126)  BP: 110/76 (13 May 2020 00:43) (110/67 - 132/78)  BP(mean): 79 (12 May 2020 20:08) (79 - 79)  RR: 18 (13 May 2020 00:43) (12 - 20)  SpO2: 98% (13 May 2020 00:43) (98% - 100%)  I&O's Summary    Allergies    No Known Allergies    Intolerances      MEDICATIONS  (STANDING):  dextrose 5% + sodium chloride 0.45%. 1000 milliLiter(s) (75 mL/Hr) IV Continuous <Continuous>  dextrose 5%. 1000 milliLiter(s) (50 mL/Hr) IV Continuous <Continuous>  dextrose 50% Injectable 12.5 Gram(s) IV Push once  dextrose 50% Injectable 25 Gram(s) IV Push once  dextrose 50% Injectable 25 Gram(s) IV Push once  insulin glargine Injectable (LANTUS) 12 Unit(s) SubCutaneous at bedtime  insulin lispro (HumaLOG) corrective regimen sliding scale   SubCutaneous three times a day before meals  insulin lispro (HumaLOG) corrective regimen sliding scale   SubCutaneous at bedtime  insulin lispro Injectable (HumaLOG) 4 Unit(s) SubCutaneous three times a day before meals  pantoprazole    Tablet 40 milliGRAM(s) Oral before breakfast    MEDICATIONS  (PRN):  dextrose 40% Gel 15 Gram(s) Oral once PRN Blood Glucose LESS THAN 70 milliGRAM(s)/deciliter  glucagon  Injectable 1 milliGRAM(s) IntraMuscular once PRN Glucose LESS THAN 70 milligrams/deciliter                                  14.6   17.12 )-----------( 339      ( 12 May 2020 10:15 )             42.8     05-12    136  |  102  |  21  ----------------------------<  140<H>  5.0   |  22  |  0.86    Ca    10.0      12 May 2020 19:10    TPro  8.5<H>  /  Alb  4.7  /  TBili  2.0<H>  /  DBili  x   /  AST  18  /  ALT  24  /  AlkPhos  78  05-12

## 2020-05-13 NOTE — DISCHARGE NOTE PROVIDER - PROVIDER TOKENS
FREE:[LAST:[Follow up with Medicine Specialties],PHONE:[(   )    -],FAX:[(   )    -],ADDRESS:[[facility], [facility], [facility] [address], [phone]  Appointment scheduled for Tuesday 5/19/20 at 9:00 AM]] FREE:[LAST:[Follow up with Medicine Specialties],PHONE:[(   )    -],FAX:[(   )    -],ADDRESS:[[facility], [facility], [address] [place] [address], [phone]  Appointment scheduled for Tuesday 5/19/20 at 9:00 AM]],FREE:[LAST:[Gastroenterology clinic],PHONE:[phone]],FAX:[(   )    -]]

## 2020-05-13 NOTE — CONSULT NOTE ADULT - PROBLEM SELECTOR RECOMMENDATION 2
- change Lantus to 15 units qhs  - adjust Humalog to 5 units TID if eating  - c/w low correction scale to qac and qhs  - check FS qac and qhs   - consistent carb diet  - will follow  - for discharge: patient without insurance. Plan to dc on Novolin 70/30, doses to be determined. Was reportedly taking 30 units a day as outpatient with BG not at goal. He has an appt at the endocrine clinic at [place] on 5/19/2020 at 9 am, may need to reschedule if he remains inpatient

## 2020-05-13 NOTE — CONSULT NOTE ADULT - PROBLEM SELECTOR RECOMMENDATION 9
- DKA now resolved after initiation of insulin gtt  - received Lantus 12 units last night and was hyperglycemic this AM  - change Lantus to 15 units qhs  - adjust Humalog to 5 units TID if eating  - c/w low correction scale to qac and qhs  - consistent carb diet  - will follow

## 2020-05-13 NOTE — PROGRESS NOTE ADULT - SUBJECTIVE AND OBJECTIVE BOX
Patient is a 29y old  Male who presents with a chief complaint of DKA (13 May 2020 15:04)      SUBJECTIVE / OVERNIGHT EVENTS:    Events noted.  CONSTITUTIONAL: No fever,  or fatigue  RESPIRATORY: No cough, wheezing,  No shortness of breath  CARDIOVASCULAR: No chest pain, palpitations, dizziness, or leg swelling  GASTROINTESTINAL: No abdominal or epigastric pain. No nausea, vomiting.  NEUROLOGICAL: No headaches,     MEDICATIONS  (STANDING):  dextrose 5%. 1000 milliLiter(s) (50 mL/Hr) IV Continuous <Continuous>  dextrose 50% Injectable 12.5 Gram(s) IV Push once  dextrose 50% Injectable 25 Gram(s) IV Push once  dextrose 50% Injectable 25 Gram(s) IV Push once  insulin glargine Injectable (LANTUS) 15 Unit(s) SubCutaneous at bedtime  insulin lispro (HumaLOG) corrective regimen sliding scale   SubCutaneous three times a day before meals  insulin lispro (HumaLOG) corrective regimen sliding scale   SubCutaneous at bedtime  insulin lispro Injectable (HumaLOG) 5 Unit(s) SubCutaneous three times a day before meals  nystatin    Suspension 279278 Unit(s) Oral every 6 hours  pantoprazole  Injectable 40 milliGRAM(s) IV Push daily  sodium chloride 0.9%. 1000 milliLiter(s) (100 mL/Hr) IV Continuous <Continuous>  sucralfate 1 Gram(s) Oral <User Schedule>    MEDICATIONS  (PRN):  benzocaine 15 mG/menthol 3.6 mG (Sugar-Free) Lozenge 1 Lozenge Oral every 3 hours PRN Sore Throat  dextrose 40% Gel 15 Gram(s) Oral once PRN Blood Glucose LESS THAN 70 milliGRAM(s)/deciliter  glucagon  Injectable 1 milliGRAM(s) IntraMuscular once PRN Glucose LESS THAN 70 milligrams/deciliter  ondansetron Injectable 4 milliGRAM(s) IV Push every 6 hours PRN Nausea and/or Vomiting        CAPILLARY BLOOD GLUCOSE      POCT Blood Glucose.: 163 mg/dL (13 May 2020 21:11)  POCT Blood Glucose.: 259 mg/dL (13 May 2020 17:08)  POCT Blood Glucose.: 122 mg/dL (13 May 2020 12:37)  POCT Blood Glucose.: 180 mg/dL (13 May 2020 08:18)  POCT Blood Glucose.: 219 mg/dL (13 May 2020 06:58)  POCT Blood Glucose.: 237 mg/dL (13 May 2020 06:02)  POCT Blood Glucose.: 157 mg/dL (13 May 2020 03:48)  POCT Blood Glucose.: 130 mg/dL (13 May 2020 02:25)  POCT Blood Glucose.: 137 mg/dL (12 May 2020 23:37)  POCT Blood Glucose.: 153 mg/dL (12 May 2020 22:54)    I&O's Summary    13 May 2020 07:01  -  13 May 2020 22:26  --------------------------------------------------------  IN: 300 mL / OUT: 950 mL / NET: -650 mL        PHYSICAL EXAM:  GENERAL: NAD  NECK: Supple, No JVD  CHEST/LUNG: Clear to auscultation bilaterally; No wheezing.  HEART: Regular rate and rhythm; No murmurs, rubs, or gallops  ABDOMEN: Soft, Nontender, Nondistended; Bowel sounds present  EXTREMITIES:   No edema  NEUROLOGY: AAO X 3      LABS:                        12.8   11.38 )-----------( 290      ( 13 May 2020 04:50 )             37.6     05-13    140  |  107  |  18  ----------------------------<  172<H>  4.0   |  20<L>  |  0.92    Ca    9.6      13 May 2020 09:48  Phos  1.5     05-13  Mg     1.9     05-13    TPro  6.9  /  Alb  3.9  /  TBili  2.1<H>  /  DBili  x   /  AST  15  /  ALT  19  /  AlkPhos  66  05-13          Urinalysis Basic - ( 12 May 2020 15:07 )    Color: LIGHT YELLOW / Appearance: CLEAR / SG: > 1.040 / pH: 5.5  Gluc: >1000 / Ketone: >150  / Bili: NEGATIVE / Urobili: NORMAL   Blood: NEGATIVE / Protein: 30 / Nitrite: NEGATIVE   Leuk Esterase: NEGATIVE / RBC: 0-2 / WBC 0-2   Sq Epi: OCC / Non Sq Epi: x / Bacteria: NEGATIVE      CAPILLARY BLOOD GLUCOSE      POCT Blood Glucose.: 163 mg/dL (13 May 2020 21:11)  POCT Blood Glucose.: 259 mg/dL (13 May 2020 17:08)  POCT Blood Glucose.: 122 mg/dL (13 May 2020 12:37)  POCT Blood Glucose.: 180 mg/dL (13 May 2020 08:18)  POCT Blood Glucose.: 219 mg/dL (13 May 2020 06:58)  POCT Blood Glucose.: 237 mg/dL (13 May 2020 06:02)  POCT Blood Glucose.: 157 mg/dL (13 May 2020 03:48)  POCT Blood Glucose.: 130 mg/dL (13 May 2020 02:25)  POCT Blood Glucose.: 137 mg/dL (12 May 2020 23:37)  POCT Blood Glucose.: 153 mg/dL (12 May 2020 22:54)    05-12 @ 16:43  Culture-urine --  Culture results   No growth to date.  method type --  Organism --  Organism Identification --  Specimen source .Blood Blood-Peripheral  05-12 @ 16:40  Culture-urine --  Culture results   No growth to date.  method type --  Organism --  Organism Identification --  Specimen source .Blood Blood  05-12 @ 14:30  Culture-urine --  Culture results   No growth  method type --  Organism --  Organism Identification --  Specimen source .Urine Clean Catch (Midstream)           05-12 @ 16:43  Culture blood --  Culture results   No growth to date.  Gram stain --  Gram stain blood --  Method type --  Organism --  Organism identification --  Specimen source .Blood Blood-Peripheral   05-12 @ 16:40  Culture blood --  Culture results   No growth to date.  Gram stain --  Gram stain blood --  Method type --  Organism --  Organism identification --  Specimen source .Blood Blood   05-12 @ 14:30  Culture blood --  Culture results   No growth  Gram stain --  Gram stain blood --  Method type --  Organism --  Organism identification --  Specimen source .Urine Clean Catch (Midstream)      RADIOLOGY & ADDITIONAL TESTS:    Imaging Personally Reviewed:    Consultant(s) Notes Reviewed:      Care Discussed with Consultants/Other Providers: Patient is a 29y old  Male who presents with a chief complaint of DKA (13 May 2020 15:04)      SUBJECTIVE / OVERNIGHT EVENTS:    Events noted.  CONSTITUTIONAL: No fever,  or fatigue  RESPIRATORY: No cough, wheezing,  No shortness of breath  CARDIOVASCULAR: No chest pain, palpitations, dizziness, or leg swelling  GASTROINTESTINAL: Nausea/Abd discomfort  NEUROLOGICAL: No headaches,     MEDICATIONS  (STANDING):  dextrose 5%. 1000 milliLiter(s) (50 mL/Hr) IV Continuous <Continuous>  dextrose 50% Injectable 12.5 Gram(s) IV Push once  dextrose 50% Injectable 25 Gram(s) IV Push once  dextrose 50% Injectable 25 Gram(s) IV Push once  insulin glargine Injectable (LANTUS) 15 Unit(s) SubCutaneous at bedtime  insulin lispro (HumaLOG) corrective regimen sliding scale   SubCutaneous three times a day before meals  insulin lispro (HumaLOG) corrective regimen sliding scale   SubCutaneous at bedtime  insulin lispro Injectable (HumaLOG) 5 Unit(s) SubCutaneous three times a day before meals  nystatin    Suspension [id number] Unit(s) Oral every 6 hours  pantoprazole  Injectable 40 milliGRAM(s) IV Push daily  sodium chloride 0.9%. 1000 milliLiter(s) (100 mL/Hr) IV Continuous <Continuous>  sucralfate 1 Gram(s) Oral <User Schedule>    MEDICATIONS  (PRN):  benzocaine 15 mG/menthol 3.6 mG (Sugar-Free) Lozenge 1 Lozenge Oral every 3 hours PRN Sore Throat  dextrose 40% Gel 15 Gram(s) Oral once PRN Blood Glucose LESS THAN 70 milliGRAM(s)/deciliter  glucagon  Injectable 1 milliGRAM(s) IntraMuscular once PRN Glucose LESS THAN 70 milligrams/deciliter  ondansetron Injectable 4 milliGRAM(s) IV Push every 6 hours PRN Nausea and/or Vomiting        CAPILLARY BLOOD GLUCOSE      POCT Blood Glucose.: 163 mg/dL (13 May 2020 21:11)  POCT Blood Glucose.: 259 mg/dL (13 May 2020 17:08)  POCT Blood Glucose.: 122 mg/dL (13 May 2020 12:37)  POCT Blood Glucose.: 180 mg/dL (13 May 2020 08:18)  POCT Blood Glucose.: 219 mg/dL (13 May 2020 06:58)  POCT Blood Glucose.: 237 mg/dL (13 May 2020 06:02)  POCT Blood Glucose.: 157 mg/dL (13 May 2020 03:48)  POCT Blood Glucose.: 130 mg/dL (13 May 2020 02:25)  POCT Blood Glucose.: 137 mg/dL (12 May 2020 23:37)  POCT Blood Glucose.: 153 mg/dL (12 May 2020 22:54)    I&O's Summary    13 May 2020 07:01  -  13 May 2020 22:26  --------------------------------------------------------  IN: 300 mL / OUT: 950 mL / NET: -650 mL        PHYSICAL EXAM:  GENERAL: NAD  NECK: Supple, No JVD  CHEST/LUNG: Clear to auscultation bilaterally; No wheezing.  HEART: Regular rate and rhythm; No murmurs, rubs, or gallops  ABDOMEN: Soft, Nontender, Nondistended; Bowel sounds present  EXTREMITIES:   No edema  NEUROLOGY: AAO X 3      LABS:                        12.8   11.38 )-----------( 290      ( 13 May 2020 04:50 )             37.6     05-13    140  |  107  |  18  ----------------------------<  172<H>  4.0   |  20<L>  |  0.92    Ca    9.6      13 May 2020 09:48  Phos  1.5     05-13  Mg     1.9     05-13    TPro  6.9  /  Alb  3.9  /  TBili  2.1<H>  /  DBili  x   /  AST  15  /  ALT  19  /  AlkPhos  66  05-13          Urinalysis Basic - ( 12 May 2020 15:07 )    Color: LIGHT YELLOW / Appearance: CLEAR / SG: > 1.040 / pH: 5.5  Gluc: >1000 / Ketone: >150  / Bili: NEGATIVE / Urobili: NORMAL   Blood: NEGATIVE / Protein: 30 / Nitrite: NEGATIVE   Leuk Esterase: NEGATIVE / RBC: 0-2 / WBC 0-2   Sq Epi: OCC / Non Sq Epi: x / Bacteria: NEGATIVE      CAPILLARY BLOOD GLUCOSE      POCT Blood Glucose.: 163 mg/dL (13 May 2020 21:11)  POCT Blood Glucose.: 259 mg/dL (13 May 2020 17:08)  POCT Blood Glucose.: 122 mg/dL (13 May 2020 12:37)  POCT Blood Glucose.: 180 mg/dL (13 May 2020 08:18)  POCT Blood Glucose.: 219 mg/dL (13 May 2020 06:58)  POCT Blood Glucose.: 237 mg/dL (13 May 2020 06:02)  POCT Blood Glucose.: 157 mg/dL (13 May 2020 03:48)  POCT Blood Glucose.: 130 mg/dL (13 May 2020 02:25)  POCT Blood Glucose.: 137 mg/dL (12 May 2020 23:37)  POCT Blood Glucose.: 153 mg/dL (12 May 2020 22:54)    05-12 @ 16:43  Culture-urine --  Culture results   No growth to date.  method type --  Organism --  Organism Identification --  Specimen source .Blood Blood-Peripheral  05-12 @ 16:40  Culture-urine --  Culture results   No growth to date.  method type --  Organism --  Organism Identification --  Specimen source .Blood Blood  05-12 @ 14:30  Culture-urine --  Culture results   No growth  method type --  Organism --  Organism Identification --  Specimen source .Urine Clean Catch (Midstream)           05-12 @ 16:43  Culture blood --  Culture results   No growth to date.  Gram stain --  Gram stain blood --  Method type --  Organism --  Organism identification --  Specimen source .Blood Blood-Peripheral   05-12 @ 16:40  Culture blood --  Culture results   No growth to date.  Gram stain --  Gram stain blood --  Method type --  Organism --  Organism identification --  Specimen source .Blood Blood   05-12 @ 14:30  Culture blood --  Culture results   No growth  Gram stain --  Gram stain blood --  Method type --  Organism --  Organism identification --  Specimen source .Urine Clean Catch (Midstream)      RADIOLOGY & ADDITIONAL TESTS:    Imaging Personally Reviewed:    Consultant(s) Notes Reviewed:      Care Discussed with Consultants/Other Providers:

## 2020-05-13 NOTE — CONSULT NOTE ADULT - SUBJECTIVE AND OBJECTIVE BOX
Chief Complaint:  Patient is a 29y old  Male who presents with a chief complaint of DKA (12 May 2020 22:06)      HPI:  28yo M with insulin dependent DM presents to the ED with nausea and vomiting, found to have DKA, s/p insulin gtt and IVF. Patient's glucose has now normalized with closed AG and patient was transferred from ICU to floor. GI is now being consulted for complaints of sorethroat.    Patient had an EGD in 01/2020 which showed LA grade A esophagitis, small amount of food in stomach.     Allergies:  No Known Allergies      Home Medications:    Hospital Medications:  benzocaine 15 mG/menthol 3.6 mG (Sugar-Free) Lozenge 1 Lozenge Oral every 3 hours PRN  dextrose 40% Gel 15 Gram(s) Oral once PRN  dextrose 5%. 1000 milliLiter(s) IV Continuous <Continuous>  dextrose 50% Injectable 12.5 Gram(s) IV Push once  dextrose 50% Injectable 25 Gram(s) IV Push once  dextrose 50% Injectable 25 Gram(s) IV Push once  glucagon  Injectable 1 milliGRAM(s) IntraMuscular once PRN  insulin glargine Injectable (LANTUS) 12 Unit(s) SubCutaneous at bedtime  insulin lispro (HumaLOG) corrective regimen sliding scale   SubCutaneous three times a day before meals  insulin lispro (HumaLOG) corrective regimen sliding scale   SubCutaneous at bedtime  insulin lispro Injectable (HumaLOG) 4 Unit(s) SubCutaneous three times a day before meals  nystatin    Suspension 866234 Unit(s) Oral every 6 hours  pantoprazole    Tablet 40 milliGRAM(s) Oral before breakfast  sodium chloride 0.9%. 1000 milliLiter(s) IV Continuous <Continuous>      PMHX/PSHX:  Diabetes  No significant past surgical history      Family history:  FH: diabetes mellitus      There is no family history of peptic ulcer disease, gastric cancer, colon polyps, colon cancer, celiac disease, biliary, hepatic, or pancreatic disease.  None of the female relatives have breast, uterine, or ovarian cancer.     Social History:     ROS:     General:  No wt loss, fevers, chills, night sweats, fatigue,   Eyes:  Good vision, no reported pain  ENT:  No sore throat, pain, runny nose, dysphagia  CV:  No pain, palpitations, hypo/hypertension  Resp:  No dyspnea, cough, tachypnea, wheezing  GI:  See HPI  :  No pain, bleeding, incontinence, nocturia  Muscle:  No pain, weakness  Neuro:  No weakness, tingling, memory problems  Psych:  No fatigue, insomnia, mood problems, depression  Endocrine:  No polyuria, polydipsia, cold/heat intolerance  Heme:  No petechiae, ecchymosis, easy bruisability  Skin:  No rash, tattoos, scars, edema      PHYSICAL EXAM:     GENERAL:  Appears stated age, well-groomed  HEENT:  NC/AT,  conjunctivae clear and pink, no thyromegaly  CHEST:  Full & symmetric excursion, no increased effort  HEART:  Regular rhythm, S1, S2, no murmur/rub/S3/S4  ABDOMEN:  Soft, non-tender, non-distended  EXTEREMITIES:  no cyanosis,clubbing or edema  SKIN:  No rash/erythema/ecchymoses  NEURO:  Alert, oriented, no asterixis    Vital Signs:  Vital Signs Last 24 Hrs  T(C): 36.9 (13 May 2020 10:22), Max: 37.2 (13 May 2020 08:20)  T(F): 98.5 (13 May 2020 10:22), Max: 98.9 (13 May 2020 08:20)  HR: 91 (13 May 2020 10:22) (69 - 92)  BP: 123/71 (13 May 2020 10:22) (110/67 - 132/78)  BP(mean): 79 (12 May 2020 20:08) (79 - 79)  RR: 18 (13 May 2020 10:22) (12 - 18)  SpO2: 100% (13 May 2020 10:22) (98% - 100%)  Daily Height in cm: 185.42 (13 May 2020 10:22)    Daily     LABS:                        12.8   11.38 )-----------( 290      ( 13 May 2020 04:50 )             37.6     Mean Cell Volume: 88.1 fL (05-13-20 @ 04:50)    05-13    140  |  107  |  18  ----------------------------<  172<H>  4.0   |  20<L>  |  0.92    Ca    9.6      13 May 2020 09:48  Phos  1.5     05-13  Mg     1.9     05-13    TPro  6.9  /  Alb  3.9  /  TBili  2.1<H>  /  DBili  x   /  AST  15  /  ALT  19  /  AlkPhos  66  05-13    LIVER FUNCTIONS - ( 13 May 2020 04:50 )  Alb: 3.9 g/dL / Pro: 6.9 g/dL / ALK PHOS: 66 u/L / ALT: 19 u/L / AST: 15 u/L / GGT: x             Urinalysis Basic - ( 12 May 2020 15:07 )    Color: LIGHT YELLOW / Appearance: CLEAR / SG: > 1.040 / pH: 5.5  Gluc: >1000 / Ketone: >150  / Bili: NEGATIVE / Urobili: NORMAL   Blood: NEGATIVE / Protein: 30 / Nitrite: NEGATIVE   Leuk Esterase: NEGATIVE / RBC: 0-2 / WBC 0-2   Sq Epi: OCC / Non Sq Epi: x / Bacteria: NEGATIVE                              12.8   11.38 )-----------( 290      ( 13 May 2020 04:50 )             37.6                         14.6   17.12 )-----------( 339      ( 12 May 2020 10:15 )             42.8     Imaging: Chief Complaint:  Patient is a 29y old  Male who presents with a chief complaint of DKA (12 May 2020 22:06)      HPI:  30yo M with insulin dependent DM presents to the ED with nausea and vomiting, found to have DKA, s/p insulin gtt and IVF. Patient's glucose has now normalized with closed AG and patient was transferred from ICU to floor. GI is now being consulted for complaints of sorethroat, nausea and vomiting.     Patient had an EGD in 01/2020 which showed LA grade A esophagitis, small amount of food in stomach. Pathology of the stomach showed gastritis as well as Hpylori, s/p triple therapy. Pt was advised to obtain a gastric emptying study as an outpatient.     Allergies:  No Known Allergies      Home Medications:    Hospital Medications:  benzocaine 15 mG/menthol 3.6 mG (Sugar-Free) Lozenge 1 Lozenge Oral every 3 hours PRN  dextrose 40% Gel 15 Gram(s) Oral once PRN  dextrose 5%. 1000 milliLiter(s) IV Continuous <Continuous>  dextrose 50% Injectable 12.5 Gram(s) IV Push once  dextrose 50% Injectable 25 Gram(s) IV Push once  dextrose 50% Injectable 25 Gram(s) IV Push once  glucagon  Injectable 1 milliGRAM(s) IntraMuscular once PRN  insulin glargine Injectable (LANTUS) 12 Unit(s) SubCutaneous at bedtime  insulin lispro (HumaLOG) corrective regimen sliding scale   SubCutaneous three times a day before meals  insulin lispro (HumaLOG) corrective regimen sliding scale   SubCutaneous at bedtime  insulin lispro Injectable (HumaLOG) 4 Unit(s) SubCutaneous three times a day before meals  nystatin    Suspension 694542 Unit(s) Oral every 6 hours  pantoprazole    Tablet 40 milliGRAM(s) Oral before breakfast  sodium chloride 0.9%. 1000 milliLiter(s) IV Continuous <Continuous>      PMHX/PSHX:  Diabetes  No significant past surgical history      Family history:  FH: diabetes mellitus      There is no family history of peptic ulcer disease, gastric cancer, colon polyps, colon cancer, celiac disease, biliary, hepatic, or pancreatic disease.  None of the female relatives have breast, uterine, or ovarian cancer.     Social History:     ROS:     General:  No wt loss, fevers, chills, night sweats, fatigue,   Eyes:  Good vision, no reported pain  ENT:  No sore throat, pain, runny nose, dysphagia  CV:  No pain, palpitations, hypo/hypertension  Resp:  No dyspnea, cough, tachypnea, wheezing  GI:  See HPI  :  No pain, bleeding, incontinence, nocturia  Muscle:  No pain, weakness  Neuro:  No weakness, tingling, memory problems  Psych:  No fatigue, insomnia, mood problems, depression  Endocrine:  No polyuria, polydipsia, cold/heat intolerance  Heme:  No petechiae, ecchymosis, easy bruisability  Skin:  No rash, tattoos, scars, edema      PHYSICAL EXAM:     GENERAL:  Appears stated age, well-groomed  HEENT:  NC/AT,  conjunctivae clear and pink, no thyromegaly  CHEST:  Full & symmetric excursion, no increased effort  HEART:  Regular rhythm, S1, S2, no murmur/rub/S3/S4  ABDOMEN:  Soft, non-tender, non-distended  EXTEREMITIES:  no cyanosis,clubbing or edema  SKIN:  No rash/erythema/ecchymoses  NEURO:  Alert, oriented, no asterixis    Vital Signs:  Vital Signs Last 24 Hrs  T(C): 36.9 (13 May 2020 10:22), Max: 37.2 (13 May 2020 08:20)  T(F): 98.5 (13 May 2020 10:22), Max: 98.9 (13 May 2020 08:20)  HR: 91 (13 May 2020 10:22) (69 - 92)  BP: 123/71 (13 May 2020 10:22) (110/67 - 132/78)  BP(mean): 79 (12 May 2020 20:08) (79 - 79)  RR: 18 (13 May 2020 10:22) (12 - 18)  SpO2: 100% (13 May 2020 10:22) (98% - 100%)  Daily Height in cm: 185.42 (13 May 2020 10:22)    Daily     LABS:                        12.8   11.38 )-----------( 290      ( 13 May 2020 04:50 )             37.6     Mean Cell Volume: 88.1 fL (05-13-20 @ 04:50)    05-13    140  |  107  |  18  ----------------------------<  172<H>  4.0   |  20<L>  |  0.92    Ca    9.6      13 May 2020 09:48  Phos  1.5     05-13  Mg     1.9     05-13    TPro  6.9  /  Alb  3.9  /  TBili  2.1<H>  /  DBili  x   /  AST  15  /  ALT  19  /  AlkPhos  66  05-13    LIVER FUNCTIONS - ( 13 May 2020 04:50 )  Alb: 3.9 g/dL / Pro: 6.9 g/dL / ALK PHOS: 66 u/L / ALT: 19 u/L / AST: 15 u/L / GGT: x             Urinalysis Basic - ( 12 May 2020 15:07 )    Color: LIGHT YELLOW / Appearance: CLEAR / SG: > 1.040 / pH: 5.5  Gluc: >1000 / Ketone: >150  / Bili: NEGATIVE / Urobili: NORMAL   Blood: NEGATIVE / Protein: 30 / Nitrite: NEGATIVE   Leuk Esterase: NEGATIVE / RBC: 0-2 / WBC 0-2   Sq Epi: OCC / Non Sq Epi: x / Bacteria: NEGATIVE                              12.8   11.38 )-----------( 290      ( 13 May 2020 04:50 )             37.6                         14.6   17.12 )-----------( 339      ( 12 May 2020 10:15 )             42.8     Imaging:    < from: Upper Endoscopy (01.02.20 @ 12:07) >  Findings:     The upper third of the esophagus and middle third of the esophagus were        normal.       LA Grade A (one or more mucosal breaks less than 5 mm, not extending        between tops of 2 mucosal folds) esophagitis with no bleeding was found   in the lower third of the esophagus.       The Z-line was regular and was found 38 cm from the incisors.       A small amount of food (residue) was found in the gastric body and in        the gastric antrum. Biopsies were taken with a cold forceps for        Helicobacter pylori testing.       The duodenal bulb, first part of the duodenum and 2nd part of the        duodenum were normal.                                                                                   Impression:          - Normal upper third of esophagus and middle third of                        esophagus.                       - LA Grade A esophagitis. Possible etiology of CT                        findings.                       - Z-line regular, 38 cm from the incisors.                     - A small amount of food (residue) in the stomach.                        Likely 2/2 underlying gastroparesis. Biopsied.                       - Normal duodenal bulb, first part of the duodenum and                        2nd part of the duodenum.  Recommendation:      - Return patient to [facility] for [facility] care.                       - Advance diet as tolerated.                       - Follow up pathology.                       - Use Protonix (pantoprazole) 40 mg PO daily for 8 weeks.                       - Can consider NM GES as outpatient for evaluation of                        likely underlying gastroparesis.                       - Gastroparesis diet. Small, frequent, low fat meals.                       - Glucose management per primary team.    < end of copied text >      Surgical Pathology Report (01.02.20 @ 12:30)    Surgical Pathology Report:   ACCESSION No:  [id number]    IRON MUNIZ        Surgical Final Report          Final Diagnosis  1. Stomach, biopsy:  - Gastric antral and body  type mucosa with chronic moderately  active gastritis  - Numerous Helicobacter microorganisms are present (Warthin-  Starry stain)    Verified by: JONO HOLLAND MD  (Electronic Signature)  Reported on: 01/06/20 15:39 [facility], [address] [place] [address]  Phone: [phone]   Fax: [phone]  _________________________________________________________________    Specimen(s) Submitted  1- Gastric biopsy    Gross Description  1. The specimen is received in formalin and the specimen  container is labeled: Gastric biopsy.  It  consists of five  fragments of soft, tan-pink tissue ranging from less than 0.1 cm  to 0.6 cm in maximum dimension.  Special stains requested.  Entirely submitted.  One cassette.    In addition to other data that may appear on the specimen  container, the label has been inspected to confirm the presence  of the patient's name and date of birth.    Edi Major 01/02/2020 18:46 Chief Complaint:  Patient is a 29y old  Male who presents with a chief complaint of DKA (12 May 2020 22:06)      HPI:  30yo M with insulin dependent DM presents to the ED with nausea and vomiting, found to have DKA, s/p insulin gtt and IVF. Patient's glucose has now normalized with closed AG and patient was transferred from ICU to floor. GI is now being consulted for complaints of sorethroat, nausea and vomiting. When examined, patient states that his nausea and vomiting is improving since admission. Pt states that he now feels that his throat feels 'mendel', causing him to feel nauseas, but food will go down after awhile. Pt otherwise denies fever, chills, constipation, diarrhea, abdominal pain, bloating sensation.     Patient had an EGD in 01/2020 which showed LA grade A esophagitis, small amount of food in stomach. Pathology of the stomach showed gastritis as well as Hpylori, s/p triple therapy. Pt was advised to obtain a gastric emptying study as an outpatient.     Allergies:  No Known Allergies      Home Medications:    Hospital Medications:  benzocaine 15 mG/menthol 3.6 mG (Sugar-Free) Lozenge 1 Lozenge Oral every 3 hours PRN  dextrose 40% Gel 15 Gram(s) Oral once PRN  dextrose 5%. 1000 milliLiter(s) IV Continuous <Continuous>  dextrose 50% Injectable 12.5 Gram(s) IV Push once  dextrose 50% Injectable 25 Gram(s) IV Push once  dextrose 50% Injectable 25 Gram(s) IV Push once  glucagon  Injectable 1 milliGRAM(s) IntraMuscular once PRN  insulin glargine Injectable (LANTUS) 12 Unit(s) SubCutaneous at bedtime  insulin lispro (HumaLOG) corrective regimen sliding scale   SubCutaneous three times a day before meals  insulin lispro (HumaLOG) corrective regimen sliding scale   SubCutaneous at bedtime  insulin lispro Injectable (HumaLOG) 4 Unit(s) SubCutaneous three times a day before meals  nystatin    Suspension 816141 Unit(s) Oral every 6 hours  pantoprazole    Tablet 40 milliGRAM(s) Oral before breakfast  sodium chloride 0.9%. 1000 milliLiter(s) IV Continuous <Continuous>      PMHX/PSHX:  Diabetes  No significant past surgical history      Family history:  FH: diabetes mellitus      There is no family history of peptic ulcer disease, gastric cancer, colon polyps, colon cancer, celiac disease, biliary, hepatic, or pancreatic disease.  None of the female relatives have breast, uterine, or ovarian cancer.     Social History:     ROS:     General:  No wt loss, fevers, chills, night sweats, fatigue,   Eyes:  Good vision, no reported pain  ENT:  No sore throat, pain, runny nose, dysphagia  CV:  No pain, palpitations, hypo/hypertension  Resp:  No dyspnea, cough, tachypnea, wheezing  GI:  See HPI  :  No pain, bleeding, incontinence, nocturia  Muscle:  No pain, weakness  Neuro:  No weakness, tingling, memory problems  Psych:  No fatigue, insomnia, mood problems, depression  Endocrine:  No polyuria, polydipsia, cold/heat intolerance  Heme:  No petechiae, ecchymosis, easy bruisability  Skin:  No rash, tattoos, scars, edema      PHYSICAL EXAM:     GENERAL:  Appears stated age, well-groomed  HEENT:  NC/AT,  conjunctivae clear and pink, no thyromegaly  CHEST:  Full & symmetric excursion, no increased effort  HEART:  Regular rhythm, S1, S2, no murmur/rub/S3/S4  ABDOMEN:  Soft, non-tender, non-distended  EXTEREMITIES:  no cyanosis,clubbing or edema  SKIN:  No rash/erythema/ecchymoses  NEURO:  Alert, oriented, no asterixis    Vital Signs:  Vital Signs Last 24 Hrs  T(C): 36.9 (13 May 2020 10:22), Max: 37.2 (13 May 2020 08:20)  T(F): 98.5 (13 May 2020 10:22), Max: 98.9 (13 May 2020 08:20)  HR: 91 (13 May 2020 10:22) (69 - 92)  BP: 123/71 (13 May 2020 10:22) (110/67 - 132/78)  BP(mean): 79 (12 May 2020 20:08) (79 - 79)  RR: 18 (13 May 2020 10:22) (12 - 18)  SpO2: 100% (13 May 2020 10:22) (98% - 100%)  Daily Height in cm: 185.42 (13 May 2020 10:22)    Daily     LABS:                        12.8   11.38 )-----------( 290      ( 13 May 2020 04:50 )             37.6     Mean Cell Volume: 88.1 fL (05-13-20 @ 04:50)    05-13    140  |  107  |  18  ----------------------------<  172<H>  4.0   |  20<L>  |  0.92    Ca    9.6      13 May 2020 09:48  Phos  1.5     05-13  Mg     1.9     05-13    TPro  6.9  /  Alb  3.9  /  TBili  2.1<H>  /  DBili  x   /  AST  15  /  ALT  19  /  AlkPhos  66  05-13    LIVER FUNCTIONS - ( 13 May 2020 04:50 )  Alb: 3.9 g/dL / Pro: 6.9 g/dL / ALK PHOS: 66 u/L / ALT: 19 u/L / AST: 15 u/L / GGT: x             Urinalysis Basic - ( 12 May 2020 15:07 )    Color: LIGHT YELLOW / Appearance: CLEAR / SG: > 1.040 / pH: 5.5  Gluc: >1000 / Ketone: >150  / Bili: NEGATIVE / Urobili: NORMAL   Blood: NEGATIVE / Protein: 30 / Nitrite: NEGATIVE   Leuk Esterase: NEGATIVE / RBC: 0-2 / WBC 0-2   Sq Epi: OCC / Non Sq Epi: x / Bacteria: NEGATIVE                              12.8   11.38 )-----------( 290      ( 13 May 2020 04:50 )             37.6                         14.6   17.12 )-----------( 339      ( 12 May 2020 10:15 )             42.8     Imaging:    < from: Upper Endoscopy (01.02.20 @ 12:07) >  Findings:     The upper third of the esophagus and middle third of the esophagus were        normal.       LA Grade A (one or more mucosal breaks less than 5 mm, not extending        between tops of 2 mucosal folds) esophagitis with no bleeding was found   in the lower third of the esophagus.       The Z-line was regular and was found 38 cm from the incisors.       A small amount of food (residue) was found in the gastric body and in        the gastric antrum. Biopsies were taken with a cold forceps for        Helicobacter pylori testing.       The duodenal bulb, first part of the duodenum and 2nd part of the        duodenum were normal.                                                                                   Impression:          - Normal upper third of esophagus and middle third of                        esophagus.                       - LA Grade A esophagitis. Possible etiology of CT                        findings.                       - Z-line regular, 38 cm from the incisors.                     - A small amount of food (residue) in the stomach.                        Likely 2/2 underlying gastroparesis. Biopsied.                       - Normal duodenal bulb, first part of the duodenum and                        2nd part of the duodenum.  Recommendation:      - Return patient to [facility] for ongoing care.                       - Advance diet as tolerated.                       - Follow up pathology.                       - Use Protonix (pantoprazole) 40 mg PO daily for 8 weeks.                       - Can consider NM GES as outpatient for evaluation of                        likely underlying gastroparesis.                       - Gastroparesis diet. Small, frequent, low fat meals.                       - Glucose management per primary team.    < end of copied text >      Surgical Pathology Report (01.02.20 @ 12:30)    Surgical Pathology Report:   ACCESSION No:  [id number]    MUNIZ IRON Ledesma        Surgical Final Report          Final Diagnosis  1. Stomach, biopsy:  - Gastric antral and body  type mucosa with chronic moderately  active gastritis  - Numerous Helicobacter microorganisms are present (Warthin-  Starry stain)    Verified by: JONO HOLLAND MD  (Electronic Signature)  Reported on: 01/06/20 15:39 [facility], [address] [place] [address]  Phone: [phone]   Fax: [phone]  _________________________________________________________________    Specimen(s) Submitted  1- Gastric biopsy    Gross Description  1. The specimen is received in formalin and the specimen  container is labeled: Gastric biopsy.  It  consists of five  fragments of soft, tan-pink tissue ranging from less than 0.1 cm  to 0.6 cm in maximum dimension.  Special stains requested.  Entirely submitted.  One cassette.    In addition to other data that may appear on the specimen  container, the label has been inspected to confirm the presence  of the patient's name and date of birth.    Edi Major 01/02/2020 18:46

## 2020-05-13 NOTE — PROGRESS NOTE ADULT - ASSESSMENT
· Assessment	  29 year old man with uncontrolled DM1, HLD, possible gastroparesis, admitted with DKA in setting of uncontrolled DM1 with inappropriate dosing of insulin. BG goal 100-180 mg/dL. Patient with multiple admissions for DKA. He is a high risk patient with high level decision making, at high risk of worsening microvascular and macrovascular complications.     DKA Type I:  Resolved  Endo eval noted.  Lantus/Humalog    Oral thrush:  Nystatin liquid    Esophagitis:  GI eval noted.  OP Gastric emptying study  Protonix/Sucral fate

## 2020-05-13 NOTE — CONSULT NOTE ADULT - SUBJECTIVE AND OBJECTIVE BOX
HPI:  Patient is 29 year old man with uncontrolled DM1, HLD, possible gastroparesis who presented to the ED complaining of nausea/vomiting x 3 days and high fingerstick readings at home, admitted to the ICU initially with DKA, however after initiation of insulin gtt in the ED, anion gap closed and patient was transferred to the medical floors. Consult called for management of uncontrolled DM1 with DKA. Patient known to the endocrine service from prior admissions and prior episodes of DKA, last seen by our service in March 2020. He was diagnosed with DM1 in 2015. He was previously on basal bolus insulin, however due to insurance issues, he was most recently switched to Novolin 70/30 via syringes and vials. He was discharged in the end of March 2020 on 20 units before breakfast and 10 units before dinner. At bedside, patient states that since his discharge, for at least a month, he has been hyperglycemic at home on the 70/30 insulin. He has been taking 10 units at 8 am, 10 units at 2 pm, and another 10 units at 10 pm - has been taking 3x a day, not twice a day at the prescribed doses - states that he thought this was how he was supposed to take it. Has been checking his BG at home and has noted that his FS were in the 300's. He now has new symptoms of neuropathy in the feet. No known retinopathy or nephropathy. He does not have blurry vision, polyuria, polydipsia. He also reports having sensation of dysphagia due to feeling that something is stuck in his throat, which has limited his po intake as he is unable to keep food down. Has been having soup at home. HbA1c now 10.2%, was 14.5% in December 2019.    On admission here, he was noted to have BG in the 200's with AG 19, bicarb 19, BHB 4.3, consistent with DKA. States that he last took his insulin at 8 am yesterday.     PAST MEDICAL & SURGICAL HISTORY:  Diabetes: type 1  No significant past surgical history    FAMILY HISTORY:  DM2 in uncle    Social History:  no cigarette use  no alcohol use    Outpatient Medications:  · 	Protonix 40 mg oral delayed release tablet: 1 tab(s) orally once a day   · 	HumuLIN 70/30 KwikPen 70 units-30 units/mL subcutaneous suspension: 20 unit(s) subcutaneous in the morning  	10 Units in the evening    MEDICATIONS  (STANDING):  dextrose 5%. 1000 milliLiter(s) (50 mL/Hr) IV Continuous <Continuous>  dextrose 50% Injectable 12.5 Gram(s) IV Push once  dextrose 50% Injectable 25 Gram(s) IV Push once  dextrose 50% Injectable 25 Gram(s) IV Push once  insulin glargine Injectable (LANTUS) 12 Unit(s) SubCutaneous at bedtime  insulin lispro (HumaLOG) corrective regimen sliding scale   SubCutaneous three times a day before meals  insulin lispro (HumaLOG) corrective regimen sliding scale   SubCutaneous at bedtime  insulin lispro Injectable (HumaLOG) 4 Unit(s) SubCutaneous three times a day before meals  nystatin    Suspension 570945 Unit(s) Oral every 6 hours  pantoprazole    Tablet 40 milliGRAM(s) Oral before breakfast  sodium chloride 0.9%. 1000 milliLiter(s) (100 mL/Hr) IV Continuous <Continuous>    MEDICATIONS  (PRN):  benzocaine 15 mG/menthol 3.6 mG (Sugar-Free) Lozenge 1 Lozenge Oral every 3 hours PRN Sore Throat  dextrose 40% Gel 15 Gram(s) Oral once PRN Blood Glucose LESS THAN 70 milliGRAM(s)/deciliter  glucagon  Injectable 1 milliGRAM(s) IntraMuscular once PRN Glucose LESS THAN 70 milligrams/deciliter      Allergies  No Known Allergies    Review of Systems:  Constitutional: No fever  Eyes: No blurry vision  Neuro: No tremors  HEENT: +dysphagia   Cardiovascular: No chest pain, palpitations  Respiratory: No SOB, no cough  GI: No nausea, vomiting, abdominal pain  : No dysuria  Skin: no rash  Endocrine: no polyuria, polydipsia    ALL OTHER SYSTEMS REVIEWED AND NEGATIVE    PHYSICAL EXAM:  VITALS: T(C): 36.9 (05-13-20 @ 10:22)  T(F): 98.5 (05-13-20 @ 10:22), Max: 98.9 (05-13-20 @ 08:20)  HR: 91 (05-13-20 @ 10:22) (69 - 92)  BP: 123/71 (05-13-20 @ 10:22) (110/67 - 132/78)  RR:  (12 - 18)  SpO2:  (98% - 100%)  Wt(kg): --  GENERAL: NAD, well-developed  EYES: No proptosis, anicteric  HEENT:  Atraumatic, Normocephalic  THYROID: Normal size, no palpable nodules  RESPIRATORY: no respiratory distress; non-labored breathing; + air movement bilaterally   CARDIOVASCULAR: Regular rate and rhythm; no peripheral edema; 2+ peripheral pulses   GI: Soft, nontender, non distended, normal bowel sounds  SKIN: Dry, intact, No rashes or lesions  MUSCULOSKELETAL: Full range of motion, normal strength  PSYCH: Alert and oriented x 3, reactive affect, euthymic mood      POCT Blood Glucose.: 180 mg/dL (05-13-20 @ 08:18)  POCT Blood Glucose.: 219 mg/dL (05-13-20 @ 06:58)  POCT Blood Glucose.: 237 mg/dL (05-13-20 @ 06:02)  POCT Blood Glucose.: 157 mg/dL (05-13-20 @ 03:48)  POCT Blood Glucose.: 130 mg/dL (05-13-20 @ 02:25)  POCT Blood Glucose.: 137 mg/dL (05-12-20 @ 23:37)  POCT Blood Glucose.: 153 mg/dL (05-12-20 @ 22:54)  POCT Blood Glucose.: 161 mg/dL (05-12-20 @ 22:03)  POCT Blood Glucose.: 130 mg/dL (05-12-20 @ 20:59)  POCT Blood Glucose.: 132 mg/dL (05-12-20 @ 20:00)  POCT Blood Glucose.: 130 mg/dL (05-12-20 @ 18:56)  POCT Blood Glucose.: 145 mg/dL (05-12-20 @ 18:01)  POCT Blood Glucose.: 132 mg/dL (05-12-20 @ 17:20)  POCT Blood Glucose.: 129 mg/dL (05-12-20 @ 16:09)  POCT Blood Glucose.: 208 mg/dL (05-12-20 @ 14:57)  POCT Blood Glucose.: 278 mg/dL (05-12-20 @ 14:05)  POCT Blood Glucose.: 245 mg/dL (05-12-20 @ 12:18)  POCT Blood Glucose.: 299 mg/dL (05-12-20 @ 09:17)                          12.8   11.38 )-----------( 290      ( 13 May 2020 04:50 )             37.6       05-13    140  |  107  |  18  ----------------------------<  172<H>  4.0   |  20<L>  |  0.92    EGFR if : 131  EGFR if non : 113    Ca    9.6      05-13  Mg     1.9     05-13  Phos  1.5     05-13    TPro  6.9  /  Alb  3.9  /  TBili  2.1<H>  /  DBili  x   /  AST  15  /  ALT  19  /  AlkPhos  66  05-13      Thyroid Function Tests:  05-12 @ 10:15 TSH 1.04 FreeT4 -- T3 -- Anti TPO -- Anti Thyroglobulin Ab -- TSI -- HPI:  Patient is 29 year old man with uncontrolled DM1, HLD, possible gastroparesis who presented to the ED complaining of nausea/vomiting x 3 days and high fingerstick readings at home, admitted to the ICU initially with DKA, however after initiation of insulin gtt in the ED, anion gap closed and patient was transferred to the medical floors. Consult called for management of uncontrolled DM1 with DKA. Patient known to the endocrine service from prior admissions and prior episodes of DKA, last seen by our service in March 2020. He was diagnosed with DM1 in 2015. He was previously on basal bolus insulin, however due to insurance issues, he was most recently switched to Novolin 70/30 via syringes and vials. He was discharged in the end of March 2020 on 20 units before breakfast and 10 units before dinner. At bedside, patient states that since his discharge, for at least a month, he has been hyperglycemic at home on the 70/30 insulin. He has been taking 10 units at 8 am, 10 units at 2 pm, and another 10 units at 10 pm - has been taking 3x a day, not twice a day at the prescribed doses - states that he thought this was how he was supposed to take it. Has been checking his BG at home and has noted that his FS were in the 300's. He now has new symptoms of neuropathy in the feet. No known retinopathy or nephropathy. He does not have blurry vision, polyuria, polydipsia. He also reports having sensation of dysphagia due to feeling that something is stuck in his throat, which has limited his po intake as he is unable to keep food down. Has been having soup at home. HbA1c now 10.2%, was 14.5% in December 2019.    On admission here, he was noted to have BG in the 200's with AG 19, bicarb 19, BHB 4.3, consistent with DKA. States that he last took his insulin at 8 am yesterday.     PAST MEDICAL & SURGICAL HISTORY:  Diabetes: type 1  No significant past surgical history    FAMILY HISTORY:  DM2 in uncle    Social History:  no cigarette use  no alcohol use    Outpatient Medications:  · 	Protonix 40 mg oral delayed release tablet: 1 tab(s) orally once a day   · 	HumuLIN 70/30 KwikPen 70 units-30 units/mL subcutaneous suspension: 20 unit(s) subcutaneous in the morning  	10 Units in the evening    MEDICATIONS  (STANDING):  dextrose 5%. 1000 milliLiter(s) (50 mL/Hr) IV Continuous <Continuous>  dextrose 50% Injectable 12.5 Gram(s) IV Push once  dextrose 50% Injectable 25 Gram(s) IV Push once  dextrose 50% Injectable 25 Gram(s) IV Push once  insulin glargine Injectable (LANTUS) 12 Unit(s) SubCutaneous at bedtime  insulin lispro (HumaLOG) corrective regimen sliding scale   SubCutaneous three times a day before meals  insulin lispro (HumaLOG) corrective regimen sliding scale   SubCutaneous at bedtime  insulin lispro Injectable (HumaLOG) 4 Unit(s) SubCutaneous three times a day before meals  nystatin    Suspension 029800 Unit(s) Oral every 6 hours  pantoprazole    Tablet 40 milliGRAM(s) Oral before breakfast  sodium chloride 0.9%. 1000 milliLiter(s) (100 mL/Hr) IV Continuous <Continuous>    MEDICATIONS  (PRN):  benzocaine 15 mG/menthol 3.6 mG (Sugar-Free) Lozenge 1 Lozenge Oral every 3 hours PRN Sore Throat  dextrose 40% Gel 15 Gram(s) Oral once PRN Blood Glucose LESS THAN 70 milliGRAM(s)/deciliter  glucagon  Injectable 1 milliGRAM(s) IntraMuscular once PRN Glucose LESS THAN 70 milligrams/deciliter      Allergies  No Known Allergies    Review of Systems:  Constitutional: No fever  Eyes: No blurry vision  Neuro: No tremors  HEENT: +dysphagia   Cardiovascular: No chest pain, palpitations  Respiratory: No SOB, no cough  GI: No nausea, vomiting, abdominal pain  : No dysuria  Skin: no rash  Endocrine: no polyuria, polydipsia    ALL OTHER SYSTEMS REVIEWED AND NEGATIVE    PHYSICAL EXAM:  VITALS: T(C): 36.9 (05-13-20 @ 10:22)  T(F): 98.5 (05-13-20 @ 10:22), Max: 98.9 (05-13-20 @ 08:20)  HR: 91 (05-13-20 @ 10:22) (69 - 92)  BP: 123/71 (05-13-20 @ 10:22) (110/67 - 132/78)  RR:  (12 - 18)  SpO2:  (98% - 100%)  Wt(kg): --  GENERAL: NAD, well-developed  EYES: No proptosis, anicteric  HEENT:  Atraumatic, Normocephalic  THYROID: Normal size, no palpable nodules  RESPIRATORY: no respiratory distress; non-labored breathing; + air movement bilaterally   CARDIOVASCULAR: Regular rate and rhythm; no peripheral edema; 2+ peripheral pulses   GI: Soft, nontender, non distended, normal bowel sounds  SKIN: Dry, intact, No rashes or lesions  MUSCULOSKELETAL: Full range of motion, normal strength  PSYCH: Alert and oriented x 3, reactive affect, euthymic mood      POCT Blood Glucose.: 180 mg/dL (05-13-20 @ 08:18) H 1  POCT Blood Glucose.: 219 mg/dL (05-13-20 @ 06:58)  POCT Blood Glucose.: 237 mg/dL (05-13-20 @ 06:02) H 5   POCT Blood Glucose.: 157 mg/dL (05-13-20 @ 03:48)  POCT Blood Glucose.: 130 mg/dL (05-13-20 @ 02:25)  POCT Blood Glucose.: 137 mg/dL (05-12-20 @ 23:37)  POCT Blood Glucose.: 153 mg/dL (05-12-20 @ 22:54)  POCT Blood Glucose.: 161 mg/dL (05-12-20 @ 22:03)  POCT Blood Glucose.: 130 mg/dL (05-12-20 @ 20:59) L 12  POCT Blood Glucose.: 132 mg/dL (05-12-20 @ 20:00)  POCT Blood Glucose.: 130 mg/dL (05-12-20 @ 18:56)  POCT Blood Glucose.: 145 mg/dL (05-12-20 @ 18:01)  POCT Blood Glucose.: 132 mg/dL (05-12-20 @ 17:20)  POCT Blood Glucose.: 129 mg/dL (05-12-20 @ 16:09)  POCT Blood Glucose.: 208 mg/dL (05-12-20 @ 14:57)  POCT Blood Glucose.: 278 mg/dL (05-12-20 @ 14:05)  POCT Blood Glucose.: 245 mg/dL (05-12-20 @ 12:18)  POCT Blood Glucose.: 299 mg/dL (05-12-20 @ 09:17)                          12.8   11.38 )-----------( 290      ( 13 May 2020 04:50 )             37.6       05-13    140  |  107  |  18  ----------------------------<  172<H>  4.0   |  20<L>  |  0.92    EGFR if : 131  EGFR if non : 113    Ca    9.6      05-13  Mg     1.9     05-13  Phos  1.5     05-13    TPro  6.9  /  Alb  3.9  /  TBili  2.1<H>  /  DBili  x   /  AST  15  /  ALT  19  /  AlkPhos  66  05-13      Thyroid Function Tests:  05-12 @ 10:15 TSH 1.04 FreeT4 -- T3 -- Anti TPO -- Anti Thyroglobulin Ab -- TSI --

## 2020-05-13 NOTE — CONSULT NOTE ADULT - ASSESSMENT
29 year old man with uncontrolled DM1, HLD, possible gastroparesis, admitted with DKA in setting of uncontrolled DM1 with inappropriate dosing of insulin. BG goal 100-180 mg/dL. Patient with multiple admissions for DKA. He is a high risk patient with high level decision making, at high risk of worsening microvascular and macrovascular complications.

## 2020-05-13 NOTE — CONSULT NOTE ADULT - ASSESSMENT
Impression:  1) Nausea, vomiting, sorethroat- S/P EGD in 01/2020 revealing grade A esophagitis, also H pylori gastritis s/p completed triple therapy. Symptoms are likely related to motility disorder (gastroparesis) from uncontrolled DM. Peptic ulcer disease, severe esophagitis, malignancy ruled out on last EGD. Other differential diagnosis includes functional dyspepsia, IBS, gastroenteritis.   2) Uncontrolled DM    Recommendations:  -No role for repeat endoscopic evaluation given recent EGD  -Continue with supportive care with Reglan PRN  -Small frequent meals as tolerated  -May start carafate 1g TID  -Pt will benefit from outpatient gastric emptying study for further evaluation   -Rest of care per primary team Impression:  1) Nausea, vomiting, sorethroat- S/P EGD in 01/2020 revealing grade A esophagitis, also H pylori gastritis s/p completed triple therapy. Symptoms are likely related to motility disorder (gastroparesis) from uncontrolled DM. Peptic ulcer disease, severe esophagitis, malignancy ruled out on last EGD. Other differential diagnosis includes functional dyspepsia, IBS, gastroenteritis.   2) Uncontrolled DM  3) History of H pylori gastritis    Recommendations:  -No role for repeat endoscopic evaluation given recent EGD  -Continue with supportive care with Reglan PRN  -Small frequent meals as tolerated  -May start carafate 1g TID  -Please check H pylori stool Ag  -Pt will benefit from outpatient gastric emptying study for further evaluation   -Rest of care per primary team Impression:  1) Nausea, vomiting, sorethroat- S/P EGD in 01/2020 revealing grade A esophagitis, also H pylori gastritis s/p completed triple therapy. Symptoms are likely related to motility disorder (gastroparesis) from uncontrolled DM. Peptic ulcer disease, severe esophagitis, malignancy ruled out on last EGD. Other differential diagnosis includes functional dyspepsia, IBS, gastroenteritis.   2) Uncontrolled DM  3) History of H pylori gastritis    Recommendations:  -No role for repeat endoscopic evaluation given recent EGD and improving symptoms  -Continue with supportive care with Reglan PRN  -Small frequent meals as tolerated  -May start carafate 1g TID  -Please check H pylori stool Ag  -Pt will benefit from outpatient gastric emptying study for further evaluation   -Rest of care per primary team Impression:  1) Nausea, vomiting, sorethroat- S/P EGD in 01/2020 revealing grade A esophagitis, also H pylori gastritis s/p completed triple therapy. Symptoms are likely related to motility disorder (gastroparesis) from uncontrolled DM, exacerbating reflux symptoms. Peptic ulcer disease, severe esophagitis, malignancy ruled out on last EGD. Other differential diagnosis includes functional dyspepsia, IBS, gastroenteritis.   2) Uncontrolled DM  3) History of H pylori gastritis    Recommendations:  -No role for repeat endoscopic evaluation given recent EGD and overall improving symptoms  -Continue with supportive care with Reglan PRN  -Small frequent, low fat meals as tolerated  -Can consider carafate 1g TID  -Continue PPI  -Please check H pylori stool Ag (though may be falsely negative while taking PPI)  -Pt will benefit from outpatient gastric emptying study for further evaluation   -Diabetic management per primary team  -Rest of care per primary team

## 2020-05-13 NOTE — DISCHARGE NOTE PROVIDER - NSDCCPCAREPLAN_GEN_ALL_CORE_FT
PRINCIPAL DISCHARGE DIAGNOSIS  Diagnosis: Diabetic ketoacidosis without coma associated with type 1 diabetes mellitus  Assessment and Plan of Treatment: Due to uncontrolled type 1 diabetes.   You were evaluated by endocrine while in the hospital.   On discharge, you are to take lantus 14 units everyday at bedtime and humalog 4 units before meals.   Hold humalog before meals if you are not eating or not eating meal well or if eating less than 25% of meal.  You have a follow up appointment with [facility] at [facility], [facility]-[address] [place], [place] [address], [phone]. Appointment scheduled for Tuesday 5/19/20 at 9:00 AM  Continue your medication regimen and a consistent carbohydrate diet (Meaning eating the same amount of carbohydrates at the same time each day). Monitor blood glucose levels throughout the day before meals and at bedtime. Record blood sugars and bring to outpatient providers appointment in order to be reviewed by your doctor for management modifications. If your sugars are more than 400 or less than 70 you should contact your PCP immediately. Monitor for signs/symptoms of low blood glucose, such as, dizziness, altered mental status, or cool/clammy skin. In addition, monitor for signs/symptoms of high blood glucose, such as, feeling hot, dry, fatigued, or with increased thirst/urination. Make regular podiatry appointments in order to have feet checked for wounds and uncontrolled toe nail growth to prevent infections, as well as, appointments with an ophthalmologist to monitor your vision.

## 2020-05-13 NOTE — DISCHARGE NOTE PROVIDER - CARE PROVIDER_API CALL
Follow up with Medicine Specialties,   [facility], [address] [facility], [place] [address], [phone]  Appointment scheduled for Tuesday 5/19/20 at 9:00 AM  Phone: (   )    -  Fax: (   )    -  Follow Up Time: Follow up with Medicine Specialties,   [facility], [address] [facility], [facility] [address], [phone]  Appointment scheduled for Tuesday 5/19/20 at 9:00 AM  Phone: (   )    -  Fax: (   )    -  Follow Up Time:     Gastroenterology clinic,   Phone: [phone]  Fax: (   )    -  Follow Up Time:

## 2020-05-13 NOTE — PHARMACOTHERAPY INTERVENTION NOTE - COMMENTS
S/w Endocrine MD and ACP provider to send a test prescription to [facility]. Lantus Pens went through straight medicaid (though Vivo says he may have a secondary insurance as well - unclear what that is though - called [employer] and [facility] and both accounts were inactive). Told Endocrine that insurance active for pt to take basal/bolus pens at home. S/w Endocrine MD and ACP provider to send a test prescription to [facility]. Lantus Pens went through straight medicaid (though Vivo says he may have a secondary insurance as well - unclear what that is though - called [employer] and [facility] and both accounts were inactive). Emailed financial team to reach out to patient to assist. Told Endocrine that insurance is active for pt to take basal/bolus pens at home. S/w DEVYN Mead and he will review basal/bolus and pen technique with the patient.

## 2020-05-13 NOTE — CONSULT NOTE ADULT - ATTENDING COMMENTS
As above , Pt with elevated glucose with elevated anion  gap, however responded to iv fluid and iv insulin with   normalization of anion gap.  Pt on home 70/30 insulin,  changed to lantus 12 units and sliding scale coverage.  no source of infection. would need follow up endocrine  evaluation. Pt is stable for transfert to the floor.
Helena Murphy MD   Pager # [phone]  On evenings and weekends, please call the office at [phone] or page endocrine fellow on call. Please note that this patient may be followed by different provider tomorrow. If no answer, contact the office.

## 2020-05-13 NOTE — DISCHARGE NOTE PROVIDER - NSDCMRMEDTOKEN_GEN_ALL_CORE_FT
HumuLIN 70/30 KwikPen 70 units-30 units/mL subcutaneous suspension: 20 unit(s) subcutaneous in the morning  10 Units in the evening  Lantus Solostar Pen 100 units/mL subcutaneous solution: 15 unit(s) subcutaneous once a day (at bedtime) . price check spectra [id number]  Protonix 40 mg oral delayed release tablet: 1 tab(s) orally once a day HumuLIN 70/30 KwikPen 70 units-30 units/mL subcutaneous suspension: 20 unit(s) subcutaneous in the morning  10 Units in the evening  Lantus Solostar Pen 100 units/mL subcutaneous solution: 15 unit(s) subcutaneous once a day (at bedtime) . price check spectra [id number]  Protonix 40 mg oral delayed release tablet: 1 tab(s) orally once a day   sucralfate 1 g oral tablet: 1 tab(s) orally 3 times a day alcohol swabs : Apply topically to affected area 4 times a day   glucometer (per patient&#x27;s insurance): Test blood sugars four times a day. Dispense #1 glucometer.  HumaLOG KwikPen 100 units/mL injectable solution: 4 unit(s) injectable 3 times a day before meals   Insulin Pen Needles, 4mm: 1 application subcutaneously 4 times a day. ** Use with insulin pen **   lancets: 1 application subcutaneously 4 times a day   Lantus Solostar Pen 100 units/mL subcutaneous solution: 14 unit(s) subcutaneous once a day (at bedtime)  . price check spectra [phone]   nystatin 100,000 units/mL oral suspension: 5 milliliter(s) orally every 6 hours  Protonix 40 mg oral delayed release tablet: 1 tab(s) orally once a day   sucralfate 1 g oral tablet: 1 tab(s) orally 3 times a day   test strips (per patient&#x27;s insurance): 1 application subcutaneously 4 times a day. ** Compatible with patient&#x27;s glucometer ** alcohol swabs : Apply topically to affected area 4 times a day   glucometer (per patient&#x27;s insurance): Test blood sugars four times a day. Dispense #1 glucometer.  HumaLOG KwikPen 100 units/mL injectable solution: 4 unit(s) injectable 3 times a day before meals   Insulin Pen Needles, 4mm: 1 application subcutaneously 4 times a day. ** Use with insulin pen **   lancets: 1 application subcutaneously 4 times a day   Lantus Solostar Pen 100 units/mL subcutaneous solution: 14 unit(s) subcutaneous once a day (at bedtime)  . price check spectra [id number]   nystatin 100,000 units/mL oral suspension: 5 milliliter(s) orally every 6 hours  Protonix 40 mg oral delayed release tablet: 1 tab(s) orally once a day   sucralfate 1 g oral tablet: 1 tab(s) orally 3 times a day   test strips (per patient&#x27;s insurance): 1 application subcutaneously 4 times a day. ** Compatible with patient&#x27;s glucometer **

## 2020-05-14 DIAGNOSIS — E10.10 TYPE 1 DIABETES MELLITUS WITH KETOACIDOSIS WITHOUT COMA: ICD-10-CM

## 2020-05-14 LAB
ALBUMIN SERPL ELPH-MCNC: 3.4 G/DL — SIGNIFICANT CHANGE UP (ref 3.3–5)
ALP SERPL-CCNC: 59 U/L — SIGNIFICANT CHANGE UP (ref 40–120)
ALT FLD-CCNC: 13 U/L — SIGNIFICANT CHANGE UP (ref 4–41)
ANION GAP SERPL CALC-SCNC: 13 MMO/L — SIGNIFICANT CHANGE UP (ref 7–14)
AST SERPL-CCNC: 12 U/L — SIGNIFICANT CHANGE UP (ref 4–40)
BILIRUB SERPL-MCNC: 2.1 MG/DL — HIGH (ref 0.2–1.2)
BUN SERPL-MCNC: 13 MG/DL — SIGNIFICANT CHANGE UP (ref 7–23)
CALCIUM SERPL-MCNC: 9.2 MG/DL — SIGNIFICANT CHANGE UP (ref 8.4–10.5)
CHLORIDE SERPL-SCNC: 103 MMOL/L — SIGNIFICANT CHANGE UP (ref 98–107)
CO2 SERPL-SCNC: 23 MMOL/L — SIGNIFICANT CHANGE UP (ref 22–31)
CREAT SERPL-MCNC: 0.79 MG/DL — SIGNIFICANT CHANGE UP (ref 0.5–1.3)
GLUCOSE BLDC GLUCOMTR-MCNC: 108 MG/DL — HIGH (ref 70–99)
GLUCOSE BLDC GLUCOMTR-MCNC: 149 MG/DL — HIGH (ref 70–99)
GLUCOSE BLDC GLUCOMTR-MCNC: 149 MG/DL — HIGH (ref 70–99)
GLUCOSE BLDC GLUCOMTR-MCNC: 78 MG/DL — SIGNIFICANT CHANGE UP (ref 70–99)
GLUCOSE SERPL-MCNC: 172 MG/DL — HIGH (ref 70–99)
HCT VFR BLD CALC: 34.5 % — LOW (ref 39–50)
HGB BLD-MCNC: 11.5 G/DL — LOW (ref 13–17)
MAGNESIUM SERPL-MCNC: 1.9 MG/DL — SIGNIFICANT CHANGE UP (ref 1.6–2.6)
MCHC RBC-ENTMCNC: 29 PG — SIGNIFICANT CHANGE UP (ref 27–34)
MCHC RBC-ENTMCNC: 33.3 % — SIGNIFICANT CHANGE UP (ref 32–36)
MCV RBC AUTO: 87.1 FL — SIGNIFICANT CHANGE UP (ref 80–100)
NRBC # FLD: 0 K/UL — SIGNIFICANT CHANGE UP (ref 0–0)
PHOSPHATE SERPL-MCNC: 2.1 MG/DL — LOW (ref 2.5–4.5)
PLATELET # BLD AUTO: 253 K/UL — SIGNIFICANT CHANGE UP (ref 150–400)
PMV BLD: 10.2 FL — SIGNIFICANT CHANGE UP (ref 7–13)
POTASSIUM SERPL-MCNC: 3.5 MMOL/L — SIGNIFICANT CHANGE UP (ref 3.5–5.3)
POTASSIUM SERPL-SCNC: 3.5 MMOL/L — SIGNIFICANT CHANGE UP (ref 3.5–5.3)
PROT SERPL-MCNC: 6 G/DL — SIGNIFICANT CHANGE UP (ref 6–8.3)
RBC # BLD: 3.96 M/UL — LOW (ref 4.2–5.8)
RBC # FLD: 12.8 % — SIGNIFICANT CHANGE UP (ref 10.3–14.5)
SODIUM SERPL-SCNC: 139 MMOL/L — SIGNIFICANT CHANGE UP (ref 135–145)
WBC # BLD: 6.32 K/UL — SIGNIFICANT CHANGE UP (ref 3.8–10.5)
WBC # FLD AUTO: 6.32 K/UL — SIGNIFICANT CHANGE UP (ref 3.8–10.5)

## 2020-05-14 PROCEDURE — 99232 SBSQ HOSP IP/OBS MODERATE 35: CPT | Mod: GC

## 2020-05-14 PROCEDURE — 99232 SBSQ HOSP IP/OBS MODERATE 35: CPT

## 2020-05-14 RX ORDER — METOCLOPRAMIDE HCL 10 MG
5 TABLET ORAL ONCE
Refills: 0 | Status: COMPLETED | OUTPATIENT
Start: 2020-05-14 | End: 2020-05-14

## 2020-05-14 RX ORDER — INSULIN LISPRO 100/ML
4 VIAL (ML) SUBCUTANEOUS
Refills: 0 | Status: DISCONTINUED | OUTPATIENT
Start: 2020-05-14 | End: 2020-05-15

## 2020-05-14 RX ADMIN — Medication 5 UNIT(S): at 08:46

## 2020-05-14 RX ADMIN — PANTOPRAZOLE SODIUM 40 MILLIGRAM(S): 20 TABLET, DELAYED RELEASE ORAL at 12:36

## 2020-05-14 RX ADMIN — ONDANSETRON 4 MILLIGRAM(S): 8 TABLET, FILM COATED ORAL at 08:52

## 2020-05-14 RX ADMIN — Medication 5 UNIT(S): at 12:22

## 2020-05-14 RX ADMIN — Medication 5 MILLIGRAM(S): at 12:51

## 2020-05-14 RX ADMIN — Medication 4 UNIT(S): at 17:50

## 2020-05-14 RX ADMIN — INSULIN GLARGINE 15 UNIT(S): 100 INJECTION, SOLUTION SUBCUTANEOUS at 22:28

## 2020-05-14 NOTE — PROGRESS NOTE ADULT - ASSESSMENT
· Assessment	  29 year old man with uncontrolled DM1, HLD, possible gastroparesis, admitted with DKA in setting of uncontrolled DM1 with inappropriate dosing of insulin. BG goal 100-180 mg/dL. Patient with multiple admissions for DKA. He is a high risk patient with high level decision making, at high risk of worsening microvascular and macrovascular complications.     DKA Type I:  Resolved  Endo f/up noted.  Lantus/Humalog    Oral thrush:  Nystatin liquid    Esophagitis:  GI f/up noted.  OP Gastric emptying study  Protonix/Sucral fate

## 2020-05-14 NOTE — PROGRESS NOTE ADULT - SUBJECTIVE AND OBJECTIVE BOX
Patient is a 29y old  Male who presents with a chief complaint of DKA (13 May 2020 15:04)      SUBJECTIVE / OVERNIGHT EVENTS:    Events noted.  CONSTITUTIONAL: No fever,  or fatigue  RESPIRATORY: No cough, wheezing,  No shortness of breath  CARDIOVASCULAR: No chest pain, palpitations, dizziness, or leg swelling  GASTROINTESTINAL: Nausea/Abd discomfort  NEUROLOGICAL: No headaches,     MEDICATIONS  (STANDING):  dextrose 5%. 1000 milliLiter(s) (50 mL/Hr) IV Continuous <Continuous>  dextrose 50% Injectable 12.5 Gram(s) IV Push once  dextrose 50% Injectable 25 Gram(s) IV Push once  dextrose 50% Injectable 25 Gram(s) IV Push once  insulin glargine Injectable (LANTUS) 15 Unit(s) SubCutaneous at bedtime  insulin lispro (HumaLOG) corrective regimen sliding scale   SubCutaneous three times a day before meals  insulin lispro (HumaLOG) corrective regimen sliding scale   SubCutaneous at bedtime  insulin lispro Injectable (HumaLOG) 5 Unit(s) SubCutaneous three times a day before meals  nystatin    Suspension [id number] Unit(s) Oral every 6 hours  pantoprazole  Injectable 40 milliGRAM(s) IV Push daily  sodium chloride 0.9%. 1000 milliLiter(s) (100 mL/Hr) IV Continuous <Continuous>  sucralfate 1 Gram(s) Oral <User Schedule>    MEDICATIONS  (PRN):  benzocaine 15 mG/menthol 3.6 mG (Sugar-Free) Lozenge 1 Lozenge Oral every 3 hours PRN Sore Throat  dextrose 40% Gel 15 Gram(s) Oral once PRN Blood Glucose LESS THAN 70 milliGRAM(s)/deciliter  glucagon  Injectable 1 milliGRAM(s) IntraMuscular once PRN Glucose LESS THAN 70 milligrams/deciliter  ondansetron Injectable 4 milliGRAM(s) IV Push every 6 hours PRN Nausea and/or Vomiting        CAPILLARY BLOOD GLUCOSE      POCT Blood Glucose.: 163 mg/dL (13 May 2020 21:11)  POCT Blood Glucose.: 259 mg/dL (13 May 2020 17:08)  POCT Blood Glucose.: 122 mg/dL (13 May 2020 12:37)  POCT Blood Glucose.: 180 mg/dL (13 May 2020 08:18)  POCT Blood Glucose.: 219 mg/dL (13 May 2020 06:58)  POCT Blood Glucose.: 237 mg/dL (13 May 2020 06:02)  POCT Blood Glucose.: 157 mg/dL (13 May 2020 03:48)  POCT Blood Glucose.: 130 mg/dL (13 May 2020 02:25)  POCT Blood Glucose.: 137 mg/dL (12 May 2020 23:37)  POCT Blood Glucose.: 153 mg/dL (12 May 2020 22:54)    I&O's Summary    13 May 2020 07:01  -  13 May 2020 22:26  --------------------------------------------------------  IN: 300 mL / OUT: 950 mL / NET: -650 mL        PHYSICAL EXAM:  GENERAL: NAD  NECK: Supple, No JVD  CHEST/LUNG: Clear to auscultation bilaterally; No wheezing.  HEART: Regular rate and rhythm; No murmurs, rubs, or gallops  ABDOMEN: Soft, Nontender, Nondistended; Bowel sounds present  EXTREMITIES:   No edema  NEUROLOGY: AAO X 3      LABS:                        12.8   11.38 )-----------( 290      ( 13 May 2020 04:50 )             37.6     05-13    140  |  107  |  18  ----------------------------<  172<H>  4.0   |  20<L>  |  0.92    Ca    9.6      13 May 2020 09:48  Phos  1.5     05-13  Mg     1.9     05-13    TPro  6.9  /  Alb  3.9  /  TBili  2.1<H>  /  DBili  x   /  AST  15  /  ALT  19  /  AlkPhos  66  05-13          Urinalysis Basic - ( 12 May 2020 15:07 )    Color: LIGHT YELLOW / Appearance: CLEAR / SG: > 1.040 / pH: 5.5  Gluc: >1000 / Ketone: >150  / Bili: NEGATIVE / Urobili: NORMAL   Blood: NEGATIVE / Protein: 30 / Nitrite: NEGATIVE   Leuk Esterase: NEGATIVE / RBC: 0-2 / WBC 0-2   Sq Epi: OCC / Non Sq Epi: x / Bacteria: NEGATIVE      CAPILLARY BLOOD GLUCOSE      POCT Blood Glucose.: 163 mg/dL (13 May 2020 21:11)  POCT Blood Glucose.: 259 mg/dL (13 May 2020 17:08)  POCT Blood Glucose.: 122 mg/dL (13 May 2020 12:37)  POCT Blood Glucose.: 180 mg/dL (13 May 2020 08:18)  POCT Blood Glucose.: 219 mg/dL (13 May 2020 06:58)  POCT Blood Glucose.: 237 mg/dL (13 May 2020 06:02)  POCT Blood Glucose.: 157 mg/dL (13 May 2020 03:48)  POCT Blood Glucose.: 130 mg/dL (13 May 2020 02:25)  POCT Blood Glucose.: 137 mg/dL (12 May 2020 23:37)  POCT Blood Glucose.: 153 mg/dL (12 May 2020 22:54)    05-12 @ 16:43  Culture-urine --  Culture results   No growth to date.  method type --  Organism --  Organism Identification --  Specimen source .Blood Blood-Peripheral  05-12 @ 16:40  Culture-urine --  Culture results   No growth to date.  method type --  Organism --  Organism Identification --  Specimen source .Blood Blood  05-12 @ 14:30  Culture-urine --  Culture results   No growth  method type --  Organism --  Organism Identification --  Specimen source .Urine Clean Catch (Midstream)           05-12 @ 16:43  Culture blood --  Culture results   No growth to date.  Gram stain --  Gram stain blood --  Method type --  Organism --  Organism identification --  Specimen source .Blood Blood-Peripheral   05-12 @ 16:40  Culture blood --  Culture results   No growth to date.  Gram stain --  Gram stain blood --  Method type --  Organism --  Organism identification --  Specimen source .Blood Blood   05-12 @ 14:30  Culture blood --  Culture results   No growth  Gram stain --  Gram stain blood --  Method type --  Organism --  Organism identification --  Specimen source .Urine Clean Catch (Midstream)      RADIOLOGY & ADDITIONAL TESTS:    Imaging Personally Reviewed:    Consultant(s) Notes Reviewed:      Care Discussed with Consultants/Other Providers:

## 2020-05-14 NOTE — PROGRESS NOTE ADULT - ASSESSMENT
Impression:  1) Nausea, vomiting, sorethroat- S/P EGD in 01/2020 revealing grade A esophagitis, also H pylori gastritis s/p completed triple therapy. Symptoms are likely related to motility disorder (gastroparesis) from uncontrolled DM, exacerbating reflux symptoms. Peptic ulcer disease, severe esophagitis, malignancy ruled out on last EGD. Other differential diagnosis includes functional dyspepsia, IBS, gastroenteritis.   2) Uncontrolled DM  3) History of H pylori gastritis    Recommendations:  -Continue with supportive care with Reglan PRN  -Small frequent, low fat meals as tolerated  -Can consider carafate 1g TID  -Continue PPI  -Please check H pylori stool Ag (though may be falsely negative while taking PPI)  -Pt will benefit from outpatient gastric emptying study for further evaluation   -Please call back with questions Impression:  1) Nausea, vomiting, sorethroat- S/P EGD in 01/2020 revealing grade A esophagitis, also H pylori gastritis s/p completed triple therapy. Symptoms are likely related to motility disorder (gastroparesis) from uncontrolled DM, exacerbating reflux symptoms. Peptic ulcer disease, severe esophagitis, malignancy ruled out on last EGD. Other differential diagnosis includes functional dyspepsia, IBS, gastroenteritis.   2) Uncontrolled DM  3) History of H pylori gastritis    Recommendations:  -As symptoms are improving, no role for repeat EGD   -Continue with supportive care with Reglan PRN  -Small frequent, low fat meals as tolerated  -Can consider carafate 1g TID  -Continue PPI  -Please check H pylori stool Ag (though may be falsely negative while taking PPI)  -Pt will benefit from outpatient gastric emptying study for further evaluation   -Please call back with questions Impression:  1) Nausea, vomiting, sorethroat- S/P EGD in 01/2020 revealing grade A esophagitis, also H pylori gastritis s/p completed triple therapy. Symptoms are likely related to motility disorder (gastroparesis) from uncontrolled DM, exacerbating reflux symptoms. Peptic ulcer disease, severe esophagitis, malignancy ruled out on last EGD. Other differential diagnosis includes functional dyspepsia, IBS, gastroenteritis.   2) Uncontrolled DM  3) History of H pylori gastritis    Recommendations:  -As symptoms are improving and recent EGD performed, no role for repeat EGD   -Continue with supportive care with Reglan PRN  -Small frequent, low fat meals as tolerated  -Can consider carafate 1g TID  -Continue PPI  -Please check H pylori stool Ag (though may be falsely negative while taking PPI)  -Pt will benefit from outpatient gastric emptying study for further evaluation   -Please call back with questions

## 2020-05-14 NOTE — PROGRESS NOTE ADULT - SUBJECTIVE AND OBJECTIVE BOX
Chief Complaint: DM 1    History: Patient seen at bedside  Patient not participating fully in interview; nodding/shaking head to questions, +nausea  Patient endorses nausea but no episodes of vomiting. Reports he hasn't eaten any of his meals today  Most recent BG tightly controlled at 78 mg/dl. No hypoglycemia symptoms   Labs from this AM stable with AG 13, bicarb 23    MEDICATIONS  (STANDING):  dextrose 5%. 1000 milliLiter(s) (50 mL/Hr) IV Continuous <Continuous>  dextrose 50% Injectable 12.5 Gram(s) IV Push once  dextrose 50% Injectable 25 Gram(s) IV Push once  dextrose 50% Injectable 25 Gram(s) IV Push once  insulin glargine Injectable (LANTUS) 15 Unit(s) SubCutaneous at bedtime  insulin lispro (HumaLOG) corrective regimen sliding scale   SubCutaneous three times a day before meals  insulin lispro (HumaLOG) corrective regimen sliding scale   SubCutaneous at bedtime  nystatin    Suspension 339303 Unit(s) Oral every 6 hours  pantoprazole  Injectable 40 milliGRAM(s) IV Push daily  sucralfate 1 Gram(s) Oral <User Schedule>    MEDICATIONS  (PRN):  benzocaine 15 mG/menthol 3.6 mG (Sugar-Free) Lozenge 1 Lozenge Oral every 3 hours PRN Sore Throat  dextrose 40% Gel 15 Gram(s) Oral once PRN Blood Glucose LESS THAN 70 milliGRAM(s)/deciliter  glucagon  Injectable 1 milliGRAM(s) IntraMuscular once PRN Glucose LESS THAN 70 milligrams/deciliter  ondansetron Injectable 4 milliGRAM(s) IV Push every 6 hours PRN Nausea and/or Vomiting    No Known Allergies    Review of Systems:  Cardiovascular: No chest pain  Respiratory: No SOB  GI: +nausea, no vomiting  Endocrine: no hypoglycemia    PHYSICAL EXAM:  VITALS: T(C): 37.3 (05-14-20 @ 11:44)  T(F): 99.2 (05-14-20 @ 11:44), Max: 99.2 (05-14-20 @ 11:44)  HR: 69 (05-14-20 @ 11:44) (69 - 87)  BP: 100/51 (05-14-20 @ 11:44) (100/51 - 118/67)  RR:  (18 - 19)  SpO2:  (97% - 100%)  Wt(kg): --  GENERAL: NAD  EYES: No proptosis, no lid lag, anicteric  HEENT:  Atraumatic, Normocephalic  PSYCH: Alert and oriented x 3, normal affect, normal mood    CAPILLARY BLOOD GLUCOSE    POCT Blood Glucose.: 78 mg/dL (14 May 2020 12:21)  POCT Blood Glucose.: 149 mg/dL (14 May 2020 08:19)  POCT Blood Glucose.: 163 mg/dL (13 May 2020 21:11)  POCT Blood Glucose.: 259 mg/dL (13 May 2020 17:08)      05-14    139  |  103  |  13  ----------------------------<  172<H>  3.5   |  23  |  0.79    EGFR if : 142  EGFR if non : 122    Ca    9.2      05-14  Mg     1.9     05-14  Phos  2.1     05-14    TPro  6.0  /  Alb  3.4  /  TBili  2.1<H>  /  DBili  x   /  AST  12  /  ALT  13  /  AlkPhos  59  05-14      Thyroid Function Tests:  05-12 @ 10:15 TSH 1.04 FreeT4 -- T3 -- Anti TPO -- Anti Thyroglobulin Ab -- TSI --      A1C with Estimated Average Glucose: 10.2 % (05-13-20 @ 04:30)  Hemoglobin A1C, Whole Blood: 10.0 % (03-23-20 @ 05:45)  Hemoglobin A1C, Whole Blood: 14.5 % (12-28-19 @ 02:05)

## 2020-05-14 NOTE — PROGRESS NOTE ADULT - SUBJECTIVE AND OBJECTIVE BOX
Chief Complaint:  Patient is a 29y old  Male who presents with a chief complaint of DKA (13 May 2020 16:24)      Interval Events: No acute overnight events.   ROS: All 12 point system except listed above were otherwise negative.    Allergies:  No Known Allergies        Hospital Medications:  benzocaine 15 mG/menthol 3.6 mG (Sugar-Free) Lozenge 1 Lozenge Oral every 3 hours PRN  dextrose 40% Gel 15 Gram(s) Oral once PRN  dextrose 5%. 1000 milliLiter(s) IV Continuous <Continuous>  dextrose 50% Injectable 12.5 Gram(s) IV Push once  dextrose 50% Injectable 25 Gram(s) IV Push once  dextrose 50% Injectable 25 Gram(s) IV Push once  glucagon  Injectable 1 milliGRAM(s) IntraMuscular once PRN  insulin glargine Injectable (LANTUS) 15 Unit(s) SubCutaneous at bedtime  insulin lispro (HumaLOG) corrective regimen sliding scale   SubCutaneous three times a day before meals  insulin lispro (HumaLOG) corrective regimen sliding scale   SubCutaneous at bedtime  insulin lispro Injectable (HumaLOG) 5 Unit(s) SubCutaneous three times a day before meals  nystatin    Suspension [id number] Unit(s) Oral every 6 hours  ondansetron Injectable 4 milliGRAM(s) IV Push every 6 hours PRN  pantoprazole  Injectable 40 milliGRAM(s) IV Push daily  sucralfate 1 Gram(s) Oral <User Schedule>      PMHX/PSHX:  Diabetes  No significant past surgical history      Family history:  FH: diabetes mellitus    There is no family history of peptic ulcer disease, gastric cancer, colon polyps, colon cancer, celiac disease, biliary, hepatic, or pancreatic disease.  None of the female relatives have breast, uterine, or ovarian cancer.     PHYSICAL EXAM:   Vital Signs:  Vital Signs Last 24 Hrs  T(C): 37.1 (14 May 2020 05:01), Max: 37.2 (13 May 2020 08:20)  T(F): 98.7 (14 May 2020 05:01), Max: 98.9 (13 May 2020 08:20)  HR: 87 (14 May 2020 05:01) (85 - 91)  BP: 118/67 (14 May 2020 05:01) (111/64 - 123/71)  BP(mean): --  RR: 18 (14 May 2020 05:01) (17 - 18)  SpO2: 100% (14 May 2020 05:01) (100% - 100%)  Daily Height in cm: 185.42 (13 May 2020 10:22)    Daily       PHYSICAL EXAM:     GENERAL:  Appears stated age, well-groomed  HEENT:  NC/AT,  conjunctivae clear and pink, no thyromegaly  CHEST:  Full & symmetric excursion, no increased effort  HEART:  Regular rhythm, S1, S2, no murmur/rub/S3/S4  ABDOMEN:  Soft, non-tender, non-distended  EXTEREMITIES:  no cyanosis,clubbing or edema  SKIN:  No rash/erythema/ecchymoses  NEURO:  Alert, oriented, no asterixis    LABS:                        12.8   11.38 )-----------( 290      ( 13 May 2020 04:50 )             37.6       05-13    140  |  107  |  18  ----------------------------<  172<H>  4.0   |  20<L>  |  0.92    Ca    9.6      13 May 2020 09:48  Phos  1.5     05-13  Mg     1.9     05-13    TPro  6.9  /  Alb  3.9  /  TBili  2.1<H>  /  DBili  x   /  AST  15  /  ALT  19  /  AlkPhos  66  05-13    LIVER FUNCTIONS - ( 13 May 2020 04:50 )  Alb: 3.9 g/dL / Pro: 6.9 g/dL / ALK PHOS: 66 u/L / ALT: 19 u/L / AST: 15 u/L / GGT: x             Urinalysis Basic - ( 12 May 2020 15:07 )    Color: LIGHT YELLOW / Appearance: CLEAR / SG: > 1.040 / pH: 5.5  Gluc: >1000 / Ketone: >150  / Bili: NEGATIVE / Urobili: NORMAL   Blood: NEGATIVE / Protein: 30 / Nitrite: NEGATIVE   Leuk Esterase: NEGATIVE / RBC: 0-2 / WBC 0-2   Sq Epi: OCC / Non Sq Epi: x / Bacteria: NEGATIVE                              12.8   11.38 )-----------( 290      ( 13 May 2020 04:50 )             37.6                         14.6   17.12 )-----------( 339      ( 12 May 2020 10:15 )             42.8     Imaging: Chief Complaint:  Patient is a 29y old  Male who presents with a chief complaint of DKA (13 May 2020 16:24)      Interval Events: No acute overnight events. Pt states that he is still having nausea but improved from admission. Pt was able to tolerate some PO. The 'dry throat' sensation is improved.   ROS: All 12 point system except listed above were otherwise negative.    Allergies:  No Known Allergies        Hospital Medications:  benzocaine 15 mG/menthol 3.6 mG (Sugar-Free) Lozenge 1 Lozenge Oral every 3 hours PRN  dextrose 40% Gel 15 Gram(s) Oral once PRN  dextrose 5%. 1000 milliLiter(s) IV Continuous <Continuous>  dextrose 50% Injectable 12.5 Gram(s) IV Push once  dextrose 50% Injectable 25 Gram(s) IV Push once  dextrose 50% Injectable 25 Gram(s) IV Push once  glucagon  Injectable 1 milliGRAM(s) IntraMuscular once PRN  insulin glargine Injectable (LANTUS) 15 Unit(s) SubCutaneous at bedtime  insulin lispro (HumaLOG) corrective regimen sliding scale   SubCutaneous three times a day before meals  insulin lispro (HumaLOG) corrective regimen sliding scale   SubCutaneous at bedtime  insulin lispro Injectable (HumaLOG) 5 Unit(s) SubCutaneous three times a day before meals  nystatin    Suspension [id number] Unit(s) Oral every 6 hours  ondansetron Injectable 4 milliGRAM(s) IV Push every 6 hours PRN  pantoprazole  Injectable 40 milliGRAM(s) IV Push daily  sucralfate 1 Gram(s) Oral <User Schedule>      PMHX/PSHX:  Diabetes  No significant past surgical history      Family history:  FH: diabetes mellitus    There is no family history of peptic ulcer disease, gastric cancer, colon polyps, colon cancer, celiac disease, biliary, hepatic, or pancreatic disease.  None of the female relatives have breast, uterine, or ovarian cancer.     PHYSICAL EXAM:   Vital Signs:  Vital Signs Last 24 Hrs  T(C): 37.1 (14 May 2020 05:01), Max: 37.2 (13 May 2020 08:20)  T(F): 98.7 (14 May 2020 05:01), Max: 98.9 (13 May 2020 08:20)  HR: 87 (14 May 2020 05:01) (85 - 91)  BP: 118/67 (14 May 2020 05:01) (111/64 - 123/71)  BP(mean): --  RR: 18 (14 May 2020 05:01) (17 - 18)  SpO2: 100% (14 May 2020 05:01) (100% - 100%)  Daily Height in cm: 185.42 (13 May 2020 10:22)    Daily       PHYSICAL EXAM:     GENERAL:  Appears stated age, well-groomed  HEENT:  NC/AT,  conjunctivae clear and pink, no thyromegaly  CHEST:  Full & symmetric excursion, no increased effort  HEART:  Regular rhythm, S1, S2, no murmur/rub/S3/S4  ABDOMEN:  Soft, non-tender, non-distended  EXTEREMITIES:  no cyanosis,clubbing or edema  SKIN:  No rash/erythema/ecchymoses  NEURO:  Alert, oriented, no asterixis    LABS:                        12.8   11.38 )-----------( 290      ( 13 May 2020 04:50 )             37.6       05-13    140  |  107  |  18  ----------------------------<  172<H>  4.0   |  20<L>  |  0.92    Ca    9.6      13 May 2020 09:48  Phos  1.5     05-13  Mg     1.9     05-13    TPro  6.9  /  Alb  3.9  /  TBili  2.1<H>  /  DBili  x   /  AST  15  /  ALT  19  /  AlkPhos  66  05-13    LIVER FUNCTIONS - ( 13 May 2020 04:50 )  Alb: 3.9 g/dL / Pro: 6.9 g/dL / ALK PHOS: 66 u/L / ALT: 19 u/L / AST: 15 u/L / GGT: x             Urinalysis Basic - ( 12 May 2020 15:07 )    Color: LIGHT YELLOW / Appearance: CLEAR / SG: > 1.040 / pH: 5.5  Gluc: >1000 / Ketone: >150  / Bili: NEGATIVE / Urobili: NORMAL   Blood: NEGATIVE / Protein: 30 / Nitrite: NEGATIVE   Leuk Esterase: NEGATIVE / RBC: 0-2 / WBC 0-2   Sq Epi: OCC / Non Sq Epi: x / Bacteria: NEGATIVE                              12.8   11.38 )-----------( 290      ( 13 May 2020 04:50 )             37.6                         14.6   17.12 )-----------( 339      ( 12 May 2020 10:15 )             42.8     Imaging:

## 2020-05-14 NOTE — PROGRESS NOTE ADULT - ASSESSMENT
29 year old man with uncontrolled DM1, HLD, possible gastroparesis, admitted with DKA in setting of uncontrolled DM 1, inappropriately taking mixed insulin at home. Patient with multiple admissions for DKA. Of note, HbA1c in December 2019 was 14.5%, this admission now improved to 10.2%. He is a high risk patient with high level decision making, at high risk of worsening microvascular and macrovascular complications.     1. Uncontrolled Type 1 diabetes mellitus with hyperglycemia   -Patient with uncontrolled DM 1, A1c 10.2%, with multiple admissions due to DKA. Goal A1c less than 7%  -Inpatient BG target 100-180 mg/dl, patient below target at lunch in setting of low PO intake/nausea.   -Recommend to decrease Humalog slightly to 4 units SQ TID before meals (Hold if NPO/not eating meal)- it is ok to hold this dose if he is not eating meal, also would hold if eating less than 25% of meal.  -Continue with Lantus 15 units SQ qHS, fasting glucose at goal  -DO NOT HOLD LANTUS- risk for DKA. If patient hypoglycemic or tightly controlled when Lantus dose is due, RN should contact primary MD for adjustment. Would recommend 20-30% decrease in dose if BG trending low, instead of holding completely.   -Continue Humalog LOW dose correctional scale before meals and LOW dose correction at bedtime  -Consistent carbohydrate diet  -Check BG before meals and bedtime    Discharge Plan:  -On previous admission, patient was uninsured and was discharged on mixed insulin - ReliOn 70/30 insulin PENS (from [facility]). NOW, patient has active MEDICAID. Plan for discharge is basal/bolus insulin PENS, with dose TBD  -Please ensure patient has supplies including glucometer, glucose test strips, lancets, alcohol swabs and insulin PEN needles (enough for 4x per day injections)   -Patient is being followed by our team pharmacy liaison. See pharmacy intervention notes for medication counseling & DM education provided.   -Follow up with [facility], [id number] [facility] [address], [phone]  Appointment scheduled for Tuesday 5/19/20 at 9:00 AM  *Please reschedule if patient will still be inpatient    2. DKA  -Now resolved, most recent labs reflect AG 13 and bicarb 23  -Please do not hold basal insulin due to risk for repeat DKA    3. Essential hypertension  -BP at goal less than 130/80  -Not currently on antihypertensives   -Continue to monitor BP     4. Hyperlipidemia, unspecified hyperlipidemia type  -LDL 74 in December 2019  -Defer treatment with statin given his age and LDL level  -Recommend low cholesterol, DASH, consistent carbohydrate diet    Reena Franco  Nurse Practitioner  Division of Endocrinology & Diabetes  In house pager #[phone]/long range pager #[phone]    If before 9AM or after 6PM, or on weekends/holidays, please call endocrine answering service for assistance ([phone]).  For nonurgent matters email [email or web address] for assistance.

## 2020-05-15 LAB
GLUCOSE BLDC GLUCOMTR-MCNC: 102 MG/DL — HIGH (ref 70–99)
GLUCOSE BLDC GLUCOMTR-MCNC: 122 MG/DL — HIGH (ref 70–99)
GLUCOSE BLDC GLUCOMTR-MCNC: 233 MG/DL — HIGH (ref 70–99)
GLUCOSE BLDC GLUCOMTR-MCNC: 238 MG/DL — HIGH (ref 70–99)
GLUCOSE BLDC GLUCOMTR-MCNC: 70 MG/DL — SIGNIFICANT CHANGE UP (ref 70–99)
GLUCOSE BLDC GLUCOMTR-MCNC: 71 MG/DL — SIGNIFICANT CHANGE UP (ref 70–99)

## 2020-05-15 PROCEDURE — 99232 SBSQ HOSP IP/OBS MODERATE 35: CPT

## 2020-05-15 RX ORDER — INSULIN LISPRO 100/ML
3 VIAL (ML) SUBCUTANEOUS
Refills: 0 | Status: DISCONTINUED | OUTPATIENT
Start: 2020-05-16 | End: 2020-05-13

## 2020-05-15 RX ORDER — INSULIN LISPRO 100/ML
4 VIAL (ML) SUBCUTANEOUS
Refills: 0 | Status: DISCONTINUED | OUTPATIENT
Start: 2020-05-15 | End: 2020-05-13

## 2020-05-15 RX ORDER — INSULIN LISPRO 100/ML
4 VIAL (ML) SUBCUTANEOUS
Refills: 0 | Status: DISCONTINUED | OUTPATIENT
Start: 2020-05-16 | End: 2020-05-13

## 2020-05-15 RX ORDER — SUCRALFATE 1 G
1 TABLET ORAL
Qty: 90 | Refills: 0
Start: 2020-05-15 | End: 2020-06-13

## 2020-05-15 RX ORDER — INSULIN GLARGINE 100 [IU]/ML
14 INJECTION, SOLUTION SUBCUTANEOUS AT BEDTIME
Refills: 0 | Status: DISCONTINUED | OUTPATIENT
Start: 2020-05-15 | End: 2020-05-13

## 2020-05-15 RX ADMIN — Medication 4 UNIT(S): at 17:59

## 2020-05-15 RX ADMIN — Medication 1 GRAM(S): at 09:13

## 2020-05-15 RX ADMIN — INSULIN GLARGINE 14 UNIT(S): 100 INJECTION, SOLUTION SUBCUTANEOUS at 22:55

## 2020-05-15 RX ADMIN — Medication 500000 UNIT(S): at 17:58

## 2020-05-15 RX ADMIN — Medication 4 UNIT(S): at 09:13

## 2020-05-15 RX ADMIN — PANTOPRAZOLE SODIUM 40 MILLIGRAM(S): 20 TABLET, DELAYED RELEASE ORAL at 12:47

## 2020-05-15 RX ADMIN — Medication 1 GRAM(S): at 17:58

## 2020-05-15 RX ADMIN — Medication 500000 UNIT(S): at 12:47

## 2020-05-15 RX ADMIN — Medication 500000 UNIT(S): at 04:53

## 2020-05-15 RX ADMIN — Medication 2: at 17:59

## 2020-05-15 NOTE — PROGRESS NOTE ADULT - ATTENDING COMMENTS
Helena Murphy MD   Pager # [phone]  On evenings and weekends, please call the office at [phone] or page endocrine fellow on call. Please note that this patient may be followed by different provider tomorrow. If no answer, contact the office.

## 2020-05-15 NOTE — PROGRESS NOTE ADULT - SUBJECTIVE AND OBJECTIVE BOX
Chief Complaint: f/u DM1    History:  Patient seen at bedside this morning. States that he feels much better today; does not have abdominal pain, nausea, vomiting. Reports eating better today as well, had most of his breakfast. However, FS before lunch today in the 70's.     MEDICATIONS  (STANDING):  dextrose 5%. 1000 milliLiter(s) (50 mL/Hr) IV Continuous <Continuous>  dextrose 50% Injectable 12.5 Gram(s) IV Push once  dextrose 50% Injectable 25 Gram(s) IV Push once  dextrose 50% Injectable 25 Gram(s) IV Push once  insulin glargine Injectable (LANTUS) 15 Unit(s) SubCutaneous at bedtime  insulin lispro (HumaLOG) corrective regimen sliding scale   SubCutaneous three times a day before meals  insulin lispro (HumaLOG) corrective regimen sliding scale   SubCutaneous at bedtime  insulin lispro Injectable (HumaLOG) 4 Unit(s) SubCutaneous three times a day before meals  nystatin    Suspension 773495 Unit(s) Oral every 6 hours  pantoprazole  Injectable 40 milliGRAM(s) IV Push daily  sucralfate 1 Gram(s) Oral <User Schedule>    MEDICATIONS  (PRN):  benzocaine 15 mG/menthol 3.6 mG (Sugar-Free) Lozenge 1 Lozenge Oral every 3 hours PRN Sore Throat  dextrose 40% Gel 15 Gram(s) Oral once PRN Blood Glucose LESS THAN 70 milliGRAM(s)/deciliter  glucagon  Injectable 1 milliGRAM(s) IntraMuscular once PRN Glucose LESS THAN 70 milligrams/deciliter  ondansetron Injectable 4 milliGRAM(s) IV Push every 6 hours PRN Nausea and/or Vomiting      Allergies  No Known Allergies    Review of Systems:  ALL OTHER SYSTEMS REVIEWED AND NEGATIVE    PHYSICAL EXAM:  VITALS: T(C): 37.1 (05-15-20 @ 10:26)  T(F): 98.7 (05-15-20 @ 10:26), Max: 99.2 (05-15-20 @ 05:00)  HR: 68 (05-15-20 @ 10:26) (64 - 74)  BP: 94/62 (05-15-20 @ 10:26) (94/62 - 110/74)  RR:  (16 - 18)  SpO2:  (97% - 100%)  Wt(kg): --  GENERAL: NAD, well-developed  EYES: No proptosis, anicteric  HEENT:  Atraumatic, Normocephalic  PSYCH: Alert and oriented x 3, reactive affect, euthymic mood     POCT Blood Glucose.: 70 mg/dL (05-15-20 @ 12:07)  POCT Blood Glucose.: 71 mg/dL (05-15-20 @ 12:05)  POCT Blood Glucose.: 102 mg/dL (05-15-20 @ 08:42) H 4  POCT Blood Glucose.: 108 mg/dL (05-14-20 @ 22:21) L 15  POCT Blood Glucose.: 149 mg/dL (05-14-20 @ 17:04) H 4  POCT Blood Glucose.: 78 mg/dL (05-14-20 @ 12:21) H 5  POCT Blood Glucose.: 149 mg/dL (05-14-20 @ 08:19) H 5   POCT Blood Glucose.: 163 mg/dL (05-13-20 @ 21:11)  POCT Blood Glucose.: 259 mg/dL (05-13-20 @ 17:08)  POCT Blood Glucose.: 122 mg/dL (05-13-20 @ 12:37)  POCT Blood Glucose.: 180 mg/dL (05-13-20 @ 08:18)  POCT Blood Glucose.: 219 mg/dL (05-13-20 @ 06:58)  POCT Blood Glucose.: 237 mg/dL (05-13-20 @ 06:02)  POCT Blood Glucose.: 157 mg/dL (05-13-20 @ 03:48)  POCT Blood Glucose.: 130 mg/dL (05-13-20 @ 02:25)  POCT Blood Glucose.: 137 mg/dL (05-12-20 @ 23:37)  POCT Blood Glucose.: 153 mg/dL (05-12-20 @ 22:54)  POCT Blood Glucose.: 161 mg/dL (05-12-20 @ 22:03)  POCT Blood Glucose.: 130 mg/dL (05-12-20 @ 20:59)  POCT Blood Glucose.: 132 mg/dL (05-12-20 @ 20:00)  POCT Blood Glucose.: 130 mg/dL (05-12-20 @ 18:56)  POCT Blood Glucose.: 145 mg/dL (05-12-20 @ 18:01)  POCT Blood Glucose.: 132 mg/dL (05-12-20 @ 17:20)  POCT Blood Glucose.: 129 mg/dL (05-12-20 @ 16:09)  POCT Blood Glucose.: 208 mg/dL (05-12-20 @ 14:57)  POCT Blood Glucose.: 278 mg/dL (05-12-20 @ 14:05)      05-14    139  |  103  |  13  ----------------------------<  172<H>  3.5   |  23  |  0.79    EGFR if : 142  EGFR if non : 122    Ca    9.2      05-14  Mg     1.9     05-14  Phos  2.1     05-14    TPro  6.0  /  Alb  3.4  /  TBili  2.1<H>  /  DBili  x   /  AST  12  /  ALT  13  /  AlkPhos  59  05-14          Thyroid Function Tests:  05-12 @ 10:15 TSH 1.04 FreeT4 -- T3 -- Anti TPO -- Anti Thyroglobulin Ab -- TSI --

## 2020-05-15 NOTE — PROGRESS NOTE ADULT - ASSESSMENT
29 year old man with uncontrolled DM1, HLD, possible gastroparesis, admitted with DKA in setting of uncontrolled DM 1, inappropriately taking mixed insulin at home. Patient with multiple admissions for DKA. Of note, HbA1c in December 2019 was 14.5%, this admission now improved to 10.2%.    1. Uncontrolled Type 1 diabetes mellitus with hyperglycemia   -Patient with uncontrolled DM 1, A1c 10.2%, with multiple admissions due to DKA. Goal A1c less than 7%  -Inpatient BG target 100-180 mg/dl  -Recommend to decrease Humalog to 3 units before breakfast and c/w Humalog 4 units before lunch and dinner (Hold if NPO/not eating meal) - it is ok to hold dose if he is not eating meal, also would hold if eating less than 25% of meal.  -Decrease Lantus slightly to 15 units SQ qHS  -DO NOT HOLD LANTUS- at risk for DKA. If patient hypoglycemic or tightly controlled when Lantus dose is due, RN should contact primary MD for adjustment. Would recommend 20% decrease in dose if BG trending low, instead of holding completely.   -Continue Humalog LOW dose correctional scale before meals and LOW dose correction at bedtime  -Consistent carbohydrate diet  -Check BG before meals and bedtime    Discharge Plan:  -On previous admission, patient was uninsured and was discharged on mixed insulin - ReliOn 70/30 insulin PENS (from [facility]). NOW, patient has active MEDICAID. Plan for discharge is basal/bolus insulin PENS, with dose TBD  -Please ensure patient has supplies including glucometer, glucose test strips, lancets, alcohol swabs and insulin PEN needles (enough for 4x per day injections)   -Patient is being followed by our team pharmacy liaison. See pharmacy intervention notes for medication counseling & DM education provided.   -Follow up with [facility], [id number] [facility] [address], [phone]  Appointment scheduled for Tuesday 5/19/20 at 9:00 AM  *Please reschedule if patient will still be inpatient    2. DKA  -Please do not hold basal insulin due to risk for repeat DKA    3. Essential hypertension  -BP at goal less than 130/80  -Not currently on antihypertensives   -Continue to monitor BP     4. Hyperlipidemia, unspecified hyperlipidemia type  -LDL 74 in December 2019  -Defer treatment with statin given his age and LDL level  -Recommend low cholesterol, DASH, consistent carbohydrate diet

## 2020-05-15 NOTE — PROGRESS NOTE ADULT - SUBJECTIVE AND OBJECTIVE BOX
Patient is a 29y old  Male who presents with a chief complaint of DKA (15 May 2020 12:47)      SUBJECTIVE / OVERNIGHT EVENTS:    Events noted.  CONSTITUTIONAL: No fever,  or fatigue  RESPIRATORY: No cough, wheezing,  No shortness of breath  CARDIOVASCULAR: No chest pain, palpitations, dizziness, or leg swelling  GASTROINTESTINAL:  Nausea  NEUROLOGICAL: No headaches,     MEDICATIONS  (STANDING):  dextrose 5%. 1000 milliLiter(s) (50 mL/Hr) IV Continuous <Continuous>  dextrose 50% Injectable 12.5 Gram(s) IV Push once  dextrose 50% Injectable 25 Gram(s) IV Push once  dextrose 50% Injectable 25 Gram(s) IV Push once  insulin glargine Injectable (LANTUS) 14 Unit(s) SubCutaneous at bedtime  insulin lispro (HumaLOG) corrective regimen sliding scale   SubCutaneous at bedtime  insulin lispro (HumaLOG) corrective regimen sliding scale   SubCutaneous three times a day before meals  insulin lispro Injectable (HumaLOG) 3 Unit(s) SubCutaneous before breakfast  insulin lispro Injectable (HumaLOG) 4 Unit(s) SubCutaneous before lunch  insulin lispro Injectable (HumaLOG) 4 Unit(s) SubCutaneous before dinner  nystatin    Suspension [id number] Unit(s) Oral every 6 hours  pantoprazole  Injectable 40 milliGRAM(s) IV Push daily  sucralfate 1 Gram(s) Oral <User Schedule>    MEDICATIONS  (PRN):  benzocaine 15 mG/menthol 3.6 mG (Sugar-Free) Lozenge 1 Lozenge Oral every 3 hours PRN Sore Throat  dextrose 40% Gel 15 Gram(s) Oral once PRN Blood Glucose LESS THAN 70 milliGRAM(s)/deciliter  glucagon  Injectable 1 milliGRAM(s) IntraMuscular once PRN Glucose LESS THAN 70 milligrams/deciliter  ondansetron Injectable 4 milliGRAM(s) IV Push every 6 hours PRN Nausea and/or Vomiting        CAPILLARY BLOOD GLUCOSE      POCT Blood Glucose.: 238 mg/dL (15 May 2020 22:31)  POCT Blood Glucose.: 233 mg/dL (15 May 2020 17:42)  POCT Blood Glucose.: 122 mg/dL (15 May 2020 13:15)  POCT Blood Glucose.: 70 mg/dL (15 May 2020 12:07)  POCT Blood Glucose.: 71 mg/dL (15 May 2020 12:05)  POCT Blood Glucose.: 102 mg/dL (15 May 2020 08:42)    I&O's Summary    15 May 2020 07:01  -  16 May 2020 00:11  --------------------------------------------------------  IN: 120 mL / OUT: 0 mL / NET: 120 mL        PHYSICAL EXAM:  GENERAL: NAD  NECK: Supple, No JVD  CHEST/LUNG: Clear to auscultation bilaterally; No wheezing.  HEART: Regular rate and rhythm; No murmurs, rubs, or gallops  ABDOMEN: Soft, Nontender, Nondistended; Bowel sounds present  EXTREMITIES:   No edema  NEUROLOGY: AAO X 3      LABS:                        11.5   6.32  )-----------( 253      ( 14 May 2020 06:36 )             34.5     05-14    139  |  103  |  13  ----------------------------<  172<H>  3.5   |  23  |  0.79    Ca    9.2      14 May 2020 06:36  Phos  2.1     05-14  Mg     1.9     05-14    TPro  6.0  /  Alb  3.4  /  TBili  2.1<H>  /  DBili  x   /  AST  12  /  ALT  13  /  AlkPhos  59  05-14            CAPILLARY BLOOD GLUCOSE      POCT Blood Glucose.: 238 mg/dL (15 May 2020 22:31)  POCT Blood Glucose.: 233 mg/dL (15 May 2020 17:42)  POCT Blood Glucose.: 122 mg/dL (15 May 2020 13:15)  POCT Blood Glucose.: 70 mg/dL (15 May 2020 12:07)  POCT Blood Glucose.: 71 mg/dL (15 May 2020 12:05)  POCT Blood Glucose.: 102 mg/dL (15 May 2020 08:42)    05-12 @ 16:43  Culture-urine --  Culture results   No growth to date.  method type --  Organism --  Organism Identification --  Specimen source .Blood Blood-Peripheral  05-12 @ 16:40  Culture-urine --  Culture results   No growth to date.  method type --  Organism --  Organism Identification --  Specimen source .Blood Blood  05-12 @ 14:30  Culture-urine --  Culture results   No growth  method type --  Organism --  Organism Identification --  Specimen source .Urine Clean Catch (Midstream)           05-12 @ 16:43  Culture blood --  Culture results   No growth to date.  Gram stain --  Gram stain blood --  Method type --  Organism --  Organism identification --  Specimen source .Blood Blood-Peripheral   05-12 @ 16:40  Culture blood --  Culture results   No growth to date.  Gram stain --  Gram stain blood --  Method type --  Organism --  Organism identification --  Specimen source .Blood Blood   05-12 @ 14:30  Culture blood --  Culture results   No growth  Gram stain --  Gram stain blood --  Method type --  Organism --  Organism identification --  Specimen source .Urine Clean Catch (Midstream)      RADIOLOGY & ADDITIONAL TESTS:    Imaging Personally Reviewed:    Consultant(s) Notes Reviewed:      Care Discussed with Consultants/Other Providers:

## 2020-05-15 NOTE — CHART NOTE - NSCHARTNOTEFT_GEN_A_CORE
d/w pharmacy lantus not covered , unable to verify insurance information   endo paged await callback

## 2020-05-16 ENCOUNTER — TRANSCRIPTION ENCOUNTER (OUTPATIENT)
Age: 30
End: 2020-05-16

## 2020-05-16 VITALS
HEART RATE: 77 BPM | TEMPERATURE: 99 F | DIASTOLIC BLOOD PRESSURE: 57 MMHG | SYSTOLIC BLOOD PRESSURE: 96 MMHG | RESPIRATION RATE: 18 BRPM | OXYGEN SATURATION: 99 %

## 2020-05-16 LAB
ANION GAP SERPL CALC-SCNC: 12 MMO/L — SIGNIFICANT CHANGE UP (ref 7–14)
BASOPHILS # BLD AUTO: 0.05 K/UL — SIGNIFICANT CHANGE UP (ref 0–0.2)
BASOPHILS NFR BLD AUTO: 1.1 % — SIGNIFICANT CHANGE UP (ref 0–2)
BUN SERPL-MCNC: 11 MG/DL — SIGNIFICANT CHANGE UP (ref 7–23)
CALCIUM SERPL-MCNC: 9.1 MG/DL — SIGNIFICANT CHANGE UP (ref 8.4–10.5)
CHLORIDE SERPL-SCNC: 99 MMOL/L — SIGNIFICANT CHANGE UP (ref 98–107)
CO2 SERPL-SCNC: 25 MMOL/L — SIGNIFICANT CHANGE UP (ref 22–31)
CREAT SERPL-MCNC: 0.73 MG/DL — SIGNIFICANT CHANGE UP (ref 0.5–1.3)
EOSINOPHIL # BLD AUTO: 0.1 K/UL — SIGNIFICANT CHANGE UP (ref 0–0.5)
EOSINOPHIL NFR BLD AUTO: 2.2 % — SIGNIFICANT CHANGE UP (ref 0–6)
GLUCOSE BLDC GLUCOMTR-MCNC: 184 MG/DL — HIGH (ref 70–99)
GLUCOSE BLDC GLUCOMTR-MCNC: 222 MG/DL — HIGH (ref 70–99)
GLUCOSE SERPL-MCNC: 208 MG/DL — HIGH (ref 70–99)
HCT VFR BLD CALC: 35.4 % — LOW (ref 39–50)
HGB BLD-MCNC: 12.4 G/DL — LOW (ref 13–17)
IMM GRANULOCYTES NFR BLD AUTO: 0 % — SIGNIFICANT CHANGE UP (ref 0–1.5)
LYMPHOCYTES # BLD AUTO: 3.06 K/UL — SIGNIFICANT CHANGE UP (ref 1–3.3)
LYMPHOCYTES # BLD AUTO: 65.8 % — HIGH (ref 13–44)
MAGNESIUM SERPL-MCNC: 1.8 MG/DL — SIGNIFICANT CHANGE UP (ref 1.6–2.6)
MCHC RBC-ENTMCNC: 29.7 PG — SIGNIFICANT CHANGE UP (ref 27–34)
MCHC RBC-ENTMCNC: 35 % — SIGNIFICANT CHANGE UP (ref 32–36)
MCV RBC AUTO: 84.9 FL — SIGNIFICANT CHANGE UP (ref 80–100)
MONOCYTES # BLD AUTO: 0.29 K/UL — SIGNIFICANT CHANGE UP (ref 0–0.9)
MONOCYTES NFR BLD AUTO: 6.2 % — SIGNIFICANT CHANGE UP (ref 2–14)
NEUTROPHILS # BLD AUTO: 1.15 K/UL — LOW (ref 1.8–7.4)
NEUTROPHILS NFR BLD AUTO: 24.7 % — LOW (ref 43–77)
NRBC # FLD: 0 K/UL — SIGNIFICANT CHANGE UP (ref 0–0)
PHOSPHATE SERPL-MCNC: 2.9 MG/DL — SIGNIFICANT CHANGE UP (ref 2.5–4.5)
PLATELET # BLD AUTO: 238 K/UL — SIGNIFICANT CHANGE UP (ref 150–400)
PMV BLD: 10 FL — SIGNIFICANT CHANGE UP (ref 7–13)
POTASSIUM SERPL-MCNC: 3.2 MMOL/L — LOW (ref 3.5–5.3)
POTASSIUM SERPL-SCNC: 3.2 MMOL/L — LOW (ref 3.5–5.3)
RBC # BLD: 4.17 M/UL — LOW (ref 4.2–5.8)
RBC # FLD: 12.1 % — SIGNIFICANT CHANGE UP (ref 10.3–14.5)
SODIUM SERPL-SCNC: 136 MMOL/L — SIGNIFICANT CHANGE UP (ref 135–145)
WBC # BLD: 4.65 K/UL — SIGNIFICANT CHANGE UP (ref 3.8–10.5)
WBC # FLD AUTO: 4.65 K/UL — SIGNIFICANT CHANGE UP (ref 3.8–10.5)

## 2020-05-16 RX ORDER — PANTOPRAZOLE SODIUM 20 MG/1
1 TABLET, DELAYED RELEASE ORAL
Qty: 30 | Refills: 0
Start: 2020-05-16 | End: 2020-06-14

## 2020-05-16 RX ORDER — SUCRALFATE 1 G
1 TABLET ORAL
Qty: 90 | Refills: 0
Start: 2020-05-16 | End: 2020-06-14

## 2020-05-16 RX ORDER — ISOPROPYL ALCOHOL, BENZOCAINE .7; .06 ML/ML; ML/ML
1 SWAB TOPICAL
Qty: 100 | Refills: 1
Start: 2020-05-16 | End: 2020-07-04

## 2020-05-16 RX ORDER — NYSTATIN 500MM UNIT
5 POWDER (EA) MISCELLANEOUS
Qty: 280 | Refills: 0
Start: 2020-05-16 | End: 2020-05-29

## 2020-05-16 RX ORDER — ENOXAPARIN SODIUM 100 MG/ML
14 INJECTION SUBCUTANEOUS
Qty: 4 | Refills: 0
Start: 2020-05-16 | End: 2020-06-14

## 2020-05-16 RX ORDER — POTASSIUM CHLORIDE 20 MEQ
40 PACKET (EA) ORAL EVERY 4 HOURS
Refills: 0 | Status: COMPLETED | OUTPATIENT
Start: 2020-05-16 | End: 2020-05-16

## 2020-05-16 RX ORDER — INSULIN LISPRO 100/ML
4 VIAL (ML) SUBCUTANEOUS
Qty: 3 | Refills: 0
Start: 2020-05-16

## 2020-05-16 RX ADMIN — Medication 1 GRAM(S): at 08:50

## 2020-05-16 RX ADMIN — Medication 1 GRAM(S): at 12:31

## 2020-05-16 RX ADMIN — Medication 500000 UNIT(S): at 00:20

## 2020-05-16 RX ADMIN — Medication 4 UNIT(S): at 12:30

## 2020-05-16 RX ADMIN — PANTOPRAZOLE SODIUM 40 MILLIGRAM(S): 20 TABLET, DELAYED RELEASE ORAL at 12:31

## 2020-05-16 RX ADMIN — Medication 2: at 12:30

## 2020-05-16 RX ADMIN — Medication 500000 UNIT(S): at 12:30

## 2020-05-16 RX ADMIN — Medication 500000 UNIT(S): at 05:39

## 2020-05-16 RX ADMIN — Medication 40 MILLIEQUIVALENT(S): at 08:49

## 2020-05-16 RX ADMIN — Medication 40 MILLIEQUIVALENT(S): at 12:30

## 2020-05-16 NOTE — DISCHARGE NOTE NURSING/CASE MANAGEMENT/SOCIAL WORK - NSDCPNINST_GEN_ALL_CORE
watch for signs of hypoglycemia such as Excessive sweating, Tiredness, lightheadedness, Feeling dizzy and weak, Blurred vision and Confusion  watch for signs of hyperglycemia such as Increased thirst, Blurred vision, Frequent urination, Increased hunger and Numbness or tingling in the feet

## 2020-05-16 NOTE — DISCHARGE NOTE NURSING/CASE MANAGEMENT/SOCIAL WORK - PATIENT PORTAL LINK FT
You can access the FollowMyHealth Patient Portal offered by [facility] by registering at the following website: [email or web address]. By joining [facility]’s FollowMyHealth portal, you will also be able to view your health information using other applications (apps) compatible with our system.

## 2020-05-17 LAB
CULTURE RESULTS: SIGNIFICANT CHANGE UP
CULTURE RESULTS: SIGNIFICANT CHANGE UP
SPECIMEN SOURCE: SIGNIFICANT CHANGE UP
SPECIMEN SOURCE: SIGNIFICANT CHANGE UP

## 2020-05-18 DIAGNOSIS — K20.9 ESOPHAGITIS, UNSPECIFIED: ICD-10-CM

## 2020-05-18 RX ORDER — SUCRALFATE 1 G/1
1 TABLET ORAL 3 TIMES DAILY
Qty: 90 | Refills: 0 | Status: ACTIVE | COMMUNITY
Start: 2020-05-18

## 2020-05-18 RX ORDER — LANCETS 33 GAUGE
EACH MISCELLANEOUS
Qty: 100 | Refills: 0 | Status: ACTIVE | COMMUNITY
Start: 2020-05-18

## 2020-05-19 ENCOUNTER — APPOINTMENT (OUTPATIENT)
Dept: ENDOCRINOLOGY | Facility: CLINIC | Age: 30
End: 2020-05-19

## 2020-06-01 RX ORDER — LEVOFLOXACIN 500 MG/1
500 TABLET, FILM COATED ORAL DAILY
Qty: 14 | Refills: 0 | Status: DISCONTINUED | COMMUNITY
Start: 2020-01-09 | End: 2020-06-01

## 2020-06-01 RX ORDER — AMOXICILLIN 500 MG/1
500 TABLET, FILM COATED ORAL
Qty: 56 | Refills: 0 | Status: DISCONTINUED | COMMUNITY
Start: 2020-01-09 | End: 2020-06-01

## 2020-06-01 RX ORDER — PANTOPRAZOLE 40 MG/1
40 TABLET, DELAYED RELEASE ORAL DAILY
Qty: 30 | Refills: 0 | Status: ACTIVE | COMMUNITY
Start: 2020-01-09

## 2020-06-02 ENCOUNTER — OUTPATIENT (OUTPATIENT)
Dept: OUTPATIENT SERVICES | Facility: HOSPITAL | Age: 30
LOS: 1 days | End: 2020-06-02

## 2020-06-02 ENCOUNTER — APPOINTMENT (OUTPATIENT)
Dept: ENDOCRINOLOGY | Facility: CLINIC | Age: 30
End: 2020-06-02
Payer: MEDICAID

## 2020-06-02 DIAGNOSIS — E10.9 TYPE 1 DIABETES MELLITUS WITHOUT COMPLICATIONS: ICD-10-CM

## 2020-06-02 PROCEDURE — ZZZZZ: CPT

## 2020-06-02 RX ORDER — URINE ACETONE TEST STRIPS
STRIP MISCELLANEOUS
Qty: 1 | Refills: 0 | Status: ACTIVE | COMMUNITY
Start: 2020-06-02 | End: 1900-01-01

## 2020-06-02 NOTE — ASSESSMENT
[FreeTextEntry1] : 29 M with T1DM (diagnosed in 2015, uncontrolled), multiple admissions for DKA in past most recent 5/2020), establishing care for T1DM. \par \par T1DM, uncontrolled:\par - Hba1c most recently 10.2% 5/2020 , prior to that was 14.5% in Dec 2019.  Goal is <7%. \par - Currently FS at goal , continue Lantus 15 units at bedtime and Humalog 6 units sq ac tid. \par - Counseled about diet, weight loss and exercise\par - Encouraged to continue checking FS 4 times a day and bring FS log at next visit to review; asked to call if persistently highs or lows. Prescription for ketone strips sent. \par - Will check HbA1c, CMP, urine microalbumin/cr at next visit\par - BP was at goal in hospital last month, <130/80, not on any medications. \par \par HLD:\par - LDL 74 (dec 2019), not on statin, will check at next visit and consider statin as indicated. \par \par Follow up visit in 3 months. \par \par Discussed with Dr. Catalan.\par \par I spent 30 mins with patient on the phone, all questions and concerns answered.

## 2020-06-02 NOTE — REVIEW OF SYSTEMS
[Recent Weight Loss (___ Lbs)] : recent weight loss: [unfilled] lbs [Constipation] : constipation [Negative] : Heme/Lymph [All other systems negative] : All other systems negative [Fatigue] : no fatigue [Decreased Appetite] : appetite not decreased [Eye Pain] : no pain [Neck Pain] : no neck pain [Dysphagia] : no dysphagia [Dysphonia] : no dysphonia [Chest Pain] : no chest pain [Palpitations] : no palpitations [Shortness Of Breath] : no shortness of breath [Cough] : no cough [Nausea] : no nausea [Abdominal Pain] : no abdominal pain [Vomiting] : no vomiting [FreeTextEntry3] : Blurry vision when FS in 80s-100s occasionally

## 2020-06-02 NOTE — DATA REVIEWED
[FreeTextEntry1] : Lab work:\par \par Hba1c (5/2020): 10.2%\par \par Dec 2019:\par Hba1c 14.5%\par Lipid profile: Total cholesterol 142/ Triglycerides 86/ HDL 55/ LDL 74

## 2020-06-02 NOTE — END OF VISIT
[] : Fellow [FreeTextEntry3] : Discussed importance of checking urine ketones in the event of nausea or persistent hyperglycemia. Possibility of sensor/pump technology will be discussed on office follow up. [Time Spent: ___ minutes] : I have spent [unfilled] minutes of time on the encounter.

## 2020-06-02 NOTE — HISTORY OF PRESENT ILLNESS
[Home] : at home, [unfilled] , at the time of the visit. [Medical Office: ([facility])___] : at the medical office located in  [Verbal consent obtained from patient] : the patient, [unfilled] [FreeTextEntry1] : 29 year old M with T1DM (diagnosed in 2015), multiple admissions for DKA in past most recent 5/2020), establishing care for T1DM. Hba1c most recently 10.2% 5/2020 , prior to that was 14.5% in Dec 2019. \par \par PMH: T1DM (uncontrolled)\par PSH: none\par FH: Uncle with T2DM\par SH: denies tobacco or ETOH use. \par \par Patient seen by inpatient diabetes team on multiple occasions for uncontrolled DMT1 and DKA, most recently 5/2020. Patient was diagnosed with T1DM in 2015 and was initially on basal/bolus insulin. Was switched over to mixed insulin for a while most recently due to insurance issues but now back on basal/bolus. No known microvascular or macrovascular complications. \par \par Current medications/ Insulin regimen: Lantus 15 units qhs , Humalog 6 units ac tid \par \par Checks FS 4-5 times daily. Usually ranged from AM: 80s-120s, pre-lunch: 120s-140s pre-dinner 120s-140s ; highest was 210 one day after lunch time. Reports having hypoglycemia symptoms such as shakiness, blurry vision when FS <100. Occasional reports tingling when FS >170s, but only happens rarely. \par  \par Eat 3 meals a day:\par Breakfast: eggs (fried or boiled)\par Lunch (not so heavy), does not clarify, says could be chicken breast or something light \par Dinner (heavy): says starchy and could be anything but trying to limit rice and eats brown rice only. \par Snacks: fruits\par Drinks: just water , no sodas or juices.\par \par Last Opthalmology visit: Have not seen one\par Last Podiatry visit: have not seen one\par Urine microalbumin/Cr: not done yet. \par \par Exercise for 20 mins daily in 3-4 divided sessions, usually after meals. \par \par Not on statin, Lipid profile (Dec 2019): Total cholesterol 142/ Triglycerides 86/ HDL 55/ LDL 74\par

## 2020-06-20 ENCOUNTER — INPATIENT (INPATIENT)
Facility: HOSPITAL | Age: 30
LOS: 6 days | Discharge: HOME | End: 2020-06-27
Attending: INTERNAL MEDICINE | Admitting: INTERNAL MEDICINE
Payer: MEDICAID

## 2020-06-20 VITALS
HEART RATE: 118 BPM | RESPIRATION RATE: 16 BRPM | OXYGEN SATURATION: 99 % | DIASTOLIC BLOOD PRESSURE: 85 MMHG | TEMPERATURE: 99 F | SYSTOLIC BLOOD PRESSURE: 117 MMHG

## 2020-06-20 LAB
ALBUMIN SERPL ELPH-MCNC: 4.5 G/DL — SIGNIFICANT CHANGE UP (ref 3.3–5)
ALP SERPL-CCNC: 53 U/L — SIGNIFICANT CHANGE UP (ref 40–120)
ALT FLD-CCNC: 23 U/L — SIGNIFICANT CHANGE UP (ref 4–41)
ANION GAP SERPL CALC-SCNC: 15 MMO/L — HIGH (ref 7–14)
AST SERPL-CCNC: 27 U/L — SIGNIFICANT CHANGE UP (ref 4–40)
BASE EXCESS BLDV CALC-SCNC: 2.3 MMOL/L — SIGNIFICANT CHANGE UP
BASOPHILS # BLD AUTO: 0.06 K/UL — SIGNIFICANT CHANGE UP (ref 0–0.2)
BASOPHILS NFR BLD AUTO: 1.3 % — SIGNIFICANT CHANGE UP (ref 0–2)
BILIRUB SERPL-MCNC: 1.6 MG/DL — HIGH (ref 0.2–1.2)
BLOOD GAS VENOUS - CREATININE: 0.92 MG/DL — SIGNIFICANT CHANGE UP (ref 0.5–1.3)
BLOOD GAS VENOUS - FIO2: 21 — SIGNIFICANT CHANGE UP
BUN SERPL-MCNC: 14 MG/DL — SIGNIFICANT CHANGE UP (ref 7–23)
CALCIUM SERPL-MCNC: 10.6 MG/DL — HIGH (ref 8.4–10.5)
CHLORIDE BLDV-SCNC: 103 MMOL/L — SIGNIFICANT CHANGE UP (ref 96–108)
CHLORIDE SERPL-SCNC: 98 MMOL/L — SIGNIFICANT CHANGE UP (ref 98–107)
CO2 SERPL-SCNC: 25 MMOL/L — SIGNIFICANT CHANGE UP (ref 22–31)
CREAT SERPL-MCNC: 0.95 MG/DL — SIGNIFICANT CHANGE UP (ref 0.5–1.3)
EOSINOPHIL # BLD AUTO: 0.03 K/UL — SIGNIFICANT CHANGE UP (ref 0–0.5)
EOSINOPHIL NFR BLD AUTO: 0.7 % — SIGNIFICANT CHANGE UP (ref 0–6)
GAS PNL BLDV: 137 MMOL/L — SIGNIFICANT CHANGE UP (ref 136–146)
GLUCOSE BLDV-MCNC: 176 MG/DL — HIGH (ref 70–99)
GLUCOSE SERPL-MCNC: 184 MG/DL — HIGH (ref 70–99)
HCO3 BLDV-SCNC: 25 MMOL/L — SIGNIFICANT CHANGE UP (ref 20–27)
HCT VFR BLD CALC: 40.7 % — SIGNIFICANT CHANGE UP (ref 39–50)
HCT VFR BLDV CALC: 44.3 % — SIGNIFICANT CHANGE UP (ref 39–51)
HGB BLD-MCNC: 14 G/DL — SIGNIFICANT CHANGE UP (ref 13–17)
HGB BLDV-MCNC: 14.4 G/DL — SIGNIFICANT CHANGE UP (ref 13–17)
IMM GRANULOCYTES NFR BLD AUTO: 0 % — SIGNIFICANT CHANGE UP (ref 0–1.5)
LACTATE BLDV-MCNC: 2.3 MMOL/L — HIGH (ref 0.5–2)
LIDOCAIN IGE QN: 8.4 U/L — SIGNIFICANT CHANGE UP (ref 7–60)
LYMPHOCYTES # BLD AUTO: 2.01 K/UL — SIGNIFICANT CHANGE UP (ref 1–3.3)
LYMPHOCYTES # BLD AUTO: 44 % — SIGNIFICANT CHANGE UP (ref 13–44)
MCHC RBC-ENTMCNC: 30.4 PG — SIGNIFICANT CHANGE UP (ref 27–34)
MCHC RBC-ENTMCNC: 34.4 % — SIGNIFICANT CHANGE UP (ref 32–36)
MCV RBC AUTO: 88.5 FL — SIGNIFICANT CHANGE UP (ref 80–100)
MONOCYTES # BLD AUTO: 0.38 K/UL — SIGNIFICANT CHANGE UP (ref 0–0.9)
MONOCYTES NFR BLD AUTO: 8.3 % — SIGNIFICANT CHANGE UP (ref 2–14)
NEUTROPHILS # BLD AUTO: 2.09 K/UL — SIGNIFICANT CHANGE UP (ref 1.8–7.4)
NEUTROPHILS NFR BLD AUTO: 45.7 % — SIGNIFICANT CHANGE UP (ref 43–77)
NRBC # FLD: 0 K/UL — SIGNIFICANT CHANGE UP (ref 0–0)
PCO2 BLDV: 45 MMHG — SIGNIFICANT CHANGE UP (ref 41–51)
PH BLDV: 7.39 PH — SIGNIFICANT CHANGE UP (ref 7.32–7.43)
PLATELET # BLD AUTO: 337 K/UL — SIGNIFICANT CHANGE UP (ref 150–400)
PMV BLD: 10.1 FL — SIGNIFICANT CHANGE UP (ref 7–13)
PO2 BLDV: 27 MMHG — LOW (ref 35–40)
POTASSIUM BLDV-SCNC: 4.4 MMOL/L — SIGNIFICANT CHANGE UP (ref 3.4–4.5)
POTASSIUM SERPL-MCNC: 4.5 MMOL/L — SIGNIFICANT CHANGE UP (ref 3.5–5.3)
POTASSIUM SERPL-SCNC: 4.5 MMOL/L — SIGNIFICANT CHANGE UP (ref 3.5–5.3)
PROT SERPL-MCNC: 7.5 G/DL — SIGNIFICANT CHANGE UP (ref 6–8.3)
RBC # BLD: 4.6 M/UL — SIGNIFICANT CHANGE UP (ref 4.2–5.8)
RBC # FLD: 12.6 % — SIGNIFICANT CHANGE UP (ref 10.3–14.5)
SAO2 % BLDV: 44 % — LOW (ref 60–85)
SODIUM SERPL-SCNC: 138 MMOL/L — SIGNIFICANT CHANGE UP (ref 135–145)
WBC # BLD: 4.57 K/UL — SIGNIFICANT CHANGE UP (ref 3.8–10.5)
WBC # FLD AUTO: 4.57 K/UL — SIGNIFICANT CHANGE UP (ref 3.8–10.5)

## 2020-06-20 PROCEDURE — 71045 X-RAY EXAM CHEST 1 VIEW: CPT | Mod: 26

## 2020-06-20 PROCEDURE — 76705 ECHO EXAM OF ABDOMEN: CPT | Mod: 26

## 2020-06-20 PROCEDURE — 99218: CPT

## 2020-06-20 RX ORDER — INSULIN LISPRO 100/ML
6 VIAL (ML) SUBCUTANEOUS
Refills: 0 | Status: DISCONTINUED | OUTPATIENT
Start: 2020-06-20 | End: 2020-06-20

## 2020-06-20 RX ORDER — INSULIN LISPRO 100/ML
VIAL (ML) SUBCUTANEOUS
Refills: 0 | Status: DISCONTINUED | OUTPATIENT
Start: 2020-06-20 | End: 2020-06-27

## 2020-06-20 RX ORDER — FAMOTIDINE 10 MG/ML
20 INJECTION INTRAVENOUS ONCE
Refills: 0 | Status: COMPLETED | OUTPATIENT
Start: 2020-06-20 | End: 2020-06-20

## 2020-06-20 RX ORDER — INSULIN LISPRO 100/ML
VIAL (ML) SUBCUTANEOUS AT BEDTIME
Refills: 0 | Status: DISCONTINUED | OUTPATIENT
Start: 2020-06-20 | End: 2020-06-27

## 2020-06-20 RX ORDER — SODIUM CHLORIDE 9 MG/ML
1000 INJECTION, SOLUTION INTRAVENOUS
Refills: 0 | Status: DISCONTINUED | OUTPATIENT
Start: 2020-06-20 | End: 2020-06-27

## 2020-06-20 RX ORDER — GLUCAGON INJECTION, SOLUTION 0.5 MG/.1ML
1 INJECTION, SOLUTION SUBCUTANEOUS ONCE
Refills: 0 | Status: DISCONTINUED | OUTPATIENT
Start: 2020-06-20 | End: 2020-06-27

## 2020-06-20 RX ORDER — METOCLOPRAMIDE HCL 10 MG
10 TABLET ORAL EVERY 8 HOURS
Refills: 0 | Status: DISCONTINUED | OUTPATIENT
Start: 2020-06-20 | End: 2020-06-27

## 2020-06-20 RX ORDER — DEXTROSE 50 % IN WATER 50 %
25 SYRINGE (ML) INTRAVENOUS ONCE
Refills: 0 | Status: DISCONTINUED | OUTPATIENT
Start: 2020-06-20 | End: 2020-06-27

## 2020-06-20 RX ORDER — DEXTROSE 50 % IN WATER 50 %
12.5 SYRINGE (ML) INTRAVENOUS ONCE
Refills: 0 | Status: DISCONTINUED | OUTPATIENT
Start: 2020-06-20 | End: 2020-06-27

## 2020-06-20 RX ORDER — SODIUM CHLORIDE 9 MG/ML
1000 INJECTION, SOLUTION INTRAVENOUS ONCE
Refills: 0 | Status: COMPLETED | OUTPATIENT
Start: 2020-06-20 | End: 2020-06-20

## 2020-06-20 RX ORDER — ONDANSETRON 8 MG/1
4 TABLET, FILM COATED ORAL ONCE
Refills: 0 | Status: COMPLETED | OUTPATIENT
Start: 2020-06-20 | End: 2020-06-20

## 2020-06-20 RX ORDER — ONDANSETRON 8 MG/1
4 TABLET, FILM COATED ORAL EVERY 8 HOURS
Refills: 0 | Status: DISCONTINUED | OUTPATIENT
Start: 2020-06-20 | End: 2020-06-27

## 2020-06-20 RX ORDER — FAMOTIDINE 10 MG/ML
20 INJECTION INTRAVENOUS
Refills: 0 | Status: DISCONTINUED | OUTPATIENT
Start: 2020-06-20 | End: 2020-06-27

## 2020-06-20 RX ORDER — INSULIN GLARGINE 100 [IU]/ML
15 INJECTION, SOLUTION SUBCUTANEOUS AT BEDTIME
Refills: 0 | Status: DISCONTINUED | OUTPATIENT
Start: 2020-06-20 | End: 2020-06-21

## 2020-06-20 RX ORDER — METOCLOPRAMIDE HCL 10 MG
10 TABLET ORAL ONCE
Refills: 0 | Status: COMPLETED | OUTPATIENT
Start: 2020-06-20 | End: 2020-06-20

## 2020-06-20 RX ORDER — PANTOPRAZOLE SODIUM 20 MG/1
40 TABLET, DELAYED RELEASE ORAL
Refills: 0 | Status: DISCONTINUED | OUTPATIENT
Start: 2020-06-20 | End: 2020-06-27

## 2020-06-20 RX ORDER — DEXTROSE 50 % IN WATER 50 %
15 SYRINGE (ML) INTRAVENOUS ONCE
Refills: 0 | Status: DISCONTINUED | OUTPATIENT
Start: 2020-06-20 | End: 2020-06-27

## 2020-06-20 RX ADMIN — INSULIN GLARGINE 15 UNIT(S): 100 INJECTION, SOLUTION SUBCUTANEOUS at 22:16

## 2020-06-20 RX ADMIN — ONDANSETRON 4 MILLIGRAM(S): 8 TABLET, FILM COATED ORAL at 17:06

## 2020-06-20 RX ADMIN — FAMOTIDINE 20 MILLIGRAM(S): 10 INJECTION INTRAVENOUS at 15:19

## 2020-06-20 RX ADMIN — Medication 10 MILLIGRAM(S): at 15:19

## 2020-06-20 RX ADMIN — SODIUM CHLORIDE 1000 MILLILITER(S): 9 INJECTION, SOLUTION INTRAVENOUS at 16:23

## 2020-06-20 RX ADMIN — SODIUM CHLORIDE 150 MILLILITER(S): 9 INJECTION, SOLUTION INTRAVENOUS at 19:39

## 2020-06-20 RX ADMIN — Medication 10 MILLIGRAM(S): at 19:39

## 2020-06-20 RX ADMIN — SODIUM CHLORIDE 1000 MILLILITER(S): 9 INJECTION, SOLUTION INTRAVENOUS at 16:24

## 2020-06-20 RX ADMIN — ONDANSETRON 4 MILLIGRAM(S): 8 TABLET, FILM COATED ORAL at 22:12

## 2020-06-20 RX ADMIN — Medication 30 MILLILITER(S): at 19:39

## 2020-06-20 RX ADMIN — SODIUM CHLORIDE 1000 MILLILITER(S): 9 INJECTION, SOLUTION INTRAVENOUS at 15:19

## 2020-06-20 RX ADMIN — Medication 2: at 22:17

## 2020-06-20 NOTE — ED PROVIDER NOTE - ATTENDING CONTRIBUTION TO CARE
29M T1Dm insulin dependent, h/o gastroparesis and DKA.  having epig pain and N/V x 2 days, aching pain, come and go, came in due to nausea.  A/w constip x 5 days.  Taking pantoprazole for gastroparesis, pt reports it's not working.  Epig ttp on exam.  Bilious emesis at beside.  Likely gastroparesis.  Pt reports reglan usually helps.  Would rx 2L NS given poor intake, DM1, check labs incl r/o DKA, blood gas, check RUQ US, CXR r/o perf.  CDU vs admit.   VS:  unremarkable except tachycardia    GEN - malaise, mild distress nausea abd pain;   A+O x3   HEAD - NC/AT     ENT - PEERL, EOMI, mucous membranes  very dry , no discharge      NECK: Neck supple, non-tender without lymphadenopathy, no masses, no JVD  PULM - CTA b/l,  symmetric breath sounds  COR -  normal heart sounds    ABD - , ND, epig ttp, soft,  BACK - no CVA tenderness, nontender spine     EXTREMS - no edema, no deformity, warm and well perfused    SKIN - no rash    or bruising      NEUROLOGIC - alert, face symmetric, speech fluent, sensation nl, motor no focal deficit.

## 2020-06-20 NOTE — ED ADULT NURSE NOTE - OBJECTIVE STATEMENT
Receive pt. in Intake 10c alert and oriented x 4, presenting to the ER with complaints of nausea, vomiting , and abdominal pain. Pt. have a history of DM1, Gastroparesis, and stated " my stomach started hurting 2 days ago and I have been vomiting too". Medicated as ordered, labs sent, leave ER for ultrasound .

## 2020-06-20 NOTE — ED PROVIDER NOTE - PROGRESS NOTE DETAILS
PA YEVGENIY: Patient reassessed, lying in bed c/o nausea, pt just received Zofran; states he preferred to stay in the hospital. CDU PA called, will eval pt for CDU stay. Will continue to monitor and reassess. BORA VUONG: pt accepted to CDU, pt endorsed to BORA Fuentes for continue supportive care, IVF, antiemetic and observation.

## 2020-06-20 NOTE — ED PROVIDER NOTE - CARE PLAN
Principal Discharge DX:	Epigastric pain Principal Discharge DX:	Epigastric pain  Secondary Diagnosis:	Gastroparesis

## 2020-06-20 NOTE — ED ADULT TRIAGE NOTE - CHIEF COMPLAINT QUOTE
pt c/o n,v, abd pain since yesterday  pt is DM1.  fs at triage 149    pt has had gastro paresis in the past

## 2020-06-20 NOTE — ED PROVIDER NOTE - CLINICAL SUMMARY MEDICAL DECISION MAKING FREE TEXT BOX
30 yo M, nonsmoker with PMH of DMI on insulin, gastroparesis presents to ER c/o epigastric pain a/w n/v x2 days. Likely gastroparesis. will r/o DKA, check CXR to r/o perforation. RUQ US to eval for gallstones. Plan: check labs, Ua, VBG, give IVF, reglan, pepcid, and reassess.

## 2020-06-20 NOTE — ED ADULT NURSE NOTE - NSSUHOSCREENINGYN_ED_ALL_ED
was taking a snow boarding lesson today and fell onto left arm and was seen and arm immobilized - possible FA fracture - cannot see deformity with immobilization - moves fingers well; took ibprofen 650 mg about 1630 and it helped pain
Yes - the patient is able to be screened

## 2020-06-20 NOTE — ED PROVIDER NOTE - OBJECTIVE STATEMENT
30 yo M, nonsmoker with PMH of DMI on insulin, gastroparesis, DKA presents to ER c/o epigastric pain a/w n/v x2 days. Pt states having aching epigastric pain, intermittent, no aggravating or relieving factors. Reports nausea and vomited x2 today. Admits he's constipated x5 days, normally goes 1-2 times a week. States this feels similar to his gastroparesis, taking pantoprazole w/o improvement. Denies any fever, chills, dizziness, diarrhea, chest pain, sob, dysuria, weakness, numbness or any other complaints.

## 2020-06-20 NOTE — ED CDU PROVIDER INITIAL DAY NOTE - MEDICAL DECISION MAKING DETAILS
28 yo M with DM type 1, gastroporesis here with N/v, epigastric pain.   labs wnl, not in DKA. US RUQ negative. CDU for continue hydration and antiemetics, symptomatic care labs in am.

## 2020-06-20 NOTE — ED CDU PROVIDER INITIAL DAY NOTE - OBJECTIVE STATEMENT
28 yo M, nonsmoker with PMH of DMI on insulin, gastroparesis, DKA presents to ER c/o epigastric pain a/w n/v x2 days. Pt states having aching epigastric pain, intermittent, no aggravating or relieving factors. Reports nausea and vomited x2 today. Admits he's constipated x5 days, normally goes 1-2 times a week. States this feels similar to his gastroparesis, taking pantoprazole w/o improvement. Denies any fever, chills, dizziness, diarrhea, chest pain, sob, dysuria, weakness, numbness or any other complaints.    CDU BORA Fuentes: Agree with above note. In ED labs wnl, not in DKA. US RUQ negative. CDU for continue hydration and antiemetics labs in am. 30 yo M, nonsmoker with PMH of DMI on insulin, gastroparesis, DKA presents to ER c/o epigastric pain a/w n/v x2 days. Pt states having aching epigastric pain, intermittent, no aggravating or relieving factors. Reports nausea and vomited x2 today. Admits he's constipated x5 days, normally goes 1-2 times a week. States this feels similar to his gastroparesis, taking pantoprazole w/o improvement. Denies any fever, chills, dizziness, diarrhea, chest pain, sob, dysuria, weakness, numbness or any other complaints.    CDU BORA Fuentes: Agree with above note. 30 yo M, nonsmoker, no Etoh abuse, no drug use, type I DM on Lantus 15 U and Humalog 6 U TID before meals, with EGD in Jan 2020 showing esophagitis, H.pylori s/p triple therapy, Admission in May with consult by GI recommending outpatient gastric empyting study here with epigastric pain and nausea x 2 days with vomiting today, feels like gastroparesis symptoms. In ED labs wnl, not in DKA. US RUQ negative. CDU for continue hydration and antiemetics labs in am.

## 2020-06-21 DIAGNOSIS — K31.84 GASTROPARESIS: ICD-10-CM

## 2020-06-21 DIAGNOSIS — E10.65 TYPE 1 DIABETES MELLITUS WITH HYPERGLYCEMIA: ICD-10-CM

## 2020-06-21 LAB
ALBUMIN SERPL ELPH-MCNC: 4.5 G/DL — SIGNIFICANT CHANGE UP (ref 3.3–5)
ALP SERPL-CCNC: 53 U/L — SIGNIFICANT CHANGE UP (ref 40–120)
ALT FLD-CCNC: 73 U/L — HIGH (ref 4–41)
ANION GAP SERPL CALC-SCNC: 17 MMO/L — HIGH (ref 7–14)
AST SERPL-CCNC: 63 U/L — HIGH (ref 4–40)
BASE EXCESS BLDV CALC-SCNC: -1.4 MMOL/L — SIGNIFICANT CHANGE UP
BASOPHILS # BLD AUTO: 0.02 K/UL — SIGNIFICANT CHANGE UP (ref 0–0.2)
BASOPHILS NFR BLD AUTO: 0.3 % — SIGNIFICANT CHANGE UP (ref 0–2)
BILIRUB SERPL-MCNC: 1.5 MG/DL — HIGH (ref 0.2–1.2)
BLOOD GAS VENOUS - CREATININE: 0.76 MG/DL — SIGNIFICANT CHANGE UP (ref 0.5–1.3)
BUN SERPL-MCNC: 13 MG/DL — SIGNIFICANT CHANGE UP (ref 7–23)
CALCIUM SERPL-MCNC: 10.2 MG/DL — SIGNIFICANT CHANGE UP (ref 8.4–10.5)
CHLORIDE BLDV-SCNC: 102 MMOL/L — SIGNIFICANT CHANGE UP (ref 96–108)
CHLORIDE SERPL-SCNC: 97 MMOL/L — LOW (ref 98–107)
CO2 SERPL-SCNC: 21 MMOL/L — LOW (ref 22–31)
CREAT SERPL-MCNC: 0.77 MG/DL — SIGNIFICANT CHANGE UP (ref 0.5–1.3)
EOSINOPHIL # BLD AUTO: 0 K/UL — SIGNIFICANT CHANGE UP (ref 0–0.5)
EOSINOPHIL NFR BLD AUTO: 0 % — SIGNIFICANT CHANGE UP (ref 0–6)
GAS PNL BLDV: 134 MMOL/L — LOW (ref 136–146)
GLUCOSE BLDC GLUCOMTR-MCNC: 156 MG/DL — HIGH (ref 70–99)
GLUCOSE BLDV-MCNC: 252 MG/DL — HIGH (ref 70–99)
GLUCOSE SERPL-MCNC: 266 MG/DL — HIGH (ref 70–99)
HCO3 BLDV-SCNC: 22 MMOL/L — SIGNIFICANT CHANGE UP (ref 20–27)
HCT VFR BLD CALC: 37.3 % — LOW (ref 39–50)
HCT VFR BLDV CALC: 40.7 % — SIGNIFICANT CHANGE UP (ref 39–51)
HGB BLD-MCNC: 13 G/DL — SIGNIFICANT CHANGE UP (ref 13–17)
HGB BLDV-MCNC: 13.2 G/DL — SIGNIFICANT CHANGE UP (ref 13–17)
IMM GRANULOCYTES NFR BLD AUTO: 0.3 % — SIGNIFICANT CHANGE UP (ref 0–1.5)
LACTATE BLDV-MCNC: 2.6 MMOL/L — HIGH (ref 0.5–2)
LYMPHOCYTES # BLD AUTO: 1.53 K/UL — SIGNIFICANT CHANGE UP (ref 1–3.3)
LYMPHOCYTES # BLD AUTO: 22.9 % — SIGNIFICANT CHANGE UP (ref 13–44)
MCHC RBC-ENTMCNC: 30.9 PG — SIGNIFICANT CHANGE UP (ref 27–34)
MCHC RBC-ENTMCNC: 34.9 % — SIGNIFICANT CHANGE UP (ref 32–36)
MCV RBC AUTO: 88.6 FL — SIGNIFICANT CHANGE UP (ref 80–100)
MONOCYTES # BLD AUTO: 0.32 K/UL — SIGNIFICANT CHANGE UP (ref 0–0.9)
MONOCYTES NFR BLD AUTO: 4.8 % — SIGNIFICANT CHANGE UP (ref 2–14)
NEUTROPHILS # BLD AUTO: 4.8 K/UL — SIGNIFICANT CHANGE UP (ref 1.8–7.4)
NEUTROPHILS NFR BLD AUTO: 71.7 % — SIGNIFICANT CHANGE UP (ref 43–77)
NRBC # FLD: 0 K/UL — SIGNIFICANT CHANGE UP (ref 0–0)
PCO2 BLDV: 47 MMHG — SIGNIFICANT CHANGE UP (ref 41–51)
PH BLDV: 7.33 PH — SIGNIFICANT CHANGE UP (ref 7.32–7.43)
PLATELET # BLD AUTO: 331 K/UL — SIGNIFICANT CHANGE UP (ref 150–400)
PMV BLD: 10.3 FL — SIGNIFICANT CHANGE UP (ref 7–13)
PO2 BLDV: 30 MMHG — LOW (ref 35–40)
POTASSIUM BLDV-SCNC: 4.2 MMOL/L — SIGNIFICANT CHANGE UP (ref 3.4–4.5)
POTASSIUM SERPL-MCNC: 4.4 MMOL/L — SIGNIFICANT CHANGE UP (ref 3.5–5.3)
POTASSIUM SERPL-SCNC: 4.4 MMOL/L — SIGNIFICANT CHANGE UP (ref 3.5–5.3)
PROT SERPL-MCNC: 7.3 G/DL — SIGNIFICANT CHANGE UP (ref 6–8.3)
RBC # BLD: 4.21 M/UL — SIGNIFICANT CHANGE UP (ref 4.2–5.8)
RBC # FLD: 12.8 % — SIGNIFICANT CHANGE UP (ref 10.3–14.5)
SAO2 % BLDV: 46.6 % — LOW (ref 60–85)
SARS-COV-2 RNA SPEC QL NAA+PROBE: SIGNIFICANT CHANGE UP
SODIUM SERPL-SCNC: 135 MMOL/L — SIGNIFICANT CHANGE UP (ref 135–145)
WBC # BLD: 6.69 K/UL — SIGNIFICANT CHANGE UP (ref 3.8–10.5)
WBC # FLD AUTO: 6.69 K/UL — SIGNIFICANT CHANGE UP (ref 3.8–10.5)

## 2020-06-21 PROCEDURE — 93010 ELECTROCARDIOGRAM REPORT: CPT

## 2020-06-21 PROCEDURE — 99223 1ST HOSP IP/OBS HIGH 75: CPT

## 2020-06-21 PROCEDURE — 99217: CPT

## 2020-06-21 RX ORDER — POLYETHYLENE GLYCOL 3350 17 G/17G
17 POWDER, FOR SOLUTION ORAL DAILY
Refills: 0 | Status: DISCONTINUED | OUTPATIENT
Start: 2020-06-21 | End: 2020-06-23

## 2020-06-21 RX ORDER — INSULIN LISPRO 100/ML
6 VIAL (ML) SUBCUTANEOUS ONCE
Refills: 0 | Status: COMPLETED | OUTPATIENT
Start: 2020-06-21 | End: 2020-06-21

## 2020-06-21 RX ORDER — INSULIN GLARGINE 100 [IU]/ML
12 INJECTION, SOLUTION SUBCUTANEOUS AT BEDTIME
Refills: 0 | Status: DISCONTINUED | OUTPATIENT
Start: 2020-06-21 | End: 2020-06-27

## 2020-06-21 RX ORDER — HALOPERIDOL DECANOATE 100 MG/ML
2.5 INJECTION INTRAMUSCULAR ONCE
Refills: 0 | Status: COMPLETED | OUTPATIENT
Start: 2020-06-21 | End: 2020-06-21

## 2020-06-21 RX ORDER — KETOROLAC TROMETHAMINE 30 MG/ML
15 SYRINGE (ML) INJECTION ONCE
Refills: 0 | Status: DISCONTINUED | OUTPATIENT
Start: 2020-06-21 | End: 2020-06-21

## 2020-06-21 RX ADMIN — SODIUM CHLORIDE 150 MILLILITER(S): 9 INJECTION, SOLUTION INTRAVENOUS at 08:50

## 2020-06-21 RX ADMIN — ONDANSETRON 4 MILLIGRAM(S): 8 TABLET, FILM COATED ORAL at 07:11

## 2020-06-21 RX ADMIN — SODIUM CHLORIDE 150 MILLILITER(S): 9 INJECTION, SOLUTION INTRAVENOUS at 22:14

## 2020-06-21 RX ADMIN — INSULIN GLARGINE 12 UNIT(S): 100 INJECTION, SOLUTION SUBCUTANEOUS at 22:13

## 2020-06-21 RX ADMIN — FAMOTIDINE 104 MILLIGRAM(S): 10 INJECTION INTRAVENOUS at 17:07

## 2020-06-21 RX ADMIN — FAMOTIDINE 104 MILLIGRAM(S): 10 INJECTION INTRAVENOUS at 07:11

## 2020-06-21 RX ADMIN — HALOPERIDOL DECANOATE 2.5 MILLIGRAM(S): 100 INJECTION INTRAMUSCULAR at 18:30

## 2020-06-21 RX ADMIN — PANTOPRAZOLE SODIUM 40 MILLIGRAM(S): 20 TABLET, DELAYED RELEASE ORAL at 09:31

## 2020-06-21 RX ADMIN — SODIUM CHLORIDE 150 MILLILITER(S): 9 INJECTION, SOLUTION INTRAVENOUS at 16:06

## 2020-06-21 RX ADMIN — SODIUM CHLORIDE 150 MILLILITER(S): 9 INJECTION, SOLUTION INTRAVENOUS at 23:14

## 2020-06-21 RX ADMIN — Medication 15 MILLIGRAM(S): at 18:30

## 2020-06-21 RX ADMIN — Medication 10 MILLIGRAM(S): at 22:19

## 2020-06-21 RX ADMIN — Medication 1: at 18:30

## 2020-06-21 RX ADMIN — Medication 6 UNIT(S): at 09:25

## 2020-06-21 RX ADMIN — Medication 10 MILLIGRAM(S): at 12:32

## 2020-06-21 RX ADMIN — SODIUM CHLORIDE 150 MILLILITER(S): 9 INJECTION, SOLUTION INTRAVENOUS at 01:39

## 2020-06-21 NOTE — ED CDU PROVIDER DISPOSITION NOTE - CLINICAL COURSE
30 y/o male pmh DM, gastroparesis c/o abd pain and vomiting x1-2 days. Pt had unremarkable labs and RUQ US in ED and was sent to CDU for symptomatic control and IV hydration. Pt continued to have persistent abd pain and nausea despite multiple rounds of antiemetics. Pt is not tolerating PO. WIll need inpatient admission for continued care.

## 2020-06-21 NOTE — H&P ADULT - PROBLEM SELECTOR PLAN 2
c/w lantus w/ FS AC/QHS w/ sliding scale, will check FS prior to lantus, if hypoglycemic, will reduce lantus as needed, repeat BMP to follow up RONAK, d/w RN

## 2020-06-21 NOTE — H&P ADULT - ASSESSMENT
Patient is a 28 y/o M PMH DM1 (Dx 2015) w/ gastroparesis, H.pylori s/p triple therapy p/w gastroparesis

## 2020-06-21 NOTE — ED CDU PROVIDER SUBSEQUENT DAY NOTE - MEDICAL DECISION MAKING DETAILS
30 yo M with DM type 1, gastroporesis here with N/v, epigastric pain.   labs wnl, not in DKA. US RUQ negative. CDU for IV hydration, supportive care, AM labs, general observation care / monitoring

## 2020-06-21 NOTE — ED CDU PROVIDER SUBSEQUENT DAY NOTE - HISTORY
30 yo male, nonsmoker, denies hx/o ETOH or substance abuse, PMH Type I DM on Lantus 15 U and Humalog 6 U TID before meals, hx/o gastroparesis, with EGD in Jan 2020 showing esophagitis, H.pylori s/p triple therapy, admission in May with consult by GI recommending outpatient gastric empyting study, presented to the ED with epigastric pain and nausea x 2 days with vomiting x 1 day, stated that symptoms feel similar to past gastroparesis symptoms. In ED labs unremarkable, no signs of DKA. US RUQ negative. Pt was dispo'd to CDU for IV hydration, supportive care, AM labs, general observation care / monitoring.  In the interim, pt objectively noted to be resting comfortably; no issues thus far.

## 2020-06-21 NOTE — ED CDU PROVIDER SUBSEQUENT DAY NOTE - PROGRESS NOTE DETAILS
Pt still complaining of persistent abd pain and nausea. Not tolerating PO all day, despite multiple round of antiemetics. Will admits pt to hospital, Dr. Hameed.

## 2020-06-21 NOTE — H&P ADULT - NSHPPHYSICALEXAM_GEN_ALL_CORE
T(C): 37 (06-21-20 @ 20:21), Max: 37.1 (06-21-20 @ 10:36)  HR: 88 (06-21-20 @ 20:21) (61 - 101)  BP: 112/76 (06-21-20 @ 20:21) (109/73 - 136/71)  RR: 17 (06-21-20 @ 20:21) (14 - 17)  SpO2: 98% (06-21-20 @ 20:21) (98% - 100%)    Constitutional: NAD, well-developed, well-nourished  Ears, Nose, Mouth, and Throat: normal external ears and nose, normal hearing, moist oral mucosa  Eyes: normal conjunctiva, EOMI, PERRL  Neck: supple, no JVD  Respiratory: Clear to auscultation bilaterally. No wheezes, rales or rhonchi. Normal respiratory effort  Cardiovascular: RRR, no M/R/G, no edema, 2+ Peripheral Pulses  Gastrointestinal: soft, nontender, nondistended, +BS, no hernia  Skin: warm, dry, no rash  Neurologic: sensation grossly intact, CN grossly intact, non-focal exam  Musculoskeletal: no clubbing, no cyanosis, no joint swelling  Psychiatric: AOX3, appropriate mood, affect

## 2020-06-21 NOTE — ED CDU PROVIDER SUBSEQUENT DAY NOTE - PHYSICAL EXAMINATION

## 2020-06-21 NOTE — H&P ADULT - HISTORY OF PRESENT ILLNESS
Patient is a 30 y/o M PMH DM1 (Dx 2015) w/ gastroparesis, H.pylori s/p triple therapy p/w N/V abdominal pain since 6/19. Abdominal pain is cramping, epigastric, intermittent, no aggravating factors, reports improvement in nausea and abdominal pain when laying on his left side. Reports associated nausea and nonbloody vomiting. Has not been able to tolerate PO throughout the day today, was able to drink a cup of water prior to transfer to [facility] w/ no difficulty.

## 2020-06-21 NOTE — H&P ADULT - NSHPLABSRESULTS_GEN_ALL_CORE
06-21    135  |  97<L>  |  13  ----------------------------<  266<H>  4.4   |  21<L>  |  0.77    Ca    10.2      21 Jun 2020 06:45    TPro  7.3  /  Alb  4.5  /  TBili  1.5<H>  /  DBili  x   /  AST  63<H>  /  ALT  73<H>  /  AlkPhos  53  06-21                            13.0   6.69  )-----------( 331      ( 21 Jun 2020 06:45 )             37.3             LIVER FUNCTIONS - ( 21 Jun 2020 06:45 )  Alb: 4.5 g/dL / Pro: 7.3 g/dL / ALK PHOS: 53 u/L / ALT: 73 u/L / AST: 63 u/L / GGT: x

## 2020-06-22 ENCOUNTER — TRANSCRIPTION ENCOUNTER (OUTPATIENT)
Age: 30
End: 2020-06-22

## 2020-06-22 LAB
ALBUMIN SERPL ELPH-MCNC: 3.9 G/DL — SIGNIFICANT CHANGE UP (ref 3.3–5)
ALP SERPL-CCNC: 45 U/L — SIGNIFICANT CHANGE UP (ref 40–120)
ALT FLD-CCNC: 43 U/L — HIGH (ref 4–41)
ANION GAP SERPL CALC-SCNC: 12 MMO/L — SIGNIFICANT CHANGE UP (ref 7–14)
ANION GAP SERPL CALC-SCNC: 14 MMO/L — SIGNIFICANT CHANGE UP (ref 7–14)
AST SERPL-CCNC: 25 U/L — SIGNIFICANT CHANGE UP (ref 4–40)
BASOPHILS # BLD AUTO: 0.05 K/UL — SIGNIFICANT CHANGE UP (ref 0–0.2)
BASOPHILS NFR BLD AUTO: 0.7 % — SIGNIFICANT CHANGE UP (ref 0–2)
BILIRUB SERPL-MCNC: 1.6 MG/DL — HIGH (ref 0.2–1.2)
BUN SERPL-MCNC: 14 MG/DL — SIGNIFICANT CHANGE UP (ref 7–23)
BUN SERPL-MCNC: 14 MG/DL — SIGNIFICANT CHANGE UP (ref 7–23)
CALCIUM SERPL-MCNC: 9.4 MG/DL — SIGNIFICANT CHANGE UP (ref 8.4–10.5)
CALCIUM SERPL-MCNC: 9.5 MG/DL — SIGNIFICANT CHANGE UP (ref 8.4–10.5)
CHLORIDE SERPL-SCNC: 101 MMOL/L — SIGNIFICANT CHANGE UP (ref 98–107)
CHLORIDE SERPL-SCNC: 101 MMOL/L — SIGNIFICANT CHANGE UP (ref 98–107)
CO2 SERPL-SCNC: 23 MMOL/L — SIGNIFICANT CHANGE UP (ref 22–31)
CO2 SERPL-SCNC: 23 MMOL/L — SIGNIFICANT CHANGE UP (ref 22–31)
CREAT SERPL-MCNC: 0.89 MG/DL — SIGNIFICANT CHANGE UP (ref 0.5–1.3)
CREAT SERPL-MCNC: 0.97 MG/DL — SIGNIFICANT CHANGE UP (ref 0.5–1.3)
EOSINOPHIL # BLD AUTO: 0.01 K/UL — SIGNIFICANT CHANGE UP (ref 0–0.5)
EOSINOPHIL NFR BLD AUTO: 0.1 % — SIGNIFICANT CHANGE UP (ref 0–6)
GLUCOSE BLDC GLUCOMTR-MCNC: 176 MG/DL — HIGH (ref 70–99)
GLUCOSE BLDC GLUCOMTR-MCNC: 201 MG/DL — HIGH (ref 70–99)
GLUCOSE BLDC GLUCOMTR-MCNC: 203 MG/DL — HIGH (ref 70–99)
GLUCOSE BLDC GLUCOMTR-MCNC: 223 MG/DL — HIGH (ref 70–99)
GLUCOSE SERPL-MCNC: 167 MG/DL — HIGH (ref 70–99)
GLUCOSE SERPL-MCNC: 185 MG/DL — HIGH (ref 70–99)
HCT VFR BLD CALC: 34.4 % — LOW (ref 39–50)
HGB BLD-MCNC: 11.8 G/DL — LOW (ref 13–17)
IMM GRANULOCYTES NFR BLD AUTO: 0.4 % — SIGNIFICANT CHANGE UP (ref 0–1.5)
LYMPHOCYTES # BLD AUTO: 2 K/UL — SIGNIFICANT CHANGE UP (ref 1–3.3)
LYMPHOCYTES # BLD AUTO: 27.1 % — SIGNIFICANT CHANGE UP (ref 13–44)
MAGNESIUM SERPL-MCNC: 1.7 MG/DL — SIGNIFICANT CHANGE UP (ref 1.6–2.6)
MAGNESIUM SERPL-MCNC: 1.8 MG/DL — SIGNIFICANT CHANGE UP (ref 1.6–2.6)
MCHC RBC-ENTMCNC: 30.8 PG — SIGNIFICANT CHANGE UP (ref 27–34)
MCHC RBC-ENTMCNC: 34.3 % — SIGNIFICANT CHANGE UP (ref 32–36)
MCV RBC AUTO: 89.8 FL — SIGNIFICANT CHANGE UP (ref 80–100)
MONOCYTES # BLD AUTO: 0.64 K/UL — SIGNIFICANT CHANGE UP (ref 0–0.9)
MONOCYTES NFR BLD AUTO: 8.7 % — SIGNIFICANT CHANGE UP (ref 2–14)
NEUTROPHILS # BLD AUTO: 4.66 K/UL — SIGNIFICANT CHANGE UP (ref 1.8–7.4)
NEUTROPHILS NFR BLD AUTO: 63 % — SIGNIFICANT CHANGE UP (ref 43–77)
NRBC # FLD: 0 K/UL — SIGNIFICANT CHANGE UP (ref 0–0)
PHOSPHATE SERPL-MCNC: 3 MG/DL — SIGNIFICANT CHANGE UP (ref 2.5–4.5)
PHOSPHATE SERPL-MCNC: 3.1 MG/DL — SIGNIFICANT CHANGE UP (ref 2.5–4.5)
PLATELET # BLD AUTO: 278 K/UL — SIGNIFICANT CHANGE UP (ref 150–400)
PMV BLD: 10.2 FL — SIGNIFICANT CHANGE UP (ref 7–13)
POTASSIUM SERPL-MCNC: 3.8 MMOL/L — SIGNIFICANT CHANGE UP (ref 3.5–5.3)
POTASSIUM SERPL-MCNC: 4.3 MMOL/L — SIGNIFICANT CHANGE UP (ref 3.5–5.3)
POTASSIUM SERPL-SCNC: 3.8 MMOL/L — SIGNIFICANT CHANGE UP (ref 3.5–5.3)
POTASSIUM SERPL-SCNC: 4.3 MMOL/L — SIGNIFICANT CHANGE UP (ref 3.5–5.3)
PROT SERPL-MCNC: 6.1 G/DL — SIGNIFICANT CHANGE UP (ref 6–8.3)
RBC # BLD: 3.83 M/UL — LOW (ref 4.2–5.8)
RBC # FLD: 12.9 % — SIGNIFICANT CHANGE UP (ref 10.3–14.5)
SODIUM SERPL-SCNC: 136 MMOL/L — SIGNIFICANT CHANGE UP (ref 135–145)
SODIUM SERPL-SCNC: 138 MMOL/L — SIGNIFICANT CHANGE UP (ref 135–145)
WBC # BLD: 7.39 K/UL — SIGNIFICANT CHANGE UP (ref 3.8–10.5)
WBC # FLD AUTO: 7.39 K/UL — SIGNIFICANT CHANGE UP (ref 3.8–10.5)

## 2020-06-22 PROCEDURE — 99222 1ST HOSP IP/OBS MODERATE 55: CPT | Mod: GC

## 2020-06-22 RX ADMIN — Medication 1: at 07:58

## 2020-06-22 RX ADMIN — Medication 2: at 17:23

## 2020-06-22 RX ADMIN — INSULIN GLARGINE 12 UNIT(S): 100 INJECTION, SOLUTION SUBCUTANEOUS at 21:35

## 2020-06-22 RX ADMIN — ONDANSETRON 4 MILLIGRAM(S): 8 TABLET, FILM COATED ORAL at 06:06

## 2020-06-22 RX ADMIN — Medication 30 MILLILITER(S): at 04:34

## 2020-06-22 RX ADMIN — FAMOTIDINE 104 MILLIGRAM(S): 10 INJECTION INTRAVENOUS at 17:40

## 2020-06-22 RX ADMIN — ONDANSETRON 4 MILLIGRAM(S): 8 TABLET, FILM COATED ORAL at 14:49

## 2020-06-22 RX ADMIN — Medication 10 MILLIGRAM(S): at 20:00

## 2020-06-22 RX ADMIN — Medication 10 MILLIGRAM(S): at 08:27

## 2020-06-22 RX ADMIN — Medication 2: at 12:07

## 2020-06-22 RX ADMIN — PANTOPRAZOLE SODIUM 40 MILLIGRAM(S): 20 TABLET, DELAYED RELEASE ORAL at 05:23

## 2020-06-22 RX ADMIN — FAMOTIDINE 104 MILLIGRAM(S): 10 INJECTION INTRAVENOUS at 05:24

## 2020-06-22 RX ADMIN — SODIUM CHLORIDE 150 MILLILITER(S): 9 INJECTION, SOLUTION INTRAVENOUS at 04:12

## 2020-06-22 RX ADMIN — SODIUM CHLORIDE 150 MILLILITER(S): 9 INJECTION, SOLUTION INTRAVENOUS at 21:36

## 2020-06-22 RX ADMIN — SODIUM CHLORIDE 150 MILLILITER(S): 9 INJECTION, SOLUTION INTRAVENOUS at 12:00

## 2020-06-22 NOTE — CONSULT NOTE ADULT - SUBJECTIVE AND OBJECTIVE BOX
Chief Complaint:  Patient is a 29y old  Male who presents with a chief complaint of N/V (21 Jun 2020 19:58)    HPI:  Patient is a 29yom PMH DM1 (Dx 2015) w/ gastroparesis, H.pylori s/p triple therapy who presents with nausea of for 2 days. Patient did not have any exacerbating factors prior to this, no changes in diet. He has been unable to tolerate foods for the past 2 days as well and has been having midepigastric pain and cramping as well. Mr. Mendoza today also had 2 episode of nonbilious/nonbloody emesis. Denies any diarrhea, but has had constipation for 1 week, with a bowel movement  1 day prior to admission. Patient otherwise denies fevers/chills, recent travel, rectal bleeding, dysphagia, weight loss, rhinorrhea, cough.     Allergies:  No Known Allergies    Home Medications:  Protonix 40  Humalog 4 TID  Lantus 14    Hospital Medications:  aluminum hydroxide/magnesium hydroxide/simethicone Suspension 30 milliLiter(s) Oral every 8 hours PRN  dextrose 40% Gel 15 Gram(s) Oral once PRN  dextrose 5%. 1000 milliLiter(s) IV Continuous <Continuous>  dextrose 50% Injectable 12.5 Gram(s) IV Push once  dextrose 50% Injectable 25 Gram(s) IV Push once  dextrose 50% Injectable 25 Gram(s) IV Push once  famotidine  IVPB 20 milliGRAM(s) IV Intermittent two times a day  glucagon  Injectable 1 milliGRAM(s) IntraMuscular once PRN  insulin glargine Injectable (LANTUS) 12 Unit(s) SubCutaneous at bedtime  insulin lispro (HumaLOG) corrective regimen sliding scale   SubCutaneous three times a day before meals  insulin lispro (HumaLOG) corrective regimen sliding scale   SubCutaneous at bedtime  lactated ringers. 1000 milliLiter(s) IV Continuous <Continuous>  metoclopramide Injectable 10 milliGRAM(s) IV Push every 8 hours PRN  ondansetron Injectable 4 milliGRAM(s) IV Push every 8 hours PRN  pantoprazole    Tablet 40 milliGRAM(s) Oral before breakfast  polyethylene glycol 3350 17 Gram(s) Oral daily PRN      PMHX/PSHX:  Gastroparesis due to DM  Diabetes  No significant past surgical history      Family history:  FH: diabetes mellitus      Social History: Denies etoh, smoking, recreational drugs    ROS:   General:  No wt loss, fevers, chills, night sweats, fatigue,   Eyes:  Good vision, no reported pain  ENT:  No sore throat, pain, runny nose, dysphagia  CV:  No pain, palpitations, hypo/hypertension  Resp:  No dyspnea, cough, tachypnea, wheezing  GI: Midepigastric pain, non-radiating, unrelated to food intake, + nausea, + NBNB vomiting, No diarrhea, + constipation, No weight loss, No fever, No pruritis, No rectal bleeding, No tarry stools, No dysphagia,  :  No pain, bleeding, incontinence, nocturia  Muscle:  No pain, weakness  Endocrine:  No polyuria, polydipsia, cold/heat intolerance      PHYSICAL EXAM:     GENERAL:  Appears stated age, well-groomed, well-nourished, no distress  HEENT:  NC/AT,  conjunctivae clear and pink, no thyromegaly, nodules, adenopathy, no JVD, sclera -anicteric  CHEST:  Full & symmetric excursion, no increased effort, breath sounds clear  HEART:  Regular rhythm, S1, S2, no murmur/rub/S3/S4, no abdominal bruit, no edema  ABDOMEN:  Soft, non-tender, non-distended, normoactive bowel sounds,  no masses ,no hepato-splenomegaly, no signs of chronic liver disease  EXTEREMITIES:  no cyanosis,clubbing or edema  SKIN:  No rash/erythema/ecchymoses/petechiae/wounds/abscess/warm/dry  NEURO:  Alert, oriented, no asterixis, no tremor, no encephalopathy    Vital Signs:  Vital Signs Last 24 Hrs  T(C): 36.7 (22 Jun 2020 10:00), Max: 37.2 (22 Jun 2020 04:21)  T(F): 98 (22 Jun 2020 10:00), Max: 98.9 (22 Jun 2020 04:21)  HR: 78 (22 Jun 2020 10:00) (78 - 101)  BP: 113/74 (22 Jun 2020 10:00) (109/73 - 121/82)  BP(mean): --  RR: 17 (22 Jun 2020 10:00) (14 - 18)  SpO2: 99% (22 Jun 2020 10:00) (96% - 100%)  Daily Height in cm: 185.42 (21 Jun 2020 20:21)    Daily     LABS:                        11.8   7.39  )-----------( 278      ( 22 Jun 2020 06:22 )             34.4     Mean Cell Volume: 89.8 fL (06-22-20 @ 06:22)    06-22    138  |  101  |  14  ----------------------------<  167<H>  3.8   |  23  |  0.97    Ca    9.5      22 Jun 2020 06:22  Phos  3.0     06-22  Mg     1.7     06-22    TPro  6.1  /  Alb  3.9  /  TBili  1.6<H>  /  DBili  x   /  AST  25  /  ALT  43<H>  /  AlkPhos  45  06-22    LIVER FUNCTIONS - ( 22 Jun 2020 06:22 )  Alb: 3.9 g/dL / Pro: 6.1 g/dL / ALK PHOS: 45 u/L / ALT: 43 u/L / AST: 25 u/L / GGT: x                                       11.8   7.39  )-----------( 278      ( 22 Jun 2020 06:22 )             34.4                         13.0   6.69  )-----------( 331      ( 21 Jun 2020 06:45 )             37.3                         14.0   4.57  )-----------( 337      ( 20 Jun 2020 15:00 )             40.7     Imaging:  < from: US Abdomen Limited (06.20.20 @ 15:41) >  FINDINGS:    Liver: Within normal limits.  Bile ducts: Normal caliber. Common bile duct measures 2 mm.   Gallbladder: Within normal limits. Negative sonographic Alfaro sign.  Pancreas: Visualized portions are within normal limits. Distal body and tail are obscured by bowel gas.  Right kidney: 9.1 cm. No hydronephrosis.   Ascites: None.  Aorta/IVC: Visualized portions are within normal limits.    IMPRESSION:     Normal right upper quadrant abdominal ultrasound.    < end of copied text >

## 2020-06-22 NOTE — PROGRESS NOTE ADULT - ASSESSMENT
· · Assessment	  Patient is a 30 y/o M PMH DM1 (Dx 2015) w/ gastroparesis, H.pylori s/p triple therapy p/w gastroparesis

## 2020-06-22 NOTE — DISCHARGE NOTE PROVIDER - INSTRUCTIONS
small frequent meals, low fat, with soluble fiber small frequent meals, low fat, with soluble fiber  low cholesterol low sugar

## 2020-06-22 NOTE — DISCHARGE NOTE PROVIDER - NSDCMRMEDTOKEN_GEN_ALL_CORE_FT
HumaLOG KwikPen 100 units/mL injectable solution: 4 unit(s) injectable 3 times a day before meals   Lantus Solostar Pen 100 units/mL subcutaneous solution: 14 unit(s) subcutaneous once a day (at bedtime)  . price check spectra [id number]   Protonix 40 mg oral delayed release tablet: 1 tab(s) orally once a day HumaLOG KwikPen 100 units/mL injectable solution: 4 unit(s) injectable 3 times a day before meals   Lantus Solostar Pen 100 units/mL subcutaneous solution: 14 unit(s) subcutaneous once a day (at bedtime)  . price check spectra [id number]   Protonix 40 mg oral delayed release tablet: 1 tab(s) orally once a day HumaLOG KwikPen 100 units/mL injectable solution: 4 unit(s) injectable 3 times a day before meals   Lantus Solostar Pen 100 units/mL subcutaneous solution: 14 unit(s) subcutaneous once a day (at bedtime)  . price check spectra [id number]   Protonix 40 mg oral delayed release tablet: 1 tab(s) orally once a day HumaLOG KwikPen 100 units/mL injectable solution: 4 unit(s) injectable 3 times a day before meals   Lantus Solostar Pen 100 units/mL subcutaneous solution: 14 unit(s) subcutaneous once a day (at bedtime)  . price check spectra [id number]   metoclopramide 5 mg oral tablet: 1 tab(s) orally 3 times a day (with meals)   prn nausea  polyethylene glycol 3350 oral powder for reconstitution: 17 gram(s) orally 3 times a day, As Needed -Constipation   Protonix 40 mg oral delayed release tablet: 1 tab(s) orally once a day HumaLOG KwikPen 100 units/mL injectable solution: 4 unit(s) injectable 3 times a day before meals   Lantus Solostar Pen 100 units/mL subcutaneous solution: 14 unit(s) subcutaneous once a day (at bedtime)  . price check spectra [id number]   metoclopramide 5 mg oral tablet: 1 tab(s) orally 3 times a day (with meals)   prn nausea  polyethylene glycol 3350 oral powder for reconstitution: 17 gram(s) orally 3 times a day, As Needed -Constipation   Protonix 40 mg oral delayed release tablet: 1 tab(s) orally once a day

## 2020-06-22 NOTE — DISCHARGE NOTE PROVIDER - CARE PROVIDER_API CALL
Yo Hameed  INTERNAL MEDICINE  [address] [place] [address]  Phone: [phone]  Fax: [phone]  Follow Up Time: 1 week

## 2020-06-22 NOTE — DISCHARGE NOTE PROVIDER - NSDCFUSCHEDAPPT_GEN_ALL_CORE_FT
IRON MUNIZ ; 07/28/2020 ; [facility] OP [id number] [facility] IRON MUNIZ ; 07/28/2020 ; [facility] Endocrin OP [id number] [facility] IRON MUNIZ ; 07/28/2020 ; NPP Endocrin OP [id number] [facility] IRON MUNIZ ; 07/28/2020 ; NPP Endocrin OP [id number] [facility] IRON MUNIZ ; 07/28/2020 ; NPP Endocrin OP [id number] [facility]

## 2020-06-22 NOTE — DISCHARGE NOTE PROVIDER - HOSPITAL COURSE
Patient is a 29yom PMH DM1 (Dx 2015) w/ gastroparesis, H.pylori s/p triple therapy who presents with nausea of for 2 days.             Nausea/vomiting    - c/w IVF, antiemetics (ecg 6/21 428),     - advance diet as tolerated    -GI consulted     -likely secondary to gastroparesis, given long standing uncontrolled diabetes (last A1C 10.2 in May)    -Nutrition c/s pending-     -recommend metoclopramide 10 TID, 10-15 minutes prior to meals.     strict glycemic control    -upon discharge, can send home with metoclopramide, watch out for tardive dyskinesia and repeat EKG in few weeks     -outpatient gastric emptying studying. inpatient study would not be accurate given patient is already on a promotility agent (reglan).     -can call [phone] to schedule outpt GI appointment        Transaminitis/abnormal LFTs    -rec getting direct bilirubin (to assess for hemolysis vs gilbert vs intrahepatic source)    -consider hepatitis panel, including hep C        Type 1 diabetes mellitus with hyperglycemia.    -c/w lantus w/ FS AC/QHS w/ sliding scale, will check FS prior to lantus, if hypoglycemic, will reduce lantus as needed, repeat BMP to follow up AG, d/w RN. Patient is a 29y old  Male who presents with a chief complaint of nausea and vomiting        Nausea vomiting    -6/20 CXR - clear lungs    -House GI consulted - likely secondary to gastroparesis, given long standing uncontrolled diabetes (last A1C 10.2 in May)    -EGD prior in 1/2020 suggestive of gastroparesis with food in stomach as well esophagitis.    -Nutrition was consulted and recs appreciated    -started IVF, reglan and zofran. QTC 6/26 - 434    -miralax TID and fiber supplements    -strict glycemic control as outpatient    -advance diet as tolerated; stress need for small frequent meals, low fat, soluble fiber diet & nutrition followup    -Pt's diet was advanced from clear liquids to soft. On day of discharge, patient was tolerating soft diet, and has not required reglan in AM. Pt. denies nausea or any abdominal discomfort.     -Patient to follow up outpatient with GI - Can call [phone] to schedule outpt GI appointment. Pt. may need outpatient gastric empyting study (npatient study would not be accurate given patient is already on a promotility agent (reglan) and POEMS procedure.     -Will send patient home with metoclopramide, counseled pt on signs of tardive dyskinesia and pt to follow up with PCP  for repeat EKG in 1 to 2 weeks        Pt. noted with some mild transaminitis elevation, which has subsequently resolved.     6/20 Abd US - normal right upper quadrant abd ultrasound.        Type 1 diabetes mellitus with hyperglycemia    -sugars have remained well controlled inpatient.    -continued lantus 12 qd     -will resume basal/bolus home regimen on discharge        On ___ this case was reviewed with  ____, the patient is medically stable and optimized for discharge. All medications were reviewed and prescriptions were sent to mutually agreed upon pharmacy. Patient is a 29y old  Male who presents with a chief complaint of nausea and vomiting        Nausea vomiting    -6/20 CXR - clear lungs    -House GI consulted - likely secondary to gastroparesis, given long standing uncontrolled diabetes (last A1C 10.2 in May)    -EGD prior in 1/2020 suggestive of gastroparesis with food in stomach as well esophagitis.    -Nutrition was consulted and recs appreciated    -started IVF, reglan and zofran. QTC 6/26 - 434    -miralax TID and fiber supplements    -advanced diet as tolerated    -Pt's diet was advanced from clear liquids to soft. On day of discharge, patient was tolerating soft diet. Pt. denies nausea or any abdominal discomfort.     -Patient to follow up outpatient with GI - Can call [phone] to schedule outpt GI appointment. Pt. may need outpatient gastric empyting study (inpatient study would not be accurate given patient is already on a promotility agent (reglan) and POEMS procedure.     -Will send patient home with metoclopramide for seven days, counseled pt on signs of tardive dyskinesia and pt to follow up with PCP for repeat EKG in 1 to 2 weeks        Pt. noted with some mild transaminitis elevation, which has subsequently resolved.     6/20 Abd US - normal right upper quadrant abd ultrasound.        Type 1 diabetes mellitus with hyperglycemia    -sugars have remained well controlled inpatient.    -continued lantus 12 qd     -will resume basal/bolus home regimen on discharge        On 6/27 this case was reviewed with Dr. Hameed, the patient is medically stable and optimized for discharge. All medications were reviewed and prescriptions were sent to mutually agreed upon pharmacy. Patient is a 29y old  Male who presents with a chief complaint of nausea and vomiting        Nausea vomiting    -6/20 CXR - clear lungs    -House GI consulted - likely secondary to gastroparesis, given long standing uncontrolled diabetes (last A1C 10.2 in May)    -EGD prior in 1/2020 suggestive of gastroparesis with food in stomach as well esophagitis.    -Nutrition was consulted and recs appreciated    -started IVF, reglan and zofran. QTC 6/26 - 434    -advanced diet as tolerated    -Pt's diet was advanced from clear liquids to soft. On day of discharge, patient was tolerating soft diet. Pt. denies nausea or any abdominal discomfort.     -Patient to follow up outpatient with GI - Can call [phone] to schedule outpt GI appointment. Pt. may need outpatient gastric empyting study (inpatient study would not be accurate given patient is already on a promotility agent (reglan) and POEMS procedure.     -Will send patient home with metoclopramide 5mg tid prn for seven days and miralax TID, counseled pt on signs of tardive dyskinesia and pt to follow up with PCP for repeat EKG in 1 to 2 weeks        Pt. noted with some mild transaminitis elevation, which has subsequently resolved.     6/20 Abd US - normal right upper quadrant abd ultrasound.        Type 1 diabetes mellitus with hyperglycemia    -sugars have remained well controlled inpatient.    -continued lantus 12 qd     -will resume basal/bolus home regimen on discharge        On 6/27 this case was reviewed with Dr. Hameed, the patient is medically stable and optimized for discharge. All medications were reviewed and prescriptions were sent to mutually agreed upon pharmacy. Patient is a 29y old  Male who presents with a chief complaint of nausea and vomiting        Nausea vomiting    -6/20 CXR - clear lungs    -House GI consulted - likely secondary to gastroparesis, given long standing uncontrolled diabetes (last A1C 10.2 in May)    -EGD prior in 1/2020 suggestive of gastroparesis with food in stomach as well esophagitis.    -Nutrition was consulted and recs appreciated    -started IVF, reglan and zofran. QTC 6/26 - 434    -advanced diet as tolerated    -Pt's diet was advanced from clear liquids to soft. On day of discharge, patient was tolerating soft diet. Pt. denies nausea or any abdominal discomfort.     -Patient to follow up outpatient with GI - Can call [phone] to schedule outpt GI appointment. Pt. may need outpatient gastric empyting study (inpatient study would not be accurate given patient is already on a promotility agent (reglan) and POEMS procedure.     -Will send patient home with metoclopramide 5mg tid prn for seven days and miralax TID, counseled pt on signs of tardive dyskinesia and pt to follow up with PCP for repeat EKG in 1 to 2 weeks        Pt. noted with some mild transaminitis elevation, which has subsequently resolved.     6/20 Abd US - normal right upper quadrant abd ultrasound.        Pt. noted with some mild anemia during admission. VS have remained stable. Pt. without any signs or symptoms of bleeding and pt may follow up outpatient with PCP in 1 week for cbc and further care.         Type 1 diabetes mellitus with hyperglycemia    -sugars have remained well controlled inpatient.    -continued lantus 12 qd     -will resume basal/bolus home regimen on discharge        On 6/27 this case was reviewed with Dr. Hameed, the patient is medically stable and optimized for discharge. All medications were reviewed and prescriptions were sent to mutually agreed upon pharmacy.

## 2020-06-22 NOTE — CHART NOTE - NSCHARTNOTEFT_GEN_A_CORE
PA Late chart note    Pt. noted with a hgb of 11.8 this morning. Pt. denies any weakness, dizziness, blood in stool, blood in urine, bleeding from other sources.     Vital Signs Last 24 Hrs  T(C): 36.7 (22 Jun 2020 17:25), Max: 37.2 (22 Jun 2020 04:21)  T(F): 98 (22 Jun 2020 17:25), Max: 98.9 (22 Jun 2020 04:21)  HR: 77 (22 Jun 2020 17:25) (77 - 95)  BP: 111/71 (22 Jun 2020 17:25) (111/71 - 121/82)  BP(mean): --  RR: 17 (22 Jun 2020 17:25) (17 - 18)  SpO2: 100% (22 Jun 2020 17:25) (96% - 100%)    Physical Exam  Pt. in NAD. RRR w/o MRG. CTA w/o WRR. Abdomen soft, non tender.     A/P  Decreased hgb.  Attending made aware. In light of no signs of bleeding, previous hgb trends, and stable VS, will continue to monitor and f/u repeat CBC in AM.

## 2020-06-22 NOTE — DISCHARGE NOTE PROVIDER - NSDCFUADDAPPT_GEN_ALL_CORE_FT
[phone] to schedule outpt GI appointment Please call [phone] to schedule an appointment with gastroenterology in 1 to 2 weeks.     Please follow up with your primary care provider in 1 to 2 weeks for further care. If you don't have a primary care provider please follow up at our Medicine Clinic at  [facility]-[address] [place] [address] [phone] or [phone]  (please call to make appointment)

## 2020-06-22 NOTE — DISCHARGE NOTE PROVIDER - NSDCCPCAREPLAN_GEN_ALL_CORE_FT
PRINCIPAL DISCHARGE DIAGNOSIS  Diagnosis: Gastroparesis  Assessment and Plan of Treatment:       SECONDARY DISCHARGE DIAGNOSES  Diagnosis: Epigastric pain  Assessment and Plan of Treatment: PRINCIPAL DISCHARGE DIAGNOSIS  Diagnosis: Gastroparesis  Assessment and Plan of Treatment: Please eat small frequent meals, low fat, with soluble fiber diet. Please call [phone] to schedule an appointment with gastroenterology in 1 to 2 weeks. Please have your primary care provider check your ECG and liver enzymes within one to two weeks. Continue your miralax as prescribed and reglan as needed for nausea. Please purchase over the counter fiber supplements and take everyday.   Return to ER if any fevers, chills, abdominal pain, back pain, nausea, vomiting, difficulty passing stool or urine, blood in urine, or swelling in your abdomen develops.      SECONDARY DISCHARGE DIAGNOSES  Diagnosis: Type 1 diabetes mellitus with hyperglycemia  Assessment and Plan of Treatment: Continue your medication regimen and a consistent carbohydrate diet (Meaning eating the same amount of carbohydrates at the same time each day). Monitor blood glucose levels throughout the day before meals and at bedtime. Record blood sugars and bring to outpatient providers appointment in order to be reviewed by your doctor for management modifications. If your sugars are more than 400 or less than 70 you should contact your PCP immediately. Monitor for signs/symptoms of low blood glucose, such as, dizziness, altered mental status, or cool/clammy skin. In addition, monitor for signs/symptoms of high blood glucose, such as, feeling hot, dry, fatigued, or with increased thirst/urination. Make regular podiatry appointments in order to have feet checked for wounds and uncontrolled toe nail growth to prevent infections, as well as, appointments with an ophthalmologist to monitor your vision. PRINCIPAL DISCHARGE DIAGNOSIS  Diagnosis: Gastroparesis  Assessment and Plan of Treatment: Please eat small frequent meals, low fat, with soluble fiber diet. Please call [phone] to schedule an appointment with gastroenterology in 1 to 2 weeks. Please have your primary care provider check your ECG and liver enzymes within one to two weeks. Continue your miralax as prescribed and reglan as needed for nausea. Please purchase over the counter fiber supplements and take everyday.   Return to ER if any fevers, chills, abdominal pain, back pain, nausea, vomiting, difficulty passing stool or urine, blood in urine, or swelling in your abdomen develops.      SECONDARY DISCHARGE DIAGNOSES  Diagnosis: Type 1 diabetes mellitus with hyperglycemia  Assessment and Plan of Treatment: Please talk to your primary care provider about your nutrition needs and have them refer you to a nutritionist.   Continue your medication regimen and a consistent carbohydrate diet (Meaning eating the same amount of carbohydrates at the same time each day). Monitor blood glucose levels throughout the day before meals and at bedtime. Record blood sugars and bring to outpatient providers appointment in order to be reviewed by your doctor for management modifications. If your sugars are more than 400 or less than 70 you should contact your PCP immediately. Monitor for signs/symptoms of low blood glucose, such as, dizziness, altered mental status, or cool/clammy skin. In addition, monitor for signs/symptoms of high blood glucose, such as, feeling hot, dry, fatigued, or with increased thirst/urination. Make regular podiatry appointments in order to have feet checked for wounds and uncontrolled toe nail growth to prevent infections, as well as, appointments with an ophthalmologist to monitor your vision. PRINCIPAL DISCHARGE DIAGNOSIS  Diagnosis: Gastroparesis  Assessment and Plan of Treatment: Please eat small frequent meals, low fat, with soluble fiber diet. Please call [phone] to schedule an appointment with gastroenterology in 1 to 2 weeks. Please have your primary care provider check your ECG and liver enzymes within one to two weeks. Continue your miralax as prescribed and reglan AS NEEDED for nausea. Please follow your diet as written.  Monitor for any signs such as muscle twitching, muscle rigidity, restlessness, facial twitching - return to ER if such symptoms develop.  Return to ER if any fevers, chills, abdominal pain, back pain, nausea, vomiting, difficulty passing stool or urine, blood in urine, or swelling in your abdomen develops.      SECONDARY DISCHARGE DIAGNOSES  Diagnosis: Type 1 diabetes mellitus with hyperglycemia  Assessment and Plan of Treatment: Please talk to your primary care provider about your nutrition needs and have them refer you to a nutritionist.   Continue your medication regimen and a consistent carbohydrate diet (Meaning eating the same amount of carbohydrates at the same time each day). Monitor blood glucose levels throughout the day before meals and at bedtime. Record blood sugars and bring to outpatient providers appointment in order to be reviewed by your doctor for management modifications. If your sugars are more than 400 or less than 70 you should contact your PCP immediately. Monitor for signs/symptoms of low blood glucose, such as, dizziness, altered mental status, or cool/clammy skin. In addition, monitor for signs/symptoms of high blood glucose, such as, feeling hot, dry, fatigued, or with increased thirst/urination. Make regular podiatry appointments in order to have feet checked for wounds and uncontrolled toe nail growth to prevent infections, as well as, appointments with an ophthalmologist to monitor your vision. PRINCIPAL DISCHARGE DIAGNOSIS  Diagnosis: Gastroparesis  Assessment and Plan of Treatment: Please eat small frequent meals, low fat, with soluble fiber diet. Please call [phone] to schedule an appointment with gastroenterology in 1 to 2 weeks. Please have your primary care provider check your ECG and liver enzymes within one to two weeks. Continue your miralax as prescribed and reglan AS NEEDED for nausea. Please follow your diet as written.  Monitor for any signs such as muscle twitching, muscle rigidity, restlessness, facial twitching - return to ER if such symptoms develop.  Return to ER if any fevers, chills, abdominal pain, back pain, nausea, vomiting, difficulty passing stool or urine, blood in urine, or swelling in your abdomen develops.      SECONDARY DISCHARGE DIAGNOSES  Diagnosis: Anemia  Assessment and Plan of Treatment: During admission you were found to be slightly anemic. You did not have any signs of bleeding. Please follow up with your outpatient provider for further monitoring of your blood counts in 1 to 2 weeks. Monitor for signs/symptoms indicating worsening of disease, such as headache, dizziness, blurry vision, easy bleeding/bruising, pale skin, fatigue, dizziness, increased heart rate, or chest pain and return to ER if any such symptoms develop    Diagnosis: Type 1 diabetes mellitus with hyperglycemia  Assessment and Plan of Treatment: Please talk to your primary care provider about your nutrition needs and have them refer you to a nutritionist.   Continue your medication regimen and a consistent carbohydrate diet (Meaning eating the same amount of carbohydrates at the same time each day). Monitor blood glucose levels throughout the day before meals and at bedtime. Record blood sugars and bring to outpatient providers appointment in order to be reviewed by your doctor for management modifications. If your sugars are more than 400 or less than 70 you should contact your PCP immediately. Monitor for signs/symptoms of low blood glucose, such as, dizziness, altered mental status, or cool/clammy skin. In addition, monitor for signs/symptoms of high blood glucose, such as, feeling hot, dry, fatigued, or with increased thirst/urination. Make regular podiatry appointments in order to have feet checked for wounds and uncontrolled toe nail growth to prevent infections, as well as, appointments with an ophthalmologist to monitor your vision.

## 2020-06-22 NOTE — CONSULT NOTE ADULT - ASSESSMENT
Patient is a 29yom PMH DM1 (Dx 2015) w/ gastroparesis, H.pylori s/p triple therapy who presents with nausea of for 2 days.     #Nausea/Vomiting  -likely secondary to gastroparesis, given long standing uncontrolled diabetes (last A1C 10.2 in May)  -recommend dietary modifications (small frequent meals, low fat, soluble fiber diet), may benefit from nutrition consult  -recommend metoclopramide 10 TID, 10-15 minutes prior to meals. patient reports improvement in nausea with this.  -if metoclopramide does not work, can try domperidone or oral erythromycin   -please check routine EKGs to monitor qtc (428 on 6/22)  -ensure strict glycemic control  -no need for repeat endoscopy at this point  -outpatient gastric emptying studying     ***Note written by resident. Please wait attending attestation for final recommendations***    Fawad Maynard, PGY-1  Internal Medicine  [facility]  Pager: [phone] Patient is a 29yom PMH DM1 (Dx 2015) w/ gastroparesis, H.pylori s/p triple therapy who presents with nausea of for 2 days.     #Nausea/Vomiting  -likely secondary to gastroparesis, given long standing uncontrolled diabetes (last A1C 10.2 in May)  -recommend dietary modifications (small frequent meals, low fat, soluble fiber diet), may benefit from nutrition consult  -recommend metoclopramide 10 TID, 10-15 minutes prior to meals. patient reports improvement in nausea with this.  -please check routine EKGs to monitor qtc (428 on 6/22)  -ensure strict glycemic control  -upon discharge, can send home with metoclopramide, watch out for tardive dyskinesia and repeat EKG in few weeks   -outpatient gastric emptying studying. inpatient study would not be accurate given patient is already on a promotility agent (reglan).   -can call [phone] to schedule outpt GI appointment    #Transaminitis/abnormal LFTs  -rec getting direct bilirubin (to assess for hemolysis vs gilbert vs intrahepatic source)  -consider hepatitis panel, including hep C    ***Note written by resident. Please wait attending attestation for final recommendations***    Fawad Maynard, PGY-1  Internal Medicine  [facility]  Pager: [phone] Patient is a 29yom PMH DM1 (Dx 2015) w/ gastroparesis, H.pylori s/p triple therapy who presents with nausea of for 2 days.     #Nausea/Vomiting  -likely secondary to gastroparesis, given long standing uncontrolled diabetes (last A1C 10.2 in May)  -recommend dietary modifications (small frequent meals, low fat, soluble fiber diet), may benefit from nutrition consult  -advance diet as tolerated  -recommend metoclopramide 10 TID PRN, 10-15 minutes prior to meals. patient reports improvement in nausea with this.  -please check routine EKGs to monitor qtc (428 on 6/22)  -ensure strict glycemic control  -upon discharge, if QTc within normal, can send home with metoclopramide 5mg TID PRN, watch out for tardive dyskinesia and repeat EKG in few weeks  -outpatient gastric emptying studying. inpatient study would not be accurate given patient is already on a promotility agent (Reglan).   -can call [phone] to schedule outpt GI appointment    #Transaminitis/abnormal LFTs  -rec getting direct bilirubin (to assess for hemolysis vs gilbert vs intrahepatic source)  -consider hepatitis panel, including hep C    Fawad Maynard, PGY-1  Internal Medicine  [facility]  Pager: [phone]

## 2020-06-22 NOTE — CONSULT NOTE ADULT - ATTENDING COMMENTS
Patient well-known to GI service. Readmitted with nausea/vomiting, which has improved since admission. Patient reports FSG well-controlled at home and his symptoms have improved with frequent and small, low fat meals. Discussed risks of longterm Reglan use with patient. Reasonable to given Rx for PRN doses if symptoms recur. Also instructed to follow up in GI clinic post-discharge. Can obtain NM GES to confirm gastroparesis diagnosis as outpatient (off promotility agents/anti-emetics).

## 2020-06-22 NOTE — PROGRESS NOTE ADULT - SUBJECTIVE AND OBJECTIVE BOX
Patient is a 29y old  Male who presents with a chief complaint of N/V (22 Jun 2020 20:09)      SUBJECTIVE / OVERNIGHT EVENTS:    Events noted.  CONSTITUTIONAL: No fever,  or fatigue  RESPIRATORY: No cough, wheezing,  No shortness of breath  CARDIOVASCULAR: No chest pain, palpitations, dizziness, or leg swelling  GASTROINTESTINAL: abd pain -resolved  NEUROLOGICAL: No headaches,     MEDICATIONS  (STANDING):  dextrose 5%. 1000 milliLiter(s) (50 mL/Hr) IV Continuous <Continuous>  dextrose 50% Injectable 12.5 Gram(s) IV Push once  dextrose 50% Injectable 25 Gram(s) IV Push once  dextrose 50% Injectable 25 Gram(s) IV Push once  famotidine  IVPB 20 milliGRAM(s) IV Intermittent two times a day  insulin glargine Injectable (LANTUS) 12 Unit(s) SubCutaneous at bedtime  insulin lispro (HumaLOG) corrective regimen sliding scale   SubCutaneous three times a day before meals  insulin lispro (HumaLOG) corrective regimen sliding scale   SubCutaneous at bedtime  lactated ringers. 1000 milliLiter(s) (150 mL/Hr) IV Continuous <Continuous>  pantoprazole    Tablet 40 milliGRAM(s) Oral before breakfast    MEDICATIONS  (PRN):  aluminum hydroxide/magnesium hydroxide/simethicone Suspension 30 milliLiter(s) Oral every 8 hours PRN Dyspepsia  dextrose 40% Gel 15 Gram(s) Oral once PRN Blood Glucose LESS THAN 70 milliGRAM(s)/deciliter  glucagon  Injectable 1 milliGRAM(s) IntraMuscular once PRN Glucose LESS THAN 70 milligrams/deciliter  metoclopramide Injectable 10 milliGRAM(s) IV Push every 8 hours PRN Nausea/ vomiting  ondansetron Injectable 4 milliGRAM(s) IV Push every 8 hours PRN Nausea and/or Vomiting  polyethylene glycol 3350 17 Gram(s) Oral daily PRN Constipation        CAPILLARY BLOOD GLUCOSE      POCT Blood Glucose.: 203 mg/dL (22 Jun 2020 21:29)  POCT Blood Glucose.: 223 mg/dL (22 Jun 2020 17:18)  POCT Blood Glucose.: 201 mg/dL (22 Jun 2020 11:52)  POCT Blood Glucose.: 176 mg/dL (22 Jun 2020 07:33)    I&O's Summary      PHYSICAL EXAM:  GENERAL: NAD  NECK: Supple, No JVD  CHEST/LUNG: Clear to auscultation bilaterally; No wheezing.  HEART: Regular rate and rhythm; No murmurs, rubs, or gallops  ABDOMEN: Soft, Nontender, Nondistended; Bowel sounds present  EXTREMITIES:   No edema  NEUROLOGY: AAO X 3      LABS:                        11.8   7.39  )-----------( 278      ( 22 Jun 2020 06:22 )             34.4     06-22    138  |  101  |  14  ----------------------------<  167<H>  3.8   |  23  |  0.97    Ca    9.5      22 Jun 2020 06:22  Phos  3.0     06-22  Mg     1.7     06-22    TPro  6.1  /  Alb  3.9  /  TBili  1.6<H>  /  DBili  x   /  AST  25  /  ALT  43<H>  /  AlkPhos  45  06-22            CAPILLARY BLOOD GLUCOSE      POCT Blood Glucose.: 203 mg/dL (22 Jun 2020 21:29)  POCT Blood Glucose.: 223 mg/dL (22 Jun 2020 17:18)  POCT Blood Glucose.: 201 mg/dL (22 Jun 2020 11:52)  POCT Blood Glucose.: 176 mg/dL (22 Jun 2020 07:33)                RADIOLOGY & ADDITIONAL TESTS:    Imaging Personally Reviewed:    Consultant(s) Notes Reviewed:      Care Discussed with Consultants/Other Providers:    Yo Hameed MD, CMD, FACP    [address] [place] [address]  Office Tel: [phone]  Cell: [phone]

## 2020-06-23 LAB
ALBUMIN SERPL ELPH-MCNC: 3.6 G/DL — SIGNIFICANT CHANGE UP (ref 3.3–5)
ALP SERPL-CCNC: 46 U/L — SIGNIFICANT CHANGE UP (ref 40–120)
ALT FLD-CCNC: 36 U/L — SIGNIFICANT CHANGE UP (ref 4–41)
ANION GAP SERPL CALC-SCNC: 15 MMO/L — HIGH (ref 7–14)
AST SERPL-CCNC: 20 U/L — SIGNIFICANT CHANGE UP (ref 4–40)
BASOPHILS # BLD AUTO: 0.06 K/UL — SIGNIFICANT CHANGE UP (ref 0–0.2)
BASOPHILS NFR BLD AUTO: 0.8 % — SIGNIFICANT CHANGE UP (ref 0–2)
BILIRUB DIRECT SERPL-MCNC: 0.2 MG/DL — SIGNIFICANT CHANGE UP (ref 0.1–0.2)
BILIRUB SERPL-MCNC: 1.5 MG/DL — HIGH (ref 0.2–1.2)
BUN SERPL-MCNC: 12 MG/DL — SIGNIFICANT CHANGE UP (ref 7–23)
CALCIUM SERPL-MCNC: 9.4 MG/DL — SIGNIFICANT CHANGE UP (ref 8.4–10.5)
CHLORIDE SERPL-SCNC: 100 MMOL/L — SIGNIFICANT CHANGE UP (ref 98–107)
CO2 SERPL-SCNC: 22 MMOL/L — SIGNIFICANT CHANGE UP (ref 22–31)
CREAT SERPL-MCNC: 0.82 MG/DL — SIGNIFICANT CHANGE UP (ref 0.5–1.3)
EOSINOPHIL # BLD AUTO: 0.02 K/UL — SIGNIFICANT CHANGE UP (ref 0–0.5)
EOSINOPHIL NFR BLD AUTO: 0.3 % — SIGNIFICANT CHANGE UP (ref 0–6)
GLUCOSE BLDC GLUCOMTR-MCNC: 118 MG/DL — HIGH (ref 70–99)
GLUCOSE BLDC GLUCOMTR-MCNC: 126 MG/DL — HIGH (ref 70–99)
GLUCOSE BLDC GLUCOMTR-MCNC: 148 MG/DL — HIGH (ref 70–99)
GLUCOSE BLDC GLUCOMTR-MCNC: 168 MG/DL — HIGH (ref 70–99)
GLUCOSE BLDC GLUCOMTR-MCNC: 169 MG/DL — HIGH (ref 70–99)
GLUCOSE SERPL-MCNC: 178 MG/DL — HIGH (ref 70–99)
HAV IGM SER-ACNC: NONREACTIVE — SIGNIFICANT CHANGE UP
HBV CORE IGM SER-ACNC: NONREACTIVE — SIGNIFICANT CHANGE UP
HBV SURFACE AG SER-ACNC: NONREACTIVE — SIGNIFICANT CHANGE UP
HCT VFR BLD CALC: 37.4 % — LOW (ref 39–50)
HCV AB S/CO SERPL IA: 0.12 S/CO — SIGNIFICANT CHANGE UP (ref 0–0.99)
HCV AB SERPL-IMP: SIGNIFICANT CHANGE UP
HGB BLD-MCNC: 12.4 G/DL — LOW (ref 13–17)
IMM GRANULOCYTES NFR BLD AUTO: 0.3 % — SIGNIFICANT CHANGE UP (ref 0–1.5)
LYMPHOCYTES # BLD AUTO: 2.91 K/UL — SIGNIFICANT CHANGE UP (ref 1–3.3)
LYMPHOCYTES # BLD AUTO: 37.9 % — SIGNIFICANT CHANGE UP (ref 13–44)
MAGNESIUM SERPL-MCNC: 1.7 MG/DL — SIGNIFICANT CHANGE UP (ref 1.6–2.6)
MCHC RBC-ENTMCNC: 29.7 PG — SIGNIFICANT CHANGE UP (ref 27–34)
MCHC RBC-ENTMCNC: 33.2 % — SIGNIFICANT CHANGE UP (ref 32–36)
MCV RBC AUTO: 89.5 FL — SIGNIFICANT CHANGE UP (ref 80–100)
MONOCYTES # BLD AUTO: 0.51 K/UL — SIGNIFICANT CHANGE UP (ref 0–0.9)
MONOCYTES NFR BLD AUTO: 6.6 % — SIGNIFICANT CHANGE UP (ref 2–14)
NEUTROPHILS # BLD AUTO: 4.15 K/UL — SIGNIFICANT CHANGE UP (ref 1.8–7.4)
NEUTROPHILS NFR BLD AUTO: 54.1 % — SIGNIFICANT CHANGE UP (ref 43–77)
NRBC # FLD: 0 K/UL — SIGNIFICANT CHANGE UP (ref 0–0)
PHOSPHATE SERPL-MCNC: 2.8 MG/DL — SIGNIFICANT CHANGE UP (ref 2.5–4.5)
PLATELET # BLD AUTO: 279 K/UL — SIGNIFICANT CHANGE UP (ref 150–400)
PMV BLD: 10 FL — SIGNIFICANT CHANGE UP (ref 7–13)
POTASSIUM SERPL-MCNC: 3.8 MMOL/L — SIGNIFICANT CHANGE UP (ref 3.5–5.3)
POTASSIUM SERPL-SCNC: 3.8 MMOL/L — SIGNIFICANT CHANGE UP (ref 3.5–5.3)
PROT SERPL-MCNC: 6 G/DL — SIGNIFICANT CHANGE UP (ref 6–8.3)
RBC # BLD: 4.18 M/UL — LOW (ref 4.2–5.8)
RBC # FLD: 12.6 % — SIGNIFICANT CHANGE UP (ref 10.3–14.5)
SODIUM SERPL-SCNC: 137 MMOL/L — SIGNIFICANT CHANGE UP (ref 135–145)
WBC # BLD: 7.67 K/UL — SIGNIFICANT CHANGE UP (ref 3.8–10.5)
WBC # FLD AUTO: 7.67 K/UL — SIGNIFICANT CHANGE UP (ref 3.8–10.5)

## 2020-06-23 PROCEDURE — 99232 SBSQ HOSP IP/OBS MODERATE 35: CPT | Mod: GC

## 2020-06-23 PROCEDURE — 93010 ELECTROCARDIOGRAM REPORT: CPT

## 2020-06-23 RX ORDER — POLYETHYLENE GLYCOL 3350 17 G/17G
17 POWDER, FOR SOLUTION ORAL
Refills: 0 | Status: DISCONTINUED | OUTPATIENT
Start: 2020-06-23 | End: 2020-06-27

## 2020-06-23 RX ADMIN — PANTOPRAZOLE SODIUM 40 MILLIGRAM(S): 20 TABLET, DELAYED RELEASE ORAL at 05:36

## 2020-06-23 RX ADMIN — FAMOTIDINE 104 MILLIGRAM(S): 10 INJECTION INTRAVENOUS at 05:36

## 2020-06-23 RX ADMIN — ONDANSETRON 4 MILLIGRAM(S): 8 TABLET, FILM COATED ORAL at 17:25

## 2020-06-23 RX ADMIN — SODIUM CHLORIDE 150 MILLILITER(S): 9 INJECTION, SOLUTION INTRAVENOUS at 07:26

## 2020-06-23 RX ADMIN — ONDANSETRON 4 MILLIGRAM(S): 8 TABLET, FILM COATED ORAL at 07:26

## 2020-06-23 RX ADMIN — Medication 10 MILLIGRAM(S): at 20:50

## 2020-06-23 RX ADMIN — FAMOTIDINE 104 MILLIGRAM(S): 10 INJECTION INTRAVENOUS at 17:25

## 2020-06-23 RX ADMIN — INSULIN GLARGINE 12 UNIT(S): 100 INJECTION, SOLUTION SUBCUTANEOUS at 21:25

## 2020-06-23 RX ADMIN — Medication 1: at 07:53

## 2020-06-23 RX ADMIN — SODIUM CHLORIDE 150 MILLILITER(S): 9 INJECTION, SOLUTION INTRAVENOUS at 20:49

## 2020-06-23 RX ADMIN — Medication 1: at 17:24

## 2020-06-23 RX ADMIN — Medication 10 MILLIGRAM(S): at 12:24

## 2020-06-23 NOTE — PROGRESS NOTE ADULT - ASSESSMENT
· · Assessment	  Patient is a 28 y/o M PMH DM1 (Dx 2015) w/ gastroparesis, H.pylori s/p triple therapy p/w gastroparesis

## 2020-06-23 NOTE — PROGRESS NOTE ADULT - SUBJECTIVE AND OBJECTIVE BOX
CHIEF COMPLAINT:  Patient is a 29y old  Male who presents with a chief complaint of N/V (22 Jun 2020 20:09)    HPI:  Patient is a 28 y/o M PMH DM1 (Dx 2015) w/ gastroparesis, H.pylori s/p triple therapy p/w N/V abdominal pain since 6/19. Abdominal pain is cramping, epigastric, intermittent, no aggravating factors, reports improvement in nausea and abdominal pain when laying on his left side. Reports associated nausea and nonbloody vomiting. Has not been able to tolerate PO throughout the day today, was able to drink a cup of water prior to transfer to [facility] tonight w/ no difficulty. (21 Jun 2020 19:58)    Interval Events:  -Patient still noxious this morning, and reports that Reglan improves his nausea the most. Got Reglan at 10pm last night and Zofran at 730AM.   -Otherwise denies abdominal pain, chest pain, cough. Has not had a bm, but is passing gas. Has barely been out of bed.       REVIEW OF SYSTEMS:  General:  No wt loss, fevers, chills, night sweats, fatigue,   CV:  No pain, palpitations, hypo/hypertension  Resp:  No dyspnea, cough, tachypnea, wheezing  GI: Midepigastric pain, non-radiating, unrelated to food intake, + nausea, + NBNB vomiting, No diarrhea, + constipation, No weight loss, No fever, No rectal bleeding, No tarry stools,   :  No pain, bleeding, incontinence, nocturia  Endocrine:  No polyuria, polydipsia, cold/heat intolerance    OBJECTIVE:  ICU Vital Signs Last 24 Hrs  T(C): 36.9 (23 Jun 2020 09:20), Max: 37.2 (22 Jun 2020 14:00)  T(F): 98.5 (23 Jun 2020 09:20), Max: 98.9 (22 Jun 2020 14:00)  HR: 76 (23 Jun 2020 09:20) (76 - 90)  BP: 110/74 (23 Jun 2020 09:20) (110/71 - 119/83)  BP(mean): --  ABP: --  ABP(mean): --  RR: 17 (23 Jun 2020 09:20) (16 - 18)  SpO2: 100% (23 Jun 2020 09:20) (99% - 100%)        CAPILLARY BLOOD GLUCOSE      POCT Blood Glucose.: 168 mg/dL (23 Jun 2020 07:31)    PHYSICAL EXAM:     GENERAL:  Appears stated age, well-groomed, well-nourished, no distress  CHEST:  Full & symmetric excursion, no increased effort, breath sounds clear  HEART:  Regular rhythm, S1, S2, no murmur/rub/S3/S4, no abdominal bruit, no edema  ABDOMEN:  Soft, non-tender, non-distended, normoactive bowel sounds,  no masses ,no hepato-splenomegaly  EXTEREMITIES:  no cyanosis,clubbing or edema  SKIN:  intact  NEURO:  [address] MEDICATIONS:  MEDICATIONS  (STANDING):  dextrose 5%. 1000 milliLiter(s) (50 mL/Hr) IV Continuous <Continuous>  dextrose 50% Injectable 12.5 Gram(s) IV Push once  dextrose 50% Injectable 25 Gram(s) IV Push once  dextrose 50% Injectable 25 Gram(s) IV Push once  famotidine  IVPB 20 milliGRAM(s) IV Intermittent two times a day  insulin glargine Injectable (LANTUS) 12 Unit(s) SubCutaneous at bedtime  insulin lispro (HumaLOG) corrective regimen sliding scale   SubCutaneous three times a day before meals  insulin lispro (HumaLOG) corrective regimen sliding scale   SubCutaneous at bedtime  lactated ringers. 1000 milliLiter(s) (150 mL/Hr) IV Continuous <Continuous>  pantoprazole    Tablet 40 milliGRAM(s) Oral before breakfast    MEDICATIONS  (PRN):  aluminum hydroxide/magnesium hydroxide/simethicone Suspension 30 milliLiter(s) Oral every 8 hours PRN Dyspepsia  dextrose 40% Gel 15 Gram(s) Oral once PRN Blood Glucose LESS THAN 70 milliGRAM(s)/deciliter  glucagon  Injectable 1 milliGRAM(s) IntraMuscular once PRN Glucose LESS THAN 70 milligrams/deciliter  metoclopramide Injectable 10 milliGRAM(s) IV Push every 8 hours PRN Nausea/ vomiting  ondansetron Injectable 4 milliGRAM(s) IV Push every 8 hours PRN Nausea and/or Vomiting  polyethylene glycol 3350 17 Gram(s) Oral daily PRN Constipation      LABS:                        12.4   7.67  )-----------( 279      ( 23 Jun 2020 05:45 )             37.4     Hgb Trend: 12.4<--, 11.8<--, 13.0<--, 14.0<--  06-23    137  |  100  |  12  ----------------------------<  178<H>  3.8   |  22  |  0.82    Ca    9.4      23 Jun 2020 05:45  Phos  2.8     06-23  Mg     1.7     06-23    TPro  6.0  /  Alb  3.6  /  TBili  1.5<H>  /  DBili  0.2  /  AST  20  /  ALT  36  /  AlkPhos  46  06-23    LIVER FUNCTIONS - ( 23 Jun 2020 05:45 )  Alb: 3.6 g/dL / Pro: 6.0 g/dL / ALK PHOS: 46 u/L / ALT: 36 u/L / AST: 20 u/L / GGT: x           Creatinine Trend: 0.82<--, 0.97<--, 0.89<--, 0.77<--, 0.95<--            MICROBIOLOGY:     RADIOLOGY:  [ ] Reviewed and interpreted by me    EKG:      Pedro Pablo ANP-BC (ext [id number]) CHIEF COMPLAINT:  Patient is a 29y old  Male who presents with a chief complaint of N/V (22 Jun 2020 20:09)    Interval Events:  -Patient still nauseated this morning, and reports that Reglan improves his nausea the most. Got Reglan at 10pm last night and Zofran at 730AM.   -Otherwise denies abdominal pain, chest pain, cough. Has not had a bm, but is passing gas. Has barely been out of bed.       REVIEW OF SYSTEMS:  General:  No wt loss, fevers, chills, night sweats, fatigue,   CV:  No pain, palpitations, hypo/hypertension  Resp:  No dyspnea, cough, tachypnea, wheezing  GI: Midepigastric pain, non-radiating, unrelated to food intake, + nausea, + NBNB vomiting, No diarrhea, + constipation, No weight loss, No fever, No rectal bleeding, No tarry stools,   :  No pain, bleeding, incontinence, nocturia  Endocrine:  No polyuria, polydipsia, cold/heat intolerance    OBJECTIVE:  ICU Vital Signs Last 24 Hrs  T(C): 36.9 (23 Jun 2020 09:20), Max: 37.2 (22 Jun 2020 14:00)  T(F): 98.5 (23 Jun 2020 09:20), Max: 98.9 (22 Jun 2020 14:00)  HR: 76 (23 Jun 2020 09:20) (76 - 90)  BP: 110/74 (23 Jun 2020 09:20) (110/71 - 119/83)  BP(mean): --  ABP: --  ABP(mean): --  RR: 17 (23 Jun 2020 09:20) (16 - 18)  SpO2: 100% (23 Jun 2020 09:20) (99% - 100%)        CAPILLARY BLOOD GLUCOSE      POCT Blood Glucose.: 168 mg/dL (23 Jun 2020 07:31)    PHYSICAL EXAM:     GENERAL:  Appears stated age, well-groomed, well-nourished, no distress  CHEST:  Full & symmetric excursion, no increased effort, breath sounds clear  HEART:  Regular rhythm, S1, S2, no murmur/rub/S3/S4, no abdominal bruit, no edema  ABDOMEN:  Soft, non-tender, non-distended, normoactive bowel sounds,  no masses ,no hepato-splenomegaly  EXTEREMITIES:  no cyanosis,clubbing or edema  SKIN:  intact  NEURO:  A&Ox3    HOSPITAL MEDICATIONS:  MEDICATIONS  (STANDING):  dextrose 5%. 1000 milliLiter(s) (50 mL/Hr) IV Continuous <Continuous>  dextrose 50% Injectable 12.5 Gram(s) IV Push once  dextrose 50% Injectable 25 Gram(s) IV Push once  dextrose 50% Injectable 25 Gram(s) IV Push once  famotidine  IVPB 20 milliGRAM(s) IV Intermittent two times a day  insulin glargine Injectable (LANTUS) 12 Unit(s) SubCutaneous at bedtime  insulin lispro (HumaLOG) corrective regimen sliding scale   SubCutaneous three times a day before meals  insulin lispro (HumaLOG) corrective regimen sliding scale   SubCutaneous at bedtime  lactated ringers. 1000 milliLiter(s) (150 mL/Hr) IV Continuous <Continuous>  pantoprazole    Tablet 40 milliGRAM(s) Oral before breakfast    MEDICATIONS  (PRN):  aluminum hydroxide/magnesium hydroxide/simethicone Suspension 30 milliLiter(s) Oral every 8 hours PRN Dyspepsia  dextrose 40% Gel 15 Gram(s) Oral once PRN Blood Glucose LESS THAN 70 milliGRAM(s)/deciliter  glucagon  Injectable 1 milliGRAM(s) IntraMuscular once PRN Glucose LESS THAN 70 milligrams/deciliter  metoclopramide Injectable 10 milliGRAM(s) IV Push every 8 hours PRN Nausea/ vomiting  ondansetron Injectable 4 milliGRAM(s) IV Push every 8 hours PRN Nausea and/or Vomiting  polyethylene glycol 3350 17 Gram(s) Oral daily PRN Constipation      LABS:                        12.4   7.67  )-----------( 279      ( 23 Jun 2020 05:45 )             37.4     Hgb Trend: 12.4<--, 11.8<--, 13.0<--, 14.0<--  06-23    137  |  100  |  12  ----------------------------<  178<H>  3.8   |  22  |  0.82    Ca    9.4      23 Jun 2020 05:45  Phos  2.8     06-23  Mg     1.7     06-23    TPro  6.0  /  Alb  3.6  /  TBili  1.5<H>  /  DBili  0.2  /  AST  20  /  ALT  36  /  AlkPhos  46  06-23    LIVER FUNCTIONS - ( 23 Jun 2020 05:45 )  Alb: 3.6 g/dL / Pro: 6.0 g/dL / ALK PHOS: 46 u/L / ALT: 36 u/L / AST: 20 u/L / GGT: x           Creatinine Trend: 0.82<--, 0.97<--, 0.89<--, 0.77<--, 0.95<--            MICROBIOLOGY:     RADIOLOGY:  [ ] Reviewed and interpreted by me    EKG:      Pedro Pablo ANP-BC (ext [id number])

## 2020-06-23 NOTE — PROGRESS NOTE ADULT - SUBJECTIVE AND OBJECTIVE BOX
Patient is a 29y old  Male who presents with a chief complaint of N/V (22 Jun 2020 20:09)      SUBJECTIVE / OVERNIGHT EVENTS:    Events noted.  CONSTITUTIONAL: No fever,  or fatigue  RESPIRATORY: No cough, wheezing,  No shortness of breath  CARDIOVASCULAR: No chest pain, palpitations, dizziness, or leg swelling  GASTROINTESTINAL: abd pain -resolved  NEUROLOGICAL: No headaches,     MEDICATIONS  (STANDING):  dextrose 5%. 1000 milliLiter(s) (50 mL/Hr) IV Continuous <Continuous>  dextrose 50% Injectable 12.5 Gram(s) IV Push once  dextrose 50% Injectable 25 Gram(s) IV Push once  dextrose 50% Injectable 25 Gram(s) IV Push once  famotidine  IVPB 20 milliGRAM(s) IV Intermittent two times a day  insulin glargine Injectable (LANTUS) 12 Unit(s) SubCutaneous at bedtime  insulin lispro (HumaLOG) corrective regimen sliding scale   SubCutaneous three times a day before meals  insulin lispro (HumaLOG) corrective regimen sliding scale   SubCutaneous at bedtime  lactated ringers. 1000 milliLiter(s) (150 mL/Hr) IV Continuous <Continuous>  pantoprazole    Tablet 40 milliGRAM(s) Oral before breakfast    MEDICATIONS  (PRN):  aluminum hydroxide/magnesium hydroxide/simethicone Suspension 30 milliLiter(s) Oral every 8 hours PRN Dyspepsia  dextrose 40% Gel 15 Gram(s) Oral once PRN Blood Glucose LESS THAN 70 milliGRAM(s)/deciliter  glucagon  Injectable 1 milliGRAM(s) IntraMuscular once PRN Glucose LESS THAN 70 milligrams/deciliter  metoclopramide Injectable 10 milliGRAM(s) IV Push every 8 hours PRN Nausea/ vomiting  ondansetron Injectable 4 milliGRAM(s) IV Push every 8 hours PRN Nausea and/or Vomiting  polyethylene glycol 3350 17 Gram(s) Oral daily PRN Constipation        CAPILLARY BLOOD GLUCOSE      POCT Blood Glucose.: 203 mg/dL (22 Jun 2020 21:29)  POCT Blood Glucose.: 223 mg/dL (22 Jun 2020 17:18)  POCT Blood Glucose.: 201 mg/dL (22 Jun 2020 11:52)  POCT Blood Glucose.: 176 mg/dL (22 Jun 2020 07:33)    I&O's Summary      PHYSICAL EXAM:  GENERAL: NAD  NECK: Supple, No JVD  CHEST/LUNG: Clear to auscultation bilaterally; No wheezing.  HEART: Regular rate and rhythm; No murmurs, rubs, or gallops  ABDOMEN: Soft, Nontender, Nondistended; Bowel sounds present  EXTREMITIES:   No edema  NEUROLOGY: AAO X 3      LABS:                        11.8   7.39  )-----------( 278      ( 22 Jun 2020 06:22 )             34.4     06-22    138  |  101  |  14  ----------------------------<  167<H>  3.8   |  23  |  0.97    Ca    9.5      22 Jun 2020 06:22  Phos  3.0     06-22  Mg     1.7     06-22    TPro  6.1  /  Alb  3.9  /  TBili  1.6<H>  /  DBili  x   /  AST  25  /  ALT  43<H>  /  AlkPhos  45  06-22            CAPILLARY BLOOD GLUCOSE      POCT Blood Glucose.: 203 mg/dL (22 Jun 2020 21:29)  POCT Blood Glucose.: 223 mg/dL (22 Jun 2020 17:18)  POCT Blood Glucose.: 201 mg/dL (22 Jun 2020 11:52)  POCT Blood Glucose.: 176 mg/dL (22 Jun 2020 07:33)                RADIOLOGY & ADDITIONAL TESTS:    Imaging Personally Reviewed:    Consultant(s) Notes Reviewed:      Care Discussed with Consultants/Other Providers:    Yo Hameed MD, CMD, FACP    [address] [place] [address]  Office Tel: [phone]  Cell: [phone]

## 2020-06-23 NOTE — PROVIDER CONTACT NOTE (OTHER) - ASSESSMENT
Blood sugar 126; Orthostatics Lyin/53 HR 52, Sittin/57 HR 60, Standing 77/43 . Pt on LR @ 150 ml/hr.

## 2020-06-23 NOTE — DIETITIAN INITIAL EVALUATION ADULT. - OTHER INFO
Pt 30 yo male with PMHx of DM1 (Dx 2015) w/ gastroparesis, H.pylori s/p triple therapy presented with nausea of for 2 days - per chart review. At time of visit Pt appears awake, alert, oriented, but weak. Of note PO diet advanced to Full liquids. But Pt with poor appetite/PO intake; Pt C/O Nausea at time of visit. Per Pt his height: ~6'1" & his weight: ~55 Kg now. Pt also stated he lost weight ~10 Kg in past 6 months 2/2 decreased PO intake/appetite 2/2 his sickness "nauseousness, vomiting". No report of chewing or swallowing difficulty; no report of vomiting/diarrhea @ present. Of note Pt's HbA1c level 10.2% (5/31). Unable to provide Consistent Carbohydrate diet education at present, Pt did not want to bothered. Will reattempt to provide diet education at a later time as able. RDN remains available.

## 2020-06-23 NOTE — PROGRESS NOTE ADULT - ASSESSMENT
Patient is a 29yom PMH DM1 (Dx 2015) w/ gastroparesis, H.pylori s/p triple therapy who presents with nausea of for 2 days.     #Nausea/Vomiting  -secondary to gastroparesis, given long standing uncontrolled diabetes (last A1C 10.2 in May)  -rec dietary modifications (small frequent meals, low fat, soluble fiber diet) & nutrition consult  -advance diet as tolerated, increase activity as tolerated   -rec metoclopramide 10 TID PRN, 10-15 minutes prior to meals. patient reports improvement in nausea with reglan over zofran  -please check routine EKGs to monitor qtc (428 on 6/22)  -ensure strict glycemic control  -upon dc, if QTc <500, dc w/ metoclopramide 5mg TID PRN, monitor for tardive dyskinesia and repeat EKG in few weeks  -outpatient gastric emptying studying. inpatient study would not be accurate given patient is already on a promotility agent (Reglan).   -can call [phone] to schedule outpt GI appointment    #Transaminitis/abnormal LFTs  -rec getting direct bilirubin (to assess for hemolysis vs gilbert vs intrahepatic source)  -consider hepatitis panel, including hep C    Fawad Maynard, PGY-1  Internal Medicine  [facility]  Pager: [phone] Patient is a 29yom PMH DM1 (Dx 2015) w/ gastroparesis, H.pylori s/p triple therapy who presents with nausea of for 2 days.     #Nausea/Vomiting  -secondary to gastroparesis, given long standing uncontrolled diabetes (last A1C 10.2 in May)  -rec dietary modifications (small frequent meals, low fat, soluble fiber diet) & nutrition consult  -advance diet as tolerated, increase activity as tolerated   -rec metoclopramide 10 TID PRN, 10-15 minutes prior to meals. patient reports improvement in nausea with reglan over zofran  -please check routine EKGs to monitor qtc (428 on 6/22)  -ensure strict glycemic control  -upon dc, if QTc <500, dc w/ metoclopramide 5mg TID PRN, monitor for tardive dyskinesia and repeat EKG in few weeks  -outpatient gastric emptying studying. inpatient study would not be accurate given patient is already on a promotility agent (Reglan).   -can call [phone] to schedule outpt GI appointment    #Constipation: may be contributing to patient's nausea  -can increase miralax up to TID PRN  -can give dose of dulcolax if needed for constipation    #Transaminitis/abnormal LFTs  -rec getting direct bilirubin (to assess for hemolysis vs gilbert vs intrahepatic source)  -consider hepatitis panel, including hep C    Fawad Maynard, PGY-1  Internal Medicine  [facility]  Pager: [phone]

## 2020-06-23 NOTE — DIETITIAN INITIAL EVALUATION ADULT. - PERTINENT MEDS FT
Insulin (Humalog), Insulin (Lantus), Miralax, Pepcid, Maalox (PRN), Reglan (PRN), Zofran (PRN), Protonix,

## 2020-06-23 NOTE — DIETITIAN INITIAL EVALUATION ADULT. - PHYSICAL APPEARANCE
underweight/RDN attempted to conduct Nutrition Focused Physical Assessment, but Pt declined; Pt asked RDN to come back some other time

## 2020-06-23 NOTE — DIETITIAN INITIAL EVALUATION ADULT. - DIET TYPE
full fluid/supplement (specify)/consistent carbohydrate (no snacks)/Glucerna Therapeutic Nutrition 8oz. 2x daily (will provide additional ~440 Kcals, ~20 gm Protein)

## 2020-06-23 NOTE — DIETITIAN INITIAL EVALUATION ADULT. - ADD RECOMMEND
1. Advance PO diet to Soft, Low Fiber, Consistent Carbohydrate (no snacks) once/when clinically indicates;       2. Encourage & assist Pt with meals; Monitor PO diet tolerance; Honor food preferences;      3. Rec: Anti-emetic medication PRN per MD discretion;        4. Monitor labs, hydration status;         5. Rec: To follow up with appropriate RDN upon discharge for the purposes of long-term nutrition evaluation and diet education;

## 2020-06-24 LAB
ALBUMIN SERPL ELPH-MCNC: 3 G/DL — LOW (ref 3.3–5)
ALP SERPL-CCNC: 40 U/L — SIGNIFICANT CHANGE UP (ref 40–120)
ALT FLD-CCNC: 29 U/L — SIGNIFICANT CHANGE UP (ref 4–41)
ANION GAP SERPL CALC-SCNC: 12 MMO/L — SIGNIFICANT CHANGE UP (ref 7–14)
AST SERPL-CCNC: 14 U/L — SIGNIFICANT CHANGE UP (ref 4–40)
BILIRUB DIRECT SERPL-MCNC: 0.2 MG/DL — SIGNIFICANT CHANGE UP (ref 0.1–0.2)
BILIRUB SERPL-MCNC: 1.2 MG/DL — SIGNIFICANT CHANGE UP (ref 0.2–1.2)
BUN SERPL-MCNC: 8 MG/DL — SIGNIFICANT CHANGE UP (ref 7–23)
CALCIUM SERPL-MCNC: 9.1 MG/DL — SIGNIFICANT CHANGE UP (ref 8.4–10.5)
CHLORIDE SERPL-SCNC: 102 MMOL/L — SIGNIFICANT CHANGE UP (ref 98–107)
CO2 SERPL-SCNC: 25 MMOL/L — SIGNIFICANT CHANGE UP (ref 22–31)
CREAT SERPL-MCNC: 0.82 MG/DL — SIGNIFICANT CHANGE UP (ref 0.5–1.3)
GLUCOSE BLDC GLUCOMTR-MCNC: 100 MG/DL — HIGH (ref 70–99)
GLUCOSE BLDC GLUCOMTR-MCNC: 104 MG/DL — HIGH (ref 70–99)
GLUCOSE BLDC GLUCOMTR-MCNC: 135 MG/DL — HIGH (ref 70–99)
GLUCOSE BLDC GLUCOMTR-MCNC: 82 MG/DL — SIGNIFICANT CHANGE UP (ref 70–99)
GLUCOSE SERPL-MCNC: 86 MG/DL — SIGNIFICANT CHANGE UP (ref 70–99)
HCT VFR BLD CALC: 31.6 % — LOW (ref 39–50)
HGB BLD-MCNC: 10.9 G/DL — LOW (ref 13–17)
MAGNESIUM SERPL-MCNC: 1.5 MG/DL — LOW (ref 1.6–2.6)
MCHC RBC-ENTMCNC: 30 PG — SIGNIFICANT CHANGE UP (ref 27–34)
MCHC RBC-ENTMCNC: 34.5 % — SIGNIFICANT CHANGE UP (ref 32–36)
MCV RBC AUTO: 87.1 FL — SIGNIFICANT CHANGE UP (ref 80–100)
NRBC # FLD: 0 K/UL — SIGNIFICANT CHANGE UP (ref 0–0)
PHOSPHATE SERPL-MCNC: 3.1 MG/DL — SIGNIFICANT CHANGE UP (ref 2.5–4.5)
PLATELET # BLD AUTO: 243 K/UL — SIGNIFICANT CHANGE UP (ref 150–400)
PMV BLD: 10.2 FL — SIGNIFICANT CHANGE UP (ref 7–13)
POTASSIUM SERPL-MCNC: 3.4 MMOL/L — LOW (ref 3.5–5.3)
POTASSIUM SERPL-SCNC: 3.4 MMOL/L — LOW (ref 3.5–5.3)
PROT SERPL-MCNC: 5.2 G/DL — LOW (ref 6–8.3)
RBC # BLD: 3.63 M/UL — LOW (ref 4.2–5.8)
RBC # FLD: 12.2 % — SIGNIFICANT CHANGE UP (ref 10.3–14.5)
SODIUM SERPL-SCNC: 139 MMOL/L — SIGNIFICANT CHANGE UP (ref 135–145)
WBC # BLD: 6.53 K/UL — SIGNIFICANT CHANGE UP (ref 3.8–10.5)
WBC # FLD AUTO: 6.53 K/UL — SIGNIFICANT CHANGE UP (ref 3.8–10.5)

## 2020-06-24 PROCEDURE — 93010 ELECTROCARDIOGRAM REPORT: CPT

## 2020-06-24 PROCEDURE — 99232 SBSQ HOSP IP/OBS MODERATE 35: CPT | Mod: GC

## 2020-06-24 RX ORDER — POTASSIUM CHLORIDE 20 MEQ
40 PACKET (EA) ORAL ONCE
Refills: 0 | Status: COMPLETED | OUTPATIENT
Start: 2020-06-24 | End: 2020-06-24

## 2020-06-24 RX ORDER — MAGNESIUM SULFATE 500 MG/ML
1 VIAL (ML) INJECTION ONCE
Refills: 0 | Status: COMPLETED | OUTPATIENT
Start: 2020-06-24 | End: 2020-06-24

## 2020-06-24 RX ADMIN — ONDANSETRON 4 MILLIGRAM(S): 8 TABLET, FILM COATED ORAL at 07:24

## 2020-06-24 RX ADMIN — Medication 30 MILLILITER(S): at 17:05

## 2020-06-24 RX ADMIN — ONDANSETRON 4 MILLIGRAM(S): 8 TABLET, FILM COATED ORAL at 21:56

## 2020-06-24 RX ADMIN — Medication 100 GRAM(S): at 07:24

## 2020-06-24 RX ADMIN — PANTOPRAZOLE SODIUM 40 MILLIGRAM(S): 20 TABLET, DELAYED RELEASE ORAL at 06:24

## 2020-06-24 RX ADMIN — SODIUM CHLORIDE 150 MILLILITER(S): 9 INJECTION, SOLUTION INTRAVENOUS at 18:09

## 2020-06-24 RX ADMIN — SODIUM CHLORIDE 150 MILLILITER(S): 9 INJECTION, SOLUTION INTRAVENOUS at 21:58

## 2020-06-24 RX ADMIN — FAMOTIDINE 104 MILLIGRAM(S): 10 INJECTION INTRAVENOUS at 06:24

## 2020-06-24 RX ADMIN — Medication 40 MILLIEQUIVALENT(S): at 07:24

## 2020-06-24 RX ADMIN — FAMOTIDINE 104 MILLIGRAM(S): 10 INJECTION INTRAVENOUS at 17:05

## 2020-06-24 RX ADMIN — INSULIN GLARGINE 12 UNIT(S): 100 INJECTION, SOLUTION SUBCUTANEOUS at 21:57

## 2020-06-24 RX ADMIN — Medication 10 MILLIGRAM(S): at 11:07

## 2020-06-24 NOTE — PROGRESS NOTE ADULT - SUBJECTIVE AND OBJECTIVE BOX
Chief Complaint:  Patient is a 29y old  Male who presents with a chief complaint of N/V (2020 16:37)      Interval Events: Pt complains of nausea with eating. Only tolerates food when he takes reglan. No BM in 2 days.     Allergies:  No Known Allergies      Hospital Medications:  aluminum hydroxide/magnesium hydroxide/simethicone Suspension 30 milliLiter(s) Oral every 8 hours PRN  dextrose 40% Gel 15 Gram(s) Oral once PRN  dextrose 5%. 1000 milliLiter(s) IV Continuous <Continuous>  dextrose 50% Injectable 12.5 Gram(s) IV Push once  dextrose 50% Injectable 25 Gram(s) IV Push once  dextrose 50% Injectable 25 Gram(s) IV Push once  famotidine  IVPB 20 milliGRAM(s) IV Intermittent two times a day  glucagon  Injectable 1 milliGRAM(s) IntraMuscular once PRN  insulin glargine Injectable (LANTUS) 12 Unit(s) SubCutaneous at bedtime  insulin lispro (HumaLOG) corrective regimen sliding scale   SubCutaneous three times a day before meals  insulin lispro (HumaLOG) corrective regimen sliding scale   SubCutaneous at bedtime  lactated ringers. 1000 milliLiter(s) IV Continuous <Continuous>  metoclopramide Injectable 10 milliGRAM(s) IV Push every 8 hours PRN  ondansetron Injectable 4 milliGRAM(s) IV Push every 8 hours PRN  pantoprazole    Tablet 40 milliGRAM(s) Oral before breakfast  polyethylene glycol 3350 17 Gram(s) Oral two times a day PRN      PMHX/PSHX:  Gastroparesis due to DM  Diabetes  No significant past surgical history      Family history:  FH: diabetes mellitus      ROS:     General:  No wt loss, fevers, chills, night sweats, fatigue,   Eyes:  Good vision, no reported pain  ENT:  No sore throat, pain, runny nose, dysphagia  CV:  No pain, palpitations, hypo/hypertension  Resp:  No dyspnea, cough, tachypnea, wheezing  GI:  See HPI  :  No pain, bleeding, incontinence, nocturia  Muscle:  No pain, weakness  Neuro:  No weakness, tingling, memory problems  Psych:  No fatigue, insomnia, mood problems, depression  Endocrine:  No polyuria, polydipsia, cold/heat intolerance  Heme:  No petechiae, ecchymosis, easy bruisability  Skin:  No rash, edema      PHYSICAL EXAM:     Vital Signs:  Vital Signs Last 24 Hrs  T(C): 36.7 (2020 05:26), Max: 37.1 (2020 20:46)  T(F): 98.1 (2020 05:26), Max: 98.7 (2020 20:46)  HR: 77 (2020 05:26) (52 - 77)  BP: 103/70 (2020 05:26) (92/53 - 115/80)  BP(mean): --  RR: 17 (2020 05:26) (16 - 17)  SpO2: 99% (2020 05:26) (99% - 100%)  Daily     Daily Weight in k (2020 15:55)    GENERAL:  appears mildly uncomfortable, no acute distress  HEENT:  NC/AT,  conjunctivae clear, sclera -anicteric  CHEST:  no increased effort  HEART:  Regular rate and rhythm  ABDOMEN:  Soft, non-tender, non-distended,  no masses ,no hepato-splenomegaly,   EXTREMITIES:  no cyanosis, clubbing or edema  SKIN:  No rash/erythema/ecchymoses/petechiae/wounds  NEURO:  Alert, oriented    LABS:                        10.9   6.53  )-----------( 243      ( 2020 05:22 )             31.6     06-24    139  |  102  |  8   ----------------------------<  86  3.4<L>   |  25  |  0.82    Ca    9.1      2020 05:22  Phos  3.1     06-24  Mg     1.5     06-24    TPro  5.2<L>  /  Alb  3.0<L>  /  TBili  1.2  /  DBili  0.2  /  AST  14  /  ALT  29  /  AlkPhos  40  06-24    LIVER FUNCTIONS - ( 2020 05:22 )  Alb: 3.0 g/dL / Pro: 5.2 g/dL / ALK PHOS: 40 u/L / ALT: 29 u/L / AST: 14 u/L / GGT: x                   Imaging:

## 2020-06-24 NOTE — PROGRESS NOTE ADULT - ASSESSMENT
Patient is a 29yom PMH DM1 (Dx 2015) w/ gastroparesis, H.pylori s/p triple therapy who presents with nausea of for 2 days.     #Nausea/Vomiting: likely secondary to gastroparesis given long standing uncontrolled diabetes (last A1C 10.2 in May). Requires reglan frequently with poo po intolerance. No vomiting. EGD prior in 1/2020 suggestive of gastroparesis with food in stomach as well esophagitis.  #Constipation: may be contributing to patient's nausea. likely secondary to poor po intake.  #Transaminitis/abnormal LFTs: resolved  # T1DM: poorly controlled    Reccomendations:  -small frequent meals, low fat, soluble fiber diet & nutrition followup  -advance diet as tolerated  -recommend OOB and increase activity  -metoclopramide 10 TID PRN, 10-15 minutes prior to meals  -monitor QTC closely  -ensure strict glycemic control, has long standing poorly controlled A1c  -miralax TID and fiber supplements  -outpatient gastric emptying studying (inpatient study would not be accurate given patient is already on a promotility agent)  -supportive care  -pending clinical course, may at some point be a candidate for Botox or POEM, but will need improvement in long term diabetes care

## 2020-06-24 NOTE — PROGRESS NOTE ADULT - SUBJECTIVE AND OBJECTIVE BOX
Patient is a 29y old  Male who presents with a chief complaint of N/V (24 Jun 2020 11:54)      SUBJECTIVE / OVERNIGHT EVENTS:    Events noted.  CONSTITUTIONAL: No fever,  or fatigue  RESPIRATORY: No cough, wheezing,  No shortness of breath  CARDIOVASCULAR: No chest pain, palpitations, dizziness  GASTROINTESTINAL: Abd discomfort  NEUROLOGICAL: No headaches,     MEDICATIONS  (STANDING):  dextrose 5%. 1000 milliLiter(s) (50 mL/Hr) IV Continuous <Continuous>  dextrose 50% Injectable 12.5 Gram(s) IV Push once  dextrose 50% Injectable 25 Gram(s) IV Push once  dextrose 50% Injectable 25 Gram(s) IV Push once  famotidine  IVPB 20 milliGRAM(s) IV Intermittent two times a day  insulin glargine Injectable (LANTUS) 12 Unit(s) SubCutaneous at bedtime  insulin lispro (HumaLOG) corrective regimen sliding scale   SubCutaneous three times a day before meals  insulin lispro (HumaLOG) corrective regimen sliding scale   SubCutaneous at bedtime  lactated ringers. 1000 milliLiter(s) (150 mL/Hr) IV Continuous <Continuous>  pantoprazole    Tablet 40 milliGRAM(s) Oral before breakfast    MEDICATIONS  (PRN):  aluminum hydroxide/magnesium hydroxide/simethicone Suspension 30 milliLiter(s) Oral every 8 hours PRN Dyspepsia  dextrose 40% Gel 15 Gram(s) Oral once PRN Blood Glucose LESS THAN 70 milliGRAM(s)/deciliter  glucagon  Injectable 1 milliGRAM(s) IntraMuscular once PRN Glucose LESS THAN 70 milligrams/deciliter  metoclopramide Injectable 10 milliGRAM(s) IV Push every 8 hours PRN Nausea/ vomiting  ondansetron Injectable 4 milliGRAM(s) IV Push every 8 hours PRN Nausea and/or Vomiting  polyethylene glycol 3350 17 Gram(s) Oral two times a day PRN Constipation        CAPILLARY BLOOD GLUCOSE      POCT Blood Glucose.: 100 mg/dL (24 Jun 2020 16:56)  POCT Blood Glucose.: 135 mg/dL (24 Jun 2020 12:03)  POCT Blood Glucose.: 82 mg/dL (24 Jun 2020 07:35)  POCT Blood Glucose.: 126 mg/dL (23 Jun 2020 21:42)  POCT Blood Glucose.: 118 mg/dL (23 Jun 2020 21:23)    I&O's Summary      PHYSICAL EXAM:    NECK: Supple, No JVD  CHEST/LUNG: Clear to auscultation bilaterally; No wheezing.  HEART: Regular rate and rhythm; No murmurs, rubs, or gallops  ABDOMEN: Soft, Nontender, Nondistended; Bowel sounds present  EXTREMITIES:   No edema  NEUROLOGY: AAO       LABS:                        10.9   6.53  )-----------( 243      ( 24 Jun 2020 05:22 )             31.6     06-24    139  |  102  |  8   ----------------------------<  86  3.4<L>   |  25  |  0.82    Ca    9.1      24 Jun 2020 05:22  Phos  3.1     06-24  Mg     1.5     06-24    TPro  5.2<L>  /  Alb  3.0<L>  /  TBili  1.2  /  DBili  0.2  /  AST  14  /  ALT  29  /  AlkPhos  40  06-24            CAPILLARY BLOOD GLUCOSE      POCT Blood Glucose.: 100 mg/dL (24 Jun 2020 16:56)  POCT Blood Glucose.: 135 mg/dL (24 Jun 2020 12:03)  POCT Blood Glucose.: 82 mg/dL (24 Jun 2020 07:35)  POCT Blood Glucose.: 126 mg/dL (23 Jun 2020 21:42)  POCT Blood Glucose.: 118 mg/dL (23 Jun 2020 21:23)                RADIOLOGY & ADDITIONAL TESTS:    Imaging Personally Reviewed:    Consultant(s) Notes Reviewed:      Care Discussed with Consultants/Other Providers:    Yo Hameed MD, CMD, FACP    [address] [place] [address]  Office Tel: [phone]  Cell: [phone]

## 2020-06-24 NOTE — PROGRESS NOTE ADULT - PROBLEM SELECTOR PLAN 1
GI f/up appreciated.  Full liquid - Advance as tolerates  IV Zofran/IV Reglan  IV Pepcid  outpatient gastric emptying study

## 2020-06-25 LAB
ANION GAP SERPL CALC-SCNC: 14 MMO/L — SIGNIFICANT CHANGE UP (ref 7–14)
BUN SERPL-MCNC: 5 MG/DL — LOW (ref 7–23)
CALCIUM SERPL-MCNC: 8.8 MG/DL — SIGNIFICANT CHANGE UP (ref 8.4–10.5)
CHLORIDE SERPL-SCNC: 99 MMOL/L — SIGNIFICANT CHANGE UP (ref 98–107)
CO2 SERPL-SCNC: 26 MMOL/L — SIGNIFICANT CHANGE UP (ref 22–31)
CREAT SERPL-MCNC: 0.78 MG/DL — SIGNIFICANT CHANGE UP (ref 0.5–1.3)
GLUCOSE BLDC GLUCOMTR-MCNC: 107 MG/DL — HIGH (ref 70–99)
GLUCOSE BLDC GLUCOMTR-MCNC: 131 MG/DL — HIGH (ref 70–99)
GLUCOSE BLDC GLUCOMTR-MCNC: 160 MG/DL — HIGH (ref 70–99)
GLUCOSE BLDC GLUCOMTR-MCNC: 179 MG/DL — HIGH (ref 70–99)
GLUCOSE SERPL-MCNC: 93 MG/DL — SIGNIFICANT CHANGE UP (ref 70–99)
HCT VFR BLD CALC: 32.1 % — LOW (ref 39–50)
HGB BLD-MCNC: 11.3 G/DL — LOW (ref 13–17)
MAGNESIUM SERPL-MCNC: 1.6 MG/DL — SIGNIFICANT CHANGE UP (ref 1.6–2.6)
MCHC RBC-ENTMCNC: 30.8 PG — SIGNIFICANT CHANGE UP (ref 27–34)
MCHC RBC-ENTMCNC: 35.2 % — SIGNIFICANT CHANGE UP (ref 32–36)
MCV RBC AUTO: 87.5 FL — SIGNIFICANT CHANGE UP (ref 80–100)
NRBC # FLD: 0 K/UL — SIGNIFICANT CHANGE UP (ref 0–0)
PLATELET # BLD AUTO: 249 K/UL — SIGNIFICANT CHANGE UP (ref 150–400)
PMV BLD: 10.6 FL — SIGNIFICANT CHANGE UP (ref 7–13)
POTASSIUM SERPL-MCNC: 3 MMOL/L — LOW (ref 3.5–5.3)
POTASSIUM SERPL-SCNC: 3 MMOL/L — LOW (ref 3.5–5.3)
RBC # BLD: 3.67 M/UL — LOW (ref 4.2–5.8)
RBC # FLD: 11.9 % — SIGNIFICANT CHANGE UP (ref 10.3–14.5)
SODIUM SERPL-SCNC: 139 MMOL/L — SIGNIFICANT CHANGE UP (ref 135–145)
WBC # BLD: 4.51 K/UL — SIGNIFICANT CHANGE UP (ref 3.8–10.5)
WBC # FLD AUTO: 4.51 K/UL — SIGNIFICANT CHANGE UP (ref 3.8–10.5)

## 2020-06-25 PROCEDURE — 99232 SBSQ HOSP IP/OBS MODERATE 35: CPT | Mod: GC

## 2020-06-25 PROCEDURE — 93010 ELECTROCARDIOGRAM REPORT: CPT

## 2020-06-25 RX ORDER — POTASSIUM CHLORIDE 20 MEQ
40 PACKET (EA) ORAL EVERY 4 HOURS
Refills: 0 | Status: COMPLETED | OUTPATIENT
Start: 2020-06-25 | End: 2020-06-25

## 2020-06-25 RX ADMIN — SODIUM CHLORIDE 150 MILLILITER(S): 9 INJECTION, SOLUTION INTRAVENOUS at 08:47

## 2020-06-25 RX ADMIN — Medication 10 MILLIGRAM(S): at 07:22

## 2020-06-25 RX ADMIN — PANTOPRAZOLE SODIUM 40 MILLIGRAM(S): 20 TABLET, DELAYED RELEASE ORAL at 05:17

## 2020-06-25 RX ADMIN — Medication 30 MILLILITER(S): at 13:06

## 2020-06-25 RX ADMIN — INSULIN GLARGINE 12 UNIT(S): 100 INJECTION, SOLUTION SUBCUTANEOUS at 22:44

## 2020-06-25 RX ADMIN — Medication 40 MILLIEQUIVALENT(S): at 13:06

## 2020-06-25 RX ADMIN — SODIUM CHLORIDE 150 MILLILITER(S): 9 INJECTION, SOLUTION INTRAVENOUS at 09:57

## 2020-06-25 RX ADMIN — FAMOTIDINE 104 MILLIGRAM(S): 10 INJECTION INTRAVENOUS at 17:28

## 2020-06-25 RX ADMIN — FAMOTIDINE 104 MILLIGRAM(S): 10 INJECTION INTRAVENOUS at 05:18

## 2020-06-25 RX ADMIN — SODIUM CHLORIDE 150 MILLILITER(S): 9 INJECTION, SOLUTION INTRAVENOUS at 16:47

## 2020-06-25 RX ADMIN — ONDANSETRON 4 MILLIGRAM(S): 8 TABLET, FILM COATED ORAL at 08:47

## 2020-06-25 RX ADMIN — SODIUM CHLORIDE 150 MILLILITER(S): 9 INJECTION, SOLUTION INTRAVENOUS at 22:46

## 2020-06-25 RX ADMIN — Medication 10 MILLIGRAM(S): at 22:44

## 2020-06-25 RX ADMIN — Medication 40 MILLIEQUIVALENT(S): at 17:28

## 2020-06-25 RX ADMIN — Medication 1: at 16:47

## 2020-06-25 NOTE — PROGRESS NOTE ADULT - ASSESSMENT
Patient is a 29yom PMH DM1 (Dx 2015) w/ gastroparesis, H.pylori s/p triple therapy who presents with nausea of for 2 days.     #Nausea/Vomiting: no confimred gastroparesis diagnosis but presumed given poorly controlled DM. Pt requiring reglan regularly. Last A1C 10.2 in May. EGD prior in 1/2020 suggestive of gastroparesis with food in stomach as well esophagitis.  #Constipation: may be contributing to patient's nausea. Likely secondary to poor po intake and poor gut motility.  #Transaminitis/abnormal LFTs: resolved  # T1DM: poorly controlled    Reccomendations:  -pending long term clinical improvement, if glycemic control improves and still symptomatic, may be candidate for POEM as outpatient  -at this time, continue with supportive care and glycemic control  -small frequent meals, low fat, soluble fiber diet & nutrition followup  -recommend increase activity  -metoclopramide 10 TID PRN, 10-15 minutes prior to meals, may need this at discharge; pt aware of risks of tardive dyskinesia and counselled on these risks- pt understands  -monitor QTC closely  -miralax TID and fiber supplements  -outpatient gastric emptying studying (inpatient study would not be accurate given patient is already on a promotility agent)  -supportive care Patient is a 29yom PMH DM1 (Dx 2015) w/ gastroparesis, H.pylori s/p triple therapy who presents with nausea of for 2 days.     #Nausea/Vomiting: no confimred gastroparesis diagnosis but presumed given poorly controlled DM. Pt requiring reglan regularly. Last A1C 10.2 in May. EGD prior in 1/2020 suggestive of gastroparesis with food in stomach as well esophagitis.  #Constipation: may be contributing to patient's nausea. Likely secondary to poor po intake and poor gut motility.  #Transaminitis/abnormal LFTs: resolved  # T1DM: poorly controlled    Reccomendations:  -pending long term clinical improvement, if glycemic control improves and still symptomatic, may be candidate for POEM as outpatient  -at this time, continue with supportive care and glycemic control  -small frequent meals, low fat, soluble fiber diet & nutrition followup  -recommend increase activity  -metoclopramide 10 TID PRN, 10-15 minutes prior to meals, may need this at discharge; pt aware of risks of tardive dyskinesia and counseled on these risks- pt understands  -monitor QTC closely  -miralax TID and fiber supplements  -outpatient gastric emptying studying (inpatient study would not be accurate given patient is already on a promotility agent)  -supportive care

## 2020-06-25 NOTE — PROGRESS NOTE ADULT - SUBJECTIVE AND OBJECTIVE BOX
Chief Complaint:  Patient is a 29y old  Male who presents with a chief complaint of N/V (24 Jun 2020 15:12)      Interval Events: Pt requiring reglan for symptoms. Nauseous and vomited this morning. No BM in 3 days. Did have dinner yesterday but unable to take breakfast today.    Allergies:  No Known Allergies      Hospital Medications:  aluminum hydroxide/magnesium hydroxide/simethicone Suspension 30 milliLiter(s) Oral every 8 hours PRN  dextrose 40% Gel 15 Gram(s) Oral once PRN  dextrose 5%. 1000 milliLiter(s) IV Continuous <Continuous>  dextrose 50% Injectable 12.5 Gram(s) IV Push once  dextrose 50% Injectable 25 Gram(s) IV Push once  dextrose 50% Injectable 25 Gram(s) IV Push once  famotidine  IVPB 20 milliGRAM(s) IV Intermittent two times a day  glucagon  Injectable 1 milliGRAM(s) IntraMuscular once PRN  insulin glargine Injectable (LANTUS) 12 Unit(s) SubCutaneous at bedtime  insulin lispro (HumaLOG) corrective regimen sliding scale   SubCutaneous three times a day before meals  insulin lispro (HumaLOG) corrective regimen sliding scale   SubCutaneous at bedtime  lactated ringers. 1000 milliLiter(s) IV Continuous <Continuous>  metoclopramide Injectable 10 milliGRAM(s) IV Push every 8 hours PRN  ondansetron Injectable 4 milliGRAM(s) IV Push every 8 hours PRN  pantoprazole    Tablet 40 milliGRAM(s) Oral before breakfast  polyethylene glycol 3350 17 Gram(s) Oral two times a day PRN      PMHX/PSHX:  Gastroparesis due to DM  Diabetes  No significant past surgical history      Family history:  FH: diabetes mellitus      ROS:     General:  No wt loss, fevers, chills, night sweats, fatigue,   Eyes:  Good vision, no reported pain  ENT:  No sore throat, pain, runny nose, dysphagia  CV:  No pain, palpitations, hypo/hypertension  Resp:  No dyspnea, cough, tachypnea, wheezing  GI:  See HPI  :  No pain, bleeding, incontinence, nocturia  Muscle:  No pain, weakness  Neuro:  No weakness, tingling, memory problems  Psych:  No fatigue, insomnia, mood problems, depression  Endocrine:  No polyuria, polydipsia, cold/heat intolerance  Heme:  No petechiae, ecchymosis, easy bruisability  Skin:  No rash, edema      PHYSICAL EXAM:     Vital Signs:  Vital Signs Last 24 Hrs  T(C): 36.9 (25 Jun 2020 05:15), Max: 37.6 (24 Jun 2020 21:30)  T(F): 98.4 (25 Jun 2020 05:15), Max: 99.6 (24 Jun 2020 21:30)  HR: 71 (25 Jun 2020 05:15) (60 - 89)  BP: 107/72 (25 Jun 2020 05:15) (107/72 - 126/77)  BP(mean): --  RR: 18 (25 Jun 2020 05:15) (17 - 18)  SpO2: 99% (25 Jun 2020 05:15) (99% - 100%)  Daily     Daily     GENERAL:  appears uncomfortable, no acute distress  HEENT:  NC/AT,  conjunctivae clear, sclera -anicteric  CHEST:  no increased effort  HEART:  Regular rate and rhythm  ABDOMEN:  Soft, non-tender, non-distended,  no masses ,no hepato-splenomegaly,   EXTREMITIES:  no cyanosis, clubbing or edema  SKIN:  No rash/erythema/ecchymoses/petechiae/wounds  NEURO:  Alert, oriented    LABS:                        11.3   4.51  )-----------( 249      ( 25 Jun 2020 05:32 )             32.1     06-25    139  |  99  |  5<L>  ----------------------------<  93  3.0<L>   |  26  |  0.78    Ca    8.8      25 Jun 2020 05:32  Phos  3.1     06-24  Mg     1.6     06-25    TPro  5.2<L>  /  Alb  3.0<L>  /  TBili  1.2  /  DBili  0.2  /  AST  14  /  ALT  29  /  AlkPhos  40  06-24    LIVER FUNCTIONS - ( 24 Jun 2020 05:22 )  Alb: 3.0 g/dL / Pro: 5.2 g/dL / ALK PHOS: 40 u/L / ALT: 29 u/L / AST: 14 u/L / GGT: x                   Imaging:

## 2020-06-25 NOTE — PROGRESS NOTE ADULT - SUBJECTIVE AND OBJECTIVE BOX
Patient is a 29y old  Male who presents with a chief complaint of N/V (24 Jun 2020 11:54)      SUBJECTIVE / OVERNIGHT EVENTS:    Events noted.  CONSTITUTIONAL: No fever,  or fatigue  RESPIRATORY: No cough, wheezing,  No shortness of breath  CARDIOVASCULAR: No chest pain, palpitations, dizziness  GASTROINTESTINAL: Abd discomfort  NEUROLOGICAL: No headaches,     MEDICATIONS  (STANDING):  dextrose 5%. 1000 milliLiter(s) (50 mL/Hr) IV Continuous <Continuous>  dextrose 50% Injectable 12.5 Gram(s) IV Push once  dextrose 50% Injectable 25 Gram(s) IV Push once  dextrose 50% Injectable 25 Gram(s) IV Push once  famotidine  IVPB 20 milliGRAM(s) IV Intermittent two times a day  insulin glargine Injectable (LANTUS) 12 Unit(s) SubCutaneous at bedtime  insulin lispro (HumaLOG) corrective regimen sliding scale   SubCutaneous three times a day before meals  insulin lispro (HumaLOG) corrective regimen sliding scale   SubCutaneous at bedtime  lactated ringers. 1000 milliLiter(s) (150 mL/Hr) IV Continuous <Continuous>  pantoprazole    Tablet 40 milliGRAM(s) Oral before breakfast    MEDICATIONS  (PRN):  aluminum hydroxide/magnesium hydroxide/simethicone Suspension 30 milliLiter(s) Oral every 8 hours PRN Dyspepsia  dextrose 40% Gel 15 Gram(s) Oral once PRN Blood Glucose LESS THAN 70 milliGRAM(s)/deciliter  glucagon  Injectable 1 milliGRAM(s) IntraMuscular once PRN Glucose LESS THAN 70 milligrams/deciliter  metoclopramide Injectable 10 milliGRAM(s) IV Push every 8 hours PRN Nausea/ vomiting  ondansetron Injectable 4 milliGRAM(s) IV Push every 8 hours PRN Nausea and/or Vomiting  polyethylene glycol 3350 17 Gram(s) Oral two times a day PRN Constipation        CAPILLARY BLOOD GLUCOSE      POCT Blood Glucose.: 100 mg/dL (24 Jun 2020 16:56)  POCT Blood Glucose.: 135 mg/dL (24 Jun 2020 12:03)  POCT Blood Glucose.: 82 mg/dL (24 Jun 2020 07:35)  POCT Blood Glucose.: 126 mg/dL (23 Jun 2020 21:42)  POCT Blood Glucose.: 118 mg/dL (23 Jun 2020 21:23)    I&O's Summary      PHYSICAL EXAM:    NECK: Supple, No JVD  CHEST/LUNG: Clear to auscultation bilaterally; No wheezing.  HEART: Regular rate and rhythm; No murmurs, rubs, or gallops  ABDOMEN: Soft, Nontender, Nondistended; Bowel sounds present  EXTREMITIES:   No edema  NEUROLOGY: AAO       LABS:                        10.9   6.53  )-----------( 243      ( 24 Jun 2020 05:22 )             31.6     06-24    139  |  102  |  8   ----------------------------<  86  3.4<L>   |  25  |  0.82    Ca    9.1      24 Jun 2020 05:22  Phos  3.1     06-24  Mg     1.5     06-24    TPro  5.2<L>  /  Alb  3.0<L>  /  TBili  1.2  /  DBili  0.2  /  AST  14  /  ALT  29  /  AlkPhos  40  06-24            CAPILLARY BLOOD GLUCOSE      POCT Blood Glucose.: 100 mg/dL (24 Jun 2020 16:56)  POCT Blood Glucose.: 135 mg/dL (24 Jun 2020 12:03)  POCT Blood Glucose.: 82 mg/dL (24 Jun 2020 07:35)  POCT Blood Glucose.: 126 mg/dL (23 Jun 2020 21:42)  POCT Blood Glucose.: 118 mg/dL (23 Jun 2020 21:23)                RADIOLOGY & ADDITIONAL TESTS:    Imaging Personally Reviewed:    Consultant(s) Notes Reviewed:      Care Discussed with Consultants/Other Providers:    Yo Hameed MD, CMD, FACP    [address] [place] [address]  Office Tel: [phone]  Cell: [phone]

## 2020-06-26 LAB
ANION GAP SERPL CALC-SCNC: 12 MMO/L — SIGNIFICANT CHANGE UP (ref 7–14)
BASOPHILS # BLD AUTO: 0.07 K/UL — SIGNIFICANT CHANGE UP (ref 0–0.2)
BASOPHILS NFR BLD AUTO: 1.5 % — SIGNIFICANT CHANGE UP (ref 0–2)
BUN SERPL-MCNC: 7 MG/DL — SIGNIFICANT CHANGE UP (ref 7–23)
CALCIUM SERPL-MCNC: 9.3 MG/DL — SIGNIFICANT CHANGE UP (ref 8.4–10.5)
CHLORIDE SERPL-SCNC: 97 MMOL/L — LOW (ref 98–107)
CO2 SERPL-SCNC: 26 MMOL/L — SIGNIFICANT CHANGE UP (ref 22–31)
CREAT SERPL-MCNC: 0.81 MG/DL — SIGNIFICANT CHANGE UP (ref 0.5–1.3)
EOSINOPHIL # BLD AUTO: 0.06 K/UL — SIGNIFICANT CHANGE UP (ref 0–0.5)
EOSINOPHIL NFR BLD AUTO: 1.3 % — SIGNIFICANT CHANGE UP (ref 0–6)
GLUCOSE BLDC GLUCOMTR-MCNC: 156 MG/DL — HIGH (ref 70–99)
GLUCOSE BLDC GLUCOMTR-MCNC: 188 MG/DL — HIGH (ref 70–99)
GLUCOSE BLDC GLUCOMTR-MCNC: 195 MG/DL — HIGH (ref 70–99)
GLUCOSE BLDC GLUCOMTR-MCNC: 252 MG/DL — HIGH (ref 70–99)
GLUCOSE SERPL-MCNC: 139 MG/DL — HIGH (ref 70–99)
HCT VFR BLD CALC: 34.6 % — LOW (ref 39–50)
HGB BLD-MCNC: 12.3 G/DL — LOW (ref 13–17)
IMM GRANULOCYTES NFR BLD AUTO: 0.2 % — SIGNIFICANT CHANGE UP (ref 0–1.5)
LYMPHOCYTES # BLD AUTO: 2.77 K/UL — SIGNIFICANT CHANGE UP (ref 1–3.3)
LYMPHOCYTES # BLD AUTO: 59.1 % — HIGH (ref 13–44)
MAGNESIUM SERPL-MCNC: 1.6 MG/DL — SIGNIFICANT CHANGE UP (ref 1.6–2.6)
MCHC RBC-ENTMCNC: 31.2 PG — SIGNIFICANT CHANGE UP (ref 27–34)
MCHC RBC-ENTMCNC: 35.5 % — SIGNIFICANT CHANGE UP (ref 32–36)
MCV RBC AUTO: 87.8 FL — SIGNIFICANT CHANGE UP (ref 80–100)
MONOCYTES # BLD AUTO: 0.43 K/UL — SIGNIFICANT CHANGE UP (ref 0–0.9)
MONOCYTES NFR BLD AUTO: 9.2 % — SIGNIFICANT CHANGE UP (ref 2–14)
NEUTROPHILS # BLD AUTO: 1.35 K/UL — LOW (ref 1.8–7.4)
NEUTROPHILS NFR BLD AUTO: 28.7 % — LOW (ref 43–77)
NRBC # FLD: 0 K/UL — SIGNIFICANT CHANGE UP (ref 0–0)
PHOSPHATE SERPL-MCNC: 3 MG/DL — SIGNIFICANT CHANGE UP (ref 2.5–4.5)
PLATELET # BLD AUTO: 255 K/UL — SIGNIFICANT CHANGE UP (ref 150–400)
PMV BLD: 10.6 FL — SIGNIFICANT CHANGE UP (ref 7–13)
POTASSIUM SERPL-MCNC: 3.5 MMOL/L — SIGNIFICANT CHANGE UP (ref 3.5–5.3)
POTASSIUM SERPL-SCNC: 3.5 MMOL/L — SIGNIFICANT CHANGE UP (ref 3.5–5.3)
RBC # BLD: 3.94 M/UL — LOW (ref 4.2–5.8)
RBC # FLD: 12 % — SIGNIFICANT CHANGE UP (ref 10.3–14.5)
SODIUM SERPL-SCNC: 135 MMOL/L — SIGNIFICANT CHANGE UP (ref 135–145)
WBC # BLD: 4.69 K/UL — SIGNIFICANT CHANGE UP (ref 3.8–10.5)
WBC # FLD AUTO: 4.69 K/UL — SIGNIFICANT CHANGE UP (ref 3.8–10.5)

## 2020-06-26 PROCEDURE — 93010 ELECTROCARDIOGRAM REPORT: CPT

## 2020-06-26 PROCEDURE — 99232 SBSQ HOSP IP/OBS MODERATE 35: CPT | Mod: GC

## 2020-06-26 RX ADMIN — FAMOTIDINE 104 MILLIGRAM(S): 10 INJECTION INTRAVENOUS at 17:08

## 2020-06-26 RX ADMIN — Medication 1: at 08:09

## 2020-06-26 RX ADMIN — PANTOPRAZOLE SODIUM 40 MILLIGRAM(S): 20 TABLET, DELAYED RELEASE ORAL at 05:34

## 2020-06-26 RX ADMIN — SODIUM CHLORIDE 150 MILLILITER(S): 9 INJECTION, SOLUTION INTRAVENOUS at 07:47

## 2020-06-26 RX ADMIN — FAMOTIDINE 104 MILLIGRAM(S): 10 INJECTION INTRAVENOUS at 05:34

## 2020-06-26 RX ADMIN — Medication 10 MILLIGRAM(S): at 17:08

## 2020-06-26 RX ADMIN — Medication 1: at 12:09

## 2020-06-26 RX ADMIN — POLYETHYLENE GLYCOL 3350 17 GRAM(S): 17 POWDER, FOR SOLUTION ORAL at 17:11

## 2020-06-26 RX ADMIN — SODIUM CHLORIDE 150 MILLILITER(S): 9 INJECTION, SOLUTION INTRAVENOUS at 15:14

## 2020-06-26 RX ADMIN — ONDANSETRON 4 MILLIGRAM(S): 8 TABLET, FILM COATED ORAL at 22:14

## 2020-06-26 RX ADMIN — Medication 1: at 22:13

## 2020-06-26 RX ADMIN — SODIUM CHLORIDE 150 MILLILITER(S): 9 INJECTION, SOLUTION INTRAVENOUS at 22:13

## 2020-06-26 RX ADMIN — Medication 1: at 17:08

## 2020-06-26 RX ADMIN — INSULIN GLARGINE 12 UNIT(S): 100 INJECTION, SOLUTION SUBCUTANEOUS at 22:13

## 2020-06-26 NOTE — PROGRESS NOTE ADULT - SUBJECTIVE AND OBJECTIVE BOX
Patient is a 29y old  Male who presents with a chief complaint of N/V (26 Jun 2020 11:06)      SUBJECTIVE / OVERNIGHT EVENTS:    Events noted.  CONSTITUTIONAL: No fever,  or fatigue  RESPIRATORY: No cough, wheezing,  No shortness of breath  CARDIOVASCULAR: No chest pain, palpitations, dizziness, or leg swelling  GASTROINTESTINAL: No abdominal or epigastric pain. No nausea, vomiting.  NEUROLOGICAL: No headaches,     MEDICATIONS  (STANDING):  dextrose 5%. 1000 milliLiter(s) (50 mL/Hr) IV Continuous <Continuous>  dextrose 50% Injectable 12.5 Gram(s) IV Push once  dextrose 50% Injectable 25 Gram(s) IV Push once  dextrose 50% Injectable 25 Gram(s) IV Push once  famotidine  IVPB 20 milliGRAM(s) IV Intermittent two times a day  insulin glargine Injectable (LANTUS) 12 Unit(s) SubCutaneous at bedtime  insulin lispro (HumaLOG) corrective regimen sliding scale   SubCutaneous three times a day before meals  insulin lispro (HumaLOG) corrective regimen sliding scale   SubCutaneous at bedtime  lactated ringers. 1000 milliLiter(s) (150 mL/Hr) IV Continuous <Continuous>  pantoprazole    Tablet 40 milliGRAM(s) Oral before breakfast    MEDICATIONS  (PRN):  aluminum hydroxide/magnesium hydroxide/simethicone Suspension 30 milliLiter(s) Oral every 8 hours PRN Dyspepsia  dextrose 40% Gel 15 Gram(s) Oral once PRN Blood Glucose LESS THAN 70 milliGRAM(s)/deciliter  glucagon  Injectable 1 milliGRAM(s) IntraMuscular once PRN Glucose LESS THAN 70 milligrams/deciliter  metoclopramide Injectable 10 milliGRAM(s) IV Push every 8 hours PRN Nausea/ vomiting  ondansetron Injectable 4 milliGRAM(s) IV Push every 8 hours PRN Nausea and/or Vomiting  polyethylene glycol 3350 17 Gram(s) Oral two times a day PRN Constipation        CAPILLARY BLOOD GLUCOSE      POCT Blood Glucose.: 188 mg/dL (26 Jun 2020 16:52)  POCT Blood Glucose.: 156 mg/dL (26 Jun 2020 11:32)  POCT Blood Glucose.: 195 mg/dL (26 Jun 2020 07:55)  POCT Blood Glucose.: 179 mg/dL (25 Jun 2020 22:11)    I&O's Summary    25 Jun 2020 07:01  -  26 Jun 2020 07:00  --------------------------------------------------------  IN: 0 mL / OUT: 900 mL / NET: -900 mL        PHYSICAL EXAM:  GENERAL: NAD  NECK: Supple, No JVD  CHEST/LUNG: Clear to auscultation bilaterally; No wheezing.  HEART: Regular rate and rhythm; No murmurs, rubs, or gallops  ABDOMEN: Soft, Nontender, Nondistended; Bowel sounds present  EXTREMITIES:   No edema  NEUROLOGY: AAO X 3      LABS:                        12.3   4.69  )-----------( 255      ( 26 Jun 2020 06:20 )             34.6     06-26    135  |  97<L>  |  7   ----------------------------<  139<H>  3.5   |  26  |  0.81    Ca    9.3      26 Jun 2020 06:20  Phos  3.0     06-26  Mg     1.6     06-26              CAPILLARY BLOOD GLUCOSE      POCT Blood Glucose.: 188 mg/dL (26 Jun 2020 16:52)  POCT Blood Glucose.: 156 mg/dL (26 Jun 2020 11:32)  POCT Blood Glucose.: 195 mg/dL (26 Jun 2020 07:55)  POCT Blood Glucose.: 179 mg/dL (25 Jun 2020 22:11)                RADIOLOGY & ADDITIONAL TESTS:    Imaging Personally Reviewed:    Consultant(s) Notes Reviewed:      Care Discussed with Consultants/Other Providers:    Yo Hameed MD, CMD, FACP    [address] [place] [address]  Office Tel: [phone]  Cell: [phone]

## 2020-06-26 NOTE — PROGRESS NOTE ADULT - SUBJECTIVE AND OBJECTIVE BOX
Chief Complaint:  Patient is a 29y old  Male who presents with a chief complaint of N/V (25 Jun 2020 16:15)      Interval Events: Feels better this morning, did not require additional reglan in AM. Denies nausea this morning for first time in a few days. Tolerated breakfast.    Allergies:  No Known Allergies      Hospital Medications:  aluminum hydroxide/magnesium hydroxide/simethicone Suspension 30 milliLiter(s) Oral every 8 hours PRN  dextrose 40% Gel 15 Gram(s) Oral once PRN  dextrose 5%. 1000 milliLiter(s) IV Continuous <Continuous>  dextrose 50% Injectable 12.5 Gram(s) IV Push once  dextrose 50% Injectable 25 Gram(s) IV Push once  dextrose 50% Injectable 25 Gram(s) IV Push once  famotidine  IVPB 20 milliGRAM(s) IV Intermittent two times a day  glucagon  Injectable 1 milliGRAM(s) IntraMuscular once PRN  insulin glargine Injectable (LANTUS) 12 Unit(s) SubCutaneous at bedtime  insulin lispro (HumaLOG) corrective regimen sliding scale   SubCutaneous three times a day before meals  insulin lispro (HumaLOG) corrective regimen sliding scale   SubCutaneous at bedtime  lactated ringers. 1000 milliLiter(s) IV Continuous <Continuous>  metoclopramide Injectable 10 milliGRAM(s) IV Push every 8 hours PRN  ondansetron Injectable 4 milliGRAM(s) IV Push every 8 hours PRN  pantoprazole    Tablet 40 milliGRAM(s) Oral before breakfast  polyethylene glycol 3350 17 Gram(s) Oral two times a day PRN      PMHX/PSHX:  Gastroparesis due to DM  Diabetes  No significant past surgical history      Family history:  FH: diabetes mellitus      ROS:     General:  No wt loss, fevers, chills, night sweats, fatigue,   Eyes:  Good vision, no reported pain  ENT:  No sore throat, pain, runny nose, dysphagia  CV:  No pain, palpitations, hypo/hypertension  Resp:  No dyspnea, cough, tachypnea, wheezing  GI:  See HPI  :  No pain, bleeding, incontinence, nocturia  Muscle:  No pain, weakness  Neuro:  No weakness, tingling, memory problems  Psych:  No fatigue, insomnia, mood problems, depression  Endocrine:  No polyuria, polydipsia, cold/heat intolerance  Heme:  No petechiae, ecchymosis, easy bruisability  Skin:  No rash, edema      PHYSICAL EXAM:     Vital Signs:  Vital Signs Last 24 Hrs  T(C): 36.9 (26 Jun 2020 05:35), Max: 37.1 (25 Jun 2020 22:12)  T(F): 98.5 (26 Jun 2020 05:35), Max: 98.8 (25 Jun 2020 22:12)  HR: 75 (26 Jun 2020 05:35) (70 - 98)  BP: 117/72 (26 Jun 2020 05:35) (117/72 - 123/82)  BP(mean): --  RR: 18 (26 Jun 2020 05:35) (17 - 18)  SpO2: 100% (26 Jun 2020 05:35) (100% - 100%)  Daily     Daily     GENERAL:  appears comfortable, no acute distress  HEENT:  NC/AT,  conjunctivae clear, sclera -anicteric  CHEST:  no increased effort  HEART:  Regular rate and rhythm  ABDOMEN:  Soft, non-tender, non-distended,  no masses ,no hepato-splenomegaly,   EXTREMITIES:  no cyanosis, clubbing or edema  SKIN:  No rash/erythema/ecchymoses/petechiae/wounds  NEURO:  Alert, oriented    LABS:                        12.3   4.69  )-----------( 255      ( 26 Jun 2020 06:20 )             34.6     06-26    135  |  97<L>  |  7   ----------------------------<  139<H>  3.5   |  26  |  0.81    Ca    9.3      26 Jun 2020 06:20  Phos  3.0     06-26  Mg     1.6     06-26                Imaging:

## 2020-06-26 NOTE — PROGRESS NOTE ADULT - ASSESSMENT
Impression:  #Nausea/Vomiting c/f gastroparesis although no confimred gastroparesis diagnosis: presumed GP given poorly controlled DM. Pt imrpoved this AM. Has had well controlled sugars. Last A1C 10.2 in May. EGD prior in 1/2020 suggestive of gastroparesis with food in stomach as well esophagitis.  #Constipation: secondary to poor po intake and poor gut motility, may be contributing to patient's nausea  #Transaminitis/abnormal LFTs: resolved  # T1DM: poorly controlled    Recommendations  -would recommend reglan as need on outpatient basis with appropriate counselling on long term risks of medication including tardive dyskinesia   -strict glycemic control as outpatient  -advance diet as tolerated; stress need for small frequent meals, low fat, soluble fiber diet & nutrition followup  -recommend increase activity  -monitor QTC closely  -miralax TID and fiber supplements  -outpatient gastric emptying studying (inpatient study would not be accurate given patient is already on a promotility agent)  -no further GI plans, can likely f/u as outpatient with GI  -please have patient make appointment for follow up in the GI office ([facility]- [phone], Attending Clinic- [phone])  -supportive care Impression:  #Nausea/Vomiting c/f gastroparesis although no confimred gastroparesis diagnosis: presumed GP given poorly controlled DM. Pt imrpoved this AM. Has had well controlled sugars. Last A1C 10.2 in May. EGD prior in 1/2020 suggestive of gastroparesis with food in stomach as well esophagitis.  #Constipation: secondary to poor po intake and poor gut motility, may be contributing to patient's nausea  #Transaminitis/abnormal LFTs: resolved  # T1DM: poorly controlled    Recommendations  -would recommend reglan as needed on outpatient basis with appropriate counselling on long term risks of medication including tardive dyskinesia   -strict glycemic control as outpatient  -advance diet as tolerated; stress need for small frequent meals, low fat, soluble fiber diet & nutrition followup  -recommend increase activity  -monitor QTC closely  -miralax TID and fiber supplements  -outpatient gastric emptying studying (inpatient study would not be accurate given patient is already on a promotility agent)  -no further GI plans, can likely f/u as outpatient with GI  -please have patient make appointment for follow up in the GI clinic ([facility]- [phone])  -supportive care

## 2020-06-26 NOTE — CHART NOTE - NSCHARTNOTEFT_GEN_A_CORE
Source: Patient [X]    Family [ ]     other [X] Medical Chart   Diet rx: Soft, Consistent Carbohydrate (no snacks), Fiber Restricted     Pt 28 yo male with a PMHx of DM1 (Dx 2015) w/gastroparesis - per chart review. At time of visit Pt appears alert, oriented. Per Pt his appetite/PO intake gotten "little better". No report of chewing or swallowing difficulty; no report of nausea, vomiting or diarrhea @ this time either. Food preferences discussed; Pt agreed to try PO supplement: Glucerna shake. Of note Pt's HbA1c level 10.2% (5/13). RDN offered to discuss Consistent Carbohydrate diet, but Pt declined. Written materials on Consistent Carbohydrate diet left with Pt. No diet rx related concerns voiced @ present. RDN remains available, Pt made aware.     Pt's height: 73" - 6/21        IBW: 184#+/-10%    Pt's weight: 55 Kg - 6/21   Pertinent Medications: Insulin (Humalog), Insulin (Lantus), Miralax, Pepcid, Zofran (PRN), Protonix,   Pertinent Labs:  06-26 Na135 mmol/L Glu 139 mg/dL<H> K+ 3.5 mmol/L Cr  0.81 mg/dL BUN 7 mg/dL 06-26 Phos 3.0 mg/dL 06-24 Alb 3.0 g/dL<L>  Skin: No report of pressure injury @ present     Estimated Needs: [X] no change since previous assessment  Previous Nutrition Diagnosis: [X] Malnutrition, severe    New Nutrition Diagnosis: [X] Altered Nutrition Related Lab Values   Related to: Physiological cause/Endocrine dysfunction   As evidenced by: elevated HbA1c     Nutrition Interventions/recommendations:    1. PO diet rx: Soft, Consistent Carbohydrate (no snacks), Fiber Restricted; PO supplement: Glucerna Therapeutic Nutrition 8oz. 2x daily (will provide additional ~440 Kcals, ~20 gm Protein);   2. Encourage & assist Pt with meals; Monitor PO diet tolerance; Honor food preferences;   3. Add Multivitamins with minerals 1 tab daily for micronutrient coverage;   4. Monitor labs, weekly weights, hydration status;

## 2020-06-27 ENCOUNTER — TRANSCRIPTION ENCOUNTER (OUTPATIENT)
Age: 30
End: 2020-06-27

## 2020-06-27 VITALS
HEART RATE: 96 BPM | OXYGEN SATURATION: 97 % | TEMPERATURE: 98 F | SYSTOLIC BLOOD PRESSURE: 104 MMHG | DIASTOLIC BLOOD PRESSURE: 70 MMHG | RESPIRATION RATE: 18 BRPM

## 2020-06-27 LAB
ANION GAP SERPL CALC-SCNC: 12 MMO/L — SIGNIFICANT CHANGE UP (ref 7–14)
BASOPHILS # BLD AUTO: 0.07 K/UL — SIGNIFICANT CHANGE UP (ref 0–0.2)
BASOPHILS NFR BLD AUTO: 1.8 % — SIGNIFICANT CHANGE UP (ref 0–2)
BASOPHILS NFR SPEC: 0.9 % — SIGNIFICANT CHANGE UP (ref 0–2)
BLASTS # FLD: 0 % — SIGNIFICANT CHANGE UP (ref 0–0)
BUN SERPL-MCNC: 7 MG/DL — SIGNIFICANT CHANGE UP (ref 7–23)
CALCIUM SERPL-MCNC: 9.2 MG/DL — SIGNIFICANT CHANGE UP (ref 8.4–10.5)
CHLORIDE SERPL-SCNC: 98 MMOL/L — SIGNIFICANT CHANGE UP (ref 98–107)
CO2 SERPL-SCNC: 27 MMOL/L — SIGNIFICANT CHANGE UP (ref 22–31)
CREAT SERPL-MCNC: 0.81 MG/DL — SIGNIFICANT CHANGE UP (ref 0.5–1.3)
DACRYOCYTES BLD QL SMEAR: SLIGHT — SIGNIFICANT CHANGE UP
EOSINOPHIL # BLD AUTO: 0.09 K/UL — SIGNIFICANT CHANGE UP (ref 0–0.5)
EOSINOPHIL NFR BLD AUTO: 2.3 % — SIGNIFICANT CHANGE UP (ref 0–6)
EOSINOPHIL NFR FLD: 4.4 % — SIGNIFICANT CHANGE UP (ref 0–6)
GIANT PLATELETS BLD QL SMEAR: PRESENT — SIGNIFICANT CHANGE UP
GLUCOSE BLDC GLUCOMTR-MCNC: 123 MG/DL — HIGH (ref 70–99)
GLUCOSE BLDC GLUCOMTR-MCNC: 193 MG/DL — HIGH (ref 70–99)
GLUCOSE SERPL-MCNC: 129 MG/DL — HIGH (ref 70–99)
HCT VFR BLD CALC: 34.3 % — LOW (ref 39–50)
HGB BLD-MCNC: 11.8 G/DL — LOW (ref 13–17)
IMM GRANULOCYTES NFR BLD AUTO: 0.3 % — SIGNIFICANT CHANGE UP (ref 0–1.5)
LYMPHOCYTES # BLD AUTO: 2.48 K/UL — SIGNIFICANT CHANGE UP (ref 1–3.3)
LYMPHOCYTES # BLD AUTO: 62.5 % — HIGH (ref 13–44)
LYMPHOCYTES NFR SPEC AUTO: 61.1 % — HIGH (ref 13–44)
MAGNESIUM SERPL-MCNC: 1.7 MG/DL — SIGNIFICANT CHANGE UP (ref 1.6–2.6)
MCHC RBC-ENTMCNC: 30.2 PG — SIGNIFICANT CHANGE UP (ref 27–34)
MCHC RBC-ENTMCNC: 34.4 % — SIGNIFICANT CHANGE UP (ref 32–36)
MCV RBC AUTO: 87.7 FL — SIGNIFICANT CHANGE UP (ref 80–100)
METAMYELOCYTES # FLD: 0 % — SIGNIFICANT CHANGE UP (ref 0–1)
MONOCYTES # BLD AUTO: 0.35 K/UL — SIGNIFICANT CHANGE UP (ref 0–0.9)
MONOCYTES NFR BLD AUTO: 8.8 % — SIGNIFICANT CHANGE UP (ref 2–14)
MONOCYTES NFR BLD: 8.8 % — SIGNIFICANT CHANGE UP (ref 2–9)
MYELOCYTES NFR BLD: 0 % — SIGNIFICANT CHANGE UP (ref 0–0)
NEUTROPHIL AB SER-ACNC: 22.1 % — LOW (ref 43–77)
NEUTROPHILS # BLD AUTO: 0.97 K/UL — LOW (ref 1.8–7.4)
NEUTROPHILS NFR BLD AUTO: 24.3 % — LOW (ref 43–77)
NEUTS BAND # BLD: 0 % — SIGNIFICANT CHANGE UP (ref 0–6)
NRBC # FLD: 0 K/UL — SIGNIFICANT CHANGE UP (ref 0–0)
OTHER - HEMATOLOGY %: 0 — SIGNIFICANT CHANGE UP
PHOSPHATE SERPL-MCNC: 3.6 MG/DL — SIGNIFICANT CHANGE UP (ref 2.5–4.5)
PLATELET # BLD AUTO: 240 K/UL — SIGNIFICANT CHANGE UP (ref 150–400)
PLATELET COUNT - ESTIMATE: NORMAL — SIGNIFICANT CHANGE UP
PMV BLD: 10.2 FL — SIGNIFICANT CHANGE UP (ref 7–13)
POIKILOCYTOSIS BLD QL AUTO: SLIGHT — SIGNIFICANT CHANGE UP
POTASSIUM SERPL-MCNC: 3.7 MMOL/L — SIGNIFICANT CHANGE UP (ref 3.5–5.3)
POTASSIUM SERPL-SCNC: 3.7 MMOL/L — SIGNIFICANT CHANGE UP (ref 3.5–5.3)
PROMYELOCYTES # FLD: 0 % — SIGNIFICANT CHANGE UP (ref 0–0)
RBC # BLD: 3.91 M/UL — LOW (ref 4.2–5.8)
RBC # FLD: 12 % — SIGNIFICANT CHANGE UP (ref 10.3–14.5)
SMUDGE CELLS # BLD: PRESENT — SIGNIFICANT CHANGE UP
SODIUM SERPL-SCNC: 137 MMOL/L — SIGNIFICANT CHANGE UP (ref 135–145)
VARIANT LYMPHS # BLD: 2.7 % — SIGNIFICANT CHANGE UP
WBC # BLD: 3.97 K/UL — SIGNIFICANT CHANGE UP (ref 3.8–10.5)
WBC # FLD AUTO: 3.97 K/UL — SIGNIFICANT CHANGE UP (ref 3.8–10.5)

## 2020-06-27 PROCEDURE — 93010 ELECTROCARDIOGRAM REPORT: CPT

## 2020-06-27 RX ORDER — METOCLOPRAMIDE HCL 10 MG
1 TABLET ORAL
Qty: 21 | Refills: 0
Start: 2020-06-27 | End: 2020-07-03

## 2020-06-27 RX ORDER — PANTOPRAZOLE SODIUM 20 MG/1
1 TABLET, DELAYED RELEASE ORAL
Qty: 30 | Refills: 0
Start: 2020-06-27 | End: 2020-07-26

## 2020-06-27 RX ORDER — POLYETHYLENE GLYCOL 3350 17 G/17G
17 POWDER, FOR SOLUTION ORAL
Qty: 510 | Refills: 0
Start: 2020-06-27 | End: 2020-07-06

## 2020-06-27 RX ADMIN — FAMOTIDINE 104 MILLIGRAM(S): 10 INJECTION INTRAVENOUS at 05:04

## 2020-06-27 RX ADMIN — SODIUM CHLORIDE 150 MILLILITER(S): 9 INJECTION, SOLUTION INTRAVENOUS at 12:11

## 2020-06-27 RX ADMIN — PANTOPRAZOLE SODIUM 40 MILLIGRAM(S): 20 TABLET, DELAYED RELEASE ORAL at 05:04

## 2020-06-27 RX ADMIN — Medication 1: at 12:11

## 2020-06-27 NOTE — DISCHARGE NOTE NURSING/CASE MANAGEMENT/SOCIAL WORK - NSDCFUADDAPPT_GEN_ALL_CORE_FT
Please call [phone] to schedule an appointment with gastroenterology in 1 to 2 weeks.     Please follow up with your primary care provider in 1 to 2 weeks for further care. If you don't have a primary care provider please follow up at our Medicine Clinic at  [facility]-[address] [place] [id number] [phone] or [phone]  (please call to make appointment)

## 2020-06-27 NOTE — DISCHARGE NOTE NURSING/CASE MANAGEMENT/SOCIAL WORK - PATIENT PORTAL LINK FT
You can access the FollowMyHealth Patient Portal offered by [facility] by registering at the following website: [email or web address]. By joining [facility]’s FollowMyHealth portal, you will also be able to view your health information using other applications (apps) compatible with our system.

## 2020-07-25 ENCOUNTER — INPATIENT (INPATIENT)
Facility: HOSPITAL | Age: 30
LOS: 5 days | Discharge: HOME | End: 2020-07-31
Attending: INTERNAL MEDICINE | Admitting: INTERNAL MEDICINE
Payer: MEDICAID

## 2020-07-25 VITALS
HEART RATE: 138 BPM | TEMPERATURE: 98 F | OXYGEN SATURATION: 99 % | RESPIRATION RATE: 18 BRPM | SYSTOLIC BLOOD PRESSURE: 115 MMHG | DIASTOLIC BLOOD PRESSURE: 79 MMHG

## 2020-07-25 DIAGNOSIS — E11.43 TYPE 2 DIABETES MELLITUS WITH DIABETIC AUTONOMIC (POLY)NEUROPATHY: ICD-10-CM

## 2020-07-25 DIAGNOSIS — E10.10 TYPE 1 DIABETES MELLITUS WITH KETOACIDOSIS WITHOUT COMA: ICD-10-CM

## 2020-07-25 LAB
ALBUMIN SERPL ELPH-MCNC: 4.7 G/DL — SIGNIFICANT CHANGE UP (ref 3.3–5)
ALP SERPL-CCNC: 45 U/L — SIGNIFICANT CHANGE UP (ref 40–120)
ALT FLD-CCNC: 16 U/L — SIGNIFICANT CHANGE UP (ref 4–41)
ANION GAP SERPL CALC-SCNC: 15 MMO/L — HIGH (ref 7–14)
ANION GAP SERPL CALC-SCNC: 16 MMO/L — HIGH (ref 7–14)
ANION GAP SERPL CALC-SCNC: 24 MMO/L — HIGH (ref 7–14)
APPEARANCE UR: CLEAR — SIGNIFICANT CHANGE UP
AST SERPL-CCNC: 17 U/L — SIGNIFICANT CHANGE UP (ref 4–40)
B-OH-BUTYR SERPL-SCNC: 1.9 MMOL/L — HIGH (ref 0–0.4)
BASE EXCESS BLDV CALC-SCNC: -0.7 MMOL/L — SIGNIFICANT CHANGE UP
BASE EXCESS BLDV CALC-SCNC: -5.3 MMOL/L — SIGNIFICANT CHANGE UP
BASE EXCESS BLDV CALC-SCNC: 1.1 MMOL/L — SIGNIFICANT CHANGE UP
BASOPHILS # BLD AUTO: 0.03 K/UL — SIGNIFICANT CHANGE UP (ref 0–0.2)
BASOPHILS NFR BLD AUTO: 0.2 % — SIGNIFICANT CHANGE UP (ref 0–2)
BILIRUB SERPL-MCNC: 1.4 MG/DL — HIGH (ref 0.2–1.2)
BILIRUB UR-MCNC: NEGATIVE — SIGNIFICANT CHANGE UP
BLOOD GAS VENOUS - CREATININE: 0.9 MG/DL — SIGNIFICANT CHANGE UP (ref 0.5–1.3)
BLOOD GAS VENOUS - CREATININE: 0.92 MG/DL — SIGNIFICANT CHANGE UP (ref 0.5–1.3)
BLOOD GAS VENOUS - CREATININE: 0.94 MG/DL — SIGNIFICANT CHANGE UP (ref 0.5–1.3)
BLOOD UR QL VISUAL: NEGATIVE — SIGNIFICANT CHANGE UP
BUN SERPL-MCNC: 18 MG/DL — SIGNIFICANT CHANGE UP (ref 7–23)
BUN SERPL-MCNC: 19 MG/DL — SIGNIFICANT CHANGE UP (ref 7–23)
BUN SERPL-MCNC: 20 MG/DL — SIGNIFICANT CHANGE UP (ref 7–23)
CALCIUM SERPL-MCNC: 10 MG/DL — SIGNIFICANT CHANGE UP (ref 8.4–10.5)
CALCIUM SERPL-MCNC: 11.1 MG/DL — HIGH (ref 8.4–10.5)
CALCIUM SERPL-MCNC: 9.9 MG/DL — SIGNIFICANT CHANGE UP (ref 8.4–10.5)
CHLORIDE BLDV-SCNC: 104 MMOL/L — SIGNIFICANT CHANGE UP (ref 96–108)
CHLORIDE BLDV-SCNC: 107 MMOL/L — SIGNIFICANT CHANGE UP (ref 96–108)
CHLORIDE BLDV-SCNC: 108 MMOL/L — SIGNIFICANT CHANGE UP (ref 96–108)
CHLORIDE SERPL-SCNC: 102 MMOL/L — SIGNIFICANT CHANGE UP (ref 98–107)
CHLORIDE SERPL-SCNC: 103 MMOL/L — SIGNIFICANT CHANGE UP (ref 98–107)
CHLORIDE SERPL-SCNC: 96 MMOL/L — LOW (ref 98–107)
CO2 SERPL-SCNC: 17 MMOL/L — LOW (ref 22–31)
CO2 SERPL-SCNC: 21 MMOL/L — LOW (ref 22–31)
CO2 SERPL-SCNC: 21 MMOL/L — LOW (ref 22–31)
COLOR SPEC: SIGNIFICANT CHANGE UP
CREAT SERPL-MCNC: 0.83 MG/DL — SIGNIFICANT CHANGE UP (ref 0.5–1.3)
CREAT SERPL-MCNC: 0.91 MG/DL — SIGNIFICANT CHANGE UP (ref 0.5–1.3)
CREAT SERPL-MCNC: 0.95 MG/DL — SIGNIFICANT CHANGE UP (ref 0.5–1.3)
EOSINOPHIL # BLD AUTO: 0 K/UL — SIGNIFICANT CHANGE UP (ref 0–0.5)
EOSINOPHIL NFR BLD AUTO: 0 % — SIGNIFICANT CHANGE UP (ref 0–6)
GAS PNL BLDV: 136 MMOL/L — SIGNIFICANT CHANGE UP (ref 136–146)
GAS PNL BLDV: 137 MMOL/L — SIGNIFICANT CHANGE UP (ref 136–146)
GAS PNL BLDV: 137 MMOL/L — SIGNIFICANT CHANGE UP (ref 136–146)
GLUCOSE BLDC GLUCOMTR-MCNC: 182 MG/DL — HIGH (ref 70–99)
GLUCOSE BLDC GLUCOMTR-MCNC: 188 MG/DL — HIGH (ref 70–99)
GLUCOSE BLDV-MCNC: 221 MG/DL — HIGH (ref 70–99)
GLUCOSE BLDV-MCNC: 246 MG/DL — HIGH (ref 70–99)
GLUCOSE BLDV-MCNC: 345 MG/DL — HIGH (ref 70–99)
GLUCOSE SERPL-MCNC: 215 MG/DL — HIGH (ref 70–99)
GLUCOSE SERPL-MCNC: 271 MG/DL — HIGH (ref 70–99)
GLUCOSE SERPL-MCNC: 362 MG/DL — HIGH (ref 70–99)
GLUCOSE UR-MCNC: >1000 — HIGH
HBA1C BLD-MCNC: 8.1 % — HIGH (ref 4–5.6)
HCO3 BLDV-SCNC: 19 MMOL/L — LOW (ref 20–27)
HCO3 BLDV-SCNC: 21 MMOL/L — SIGNIFICANT CHANGE UP (ref 20–27)
HCO3 BLDV-SCNC: 23 MMOL/L — SIGNIFICANT CHANGE UP (ref 20–27)
HCT VFR BLD CALC: 42.9 % — SIGNIFICANT CHANGE UP (ref 39–50)
HCT VFR BLDV CALC: 42.3 % — SIGNIFICANT CHANGE UP (ref 39–51)
HCT VFR BLDV CALC: 42.3 % — SIGNIFICANT CHANGE UP (ref 39–51)
HCT VFR BLDV CALC: 44.6 % — SIGNIFICANT CHANGE UP (ref 39–51)
HGB BLD-MCNC: 14.7 G/DL — SIGNIFICANT CHANGE UP (ref 13–17)
HGB BLDV-MCNC: 13.8 G/DL — SIGNIFICANT CHANGE UP (ref 13–17)
HGB BLDV-MCNC: 13.8 G/DL — SIGNIFICANT CHANGE UP (ref 13–17)
HGB BLDV-MCNC: 14.5 G/DL — SIGNIFICANT CHANGE UP (ref 13–17)
IMM GRANULOCYTES NFR BLD AUTO: 0.3 % — SIGNIFICANT CHANGE UP (ref 0–1.5)
KETONES UR-MCNC: HIGH
LACTATE BLDV-MCNC: 1.7 MMOL/L — SIGNIFICANT CHANGE UP (ref 0.5–2)
LACTATE BLDV-MCNC: 3.1 MMOL/L — HIGH (ref 0.5–2)
LACTATE BLDV-MCNC: 6.2 MMOL/L — CRITICAL HIGH (ref 0.5–2)
LEUKOCYTE ESTERASE UR-ACNC: NEGATIVE — SIGNIFICANT CHANGE UP
LIDOCAIN IGE QN: 7 U/L — SIGNIFICANT CHANGE UP (ref 7–60)
LYMPHOCYTES # BLD AUTO: 1.1 K/UL — SIGNIFICANT CHANGE UP (ref 1–3.3)
LYMPHOCYTES # BLD AUTO: 7.6 % — LOW (ref 13–44)
MAGNESIUM SERPL-MCNC: 1.8 MG/DL — SIGNIFICANT CHANGE UP (ref 1.6–2.6)
MAGNESIUM SERPL-MCNC: 1.9 MG/DL — SIGNIFICANT CHANGE UP (ref 1.6–2.6)
MCHC RBC-ENTMCNC: 30.6 PG — SIGNIFICANT CHANGE UP (ref 27–34)
MCHC RBC-ENTMCNC: 34.3 % — SIGNIFICANT CHANGE UP (ref 32–36)
MCV RBC AUTO: 89.4 FL — SIGNIFICANT CHANGE UP (ref 80–100)
MONOCYTES # BLD AUTO: 0.52 K/UL — SIGNIFICANT CHANGE UP (ref 0–0.9)
MONOCYTES NFR BLD AUTO: 3.6 % — SIGNIFICANT CHANGE UP (ref 2–14)
NEUTROPHILS # BLD AUTO: 12.76 K/UL — HIGH (ref 1.8–7.4)
NEUTROPHILS NFR BLD AUTO: 88.3 % — HIGH (ref 43–77)
NITRITE UR-MCNC: NEGATIVE — SIGNIFICANT CHANGE UP
NRBC # FLD: 0 K/UL — SIGNIFICANT CHANGE UP (ref 0–0)
PCO2 BLDV: 43 MMHG — SIGNIFICANT CHANGE UP (ref 41–51)
PCO2 BLDV: 49 MMHG — SIGNIFICANT CHANGE UP (ref 41–51)
PCO2 BLDV: 52 MMHG — HIGH (ref 41–51)
PH BLDV: 7.29 PH — LOW (ref 7.32–7.43)
PH BLDV: 7.3 PH — LOW (ref 7.32–7.43)
PH BLDV: 7.35 PH — SIGNIFICANT CHANGE UP (ref 7.32–7.43)
PH UR: 5.5 — SIGNIFICANT CHANGE UP (ref 5–8)
PHOSPHATE SERPL-MCNC: 3.1 MG/DL — SIGNIFICANT CHANGE UP (ref 2.5–4.5)
PHOSPHATE SERPL-MCNC: 5.6 MG/DL — HIGH (ref 2.5–4.5)
PLATELET # BLD AUTO: 337 K/UL — SIGNIFICANT CHANGE UP (ref 150–400)
PMV BLD: 9.9 FL — SIGNIFICANT CHANGE UP (ref 7–13)
PO2 BLDV: 26 MMHG — LOW (ref 35–40)
PO2 BLDV: 32 MMHG — LOW (ref 35–40)
PO2 BLDV: < 24 MMHG — LOW (ref 35–40)
POTASSIUM BLDV-SCNC: 4.4 MMOL/L — SIGNIFICANT CHANGE UP (ref 3.4–4.5)
POTASSIUM BLDV-SCNC: 4.6 MMOL/L — HIGH (ref 3.4–4.5)
POTASSIUM BLDV-SCNC: 4.8 MMOL/L — HIGH (ref 3.4–4.5)
POTASSIUM SERPL-MCNC: 4.7 MMOL/L — SIGNIFICANT CHANGE UP (ref 3.5–5.3)
POTASSIUM SERPL-MCNC: 5.1 MMOL/L — SIGNIFICANT CHANGE UP (ref 3.5–5.3)
POTASSIUM SERPL-MCNC: 5.1 MMOL/L — SIGNIFICANT CHANGE UP (ref 3.5–5.3)
POTASSIUM SERPL-SCNC: 4.7 MMOL/L — SIGNIFICANT CHANGE UP (ref 3.5–5.3)
POTASSIUM SERPL-SCNC: 5.1 MMOL/L — SIGNIFICANT CHANGE UP (ref 3.5–5.3)
POTASSIUM SERPL-SCNC: 5.1 MMOL/L — SIGNIFICANT CHANGE UP (ref 3.5–5.3)
PROT SERPL-MCNC: 7.9 G/DL — SIGNIFICANT CHANGE UP (ref 6–8.3)
PROT UR-MCNC: 10 — SIGNIFICANT CHANGE UP
RBC # BLD: 4.8 M/UL — SIGNIFICANT CHANGE UP (ref 4.2–5.8)
RBC # FLD: 11.9 % — SIGNIFICANT CHANGE UP (ref 10.3–14.5)
SAO2 % BLDV: 24.4 % — LOW (ref 60–85)
SAO2 % BLDV: 38.6 % — LOW (ref 60–85)
SAO2 % BLDV: 51.3 % — LOW (ref 60–85)
SARS-COV-2 RNA SPEC QL NAA+PROBE: SIGNIFICANT CHANGE UP
SODIUM SERPL-SCNC: 137 MMOL/L — SIGNIFICANT CHANGE UP (ref 135–145)
SODIUM SERPL-SCNC: 139 MMOL/L — SIGNIFICANT CHANGE UP (ref 135–145)
SODIUM SERPL-SCNC: 139 MMOL/L — SIGNIFICANT CHANGE UP (ref 135–145)
SP GR SPEC: 1.03 — SIGNIFICANT CHANGE UP (ref 1–1.04)
UROBILINOGEN FLD QL: NORMAL — SIGNIFICANT CHANGE UP
WBC # BLD: 14.46 K/UL — HIGH (ref 3.8–10.5)
WBC # FLD AUTO: 14.46 K/UL — HIGH (ref 3.8–10.5)

## 2020-07-25 PROCEDURE — 99223 1ST HOSP IP/OBS HIGH 75: CPT

## 2020-07-25 PROCEDURE — 99285 EMERGENCY DEPT VISIT HI MDM: CPT

## 2020-07-25 RX ORDER — DEXTROSE 50 % IN WATER 50 %
12.5 SYRINGE (ML) INTRAVENOUS ONCE
Refills: 0 | Status: DISCONTINUED | OUTPATIENT
Start: 2020-07-25 | End: 2020-07-31

## 2020-07-25 RX ORDER — ONDANSETRON 8 MG/1
4 TABLET, FILM COATED ORAL ONCE
Refills: 0 | Status: COMPLETED | OUTPATIENT
Start: 2020-07-25 | End: 2020-07-25

## 2020-07-25 RX ORDER — INSULIN LISPRO 100/ML
VIAL (ML) SUBCUTANEOUS
Refills: 0 | Status: DISCONTINUED | OUTPATIENT
Start: 2020-07-25 | End: 2020-07-31

## 2020-07-25 RX ORDER — SODIUM CHLORIDE 9 MG/ML
1000 INJECTION INTRAMUSCULAR; INTRAVENOUS; SUBCUTANEOUS ONCE
Refills: 0 | Status: COMPLETED | OUTPATIENT
Start: 2020-07-25 | End: 2020-07-25

## 2020-07-25 RX ORDER — PANTOPRAZOLE SODIUM 20 MG/1
40 TABLET, DELAYED RELEASE ORAL
Refills: 0 | Status: DISCONTINUED | OUTPATIENT
Start: 2020-07-25 | End: 2020-07-27

## 2020-07-25 RX ORDER — DEXTROSE 50 % IN WATER 50 %
15 SYRINGE (ML) INTRAVENOUS ONCE
Refills: 0 | Status: DISCONTINUED | OUTPATIENT
Start: 2020-07-25 | End: 2020-07-31

## 2020-07-25 RX ORDER — INSULIN GLARGINE 100 [IU]/ML
15 INJECTION, SOLUTION SUBCUTANEOUS AT BEDTIME
Refills: 0 | Status: DISCONTINUED | OUTPATIENT
Start: 2020-07-25 | End: 2020-07-26

## 2020-07-25 RX ORDER — GLUCAGON INJECTION, SOLUTION 0.5 MG/.1ML
1 INJECTION, SOLUTION SUBCUTANEOUS ONCE
Refills: 0 | Status: DISCONTINUED | OUTPATIENT
Start: 2020-07-25 | End: 2020-07-31

## 2020-07-25 RX ORDER — DEXTROSE 50 % IN WATER 50 %
25 SYRINGE (ML) INTRAVENOUS ONCE
Refills: 0 | Status: DISCONTINUED | OUTPATIENT
Start: 2020-07-25 | End: 2020-07-31

## 2020-07-25 RX ORDER — INSULIN LISPRO 100/ML
4 VIAL (ML) SUBCUTANEOUS
Refills: 0 | Status: DISCONTINUED | OUTPATIENT
Start: 2020-07-25 | End: 2020-07-29

## 2020-07-25 RX ORDER — SODIUM CHLORIDE 9 MG/ML
1000 INJECTION, SOLUTION INTRAVENOUS
Refills: 0 | Status: DISCONTINUED | OUTPATIENT
Start: 2020-07-25 | End: 2020-07-31

## 2020-07-25 RX ORDER — INSULIN HUMAN 100 [IU]/ML
6 INJECTION, SOLUTION SUBCUTANEOUS ONCE
Refills: 0 | Status: COMPLETED | OUTPATIENT
Start: 2020-07-25 | End: 2020-07-25

## 2020-07-25 RX ORDER — INSULIN LISPRO 100/ML
VIAL (ML) SUBCUTANEOUS AT BEDTIME
Refills: 0 | Status: DISCONTINUED | OUTPATIENT
Start: 2020-07-25 | End: 2020-07-31

## 2020-07-25 RX ADMIN — SODIUM CHLORIDE 200 MILLILITER(S): 9 INJECTION, SOLUTION INTRAVENOUS at 23:33

## 2020-07-25 RX ADMIN — INSULIN GLARGINE 15 UNIT(S): 100 INJECTION, SOLUTION SUBCUTANEOUS at 23:34

## 2020-07-25 RX ADMIN — SODIUM CHLORIDE 1000 MILLILITER(S): 9 INJECTION INTRAMUSCULAR; INTRAVENOUS; SUBCUTANEOUS at 15:29

## 2020-07-25 RX ADMIN — SODIUM CHLORIDE 200 MILLILITER(S): 9 INJECTION, SOLUTION INTRAVENOUS at 19:32

## 2020-07-25 RX ADMIN — INSULIN HUMAN 6 UNIT(S): 100 INJECTION, SOLUTION SUBCUTANEOUS at 15:42

## 2020-07-25 RX ADMIN — SODIUM CHLORIDE 1000 MILLILITER(S): 9 INJECTION INTRAMUSCULAR; INTRAVENOUS; SUBCUTANEOUS at 17:11

## 2020-07-25 RX ADMIN — ONDANSETRON 4 MILLIGRAM(S): 8 TABLET, FILM COATED ORAL at 15:29

## 2020-07-25 RX ADMIN — ONDANSETRON 4 MILLIGRAM(S): 8 TABLET, FILM COATED ORAL at 17:11

## 2020-07-25 NOTE — ED ADULT NURSE REASSESSMENT NOTE - NS ED NURSE REASSESS COMMENT FT1
received pt in bed  A and OX 3  in NAD resting comfortably, pt c/o  nausea,  ketosis like smell noted on breath. pt is on monitor with sinus tachycardia.  orders noted and completed.

## 2020-07-25 NOTE — H&P ADULT - PROBLEM SELECTOR PLAN 1
AG now closed after 1 dose of IV regular insulin and fluids  - will check BMP overnight, and in AM  - Continue home Lantus and Humaog with sliding scale correction   - endocrinology consult in am

## 2020-07-25 NOTE — ED PROVIDER NOTE - PROGRESS NOTE DETAILS
PGY3/MD Guero. Pt received Saline 2L, insulin 6units, gap closing to 16 from 24, pH 7.30. DKA but under control. Potassium 5.1, no repletion at this time

## 2020-07-25 NOTE — ED ADULT NURSE REASSESSMENT NOTE - NS ED NURSE REASSESS COMMENT FT1
Patient resting in no suitress, voided 400 ml kirstin urine, ua/cns sent as ordered. Will continue to monitor.

## 2020-07-25 NOTE — ED PROVIDER NOTE - OBJECTIVE STATEMENT
PGY3/MD Guero. 28 yo M with DM type I (lantus 15 u at bedtime, 4-6 u regular insulin for every meal), p/w vomit, palpitation since last ngiht. pt has vomited since 11p, no fever, chills but noticed some dry cough. No diarrhea. noticed elevation of BS this afternoon to 270, took 6 units insulin this morning but missed noon dose due to lack of the meal. Had h/o DKA in the past.    PCP: Zari Rosario: not seen to a new endo PGY3/MD Guero. 28 yo M pmh gastroparesis, DM type I (lantus 15 u at bedtime, 4-6 u regular insulin for every meal), p/w nausea/vomiting, palpitations since last ngiht. pt has vomited since 11p, no fever, chills but noticed some dry cough. No diarrhea. noticed elevation of BS this afternoon to 270, took 6 units insulin this morning but missed noon dose due to lack of the meal. Had h/o DKA in the past.    PCP: Zari Rosario: not seen to a new endo

## 2020-07-25 NOTE — ED PROVIDER NOTE - CLINICAL SUMMARY MEDICAL DECISION MAKING FREE TEXT BOX
PGY3/MD Guero. 28 yo M with DM type I, p/w vomit, dehydration, since last night, HR over 130, sinus on the monitor, concernign for DKA, but no clinical evidence of infection. lab, ekg, reasess. admission to MICU vs. floor based on AG  and DKA status, will get him insulin 6u.

## 2020-07-25 NOTE — ED ADULT NURSE NOTE - OBJECTIVE STATEMENT
Pt awake, alert and oriented x 4 with 2 days of nausea and vomiting with intermittent abdominal pain.  currently denies abdominal pain.   No chest pain, shortness of breath, respirations even and unlabored.   Pt tachycardic - reports decreased PO for the last few days - diabetic insulin dependent with PMH gastroparesis.   Pt denies fever, diarrhea or pain/burning with urination.  Placed on continuous cardiac monitor and pulse oximetry.   will continue to monitor.  No skin breakdown noted.

## 2020-07-25 NOTE — H&P ADULT - NSHPREVIEWOFSYSTEMS_GEN_ALL_CORE
Review of Systems:   CONSTITUTIONAL: No fever or chills  EYES: No eye pain, visual disturbances, or discharge  ENMT:  No difficulty hearing, no throat pain  NECK: No pain or stiffness  RESPIRATORY: No cough, No shortness of breath  CARDIOVASCULAR: No chest pain, palpitations, dizziness, or leg swelling  GASTROINTESTINAL: No abdominal pain, +nausea and vomiting  GENITOURINARY: No dysuria, or hematuria  NEUROLOGICAL: No headaches, weakness, or numbness  SKIN: No rashes, or lesions   LYMPH NODES: No enlarged glands  ENDOCRINE: No heat or cold intolerance  MUSCULOSKELETAL: No joint pain or swelling  PSYCHIATRIC: No depression or anxiety  HEME/LYMPH: No easy bruising, or bleeding  ALLERGY AND IMMUNOLOGIC: No hives or eczema

## 2020-07-25 NOTE — ED PROVIDER NOTE - PHYSICAL EXAMINATION
PGY3/MD Guero.   VITALS: reviewed  GEN: No apparent distress, A & O x 4  HEAD/EYES: NC/AT, PERRL, EOMI, anicteric sclerae, no conjunctival pallor  ENT: mucus membranes + dry, oropharynx WNL, trachea midline, no JVD, neck is supple  RESP: lungs CTA with equal breath sounds bilaterally, chest wall nontender and atraumatic  CV: tachycardia; distal pulses intact and symmetric bilaterally  ABDOMEN: normoactive bowel sounds, soft, nondistended, nontender  MSK: extremities atraumatic and nontender, no edema, no asymmetry  SKIN: warm, dry, no rash, no bruising, no cyanosis. color appropriate for ethnicity  NEURO: alert, mentating appropriately, no facial asymmetry. gross sensation, motor, coordination are intact  PSYCH: Affect appropriate PGY3/MD Guero.   VITALS: reviewed  GEN: No apparent distress, A & O x 4  HEAD/EYES: NC/AT, PERRL, EOMI, anicteric sclerae, no conjunctival pallor  ENT: mucus membranes + dry, oropharynx WNL, trachea midline, no JVD, neck is supple  RESP: lungs CTA with equal breath sounds bilaterally, chest wall nontender and atraumatic  CV: tachycardia; distal pulses intact and symmetric bilaterally  ABDOMEN: normoactive bowel sounds, soft, nondistended, nontender  MSK: extremities atraumatic and nontender, no edema, no asymmetry  SKIN: warm, dry, no rash, no bruising, no cyanosis. color appropriate for ethnicity, poor skin turgor  NEURO: alert, mentating appropriately, no facial asymmetry. gross sensation, motor, coordination are intact  PSYCH: Affect appropriate

## 2020-07-25 NOTE — ED ADULT TRIAGE NOTE - CHIEF COMPLAINT QUOTE
Pt AOX4 c/o vomiting, weakness, and dehydration x 24 hours; pt has Hx of gastroparesis; Hx of DM type 1 insulin dependent; fs 381 in triage; pt tachycardic, denies chest pain, denies SOB

## 2020-07-25 NOTE — H&P ADULT - NSHPPHYSICALEXAM_GEN_ALL_CORE
Vital Signs Last 24 Hrs  T(C): 36.7 (25 Jul 2020 23:27), Max: 36.9 (25 Jul 2020 13:33)  T(F): 98.1 (25 Jul 2020 23:27), Max: 98.4 (25 Jul 2020 13:33)  HR: 84 (25 Jul 2020 23:27) (84 - 138)  BP: 121/68 (25 Jul 2020 23:27) (103/65 - 121/68)  BP(mean): --  RR: 18 (25 Jul 2020 23:27) (16 - 18)  SpO2: 100% (25 Jul 2020 23:27) (99% - 100%)    PHYSICAL EXAM:  GENERAL: No Acute Distress  EYES: conjunctiva and sclera clear  ENMT: Moist mucous membranes   NECK: Supple  PULMONARY: Clear to auscultation bilaterally  CARDIAC: Regular rate and rhythm; No murmurs, rubs, or gallops  GASTROINTESTINAL: Abdomen soft, Nontender, Nondistended; Bowel sounds normal  EXTREMITIES:   No clubbing, cyanosis, or pedal edema  PSYCH: Normal Affect, Normal Behavior  NEUROLOGY:   - Mental status A&O x 3,  SKIN: No rashes or lesions  MUSCULOSKELETAL: No joint swelling

## 2020-07-25 NOTE — H&P ADULT - HISTORY OF PRESENT ILLNESS
30 y/o M with DM type 1 with gastroparesis, H.pylori s/p triple therapy p/w nausea and elevated blood sugars.  Pt reports nausea and vomiting starting last night.  Symptoms have been typical of his gastroparesis.  He reports no abdominal pain.  No fever or chills.  No cough.  Pt reports taking last night's dose of Lantus, and this AM dose of Humalog.  He did not take lunch time dose of Humalog 2/2 poor PO intake.  He tested FS today, and had readings in high 200's.  Pt states he took Reglan last night which he states is new for him, but feels it made his symptoms worse.    In the ED, pt was noted to have BG of 381 and AG of 24.  He was given Zofran, 2L NS, and 6u regular insulin IV.

## 2020-07-25 NOTE — ED PROVIDER NOTE - ATTENDING CONTRIBUTION TO CARE
I performed a history and physical exam of the patient and discussed their management with the resident.  I reviewed the resident's note and agree with the documented findings and plan of care except as noted below. My medical decision making and observations are as follows:    29M pmh gastroparesis, DM type I (lantus 15 u at bedtime, 4-6 u regular insulin for every meal), p/w nausea/vomiting since last night. pt has vomited since 11p, no fever, chills but noticed some dry cough. No diarrhea. noticed elevated blood sugar this afternoon to 270, took 6 units insulin this morning but missed noon dose due to lack of the meal. Had h/o DKA in the past.  Pt A&O x 3, tachycardic, lungs cta, abd soft ntnd, poor skin turgor - patient is clinically dehydrated.  Concern for DKA, possible gastroparesis - will check DKA labs give fluids, zofran, home dose insulin and reassess need for insulin drip or further therapy.

## 2020-07-25 NOTE — H&P ADULT - ASSESSMENT
28 y/o M with DM type 1 with gastroparesis, H. pylori s/p triple therapy p/w nausea and elevated blood sugars.  Pt reports nausea and vomiting starting last night.  Symptoms have been typical of his gastroparesis.  He reports no abdominal pain.  No fever or chills.  No cough.  Pt reports taking last night's dose of Lantus, and this AM dose of Humalog.  He did not take lunch time dose of Humalog 2/2 poor PO intake.  He tested FS today, and had readings in high 200's.  Pt states he took Reglan last night which he states is new for him, but feels it made his symptoms worse.    In the ED, pt was noted to have BG of 381 and AG of 24.  He was given Zofran, 2L NS, and 6u regular insulin IV.

## 2020-07-25 NOTE — H&P ADULT - NSHPLABSRESULTS_GEN_ALL_CORE
139  |  103  |  18  ----------------------------<  215<H>  4.7   |  21<L>  |  0.83    Ca    10.0      2020 18:30  Phos  3.1       Mg     1.8         TPro  7.9  /  Alb  4.7  /  TBili  1.4<H>  /  DBili  x   /  AST  17  /  ALT  16  /  AlkPhos  45                              14.7   14.46 )-----------( 337      ( 2020 15:10 )             42.9             Urinalysis Basic - ( 2020 18:10 )    Color: LIGHT YELLOW / Appearance: CLEAR / S.033 / pH: 5.5  Gluc: >1000 / Ketone: LARGE  / Bili: NEGATIVE / Urobili: NORMAL   Blood: NEGATIVE / Protein: 10 / Nitrite: NEGATIVE   Leuk Esterase: NEGATIVE / RBC: x / WBC x   Sq Epi: x / Non Sq Epi: x / Bacteria: x

## 2020-07-26 DIAGNOSIS — E78.5 HYPERLIPIDEMIA, UNSPECIFIED: ICD-10-CM

## 2020-07-26 DIAGNOSIS — I10 ESSENTIAL (PRIMARY) HYPERTENSION: ICD-10-CM

## 2020-07-26 PROBLEM — E11.43 TYPE 2 DIABETES MELLITUS WITH DIABETIC AUTONOMIC (POLY)NEUROPATHY: Chronic | Status: ACTIVE | Noted: 2020-06-20

## 2020-07-26 LAB
ANION GAP SERPL CALC-SCNC: 10 MMO/L — SIGNIFICANT CHANGE UP (ref 7–14)
ANION GAP SERPL CALC-SCNC: 12 MMO/L — SIGNIFICANT CHANGE UP (ref 7–14)
BUN SERPL-MCNC: 15 MG/DL — SIGNIFICANT CHANGE UP (ref 7–23)
BUN SERPL-MCNC: 16 MG/DL — SIGNIFICANT CHANGE UP (ref 7–23)
CALCIUM SERPL-MCNC: 9.1 MG/DL — SIGNIFICANT CHANGE UP (ref 8.4–10.5)
CALCIUM SERPL-MCNC: 9.4 MG/DL — SIGNIFICANT CHANGE UP (ref 8.4–10.5)
CHLORIDE SERPL-SCNC: 102 MMOL/L — SIGNIFICANT CHANGE UP (ref 98–107)
CHLORIDE SERPL-SCNC: 104 MMOL/L — SIGNIFICANT CHANGE UP (ref 98–107)
CO2 SERPL-SCNC: 23 MMOL/L — SIGNIFICANT CHANGE UP (ref 22–31)
CO2 SERPL-SCNC: 23 MMOL/L — SIGNIFICANT CHANGE UP (ref 22–31)
CREAT SERPL-MCNC: 0.76 MG/DL — SIGNIFICANT CHANGE UP (ref 0.5–1.3)
CREAT SERPL-MCNC: 0.77 MG/DL — SIGNIFICANT CHANGE UP (ref 0.5–1.3)
CULTURE RESULTS: SIGNIFICANT CHANGE UP
GLUCOSE BLDC GLUCOMTR-MCNC: 108 MG/DL — HIGH (ref 70–99)
GLUCOSE BLDC GLUCOMTR-MCNC: 112 MG/DL — HIGH (ref 70–99)
GLUCOSE BLDC GLUCOMTR-MCNC: 149 MG/DL — HIGH (ref 70–99)
GLUCOSE BLDC GLUCOMTR-MCNC: 153 MG/DL — HIGH (ref 70–99)
GLUCOSE SERPL-MCNC: 136 MG/DL — HIGH (ref 70–99)
GLUCOSE SERPL-MCNC: 192 MG/DL — HIGH (ref 70–99)
HCT VFR BLD CALC: 32.3 % — LOW (ref 39–50)
HGB BLD-MCNC: 11.2 G/DL — LOW (ref 13–17)
MAGNESIUM SERPL-MCNC: 1.8 MG/DL — SIGNIFICANT CHANGE UP (ref 1.6–2.6)
MCHC RBC-ENTMCNC: 30.4 PG — SIGNIFICANT CHANGE UP (ref 27–34)
MCHC RBC-ENTMCNC: 34.7 % — SIGNIFICANT CHANGE UP (ref 32–36)
MCV RBC AUTO: 87.5 FL — SIGNIFICANT CHANGE UP (ref 80–100)
NRBC # FLD: 0 K/UL — SIGNIFICANT CHANGE UP (ref 0–0)
PHOSPHATE SERPL-MCNC: 3.4 MG/DL — SIGNIFICANT CHANGE UP (ref 2.5–4.5)
PLATELET # BLD AUTO: 269 K/UL — SIGNIFICANT CHANGE UP (ref 150–400)
PMV BLD: 9.8 FL — SIGNIFICANT CHANGE UP (ref 7–13)
POTASSIUM SERPL-MCNC: 4 MMOL/L — SIGNIFICANT CHANGE UP (ref 3.5–5.3)
POTASSIUM SERPL-MCNC: 4.2 MMOL/L — SIGNIFICANT CHANGE UP (ref 3.5–5.3)
POTASSIUM SERPL-SCNC: 4 MMOL/L — SIGNIFICANT CHANGE UP (ref 3.5–5.3)
POTASSIUM SERPL-SCNC: 4.2 MMOL/L — SIGNIFICANT CHANGE UP (ref 3.5–5.3)
RBC # BLD: 3.69 M/UL — LOW (ref 4.2–5.8)
RBC # FLD: 11.8 % — SIGNIFICANT CHANGE UP (ref 10.3–14.5)
SODIUM SERPL-SCNC: 137 MMOL/L — SIGNIFICANT CHANGE UP (ref 135–145)
SODIUM SERPL-SCNC: 137 MMOL/L — SIGNIFICANT CHANGE UP (ref 135–145)
SPECIMEN SOURCE: SIGNIFICANT CHANGE UP
WBC # BLD: 9.51 K/UL — SIGNIFICANT CHANGE UP (ref 3.8–10.5)
WBC # FLD AUTO: 9.51 K/UL — SIGNIFICANT CHANGE UP (ref 3.8–10.5)

## 2020-07-26 PROCEDURE — 99255 IP/OBS CONSLTJ NEW/EST HI 80: CPT | Mod: GC

## 2020-07-26 PROCEDURE — 99223 1ST HOSP IP/OBS HIGH 75: CPT

## 2020-07-26 RX ORDER — INSULIN GLARGINE 100 [IU]/ML
12 INJECTION, SOLUTION SUBCUTANEOUS AT BEDTIME
Refills: 0 | Status: DISCONTINUED | OUTPATIENT
Start: 2020-07-26 | End: 2020-07-27

## 2020-07-26 RX ORDER — ONDANSETRON 8 MG/1
4 TABLET, FILM COATED ORAL EVERY 6 HOURS
Refills: 0 | Status: DISCONTINUED | OUTPATIENT
Start: 2020-07-26 | End: 2020-07-31

## 2020-07-26 RX ADMIN — ONDANSETRON 4 MILLIGRAM(S): 8 TABLET, FILM COATED ORAL at 12:00

## 2020-07-26 RX ADMIN — Medication 1: at 17:28

## 2020-07-26 RX ADMIN — INSULIN GLARGINE 12 UNIT(S): 100 INJECTION, SOLUTION SUBCUTANEOUS at 21:14

## 2020-07-26 RX ADMIN — SODIUM CHLORIDE 100 MILLILITER(S): 9 INJECTION, SOLUTION INTRAVENOUS at 21:06

## 2020-07-26 RX ADMIN — SODIUM CHLORIDE 100 MILLILITER(S): 9 INJECTION, SOLUTION INTRAVENOUS at 12:00

## 2020-07-26 RX ADMIN — ONDANSETRON 4 MILLIGRAM(S): 8 TABLET, FILM COATED ORAL at 21:06

## 2020-07-26 NOTE — CONSULT NOTE ADULT - ASSESSMENT
28 yo M with DM type 1 with gastroparesis, H.pylori s/p triple therapy presented nausea and elevated blood sugars. Admitted for management of gastroparesis and management of DKA. Endocrine consult requested for management of patient with DM1.         DM1 c/b neuropathy, gastroparesis   DKA resolved   A1c 8.1%, improved from 3/2020 A1c 10.2%, goal A1c <7%  FS goal 100-180  FS premeal and qhs   Recommend decrease Lantus to 12 units qhs to prevent hypoglycemia   recommend Humalog 4 units premeal when patient tolerating diet  Recommend low premeal and qhs correctional scale   Reglan or zofran for nausea and vomiting 2/2 gastroparesis  On discharge patient to continue basal/bolus insulin, dose to be determined  Patient can follow up with [facility]   [address] [place] [address] [phone]      HTN   goal 130/80, at goal   Will continue to monitor     HLD   Consider checking fasting lipid panel   goal <70       Preet Mcghee MD  Endocrine Fellow   Pager  28 yo M with DM type 1 with gastroparesis, H.pylori s/p triple therapy presented nausea and elevated blood sugars. Admitted for management of gastroparesis and management of DKA. Endocrine consult requested for management of patient with DM1.     DM1 c/b neuropathy, gastroparesis   DKA resolved   A1c 8.1%, improved from 3/2020 A1c 10.2%, goal A1c <7%  FS goal 100-180  FS premeal and qhs   Recommend decrease Lantus to 12 units qhs to prevent hypoglycemia   recommend Humalog 4 units premeal when patient tolerating diet  Recommend low premeal and qhs correctional scale   Reglan or zofran for nausea and vomiting 2/2 gastroparesis  nutrition consult for gastroparesis  On discharge patient to continue basal/bolus insulin, dose to be determined  Patient can follow up with [facility]   [address] [place] [address] [phone]      HTN   goal 130/80, at goal   Will continue to monitor     HLD   Consider checking fasting lipid panel   goal <70       Discussed with primary team     Preet Mcghee MD  Endocrine Fellow   Pager

## 2020-07-26 NOTE — CONSULT NOTE ADULT - SUBJECTIVE AND OBJECTIVE BOX
HPI: 30 yo M with DM type 1 with gastroparesis, H.pylori s/p triple therapy presented nausea and elevated blood sugars.  Pt reports nausea and vomiting starting last night.  Symptoms have been typical of his gastroparesis.  He reports no abdominal pain. Does report fever or chills.  No cough.  On 7/24 patient to lantus 15units qhs prior to admission. On 7/25 patient to prebreakfast Humalog, but did not take lunch time dose of Humalog 2/2 poor PO intake.  He tested FS today, and had readings in high 200's.    Endocrine consult requested for management of patient with DM1.   Admission labs: HCO3 17, AG 24, serum glucose 362, BHB 1.9, with ketones in urine. Ph 7.29. AG closed after Humulin 6units.     Endocrine History:  Patient was diagnosed with DM1 in 2015, at which time he had DKA. Patient has had multiple episodes of gastroparesis.   Patient reports he follows with PCP. Last visit was 1 week ago.  Patient reports adherence to Lantus 15units qhs and Humalog 6-8 units premeal.   Patient reports checking four times daily. Does not report hypoglycemic events.   Patient reports neuropathy however no DM retinopathy of nephropathy      PAST MEDICAL & SURGICAL HISTORY:  Gastroparesis due to DM  Diabetes: type 1  No significant past surgical history    FAMILY HISTORY:  FH: diabetes mellitus in grandmother     Social History:  Does not report tobacco or alcohol use     Outpatient Medications:  Lantus 15units qhs and Humalog 6-8 units premeal.    MEDICATIONS  (STANDING):  dextrose 50% Injectable 12.5 Gram(s) IV Push once  dextrose 50% Injectable 25 Gram(s) IV Push once  dextrose 50% Injectable 25 Gram(s) IV Push once  insulin glargine Injectable (LANTUS) 15 Unit(s) SubCutaneous at bedtime  insulin lispro (HumaLOG) corrective regimen sliding scale   SubCutaneous three times a day before meals  insulin lispro (HumaLOG) corrective regimen sliding scale   SubCutaneous at bedtime  insulin lispro Injectable (HumaLOG) 4 Unit(s) SubCutaneous three times a day before meals  lactated ringers. 1000 milliLiter(s) (100 mL/Hr) IV Continuous <Continuous>  pantoprazole    Tablet 40 milliGRAM(s) Oral before breakfast    MEDICATIONS  (PRN):  dextrose 40% Gel 15 Gram(s) Oral once PRN Blood Glucose LESS THAN 70 milliGRAM(s)/deciliter  glucagon  Injectable 1 milliGRAM(s) IntraMuscular once PRN Glucose LESS THAN 70 milligrams/deciliter  ondansetron Injectable 4 milliGRAM(s) IV Push every 6 hours PRN Nausea and/or Vomiting    Allergies  No Known Allergies    Review of Systems:  Constitutional: + fever and chills   Eyes: No blurry vision  Neuro: No tremors  HEENT: No pain  Cardiovascular: No chest pain, palpitations  Respiratory: No SOB, no cough  GI: +nausea, vomiting, no abdominal pain  : No dysuria  Skin: no rash  Endocrine: no polyuria, polydipsia  Hem/lymph: no swelling    ALL OTHER SYSTEMS REVIEWED AND NEGATIVE    PHYSICAL EXAM:  VITALS: T(C): 37 (07-26-20 @ 11:59)  T(F): 98.6 (07-26-20 @ 11:59), Max: 98.9 (07-26-20 @ 10:38)  HR: 88 (07-26-20 @ 11:59) (84 - 131)  BP: 103/76 (07-26-20 @ 11:59) (103/65 - 121/68)  RR:  (16 - 18)  SpO2:  (97% - 100%)  Wt(kg): --  GENERAL: mild acute distress due to vomiting, well-groomed  EYES: No proptosis, anicteric  HEENT:  Atraumatic, Normocephalic, moist mucous membranes  THYROID: Normal size, no palpable nodules, nontender   RESPIRATORY: Clear to auscultation bilaterally; No rales, rhonchi, wheezing  CARDIOVASCULAR: Regular rate and rhythm; No murmurs; no peripheral edema  GI: Soft, nontender, non distended, normal bowel sounds  SKIN: Dry, intact, No rashes or lesions  MUSCULOSKELETAL: Full range of motion, normal strength  NEURO: sensation intact, extraocular movements intact, no tremor  PSYCH: Alert and oriented x 3, reactive affect      POCT Blood Glucose.: 112 mg/dL (07-26-20 @ 12:15)  POCT Blood Glucose.: 108 mg/dL (07-26-20 @ 08:47)  POCT Blood Glucose.: 188 mg/dL (07-25-20 @ 23:32) L15  POCT Blood Glucose.: 182 mg/dL (07-25-20 @ 19:25)  POCT Blood Glucose.: 280 mg/dL (07-25-20 @ 16:30)  POCT Blood Glucose.: 381 mg/dL (07-25-20 @ 13:36)                            11.2   9.51  )-----------( 269      ( 26 Jul 2020 06:10 )             32.3       07-26    137  |  104  |  15  ----------------------------<  136<H>  4.0   |  23  |  0.77    EGFR if : 143  EGFR if non : 124    Ca    9.1      07-26  Mg     1.8     07-26  Phos  3.4     07-26    TPro  7.9  /  Alb  4.7  /  TBili  1.4<H>  /  DBili  x   /  AST  17  /  ALT  16  /  AlkPhos  45  07-25 HPI: 30 yo M with DM type 1 with gastroparesis, H.pylori s/p triple therapy presented nausea and elevated blood sugars x2days.  Symptoms have been typical of his gastroparesis.  He reports no abdominal pain. Does report fever or chills.  No cough and no sick contacts.  On 7/24 patient to lantus 15units qhs prior to admission. On 7/25 patient to prebreakfast Humalog, but did not take lunch time dose of Humalog 2/2 poor PO intake.  BG prior to admission in 200s. Endocrine consult requested for management of patient with DM1.   Admission labs: HCO3 17, AG 24, serum glucose 362, BHB 1.9, with ketones in urine. Ph 7.29. Labs consistent with DKA. AG closed after Humulin 6units.     Endocrine History:  Patient was diagnosed with DM1 in 2015, at which time he had DKA. Patient has had multiple episodes of gastroparesis.   Patient reports he follows with PCP. Last visit was 1 week ago.  Patient reports adherence to Lantus 15units qhs and Humalog 6-8 units premeal.   Patient reports checking four times daily. Does not report hypoglycemic events.   Patient reports neuropathy however no DM retinopathy of nephropathy      PAST MEDICAL & SURGICAL HISTORY:  Gastroparesis due to DM  Diabetes: type 1  No significant past surgical history    FAMILY HISTORY:  FH: diabetes mellitus in grandmother     Social History:  Does not report tobacco or alcohol use     Outpatient Medications:  Lantus 15units qhs and Humalog 6-8 units premeal.    MEDICATIONS  (STANDING):  dextrose 50% Injectable 12.5 Gram(s) IV Push once  dextrose 50% Injectable 25 Gram(s) IV Push once  dextrose 50% Injectable 25 Gram(s) IV Push once  insulin glargine Injectable (LANTUS) 15 Unit(s) SubCutaneous at bedtime  insulin lispro (HumaLOG) corrective regimen sliding scale   SubCutaneous three times a day before meals  insulin lispro (HumaLOG) corrective regimen sliding scale   SubCutaneous at bedtime  insulin lispro Injectable (HumaLOG) 4 Unit(s) SubCutaneous three times a day before meals  lactated ringers. 1000 milliLiter(s) (100 mL/Hr) IV Continuous <Continuous>  pantoprazole    Tablet 40 milliGRAM(s) Oral before breakfast    MEDICATIONS  (PRN):  dextrose 40% Gel 15 Gram(s) Oral once PRN Blood Glucose LESS THAN 70 milliGRAM(s)/deciliter  glucagon  Injectable 1 milliGRAM(s) IntraMuscular once PRN Glucose LESS THAN 70 milligrams/deciliter  ondansetron Injectable 4 milliGRAM(s) IV Push every 6 hours PRN Nausea and/or Vomiting    Allergies  No Known Allergies    Review of Systems:  Constitutional: + fever and chills   Eyes: No blurry vision  Neuro: No tremors  HEENT: No pain  Cardiovascular: No chest pain, palpitations  Respiratory: No SOB, no cough  GI: +nausea, vomiting, no abdominal pain  : No dysuria  Skin: no rash  Endocrine: no polyuria, polydipsia  Hem/lymph: no swelling    ALL OTHER SYSTEMS REVIEWED AND NEGATIVE    PHYSICAL EXAM:  VITALS: T(C): 37 (07-26-20 @ 11:59)  T(F): 98.6 (07-26-20 @ 11:59), Max: 98.9 (07-26-20 @ 10:38)  HR: 88 (07-26-20 @ 11:59) (84 - 131)  BP: 103/76 (07-26-20 @ 11:59) (103/65 - 121/68)  RR:  (16 - 18)  SpO2:  (97% - 100%)  Wt(kg): --  GENERAL: mild acute distress due to vomiting, well-groomed  EYES: No proptosis, anicteric  HEENT:  Atraumatic, Normocephalic, moist mucous membranes  THYROID: Normal size, no palpable nodules, nontender   RESPIRATORY: Clear to auscultation bilaterally; No rales, rhonchi, wheezing  CARDIOVASCULAR: Regular rate and rhythm; No murmurs; no peripheral edema  GI: Soft, nontender, non distended, normal bowel sounds  SKIN: Dry, intact, No rashes or lesions  MUSCULOSKELETAL: Full range of motion, normal strength  NEURO: sensation intact, extraocular movements intact, no tremor  PSYCH: Alert and oriented x 3, reactive affect      POCT Blood Glucose.: 112 mg/dL (07-26-20 @ 12:15)  POCT Blood Glucose.: 108 mg/dL (07-26-20 @ 08:47)  POCT Blood Glucose.: 188 mg/dL (07-25-20 @ 23:32) L15  POCT Blood Glucose.: 182 mg/dL (07-25-20 @ 19:25)  POCT Blood Glucose.: 280 mg/dL (07-25-20 @ 16:30)  POCT Blood Glucose.: 381 mg/dL (07-25-20 @ 13:36)                            11.2   9.51  )-----------( 269      ( 26 Jul 2020 06:10 )             32.3       07-26    137  |  104  |  15  ----------------------------<  136<H>  4.0   |  23  |  0.77    EGFR if : 143  EGFR if non : 124    Ca    9.1      07-26  Mg     1.8     07-26  Phos  3.4     07-26    TPro  7.9  /  Alb  4.7  /  TBili  1.4<H>  /  DBili  x   /  AST  17  /  ALT  16  /  AlkPhos  45  07-25 HPI: 28 yo M with DM type 1 with gastroparesis, H.pylori s/p triple therapy presented nausea and elevated blood sugars x2days.  Symptoms have been typical of his gastroparesis.  He reports no abdominal pain. Does report fever or chills.  No cough and no sick contacts.  On 7/24 patient to lantus 15units qhs prior to admission. On 7/25 patient to prebreakfast Humalog, but did not take lunch time dose of Humalog 2/2 poor PO intake.  BG prior to admission in 200s. Endocrine consult requested for management of patient with DM1.   Admission labs: HCO3 17, AG 24, serum glucose 362, BHB 1.9, with ketones in urine. Ph 7.29. Labs consistent with DKA. AG closed after Humulin 6units.     Endocrine History:  Patient was diagnosed with DM1 in 2015, at which time he had DKA. Patient has had multiple episodes of gastroparesis.   Patient reports he follows with PCP. Last visit was 1 week ago.  Patient reports adherence to Lantus 15units qhs and Humalog 6-8 units premeal.   Patient reports checking four times daily. Does not report hypoglycemic events.   Patient reports neuropathy however no DM retinopathy of nephropathy      PAST MEDICAL & SURGICAL HISTORY:  Gastroparesis due to DM  Diabetes: type 1  No significant past surgical history    FAMILY HISTORY:  FH: diabetes mellitus in grandmother     Social History:  Does not report tobacco or alcohol use     Outpatient Medications:  Lantus 15units qhs and Humalog 6-8 units premeal.    MEDICATIONS  (STANDING):  dextrose 50% Injectable 12.5 Gram(s) IV Push once  dextrose 50% Injectable 25 Gram(s) IV Push once  dextrose 50% Injectable 25 Gram(s) IV Push once  insulin glargine Injectable (LANTUS) 15 Unit(s) SubCutaneous at bedtime  insulin lispro (HumaLOG) corrective regimen sliding scale   SubCutaneous three times a day before meals  insulin lispro (HumaLOG) corrective regimen sliding scale   SubCutaneous at bedtime  insulin lispro Injectable (HumaLOG) 4 Unit(s) SubCutaneous three times a day before meals  lactated ringers. 1000 milliLiter(s) (100 mL/Hr) IV Continuous <Continuous>  pantoprazole    Tablet 40 milliGRAM(s) Oral before breakfast    MEDICATIONS  (PRN):  dextrose 40% Gel 15 Gram(s) Oral once PRN Blood Glucose LESS THAN 70 milliGRAM(s)/deciliter  glucagon  Injectable 1 milliGRAM(s) IntraMuscular once PRN Glucose LESS THAN 70 milligrams/deciliter  ondansetron Injectable 4 milliGRAM(s) IV Push every 6 hours PRN Nausea and/or Vomiting    Allergies  No Known Allergies    Review of Systems:  Constitutional: + fever and chills   Eyes: No blurry vision  Neuro: No tremors  HEENT: No pain  Cardiovascular: No chest pain, palpitations  Respiratory: No SOB, no cough  GI: +nausea, vomiting, no abdominal pain  : No dysuria  Skin: no rash  Endocrine: no polyuria, polydipsia  Hem/lymph: no swelling    ALL OTHER SYSTEMS REVIEWED AND NEGATIVE    PHYSICAL EXAM:  VITALS: T(C): 37 (07-26-20 @ 11:59)  T(F): 98.6 (07-26-20 @ 11:59), Max: 98.9 (07-26-20 @ 10:38)  HR: 88 (07-26-20 @ 11:59) (84 - 131)  BP: 103/76 (07-26-20 @ 11:59) (103/65 - 121/68)  RR:  (16 - 18)  SpO2:  (97% - 100%)  Wt(kg): --  GENERAL: mild acute distress due to vomiting, well-groomed  EYES: No proptosis, anicteric  HEENT:  Atraumatic, Normocephalic, moist mucous membranes  RESPIRATORY: Clear to auscultation bilaterally; No rales, rhonchi, wheezing  CARDIOVASCULAR: Regular rate and rhythm; No murmurs; no peripheral edema  GI: Soft, nontender, non distended, normal bowel sounds  SKIN: Dry, intact, No rashes or lesions on feet b/l  PSYCH: Alert and oriented x 3, reactive affect      POCT Blood Glucose.: 112 mg/dL (07-26-20 @ 12:15)  POCT Blood Glucose.: 108 mg/dL (07-26-20 @ 08:47)  POCT Blood Glucose.: 188 mg/dL (07-25-20 @ 23:32) L15  POCT Blood Glucose.: 182 mg/dL (07-25-20 @ 19:25)  POCT Blood Glucose.: 280 mg/dL (07-25-20 @ 16:30)  POCT Blood Glucose.: 381 mg/dL (07-25-20 @ 13:36)                            11.2   9.51  )-----------( 269      ( 26 Jul 2020 06:10 )             32.3       07-26    137  |  104  |  15  ----------------------------<  136<H>  4.0   |  23  |  0.77    EGFR if : 143  EGFR if non : 124    Ca    9.1      07-26  Mg     1.8     07-26  Phos  3.4     07-26    TPro  7.9  /  Alb  4.7  /  TBili  1.4<H>  /  DBili  x   /  AST  17  /  ALT  16  /  AlkPhos  45  07-25

## 2020-07-26 NOTE — PROGRESS NOTE ADULT - ASSESSMENT
30 y/o M with DM type 1 with gastroparesis, H. pylori s/p triple therapy p/w nausea and elevated blood sugars.  Pt reports nausea and vomiting starting last night.  Symptoms have been typical of his gastroparesis.  He reports no abdominal pain.  No fever or chills.  No cough.  Pt reports taking last night's dose of Lantus, and this AM dose of Humalog.  He did not take lunch time dose of Humalog 2/2 poor PO intake.  He tested FS today, and had readings in high 200's.  Pt states he took Reglan last night which he states is new for him, but feels it made his symptoms worse.    In the ED, pt was noted to have BG of 381 and AG of 24.  He was given Zofran, 2L NS, and 6u regular insulin IV.

## 2020-07-27 LAB
GLUCOSE BLDC GLUCOMTR-MCNC: 102 MG/DL — HIGH (ref 70–99)
GLUCOSE BLDC GLUCOMTR-MCNC: 128 MG/DL — HIGH (ref 70–99)
GLUCOSE BLDC GLUCOMTR-MCNC: 166 MG/DL — HIGH (ref 70–99)
GLUCOSE BLDC GLUCOMTR-MCNC: 189 MG/DL — HIGH (ref 70–99)

## 2020-07-27 PROCEDURE — 99232 SBSQ HOSP IP/OBS MODERATE 35: CPT

## 2020-07-27 RX ORDER — PANTOPRAZOLE SODIUM 20 MG/1
40 TABLET, DELAYED RELEASE ORAL DAILY
Refills: 0 | Status: DISCONTINUED | OUTPATIENT
Start: 2020-07-27 | End: 2020-07-31

## 2020-07-27 RX ORDER — INSULIN GLARGINE 100 [IU]/ML
10 INJECTION, SOLUTION SUBCUTANEOUS AT BEDTIME
Refills: 0 | Status: DISCONTINUED | OUTPATIENT
Start: 2020-07-27 | End: 2020-07-29

## 2020-07-27 RX ADMIN — ONDANSETRON 4 MILLIGRAM(S): 8 TABLET, FILM COATED ORAL at 15:24

## 2020-07-27 RX ADMIN — Medication 1: at 17:01

## 2020-07-27 RX ADMIN — PANTOPRAZOLE SODIUM 40 MILLIGRAM(S): 20 TABLET, DELAYED RELEASE ORAL at 05:43

## 2020-07-27 RX ADMIN — SODIUM CHLORIDE 100 MILLILITER(S): 9 INJECTION, SOLUTION INTRAVENOUS at 17:02

## 2020-07-27 RX ADMIN — Medication 4 UNIT(S): at 07:57

## 2020-07-27 RX ADMIN — ONDANSETRON 4 MILLIGRAM(S): 8 TABLET, FILM COATED ORAL at 05:44

## 2020-07-27 RX ADMIN — SODIUM CHLORIDE 100 MILLILITER(S): 9 INJECTION, SOLUTION INTRAVENOUS at 05:47

## 2020-07-27 RX ADMIN — INSULIN GLARGINE 10 UNIT(S): 100 INJECTION, SOLUTION SUBCUTANEOUS at 21:51

## 2020-07-27 NOTE — DIETITIAN INITIAL EVALUATION ADULT. - PERTINENT LABORATORY DATA
07-26 Na137 mmol/L Glu 136 mg/dL<H> K+ 4.0 mmol/L Cr  0.77 mg/dL BUN 15 mg/dL 07-26 Phos 3.4 mg/dL 07-25 Alb 4.7 g/dL

## 2020-07-27 NOTE — DIETITIAN INITIAL EVALUATION ADULT. - OTHER INFO
30 y/o M with DM type 1 with gastroparesis, H. pylori s/p triple therapy p/w nausea and elevated blood sugars with ketoacidosis and gastroparesis due to DM. Patient reports that he has been compliant with medication and that he has received diabetic diet education in the past and follows it the best he can. Previous HbA1c 10.2 (6/23/20) Current HbA1c 8/1%. Patient with +N/V PTA x1 day and during this admission. Patient c/o abdominal pain and Nausea at time of RD assessment on Zofran. RD observed 0% of patients breakfast consumed this AM. Patient denies any /vomiting/diarrhea/constipation or difficulty chewing and swallowing at this time. Patient denies recent weight changes and endorses UBW of 130lbs. Past weight history per HIE (6/21) 121.2lbs. No weight obtained this admission. Patient appears well nourished. Patient agreeable to Gastroparesis therapeutic diet education; provided both verbal and written education to patient and encouraged low fat, low fiber diet. Will attempt to f/u to reiterate education and answer questions at later time as patient was curled up in the bed c/o pain at time of RD assessment.

## 2020-07-27 NOTE — CONSULT NOTE ADULT - SUBJECTIVE AND OBJECTIVE BOX
Chief Complaint:  Patient is a 29y old  Male who presents with a chief complaint of Nausea, elevated blood sugar (2020 15:40)      HPI: 29M w PMH T1DM w gastroparesis, admitted for management of DKA, p/w N/V for one day. The patient states that two nights ago, he was resting at home when he suddenly became nauseous and vomited around 11pm. The vomit contained only undigested food; no bile, blood, or coffee-ground material. He vomited three more times that night and then once in the morning, after which he decided to come to the ED. At home, he tried taking an antinausea medication (unsure which, possibly metoclopramide which did not help, and if anything made his heart race. ROS + for palpitations and some SOB on admission, now resolved. He also reports a sharp epigastric pain rated 5-7 on the pain scale. Since he has been in the hospital, he says his vomiting has been decreasing in frequency, with the most recent episode this morning. He denies headaches, cough, chest pain, diarrhea, recent travel, sick contacts.  The patient reports he has been hospitalized 4 times for similar symptoms, the first in 2019 and the most recent in May 2020.        Allergies:  No Known Allergies      Home Medications:    Hospital Medications:  dextrose 40% Gel 15 Gram(s) Oral once PRN  dextrose 50% Injectable 12.5 Gram(s) IV Push once  dextrose 50% Injectable 25 Gram(s) IV Push once  dextrose 50% Injectable 25 Gram(s) IV Push once  glucagon  Injectable 1 milliGRAM(s) IntraMuscular once PRN  insulin glargine Injectable (LANTUS) 10 Unit(s) SubCutaneous at bedtime  insulin lispro (HumaLOG) corrective regimen sliding scale   SubCutaneous three times a day before meals  insulin lispro (HumaLOG) corrective regimen sliding scale   SubCutaneous at bedtime  insulin lispro Injectable (HumaLOG) 4 Unit(s) SubCutaneous three times a day before meals  lactated ringers. 1000 milliLiter(s) IV Continuous <Continuous>  ondansetron Injectable 4 milliGRAM(s) IV Push every 6 hours PRN  pantoprazole  Injectable 40 milliGRAM(s) IV Push daily      PMHX/PSHX:  Gastroparesis due to DM  Diabetes  No significant past surgical history      Family history:  FH: diabetes mellitus      Social History: Works as a . Lives at home with his parents. Denies smoking, alcohol, drug use.    ROS:   General:  No fevers, chills or night sweats.  ENT:  No sore throat or dysphagia  CV:  No pain or palpitations  Resp:  No dyspnea, cough, wheezing  GI: +epigastric pain,+nausea, +vomiting, No diarrhea, No weight loss, No pruritis, No rectal bleeding, No tarry stools  Skin:  No rash or edema    NECK: Supple, No JVD  CHEST/LUNG: Clear to auscultation bilaterally; No wheezing.  HEART: Regular rate and rhythm; No murmurs, rubs, or gallops  ABDOMEN: Soft, Nondistended, +TTP over epigastric region; Bowel sounds present  EXTREMITIES:   No edema  NEUROLOGY: AAOx3, CN II-XII grossly intact      Vital Signs:  Vital Signs Last 24 Hrs  T(C): 37.1 (2020 13:38), Max: 37.2 (2020 17:26)  T(F): 98.7 (2020 13:38), Max: 99 (2020 17:26)  HR: 78 (2020 13:38) (78 - 95)  BP: 106/75 (2020 13:38) (106/75 - 130/82)  BP(mean): --  RR: 18 (2020 13:38) (18 - 18)  SpO2: 100% (2020 13:38) (100% - 100%)  Daily     Daily Weight in k.9 (2020 14:01)    LABS:                        11.2   9.51  )-----------( 269      ( 2020 06:10 )             32.3       07-26    137  |  104  |  15  ----------------------------<  136<H>  4.0   |  23  |  0.77    Ca    9.1      2020 06:10  Phos  3.4     07-26  Mg     1.8     07-26          Urinalysis Basic - ( 2020 18:10 )    Color: LIGHT YELLOW / Appearance: CLEAR / S.033 / pH: 5.5  Gluc: >1000 / Ketone: LARGE  / Bili: NEGATIVE / Urobili: NORMAL   Blood: NEGATIVE / Protein: 10 / Nitrite: NEGATIVE   Leuk Esterase: NEGATIVE / RBC: x / WBC x   Sq Epi: x / Non Sq Epi: x / Bacteria: x                              11.2   9.51  )-----------( 269      ( 2020 06:10 )             32.3                         14.7   14.46 )-----------( 337      ( 2020 15:10 )             42.9     Imaging: ***INCOMPLETE NOTE***  Shahriar Paz MD PGY1  Internal Medicine  Pager [phone] / [phone]    Chief Complaint:  Patient is a 29y old  Male who presents with a chief complaint of Nausea, elevated blood sugar (2020 15:40)      HPI: 29M w PMH T1DM w gastroparesis, admitted for management of DKA, p/w N/V for one day. The patient states that two nights ago, he was resting at home when he suddenly became nauseous and vomited around 11pm. The vomit contained only undigested food; no bile, blood, or coffee-ground material. He vomited three more times that night and then once in the morning, after which he decided to come to the ED. At home, he tried taking an antinausea medication (unsure which, possibly metoclopramide which did not help, and if anything made his heart race. ROS + for palpitations and some SOB on admission, now resolved. He also reports a sharp epigastric pain rated 5-7 on the pain scale. Since he has been in the hospital, he says his vomiting has been decreasing in frequency, with the most recent episode this morning. He denies headaches, cough, chest pain, diarrhea, recent travel, sick contacts.  The patient reports he has been hospitalized 4 times for similar symptoms, the first in 2019 and the most recent in May 2020.        Allergies:  No Known Allergies      Home Medications:    Hospital Medications:  dextrose 40% Gel 15 Gram(s) Oral once PRN  dextrose 50% Injectable 12.5 Gram(s) IV Push once  dextrose 50% Injectable 25 Gram(s) IV Push once  dextrose 50% Injectable 25 Gram(s) IV Push once  glucagon  Injectable 1 milliGRAM(s) IntraMuscular once PRN  insulin glargine Injectable (LANTUS) 10 Unit(s) SubCutaneous at bedtime  insulin lispro (HumaLOG) corrective regimen sliding scale   SubCutaneous three times a day before meals  insulin lispro (HumaLOG) corrective regimen sliding scale   SubCutaneous at bedtime  insulin lispro Injectable (HumaLOG) 4 Unit(s) SubCutaneous three times a day before meals  lactated ringers. 1000 milliLiter(s) IV Continuous <Continuous>  ondansetron Injectable 4 milliGRAM(s) IV Push every 6 hours PRN  pantoprazole  Injectable 40 milliGRAM(s) IV Push daily      PMHX/PSHX:  Gastroparesis due to DM  Diabetes  No significant past surgical history      Family history:  FH: diabetes mellitus      Social History: Works as a . Lives at home with his parents. Denies smoking, alcohol, drug use.    ROS:   General:  No fevers, chills or night sweats.  ENT:  No sore throat or dysphagia  CV:  No pain or palpitations  Resp:  No dyspnea, cough, wheezing  GI: +epigastric pain,+nausea, +vomiting, No diarrhea, No weight loss, No pruritis, No rectal bleeding, No tarry stools  Skin:  No rash or edema    NECK: Supple, No JVD  CHEST/LUNG: Clear to auscultation bilaterally; No wheezing.  HEART: Regular rate and rhythm; No murmurs, rubs, or gallops  ABDOMEN: Soft, Nondistended, +TTP over epigastric region; Bowel sounds present  EXTREMITIES:   No edema  NEUROLOGY: AAOx3, CN II-XII grossly intact      Vital Signs:  Vital Signs Last 24 Hrs  T(C): 37.1 (2020 13:38), Max: 37.2 (2020 17:26)  T(F): 98.7 (2020 13:38), Max: 99 (2020 17:26)  HR: 78 (2020 13:38) (78 - 95)  BP: 106/75 (2020 13:38) (106/75 - 130/82)  BP(mean): --  RR: 18 (2020 13:38) (18 - 18)  SpO2: 100% (2020 13:38) (100% - 100%)  Daily     Daily Weight in k.9 (2020 14:01)    LABS:                        11.2   9.51  )-----------( 269      ( 2020 06:10 )             32.3       07-26    137  |  104  |  15  ----------------------------<  136<H>  4.0   |  23  |  0.77    Ca    9.1      2020 06:10  Phos  3.4     07-26  Mg     1.8     07-26          Urinalysis Basic - ( 2020 18:10 )    Color: LIGHT YELLOW / Appearance: CLEAR / S.033 / pH: 5.5  Gluc: >1000 / Ketone: LARGE  / Bili: NEGATIVE / Urobili: NORMAL   Blood: NEGATIVE / Protein: 10 / Nitrite: NEGATIVE   Leuk Esterase: NEGATIVE / RBC: x / WBC x   Sq Epi: x / Non Sq Epi: x / Bacteria: x                              11.2   9.51  )-----------( 269      ( 2020 06:10 )             32.3                         14.7   14.46 )-----------( 337      ( 2020 15:10 )             42.9     Imaging: Shahriar Paz MD PGY1  Internal Medicine  Pager [phone] / [phone]    Chief Complaint:  Patient is a 29y old  Male who presents with a chief complaint of Nausea, elevated blood sugar (2020 15:40)      HPI: 29M w PMH T1DM w gastroparesis, admitted for management of DKA, p/w N/V for one day. The patient states that two nights ago, he was resting at home when he suddenly became nauseous and vomited around 11pm. The vomit contained only undigested food; no bile, blood, or coffee-ground material. He vomited three more times that night and then once in the morning, after which he decided to come to the ED. At home, he tried taking an antinausea medication (unsure which, possibly metoclopramide which did not help, and if anything made his heart race. ROS + for palpitations and some SOB on admission, now resolved. He also reports a sharp epigastric pain rated 5-7 on the pain scale. Since he has been in the hospital, he says his vomiting has been decreasing in frequency, with the most recent episode this morning. He denies headaches, cough, chest pain, diarrhea, recent travel, sick contacts.  The patient reports he has been hospitalized 4 times for similar symptoms, the first in 2019 and the most recent in May 2020.    Allergies:  No Known Allergies      Home Medications:    Hospital Medications:  dextrose 40% Gel 15 Gram(s) Oral once PRN  dextrose 50% Injectable 12.5 Gram(s) IV Push once  dextrose 50% Injectable 25 Gram(s) IV Push once  dextrose 50% Injectable 25 Gram(s) IV Push once  glucagon  Injectable 1 milliGRAM(s) IntraMuscular once PRN  insulin glargine Injectable (LANTUS) 10 Unit(s) SubCutaneous at bedtime  insulin lispro (HumaLOG) corrective regimen sliding scale   SubCutaneous three times a day before meals  insulin lispro (HumaLOG) corrective regimen sliding scale   SubCutaneous at bedtime  insulin lispro Injectable (HumaLOG) 4 Unit(s) SubCutaneous three times a day before meals  lactated ringers. 1000 milliLiter(s) IV Continuous <Continuous>  ondansetron Injectable 4 milliGRAM(s) IV Push every 6 hours PRN  pantoprazole  Injectable 40 milliGRAM(s) IV Push daily      PMHX/PSHX:  Gastroparesis due to DM  Diabetes  No significant past surgical history      Family history:  FH: diabetes mellitus      Social History: Works as a . Lives at home with his parents. Denies smoking, alcohol, drug use.    ROS:   General:  No fevers, chills or night sweats.  ENT:  No sore throat or dysphagia  CV:  No pain or palpitations  Resp:  No dyspnea, cough, wheezing  GI: +epigastric pain,+nausea, +vomiting, No diarrhea, No weight loss, No pruritis, No rectal bleeding, No tarry stools  Skin:  No rash or edema    NECK: Supple, No JVD  CHEST/LUNG: Clear to auscultation bilaterally; No wheezing.  HEART: Regular rate and rhythm; No murmurs, rubs, or gallops  ABDOMEN: Soft, Nondistended, +TTP over epigastric region; Bowel sounds present  EXTREMITIES:   No edema  NEUROLOGY: AAOx3, CN II-XII grossly intact      Vital Signs:  Vital Signs Last 24 Hrs  T(C): 37.1 (2020 13:38), Max: 37.2 (2020 17:26)  T(F): 98.7 (2020 13:38), Max: 99 (2020 17:26)  HR: 78 (2020 13:38) (78 - 95)  BP: 106/75 (2020 13:38) (106/75 - 130/82)  BP(mean): --  RR: 18 (2020 13:38) (18 - 18)  SpO2: 100% (2020 13:38) (100% - 100%)  Daily     Daily Weight in k.9 (2020 14:01)    LABS:                        11.2   9.51  )-----------( 269      ( 2020 06:10 )             32.3       07-26    137  |  104  |  15  ----------------------------<  136<H>  4.0   |  23  |  0.77    Ca    9.1      2020 06:10  Phos  3.4     07-26  Mg     1.8     07-26          Urinalysis Basic - ( 2020 18:10 )    Color: LIGHT YELLOW / Appearance: CLEAR / S.033 / pH: 5.5  Gluc: >1000 / Ketone: LARGE  / Bili: NEGATIVE / Urobili: NORMAL   Blood: NEGATIVE / Protein: 10 / Nitrite: NEGATIVE   Leuk Esterase: NEGATIVE / RBC: x / WBC x   Sq Epi: x / Non Sq Epi: x / Bacteria: x                              11.2   9.51  )-----------( 269      ( 2020 06:10 )             32.3                         14.7   14.46 )-----------( 337      ( 2020 15:10 )             42.9     Imaging:

## 2020-07-27 NOTE — PROVIDER CONTACT NOTE (MEDICATION) - RECOMMENDATIONS
ACP Vero made aware patient unable to tolerate PO and RN recommends changing PO Protonix to IV Protonix.

## 2020-07-27 NOTE — CONSULT NOTE ADULT - ASSESSMENT
Impression:   29M w PMH T1DM and presumed gastroparesis p/w nausea & vomiting for one day in the setting of DKA. N/V may be 2/2 gastroparesis vs DKA. DKA resolving, Pt reports symptomatic improvement on Zofran.    Recommendations  -C/w Zofran 4mg q6 PRN, monitor QTc  -strict glycemic control as outpatient  -advance diet as tolerated; recommend small frequent meals, low fat, soluble fiber diet   - should f/u at GI clinic ([facility]- [phone]) for gastric emptying study Impression:   29M w PMH T1DM and presumed gastroparesis p/w nausea & vomiting for one day in the setting of DKA. N/V may be 2/2 gastroparesis vs DKA. DKA resolving, Pt reports symptomatic improvement on Zofran.    Recommendations  -C/w Zofran 4mg q6 PRN, monitor QTc  -strict glycemic control as outpatient  -advance diet as tolerated; recommend small frequent meals, low fat, soluble fiber diet   - should f/u at GI clinic ([facility]- [phone]) for gastric emptying study    GI: [id number]

## 2020-07-27 NOTE — DIETITIAN INITIAL EVALUATION ADULT. - PERTINENT MEDS FT
MEDICATIONS  (STANDING):  dextrose 50% Injectable 12.5 Gram(s) IV Push once  dextrose 50% Injectable 25 Gram(s) IV Push once  dextrose 50% Injectable 25 Gram(s) IV Push once  insulin glargine Injectable (LANTUS) 12 Unit(s) SubCutaneous at bedtime  insulin lispro (HumaLOG) corrective regimen sliding scale   SubCutaneous three times a day before meals  insulin lispro (HumaLOG) corrective regimen sliding scale   SubCutaneous at bedtime  insulin lispro Injectable (HumaLOG) 4 Unit(s) SubCutaneous three times a day before meals  lactated ringers. 1000 milliLiter(s) (100 mL/Hr) IV Continuous <Continuous>  pantoprazole  Injectable 40 milliGRAM(s) IV Push daily    MEDICATIONS  (PRN):  dextrose 40% Gel 15 Gram(s) Oral once PRN Blood Glucose LESS THAN 70 milliGRAM(s)/deciliter  glucagon  Injectable 1 milliGRAM(s) IntraMuscular once PRN Glucose LESS THAN 70 milligrams/deciliter  ondansetron Injectable 4 milliGRAM(s) IV Push every 6 hours PRN Nausea and/or Vomiting

## 2020-07-27 NOTE — PROVIDER CONTACT NOTE (MEDICATION) - ASSESSMENT
Pt unable to tolerate PO medications/fluids. Pt had dark green emesis at 230am. Pt at this time complaining of nausea as well and requesting Zofran.

## 2020-07-27 NOTE — PROVIDER CONTACT NOTE (MEDICATION) - SITUATION
Pt gets PO 40mg Protonix as 6am medication. Pt verbalized he is unable to tolerate PO Protonix as he cannot even tolerate drinking water at this time.

## 2020-07-27 NOTE — PROGRESS NOTE ADULT - ASSESSMENT
30 yo M with DM type 1 with gastroparesis, H.pylori s/p triple therapy presented nausea and elevated blood sugars. Admitted for management of gastroparesis and management of DKA. Endocrine consult requested for management of patient with DM1.     1. DM1 c/b neuropathy, gastroparesis   DKA resolved   A1c 8.1%, goal A1c <7%, improved from 3/2020 A1c 10.2%  FS goal 100-180  Noted with gradually decreasing glucose values today in setting of low PO intake, will decrease basal dosing slightly to prevent hypoglycemia. Also, please continue to hold Humalog doses if patient will not be eating  Decrease Lantus to 10 units SQ qHS  Continue Humalog 4 units SQ TID before meals (Hold if NPO/not eating meal)   Continue Humalog low dose correctional scale SQ TID before meals and Humalog low dose bedtime correctional scale SQ qHS   Reglan or zofran for nausea and vomiting 2/2 gastroparesis  Consistent carbohydrate diet, RD consult done, counseled for gastroparesis    Discharge Plan:  On discharge patient to continue basal/bolus insulin, dose to be determined  Ensure patient has supplies - glucometer, glucose test strips, lancets, alcohol swabs, insulin pen needles  Patient can follow up with [facility] (has previously followed here) vs. [facility]- will need to speak to patient regarding preference, has his insurance coverage has changed (now managed Medicaid)      2. HTN   Goal less than 130/80, at goal   Will continue to monitor     3. HLD   Would recheck lipid profile, last checked Dec 2019, LDL 74    Reena Franco  Nurse Practitioner  Division of Endocrinology & Diabetes  In house pager #[phone]/long range pager #[phone]    If before 9AM or after 6PM, or on weekends/holidays, please call endocrine answering service for assistance ([phone]).  For nonurgent matters email [email or web address] for assistance.

## 2020-07-28 LAB
GLUCOSE BLDC GLUCOMTR-MCNC: 125 MG/DL — HIGH (ref 70–99)
GLUCOSE BLDC GLUCOMTR-MCNC: 144 MG/DL — HIGH (ref 70–99)
GLUCOSE BLDC GLUCOMTR-MCNC: 231 MG/DL — HIGH (ref 70–99)
GLUCOSE BLDC GLUCOMTR-MCNC: 249 MG/DL — HIGH (ref 70–99)

## 2020-07-28 PROCEDURE — 99232 SBSQ HOSP IP/OBS MODERATE 35: CPT

## 2020-07-28 PROCEDURE — 99222 1ST HOSP IP/OBS MODERATE 55: CPT | Mod: GC

## 2020-07-28 RX ORDER — CALCIUM CARBONATE 500(1250)
1 TABLET ORAL ONCE
Refills: 0 | Status: COMPLETED | OUTPATIENT
Start: 2020-07-28 | End: 2020-07-28

## 2020-07-28 RX ORDER — DEXTROSE 50 % IN WATER 50 %
15 SYRINGE (ML) INTRAVENOUS ONCE
Refills: 0 | Status: DISCONTINUED | OUTPATIENT
Start: 2020-07-28 | End: 2020-07-28

## 2020-07-28 RX ADMIN — SODIUM CHLORIDE 100 MILLILITER(S): 9 INJECTION, SOLUTION INTRAVENOUS at 02:32

## 2020-07-28 RX ADMIN — SODIUM CHLORIDE 100 MILLILITER(S): 9 INJECTION, SOLUTION INTRAVENOUS at 11:51

## 2020-07-28 RX ADMIN — ONDANSETRON 4 MILLIGRAM(S): 8 TABLET, FILM COATED ORAL at 11:50

## 2020-07-28 RX ADMIN — Medication 1 TABLET(S): at 02:30

## 2020-07-28 RX ADMIN — Medication 4 UNIT(S): at 16:53

## 2020-07-28 RX ADMIN — PANTOPRAZOLE SODIUM 40 MILLIGRAM(S): 20 TABLET, DELAYED RELEASE ORAL at 07:30

## 2020-07-28 RX ADMIN — Medication 2: at 16:53

## 2020-07-28 RX ADMIN — Medication 4 UNIT(S): at 07:30

## 2020-07-28 RX ADMIN — Medication 30 MILLILITER(S): at 05:49

## 2020-07-28 RX ADMIN — SODIUM CHLORIDE 100 MILLILITER(S): 9 INJECTION, SOLUTION INTRAVENOUS at 21:53

## 2020-07-28 RX ADMIN — ONDANSETRON 4 MILLIGRAM(S): 8 TABLET, FILM COATED ORAL at 21:53

## 2020-07-28 RX ADMIN — Medication 2: at 07:30

## 2020-07-28 RX ADMIN — INSULIN GLARGINE 10 UNIT(S): 100 INJECTION, SOLUTION SUBCUTANEOUS at 21:54

## 2020-07-28 RX ADMIN — ONDANSETRON 4 MILLIGRAM(S): 8 TABLET, FILM COATED ORAL at 00:15

## 2020-07-28 NOTE — CONSULT NOTE ADULT - ASSESSMENT
Impression:   29M w PMH T1DM and presumed gastroparesis p/w nausea & vomiting for one day in the setting of DKA. N/V may be 2/2 gastroparesis vs DKA. DKA resolving, Pt reports symptomatic improvement on Zofran.    Recommendations  -C/w Zofran 4mg q6 PRN, monitor QTc  -strict glycemic control as outpatient  -advance diet as tolerated; recommend small frequent meals, low fat, soluble fiber diet   - should f/u at GI clinic ([facility]- [phone]) for gastric emptying study    GI: [id number]

## 2020-07-28 NOTE — CONSULT NOTE ADULT - ATTENDING COMMENTS
Patient seen and examined with the GI fellow. I agree with the above assessment and plan. Thank you for allowing us to care for your patient.    N/V in setting of DKA. Manage conservatively with IVF and anti-emetics.
Patient seen and examined with the GI fellow. I agree with the above assessment and plan. Thank you for allowing us to care for your patient.    Plan per above for nausea and vomiting.
Patient seen and examined. Agree with note as above with addendum: patient with gastroparesis without outpatient GI or pharmacotherapy. He has managed medicaid so he can f/u at our practice at [address] [place] [address], not the clinic. He would also benefit from a RD consult as he has never been educated on the gastroparesis diet.  Jeanne Lynn MD  Pager: [phone], between 9am-6pm  Nights and Weekends: [phone]

## 2020-07-28 NOTE — CONSULT NOTE ADULT - SUBJECTIVE AND OBJECTIVE BOX
***INCOMPLETE NOTE***      Chief Complaint:  Patient is a 29y old  Male who presents with a chief complaint of Nausea, elevated blood sugar (2020 16:34)      SUBJECTIVE: Pt reports last episode of vomiting yesterday morning. However, nausea persists and he has not been able to eat anything due to fear of vomiting; he says he only had some water and ginger ale yesterday. He also reports no BMs since admission 3 days ago. He no longer reports epigastric pain.      Allergies:  No Known Allergies      Home Medications:    Hospital Medications:  dextrose 40% Gel 15 Gram(s) Oral once PRN  dextrose 50% Injectable 12.5 Gram(s) IV Push once  dextrose 50% Injectable 25 Gram(s) IV Push once  dextrose 50% Injectable 25 Gram(s) IV Push once  glucagon  Injectable 1 milliGRAM(s) IntraMuscular once PRN  insulin glargine Injectable (LANTUS) 10 Unit(s) SubCutaneous at bedtime  insulin lispro (HumaLOG) corrective regimen sliding scale   SubCutaneous three times a day before meals  insulin lispro (HumaLOG) corrective regimen sliding scale   SubCutaneous at bedtime  insulin lispro Injectable (HumaLOG) 4 Unit(s) SubCutaneous three times a day before meals  lactated ringers. 1000 milliLiter(s) IV Continuous <Continuous>  ondansetron Injectable 4 milliGRAM(s) IV Push every 6 hours PRN  pantoprazole  Injectable 40 milliGRAM(s) IV Push daily      PMHX/PSHX:  Gastroparesis due to DM  Diabetes  No significant past surgical history      Family history:  FH: diabetes mellitus      Social History:     ROS:   General:  No fevers, chills or night sweats.  ENT:  No sore throat or dysphagia  CV:  No pain or palpitations  Resp:  No dyspnea, cough, wheezing  GI:  No pain, No nausea, No vomiting, No diarrhea, No constipation, No weight loss, No pruritis, No rectal bleeding, No tarry stools  Skin:  No rash or edema      PHYSICAL EXAM:   GENERAL:  NAD, Appears stated age  HEENT:  NC/AT,  conjunctivae clear and pink, sclera -anicteric  CHEST:  CTA B/L, Normal effort  HEART:  RRR S1/S2, No murmurs  ABDOMEN:  Soft, non-distended, tender to deep palpation in epigastric region, normoactive bowel sounds,  no masses ,no hepato-splenomegaly, no signs of chronic liver disease  EXTEREMITIES:  No cyanosis or Edema  SKIN:  Warm & Dry. No rash or erythema  NEURO:  Alert, oriented    Vital Signs:  Vital Signs Last 24 Hrs  T(C): 36.8 (2020 02:25), Max: 37.1 (2020 13:38)  T(F): 98.3 (2020 02:25), Max: 98.7 (2020 13:38)  HR: 76 (2020 02:25) (76 - 78)  BP: 119/76 (2020 02:25) (106/75 - 119/76)  BP(mean): --  RR: 18 (2020 02:25) (18 - 18)  SpO2: 100% (2020 02:25) (100% - 100%)  Daily Height in cm: 185.42 (2020 17:04)    Daily Weight in k.9 (2020 14:01)    LABS:                                          11.2   9.51  )-----------( 269      ( 2020 06:10 )             32.3                         14.7   14.46 )-----------( 337      ( 2020 15:10 )             42.9     Imaging: Chief Complaint:  Patient is a 29y old  Male who presents with a chief complaint of Nausea, elevated blood sugar (2020 16:34)      SUBJECTIVE: Pt reports last episode of vomiting yesterday morning. However, nausea persists and he has not been able to eat anything due to fear of vomiting; he says he only had some water and ginger ale yesterday. He also reports no BMs since admission 3 days ago. He no longer reports epigastric pain.      Allergies:  No Known Allergies      Home Medications:    Hospital Medications:  dextrose 40% Gel 15 Gram(s) Oral once PRN  dextrose 50% Injectable 12.5 Gram(s) IV Push once  dextrose 50% Injectable 25 Gram(s) IV Push once  dextrose 50% Injectable 25 Gram(s) IV Push once  glucagon  Injectable 1 milliGRAM(s) IntraMuscular once PRN  insulin glargine Injectable (LANTUS) 10 Unit(s) SubCutaneous at bedtime  insulin lispro (HumaLOG) corrective regimen sliding scale   SubCutaneous three times a day before meals  insulin lispro (HumaLOG) corrective regimen sliding scale   SubCutaneous at bedtime  insulin lispro Injectable (HumaLOG) 4 Unit(s) SubCutaneous three times a day before meals  lactated ringers. 1000 milliLiter(s) IV Continuous <Continuous>  ondansetron Injectable 4 milliGRAM(s) IV Push every 6 hours PRN  pantoprazole  Injectable 40 milliGRAM(s) IV Push daily      PMHX/PSHX:  Gastroparesis due to DM  Diabetes  No significant past surgical history      Family history:  FH: diabetes mellitus      Social History:     ROS:   General:  No fevers, chills or night sweats.  ENT:  No sore throat or dysphagia  CV:  No pain or palpitations  Resp:  No dyspnea, cough, wheezing  GI:  No pain, No nausea, No vomiting, No diarrhea, No constipation, No weight loss, No pruritis, No rectal bleeding, No tarry stools  Skin:  No rash or edema      PHYSICAL EXAM:   GENERAL:  NAD, Appears stated age  HEENT:  NC/AT,  conjunctivae clear and pink, sclera -anicteric  CHEST:  CTA B/L, Normal effort  HEART:  RRR S1/S2, No murmurs  ABDOMEN:  Soft, non-distended, tender to deep palpation in epigastric region, normoactive bowel sounds,  no masses ,no hepato-splenomegaly, no signs of chronic liver disease  EXTEREMITIES:  No cyanosis or Edema  SKIN:  Warm & Dry. No rash or erythema  NEURO:  Alert, oriented    Vital Signs:  Vital Signs Last 24 Hrs  T(C): 36.8 (2020 02:25), Max: 37.1 (2020 13:38)  T(F): 98.3 (2020 02:25), Max: 98.7 (2020 13:38)  HR: 76 (2020 02:25) (76 - 78)  BP: 119/76 (2020 02:25) (106/75 - 119/76)  BP(mean): --  RR: 18 (2020 02:25) (18 - 18)  SpO2: 100% (2020 02:25) (100% - 100%)  Daily Height in cm: 185.42 (2020 17:04)    Daily Weight in k.9 (2020 14:01)    LABS:                                          11.2   9.51  )-----------( 269      ( 2020 06:10 )             32.3                         14.7   14.46 )-----------( 337      ( 2020 15:10 )             42.9     Imaging:

## 2020-07-28 NOTE — PROGRESS NOTE ADULT - ASSESSMENT
28 yo M with DM type 1 with gastroparesis, H.pylori s/p triple therapy presented nausea and elevated blood sugars. Admitted for management of gastroparesis and management of DKA. Endocrine consult requested for management of patient with DM1.     1. DM1 c/b neuropathy, gastroparesis   DKA resolved   A1c 8.1%, goal A1c <7%, improved from 3/2020 A1c 10.2%  FS goal 100-180  - Fasting above target this AM, however given low po intake and risk for hypoglycemia, will c/w current lantus 10 units sq HS  Noted with gradually decreasing glucose values today in setting of low PO intake, will decrease basal dosing slightly to prevent hypoglycemia. Also, please -continue to hold Humalog doses if patient will not be eating  -Continue Humalog 4 units SQ TID before meals (Hold if NPO/not eating meal)   -Continue Humalog low dose correctional scale SQ TID before meals and Humalog low dose bedtime correctional scale SQ qHS   -Reglan or zofran for nausea and vomiting 2/2 gastroparesis  -Consistent carbohydrate diet, RD consult done, counseled for gastroparesis    Discharge Plan:  On discharge patient to continue basal/bolus insulin, dose to be determined  Ensure patient has supplies - glucometer, glucose test strips, lancets, alcohol swabs, insulin pen needles  Patient can follow up with [facility] (has previously followed here) vs. [facility]- will need to speak to patient regarding preference, has his insurance coverage has changed (now managed Medicaid)      2. HTN   Goal less than 130/80, at goal   Will continue to monitor     3. HLD   Please check lipid profile, last checked Dec 2019, LDL 74    Reena Franco  Nurse Practitioner  Division of Endocrinology & Diabetes  In house pager #[phone]/long range pager #[phone]    If before 9AM or after 6PM, or on weekends/holidays, please call endocrine answering service for assistance ([phone]).  For nonurgent matters email [email or web address] for assistance.

## 2020-07-29 ENCOUNTER — TRANSCRIPTION ENCOUNTER (OUTPATIENT)
Age: 30
End: 2020-07-29

## 2020-07-29 LAB
ANION GAP SERPL CALC-SCNC: 15 MMO/L — HIGH (ref 7–14)
BASOPHILS # BLD AUTO: 0.03 K/UL — SIGNIFICANT CHANGE UP (ref 0–0.2)
BASOPHILS NFR BLD AUTO: 0.6 % — SIGNIFICANT CHANGE UP (ref 0–2)
BUN SERPL-MCNC: 11 MG/DL — SIGNIFICANT CHANGE UP (ref 7–23)
CALCIUM SERPL-MCNC: 8.6 MG/DL — SIGNIFICANT CHANGE UP (ref 8.4–10.5)
CHLORIDE SERPL-SCNC: 97 MMOL/L — LOW (ref 98–107)
CO2 SERPL-SCNC: 21 MMOL/L — LOW (ref 22–31)
CREAT SERPL-MCNC: 0.73 MG/DL — SIGNIFICANT CHANGE UP (ref 0.5–1.3)
EOSINOPHIL # BLD AUTO: 0.01 K/UL — SIGNIFICANT CHANGE UP (ref 0–0.5)
EOSINOPHIL NFR BLD AUTO: 0.2 % — SIGNIFICANT CHANGE UP (ref 0–6)
GLUCOSE BLDC GLUCOMTR-MCNC: 133 MG/DL — HIGH (ref 70–99)
GLUCOSE BLDC GLUCOMTR-MCNC: 144 MG/DL — HIGH (ref 70–99)
GLUCOSE BLDC GLUCOMTR-MCNC: 197 MG/DL — HIGH (ref 70–99)
GLUCOSE BLDC GLUCOMTR-MCNC: 89 MG/DL — SIGNIFICANT CHANGE UP (ref 70–99)
GLUCOSE SERPL-MCNC: 190 MG/DL — HIGH (ref 70–99)
HCT VFR BLD CALC: 33.6 % — LOW (ref 39–50)
HGB BLD-MCNC: 12.1 G/DL — LOW (ref 13–17)
IMM GRANULOCYTES NFR BLD AUTO: 0.2 % — SIGNIFICANT CHANGE UP (ref 0–1.5)
LYMPHOCYTES # BLD AUTO: 2.01 K/UL — SIGNIFICANT CHANGE UP (ref 1–3.3)
LYMPHOCYTES # BLD AUTO: 38.6 % — SIGNIFICANT CHANGE UP (ref 13–44)
MAGNESIUM SERPL-MCNC: 1.7 MG/DL — SIGNIFICANT CHANGE UP (ref 1.6–2.6)
MCHC RBC-ENTMCNC: 30.9 PG — SIGNIFICANT CHANGE UP (ref 27–34)
MCHC RBC-ENTMCNC: 36 % — SIGNIFICANT CHANGE UP (ref 32–36)
MCV RBC AUTO: 85.9 FL — SIGNIFICANT CHANGE UP (ref 80–100)
MONOCYTES # BLD AUTO: 0.39 K/UL — SIGNIFICANT CHANGE UP (ref 0–0.9)
MONOCYTES NFR BLD AUTO: 7.5 % — SIGNIFICANT CHANGE UP (ref 2–14)
NEUTROPHILS # BLD AUTO: 2.76 K/UL — SIGNIFICANT CHANGE UP (ref 1.8–7.4)
NEUTROPHILS NFR BLD AUTO: 52.9 % — SIGNIFICANT CHANGE UP (ref 43–77)
NRBC # FLD: 0 K/UL — SIGNIFICANT CHANGE UP (ref 0–0)
PHOSPHATE SERPL-MCNC: 2.4 MG/DL — LOW (ref 2.5–4.5)
PLATELET # BLD AUTO: 264 K/UL — SIGNIFICANT CHANGE UP (ref 150–400)
PMV BLD: 10.2 FL — SIGNIFICANT CHANGE UP (ref 7–13)
POTASSIUM SERPL-MCNC: 3.9 MMOL/L — SIGNIFICANT CHANGE UP (ref 3.5–5.3)
POTASSIUM SERPL-SCNC: 3.9 MMOL/L — SIGNIFICANT CHANGE UP (ref 3.5–5.3)
RBC # BLD: 3.91 M/UL — LOW (ref 4.2–5.8)
RBC # FLD: 11.3 % — SIGNIFICANT CHANGE UP (ref 10.3–14.5)
SODIUM SERPL-SCNC: 133 MMOL/L — LOW (ref 135–145)
WBC # BLD: 5.21 K/UL — SIGNIFICANT CHANGE UP (ref 3.8–10.5)
WBC # FLD AUTO: 5.21 K/UL — SIGNIFICANT CHANGE UP (ref 3.8–10.5)

## 2020-07-29 PROCEDURE — 99232 SBSQ HOSP IP/OBS MODERATE 35: CPT

## 2020-07-29 RX ORDER — INSULIN LISPRO 100/ML
3 VIAL (ML) SUBCUTANEOUS
Refills: 0 | Status: DISCONTINUED | OUTPATIENT
Start: 2020-07-29 | End: 2020-07-31

## 2020-07-29 RX ORDER — ENOXAPARIN SODIUM 100 MG/ML
12 INJECTION SUBCUTANEOUS
Qty: 2 | Refills: 0
Start: 2020-07-29 | End: 2020-08-27

## 2020-07-29 RX ORDER — INSULIN GLARGINE 100 [IU]/ML
12 INJECTION, SOLUTION SUBCUTANEOUS AT BEDTIME
Refills: 0 | Status: DISCONTINUED | OUTPATIENT
Start: 2020-07-29 | End: 2020-07-31

## 2020-07-29 RX ORDER — INSULIN LISPRO 100/ML
3 VIAL (ML) SUBCUTANEOUS
Qty: 2 | Refills: 0
Start: 2020-07-29 | End: 2020-08-27

## 2020-07-29 RX ADMIN — PANTOPRAZOLE SODIUM 40 MILLIGRAM(S): 20 TABLET, DELAYED RELEASE ORAL at 06:15

## 2020-07-29 RX ADMIN — Medication 4 UNIT(S): at 12:37

## 2020-07-29 RX ADMIN — SODIUM CHLORIDE 100 MILLILITER(S): 9 INJECTION, SOLUTION INTRAVENOUS at 06:15

## 2020-07-29 RX ADMIN — Medication 30 MILLILITER(S): at 12:57

## 2020-07-29 RX ADMIN — SODIUM CHLORIDE 100 MILLILITER(S): 9 INJECTION, SOLUTION INTRAVENOUS at 16:01

## 2020-07-29 RX ADMIN — Medication 3 UNIT(S): at 18:19

## 2020-07-29 RX ADMIN — Medication 30 MILLILITER(S): at 23:17

## 2020-07-29 RX ADMIN — ONDANSETRON 4 MILLIGRAM(S): 8 TABLET, FILM COATED ORAL at 18:20

## 2020-07-29 RX ADMIN — Medication 4 UNIT(S): at 07:48

## 2020-07-29 RX ADMIN — Medication 1: at 07:48

## 2020-07-29 RX ADMIN — INSULIN GLARGINE 12 UNIT(S): 100 INJECTION, SOLUTION SUBCUTANEOUS at 22:11

## 2020-07-29 RX ADMIN — ONDANSETRON 4 MILLIGRAM(S): 8 TABLET, FILM COATED ORAL at 10:05

## 2020-07-29 NOTE — DISCHARGE NOTE PROVIDER - NSFOLLOWUPCLINICS_GEN_ALL_ED_FT
[facility]  Internal Medicine  [address] [place] [address]  Phone: [phone]  Fax: [phone]  Follow Up Time:

## 2020-07-29 NOTE — DISCHARGE NOTE PROVIDER - HOSPITAL COURSE
30 y/o M with DM type 1 with gastroparesis, H. pylori s/p triple therapy p/w nausea and elevated blood sugars.  Pt reports nausea and vomiting starting last night.  Symptoms have been typical of his gastroparesis.  He reports no abdominal pain.  No fever or chills.  No cough.  Pt reports taking last night's dose of Lantus, and this AM dose of Humalog.  He did not take lunch time dose of Humalog 2/2 poor PO intake.  He tested FS today, and had readings in high 200's.  Pt states he took Reglan last night which he states is new for him, but feels it made his symptoms worse.        Hospital Course:        DM1     - lantus, pre meals, sliding scale    - house endo cs    - hgba1c 8.1    - IVF    -Endocrine f/u on dc basal/bolus insulin---        Gastroparesis due to DM:    - Symptoms improved    - hold off on reglan for now    - Zofran if needed        Dispo:  Patient is medically stable for discharge on _______ as per medicall attending.  All medications reviewed. 28 y/o M with DM type 1 with gastroparesis, H. pylori s/p triple therapy p/w nausea and elevated blood sugars.  Pt reports nausea and vomiting starting last night.  Symptoms have been typical of his gastroparesis.  He reports no abdominal pain.  No fever or chills.  No cough.  Pt reports taking last night's dose of Lantus, and this AM dose of Humalog.  He did not take lunch time dose of Humalog 2/2 poor PO intake.  He tested FS today, and had readings in high 200's.  Pt states he took Reglan last night which he states is new for him, but feels it made his symptoms worse.        Hospital Course:        1. DKA     - Resolved, s/p IVF    - BHB elevated to 1.9    - A1c 8.1%, improved from 3/2020 A1c 10.2%, goal A1c <7%    - FS goal 100-180    - Endocrine consulted - adjusted insulin regimen    - To be discharged on Basaglar and Admelog (per insurance)    - Patient can follow up with [facility]   [address] [place] [address]         2. Gastroparesis due to DM:    - Symptoms improved    - c/w Zofran for nausea and vomiting 2/2 gastroparesis    - Tolerating regular diet - small frequent meals, low fat, soluble fiber diet    - House GI consulted - outpatient Gastric Emptying Study    - [facility] with [facility]ying [facility] [phone].     - Per patient, he has an appointment with Dr. Nikita Philippe at [facility] on 8/11/20.        Case discussed with Dr. Hameed and patient is medically stable for discharge home. 30 y/o M with DM type 1 with gastroparesis, H. pylori s/p triple therapy p/w nausea and elevated blood sugars.  Pt reports nausea and vomiting starting last night.  Symptoms have been typical of his gastroparesis.  He reports no abdominal pain.  No fever or chills.  No cough.  Pt reports taking last night's dose of Lantus, and this AM dose of Humalog.  He did not take lunch time dose of Humalog 2/2 poor PO intake.  He tested FS today, and had readings in high 200's.  Pt states he took Reglan last night which he states is new for him, but feels it made his symptoms worse.        Hospital Course:        1. DKA     - Resolved, s/p IVF    - BHB elevated to 1.9    - A1c 8.1%, improved from 3/2020 A1c 10.2%, goal A1c <7%    - FS goal 100-180    - Endocrine consulted - adjusted insulin regimen    - To be discharged on Basaglar and Admelog (per insurance)    - Patient can follow up with [facility]   [address] [place] [address]         2. Gastroparesis due to DM:    - Symptoms improved    - c/w Zofran for nausea and vomiting 2/2 gastroparesis    - Protonix switched to Omeprazole on discharge due to insurance coverage    - Tolerating regular diet - small frequent meals, low fat, soluble fiber diet    - House GI consulted - outpatient Gastric Emptying Study    - Patient to follow [facility] with [facility] for [facility]astric [facility]mptying Study [phone].     - Per patient, he has an appointment with Dr. Nikita Philippe at [facility] on 8/11/20.        Case discussed with Dr. Hameed and patient is medically stable for discharge home. 28 y/o M with DM type 1 with gastroparesis, H. pylori s/p triple therapy p/w nausea and elevated blood sugars.  Pt reports nausea and vomiting starting last night.  Symptoms have been typical of his gastroparesis.  He reports no abdominal pain.  No fever or chills.  No cough.  Pt reports taking last night's dose of Lantus, and this AM dose of Humalog.  He did not take lunch time dose of Humalog 2/2 poor PO intake.  He tested FS today, and had readings in high 200's.  Pt states he took Reglan last night which he states is new for him, but feels it made his symptoms worse.        Hospital Course:        1. DKA     - Resolved, s/p IVF    - BHB elevated to 1.9    - A1c 8.1%, improved from 3/2020 A1c 10.2%, goal A1c <7%    - FS goal 100-180    - Endocrine consulted - adjusted insulin regimen    - To be discharged on Basaglar 12 units sq at bedtime and Admelog 3 units three times a day before meals (per insurance). If FS start trending higher, increase by 1 units each day on both insulins until home doses of 16 and 6 is achieved    - Patient can follow up with [facility]   [address] [place] [address]         2. Gastroparesis due to DM:    - Symptoms improved    - c/w Zofran for nausea and vomiting 2/2 gastroparesis    - Protonix switched to Omeprazole on discharge due to insurance coverage    - Tolerating regular diet - small frequent meals, low fat, soluble fiber diet    - House GI consulted - outpatient Gastric Emptying Study    - Patient to follow [facility] with [facility] for [facility]astric [facility]mptying Study [phone].     - Per patient, he has an appointment with Dr. Nikita Philippe at [facility] on 8/11/20.        Case discussed with Dr. Hameed and Endocrine team and patient is medically stable for discharge home. 28 y/o M with DM type 1 with gastroparesis, H. pylori s/p triple therapy p/w nausea and elevated blood sugars.  Pt reports nausea and vomiting starting last night.  Symptoms have been typical of his gastroparesis.  He reports no abdominal pain.  No fever or chills.  No cough.  Pt reports taking last night's dose of Lantus, and this AM dose of Humalog.  He did not take lunch time dose of Humalog 2/2 poor PO intake.  He tested FS today, and had readings in high 200's.  Pt states he took Reglan last night which he states is new for him, but feels it made his symptoms worse.        Hospital Course:        1. DKA     - Resolved, s/p IVF    - BHB elevated to 1.9    - A1c 8.1%, improved from 3/2020 A1c 10.2%, goal A1c <7%    - FS goal 100-180    - Endocrine consulted - adjusted insulin regimen    - To be discharged on Basaglar 12 units sq at bedtime and Admelog 3 units three times a day before meals (per insurance). If FS start trending higher, increase by 1 units each day on both insulins until home doses of 16 and 6 is achieved    - Patient can follow up with [facility]   [address] [place] [address]  - Appt scheduled for 9/22 at 9am            2. Gastroparesis due to DM:    - Symptoms improved    - c/w Zofran for nausea and vomiting 2/2 gastroparesis    - Protonix switched to Omeprazole on discharge due to insurance coverage    - Tolerating regular diet - small frequent meals, low fat, soluble fiber diet    - House GI consulted - outpatient Gastric Emptying Study    - Patient to follow up with [facility] for [facility]astric [facility]ying Study [phone].     - Per patient, he has an appointment with Dr. Nikita Philippe at [facility] on 8/11/20.        Case discussed with Dr. Hameed and Endocrine team and patient is medically stable for discharge home.

## 2020-07-29 NOTE — DISCHARGE NOTE PROVIDER - INSTRUCTIONS
Diet as tolerated - recommend small frequent meals, low fat, soluble fiber diet.  Consistent carbohydrate diet

## 2020-07-29 NOTE — DISCHARGE NOTE PROVIDER - PROVIDER TOKENS
FREE:[LAST:[Endocrine Clinic],PHONE:[phone]],FAX:[(   )    -],ADDRESS:[[address] [place] [address]]],FREE:[LAST:[[facility]],PHONE:[phone]],FAX:[(   )    -]] FREE:[LAST:[[facility]],PHONE:[phone]],FAX:[(   )    -],ADDRESS:[[address] [place] [address]]],FREE:[LAST:[[facility]],PHONE:[phone]],FAX:[(   )    -],ADDRESS:[[address] [place] [address]]] FREE:[LAST:[[facility]],PHONE:[phone]],FAX:[(   )    -],ADDRESS:[[address] [place] [address]]],FREE:[LAST:[[facility]],PHONE:[phone]],FAX:[(   )    -],ADDRESS:[[address] [place] [address]]],FREE:[LAST:[Dr. Nikita Philippe],PHONE:[(   )    -],FAX:[(   )    -],ADDRESS:[Gastroenterologist at [facility]],SCHEDULEDAPPT:[08/11/2020]] FREE:[LAST:[[facility]],PHONE:[phone]],FAX:[(   )    -],ADDRESS:[[address] [place] [address]],SCHEDULEDAPPT:[09/22/2020],SCHEDULEDAPPTTIME:[09:00 AM]],FREE:[LAST:[[facility]],PHONE:[phone]],FAX:[(   )    -],ADDRESS:[[address] [place] [address]]],FREE:[LAST:[Dr. Nikita Philippe],PHONE:[(   )    -],FAX:[(   )    -],ADDRESS:[Gastroenterologist at [facility]],SCHEDULEDAPPT:[08/11/2020]]

## 2020-07-29 NOTE — DISCHARGE NOTE PROVIDER - NSDCMRMEDTOKEN_GEN_ALL_CORE_FT
HumaLOG KwikPen 100 units/mL injectable solution: 4 unit(s) injectable 3 times a day before meals   HumaLOG KwikPen 100 units/mL injectable solution: 3 unit(s) injectable 3 times a day   Lantus Solostar Pen 100 units/mL subcutaneous solution: 12 unit(s) subcutaneous once a day (at bedtime)   Lantus Solostar Pen 100 units/mL subcutaneous solution: 14 unit(s) subcutaneous once a day (at bedtime)  . price check spectra [id number]   metoclopramide 5 mg oral tablet: 1 tab(s) orally 3 times a day (with meals)   prn nausea  polyethylene glycol 3350 oral powder for reconstitution: 17 gram(s) orally 3 times a day, As Needed -Constipation   Protonix 40 mg oral delayed release tablet: 1 tab(s) orally once a day HumaLOG KwikPen 100 units/mL injectable solution: 4 unit(s) injectable 3 times a day before meals   Lantus Solostar Pen 100 units/mL subcutaneous solution: 14 unit(s) subcutaneous once a day (at bedtime)  . price check spectra [id number]   metoclopramide 5 mg oral tablet: 1 tab(s) orally 3 times a day (with meals)   prn nausea  polyethylene glycol 3350 oral powder for reconstitution: 17 gram(s) orally 3 times a day, As Needed -Constipation   Protonix 40 mg oral delayed release tablet: 1 tab(s) orally once a day   Zofran 4 mg oral tablet: 1 tab(s) orally every 6 hours, As Needed for nausea/vomiting HumaLOG KwikPen 100 units/mL injectable solution: 4 unit(s) injectable 3 times a day before meals   Lantus Solostar Pen 100 units/mL subcutaneous solution: 14 unit(s) subcutaneous once a day (at bedtime)  . price check spectra [id number]   metoclopramide 5 mg oral tablet: 1 tab(s) orally 3 times a day (with meals)   prn nausea  polyethylene glycol 3350 oral powder for reconstitution: 17 gram(s) orally 3 times a day, As Needed -Constipation   Protonix 40 mg oral delayed release tablet: 1 tab(s) orally once a day   Zofran 4 mg oral tablet: 1 tab(s) orally every 6 hours, As Needed for nausea/vomiting HumaLOG KwikPen 100 units/mL injectable solution: 4 unit(s) injectable 3 times a day before meals   Lantus Solostar Pen 100 units/mL subcutaneous solution: 14 unit(s) subcutaneous once a day (at bedtime)  . price check spectra [id number]   metoclopramide 5 mg oral tablet: 1 tab(s) orally 3 times a day (with meals)   prn nausea  omeprazole 20 mg oral delayed release tablet: 1 tab(s) orally once a day   polyethylene glycol 3350 oral powder for reconstitution: 17 gram(s) orally 3 times a day, As Needed -Constipation   Zofran 4 mg oral tablet: 1 tab(s) orally every 6 hours, As Needed for nausea/vomiting Admelog SoloStar 100 units/mL injectable solution: 3 unit(s) subcutaneous 3 times a day (with meals)   alcohol swabs : Apply topically to affected area 4 times a day   Basaglar KwikPen 100 units/mL subcutaneous solution: 12 unit(s) subcutaneous once a day   Insulin Pen Needles, 4mm: 1 application subcutaneously 4 times a day. ** Use with insulin pen **   lancets: 1 application subcutaneously 4 times a day   omeprazole 20 mg oral delayed release tablet: 1 tab(s) orally once a day   polyethylene glycol 3350 oral powder for reconstitution: 17 gram(s) orally 3 times a day, As Needed -Constipation   test strips (per patient&#x27;s insurance): 1 application subcutaneously 4 times a day. ** Compatible with patient&#x27;s glucometer **  Zofran 4 mg oral tablet: 1 tab(s) orally every 6 hours, As Needed for nausea/vomiting

## 2020-07-29 NOTE — DISCHARGE NOTE PROVIDER - NSDCCPCAREPLAN_GEN_ALL_CORE_FT
PRINCIPAL DISCHARGE DIAGNOSIS  Diagnosis: DKA, type 1  Assessment and Plan of Treatment: Continue consistent carbohydrate diet.  Monitor blood glucose levels throughout the day before meals and at bedtime.  Record blood sugars and bring to outpatient providers appointment in order to be reviewed by your doctor for management modifications.  Be aware of diabetes management symptoms including feeling cool and clammy may be related to low glucose levels.  Feeling hot and dry may indicate high glucose levels.  If  you feel these symptoms, check your blood sugar.  Make regular podiatry appointments in order to have feet checked for wounds and toe nails cut by a doctor to prevent infections.        SECONDARY DISCHARGE DIAGNOSES  Diagnosis: Gastroparesis due to DM  Assessment and Plan of Treatment: Follow up with Gastroenterologist as outpatient for Gastric emptying study. PRINCIPAL DISCHARGE DIAGNOSIS  Diagnosis: DKA, type 1  Assessment and Plan of Treatment: DKA resolved. Endocrine team was consulted and adjusted your insulin regimen. You will be discharged on Basaglar and Admelog. Follow up with [facility] located at [address] [place] [address]; call [phone] for appointment. Continue consistent carbohydrate diet. Monitor blood glucose levels before meals and at bedtime. Record blood sugars and bring to outpatient providers appointment in order to be reviewed by your doctor for management modifications.  Be aware of diabetes management symptoms including feeling cool and clammy may be related to low glucose levels.  Feeling hot and dry may indicate high glucose levels.  If  you feel these symptoms, check your blood sugar.  Make regular podiatry appointments in order to have feet checked for wounds and toe nails cut by a doctor to prevent infections.      SECONDARY DISCHARGE DIAGNOSES  Diagnosis: Gastroparesis due to DM  Assessment and Plan of Treatment: Symptoms improved. Strict glycemic control as outpatient. Continue Zofran as needed for nausea and vomiting. Diet as tolerated - recommend small frequent meals, low fat, soluble fiber diet. You have an appointment with Dr. Nikita Philippe at [facility] on 8/11/20; you will need a referral from your Primary Care Physician when you are discharged; unable to give referral from the Hospital per Dr. Hameed. You may follow up with [facility] for Gastric Emptying Study if you would like; call [phone] for appointment. PRINCIPAL DISCHARGE DIAGNOSIS  Diagnosis: DKA, type 1  Assessment and Plan of Treatment: DKA resolved. Endocrine team was consulted and adjusted your insulin regimen. You will be discharged on Basaglar and Admelog. Follow up with [facility] located at [address] [place], [place] [address]; call [phone] for appointment. Continue consistent carbohydrate diet. Monitor blood glucose levels before meals and at bedtime. Record blood sugars and bring to outpatient providers appointment in order to be reviewed by your doctor for management modifications.  Be aware of diabetes management symptoms including feeling cool and clammy may be related to low glucose levels.  Feeling hot and dry may indicate high glucose levels.  If  you feel these symptoms, check your blood sugar.  Make regular podiatry appointments in order to have feet checked for wounds and toe nails cut by a doctor to prevent infections.      SECONDARY DISCHARGE DIAGNOSES  Diagnosis: Gastroparesis due to DM  Assessment and Plan of Treatment: Symptoms improved. Strict glycemic control as outpatient. Continue Zofran as needed for nausea and vomiting. Pantoprazole switched to Omeprazole due to insurance coverage. Diet as tolerated - recommend small frequent meals, low fat, soluble fiber diet. You have an appointment with Dr. Nikita Philippe at [facility] on 8/11/20; you will need a referral from your Primary Care Physician when you are discharged; unable to give referral from the Hospital per Dr. Hameed. You may follow up with [facility] for Gastric Emptying Study if you would like; call [phone] for appointment. PRINCIPAL DISCHARGE DIAGNOSIS  Diagnosis: DKA, type 1  Assessment and Plan of Treatment: DKA resolved. Endocrine team was consulted and adjusted your insulin regimen. You will be discharged on Basaglar 12 units at bedtime and Admelog 3 units three times a day before meals. If fingersticks start trending higher, increase by 1 unit each day on both insulins until home doses of 16 units (Basaglar) and 6 units (Admelog) is achieved. Follow up with [facility] located at [address] [place] [address]; call [phone] for appointment. Continue consistent carbohydrate diet. Monitor blood glucose levels before meals and at bedtime. Record blood sugars and bring to outpatient providers appointment in order to be reviewed by your doctor for management modifications.  Be aware of diabetes management symptoms including feeling cool and clammy may be related to low glucose levels.  Feeling hot and dry may indicate high glucose levels.  If  you feel these symptoms, check your blood sugar.  Make regular podiatry appointments in order to have feet checked for wounds and toe nails cut by a doctor to prevent infections.      SECONDARY DISCHARGE DIAGNOSES  Diagnosis: Gastroparesis due to DM  Assessment and Plan of Treatment: Symptoms improved. Strict glycemic control as outpatient. Continue Zofran as needed for nausea and vomiting. Pantoprazole switched to Omeprazole due to insurance coverage. Diet as tolerated - recommend small frequent meals, low fat, soluble fiber diet. You have an appointment with Dr. Nikita Philippe at [facility] on 8/11/20; you will need a referral from your Primary Care Physician when you are discharged; unable to give referral from the Hospital per Dr. Hameed. You may follow up with [facility] for Gastric Emptying Study if you would like; call [phone] for appointment. PRINCIPAL DISCHARGE DIAGNOSIS  Diagnosis: DKA, type 1  Assessment and Plan of Treatment: DKA resolved. Endocrine team was consulted and adjusted your insulin regimen. You will be discharged on Basaglar 12 units at bedtime and Admelog 3 units three times a day before meals. If fingersticks start trending >200, increase by 1 unit each day on both insulins until home doses of 16 units (Basaglar) and 6 units (Admelog) is achieved. Follow up with [facility] on 9/22/20 at 9am located at [address] [place] [address]; call [phone] for appointment. Continue consistent carbohydrate diet. Monitor blood glucose levels before meals and at bedtime. Record blood sugars and bring to outpatient providers appointment in order to be reviewed by your doctor for management modifications.  Be aware of diabetes management symptoms including feeling cool and clammy may be related to low glucose levels.  Feeling hot and dry may indicate high glucose levels.  If  you feel these symptoms, check your blood sugar.  Make regular podiatry appointments in order to have feet checked for wounds and toe nails cut by a doctor to prevent infections.      SECONDARY DISCHARGE DIAGNOSES  Diagnosis: Gastroparesis due to DM  Assessment and Plan of Treatment: Symptoms improved. Strict glycemic control as outpatient. Continue Zofran as needed for nausea and vomiting. Pantoprazole switched to Omeprazole due to insurance coverage. Diet as tolerated - recommend small frequent meals, low fat, soluble fiber diet. You have an appointment with Dr. Nikita Philippe at [facility] on 8/11/20; you will need a referral from your Primary Care Physician when you are discharged; unable to give referral from the Hospital per Dr. Hameed. You may follow up with [facility] for Gastric Emptying Study if you would like; call [phone] for appointment.

## 2020-07-29 NOTE — DISCHARGE NOTE PROVIDER - CARE PROVIDER_API CALL
Endocrine Clinic,   [address] [place]  [address]  Phone: [phone]  Fax: (   )    -  Follow Up Time:     [facility],   Phone: [phone]  Fax: (   )    -  Follow Up Time: [facility],   [address] [place] [address]  Phone: [phone]  Fax: (   )    -  Follow Up Time:     [facility],   [address] [place] [address]  Phone: [phone]  Fax: (   )    -  Follow Up Time: [facility],   [address] [place] [address]  Phone: [phone]  Fax: (   )    -  Follow Up Time:     [facility],   [address] [place] [address]  Phone: [phone]  Fax: (   )    -  Follow Up Time:     Dr. Nikita Philippe,   Gastroenterologist at [facility]  Phone: (   )    -  Fax: (   )    -  Scheduled Appointment: 08/11/2020 [facility],   [address] [place] [address]  Phone: [phone]  Fax: (   )    -  Scheduled Appointment: 09/22/2020 09:00 AM    [facility],   [facility] [place] [address]  Phone: [phone]  Fax: (   )    -  Follow Up Time:     Dr. Nikita Philippe,   Gastroenterologist at [facility]  Phone: (   )    -  Fax: (   )    -  Scheduled Appointment: 08/11/2020
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
## 2020-07-29 NOTE — PROGRESS NOTE ADULT - ASSESSMENT
28 yo M with DM type 1 with gastroparesis, H.pylori s/p triple therapy presented nausea and elevated blood sugars. Admitted for management of gastroparesis and management of DKA. Endocrine consult requested for management of patient with DM1.     DM1 c/b neuropathy, gastroparesis   DKA resolved   A1c 8.1%, improved from 3/2020 A1c 10.2%, goal A1c <7%  FS goal 100-180  FS premeal and qhs   Recommend change Lantus to 12 units qhs to prevent hypoglycemia   recommend change Humalog to 3 units premeal - hold for npo/skips meal  Recommend low premeal and qhs correctional scale   c/w Reglan or zofran for nausea and vomiting 2/2 gastroparesis as per primary team  nutrition consult complete  On discharge patient to continue basal/bolus insulin, dose to be determined  Patient can follow up with [facility]   [address] [place] [address] [phone]      HTN   goal 130/80, at goal   Will continue to monitor     HLD   Consider checking fasting lipid panel   goal <70       Lary Davidson  Nurse Practitioner  Division of Endocrinology & Diabetes  Pager # [phone]      If after 6PM or before 9AM, or on weekends/holidays, please call endocrine answering service for assistance ([phone]).  For nonurgent matters email [email or web address] for assistance. 28 yo M with DM type 1 with gastroparesis, H.pylori s/p triple therapy presented nausea and elevated blood sugars. Admitted for management of gastroparesis and management of DKA. Endocrine consult requested for management of patient with DM1.     DM1 c/b neuropathy, gastroparesis   DKA resolved   A1c 8.1%, improved from 3/2020 A1c 10.2%, goal A1c <7%  FS goal 100-180  FS premeal and qhs  AG is 15.  Suspect related to vomiting and starvation ketosis, continue to check BMP daily , can check BHB with AM labs   Recommend change Lantus to 12 units qhs to prevent hypoglycemia   recommend change Humalog to 3 units premeal - hold for npo/skips meal  Recommend low premeal and qhs correctional scale   c/w Reglan or zofran for nausea and vomiting 2/2 gastroparesis as per primary team  nutrition consult complete  On discharge patient to continue basal/bolus insulin, dose to be determined  Patient can follow up with [facility]   [address] [place] [id number] [phone]      HTN   goal 130/80, at goal   Will continue to monitor     HLD   Consider checking fasting lipid panel   goal <70       Lary Davidson  Nurse Practitioner  Division of Endocrinology & Diabetes  Pager # [phone]      If after 6PM or before 9AM, or on weekends/holidays, please call endocrine answering service for assistance ([phone]).  For nonurgent matters email [email or web address] for assistance.

## 2020-07-29 NOTE — DISCHARGE NOTE PROVIDER - NSDCFUSCHEDAPPT_GEN_ALL_CORE_FT
IRON MUNIZ ; 09/22/2020 ; [facility] [id number] [facility] IRON MUNIZ ; 09/22/2020 ; [facility] [id number] [facility] IRON MUNIZ ; 09/22/2020 ; [facility] [id number] [facility] IRON MUNIZ ; 09/22/2020 ; [facility] [id number] [facility] IRON MUNIZ ; 09/22/2020 ; [facility] [id number] [facility] IRON MUNIZ ; 09/22/2020 ; [facility] [id number] [facility]

## 2020-07-30 LAB
ANION GAP SERPL CALC-SCNC: 10 MMO/L — SIGNIFICANT CHANGE UP (ref 7–14)
BUN SERPL-MCNC: 8 MG/DL — SIGNIFICANT CHANGE UP (ref 7–23)
CALCIUM SERPL-MCNC: 8.8 MG/DL — SIGNIFICANT CHANGE UP (ref 8.4–10.5)
CHLORIDE SERPL-SCNC: 98 MMOL/L — SIGNIFICANT CHANGE UP (ref 98–107)
CO2 SERPL-SCNC: 27 MMOL/L — SIGNIFICANT CHANGE UP (ref 22–31)
CREAT SERPL-MCNC: 0.75 MG/DL — SIGNIFICANT CHANGE UP (ref 0.5–1.3)
GLUCOSE BLDC GLUCOMTR-MCNC: 103 MG/DL — HIGH (ref 70–99)
GLUCOSE BLDC GLUCOMTR-MCNC: 151 MG/DL — HIGH (ref 70–99)
GLUCOSE BLDC GLUCOMTR-MCNC: 160 MG/DL — HIGH (ref 70–99)
GLUCOSE BLDC GLUCOMTR-MCNC: 98 MG/DL — SIGNIFICANT CHANGE UP (ref 70–99)
GLUCOSE SERPL-MCNC: 92 MG/DL — SIGNIFICANT CHANGE UP (ref 70–99)
MAGNESIUM SERPL-MCNC: 1.8 MG/DL — SIGNIFICANT CHANGE UP (ref 1.6–2.6)
PHOSPHATE SERPL-MCNC: 2.9 MG/DL — SIGNIFICANT CHANGE UP (ref 2.5–4.5)
POTASSIUM SERPL-MCNC: 3 MMOL/L — LOW (ref 3.5–5.3)
POTASSIUM SERPL-SCNC: 3 MMOL/L — LOW (ref 3.5–5.3)
SODIUM SERPL-SCNC: 135 MMOL/L — SIGNIFICANT CHANGE UP (ref 135–145)

## 2020-07-30 RX ORDER — POTASSIUM CHLORIDE 20 MEQ
40 PACKET (EA) ORAL EVERY 4 HOURS
Refills: 0 | Status: COMPLETED | OUTPATIENT
Start: 2020-07-30 | End: 2020-07-30

## 2020-07-30 RX ADMIN — Medication 3 UNIT(S): at 17:24

## 2020-07-30 RX ADMIN — Medication 30 MILLILITER(S): at 18:33

## 2020-07-30 RX ADMIN — Medication 1: at 17:23

## 2020-07-30 RX ADMIN — Medication 3 UNIT(S): at 08:14

## 2020-07-30 RX ADMIN — Medication 40 MILLIEQUIVALENT(S): at 12:58

## 2020-07-30 RX ADMIN — SODIUM CHLORIDE 100 MILLILITER(S): 9 INJECTION, SOLUTION INTRAVENOUS at 12:05

## 2020-07-30 RX ADMIN — Medication 40 MILLIEQUIVALENT(S): at 17:23

## 2020-07-30 RX ADMIN — PANTOPRAZOLE SODIUM 40 MILLIGRAM(S): 20 TABLET, DELAYED RELEASE ORAL at 05:44

## 2020-07-30 RX ADMIN — Medication 30 MILLILITER(S): at 09:10

## 2020-07-30 RX ADMIN — ONDANSETRON 4 MILLIGRAM(S): 8 TABLET, FILM COATED ORAL at 12:05

## 2020-07-30 RX ADMIN — INSULIN GLARGINE 12 UNIT(S): 100 INJECTION, SOLUTION SUBCUTANEOUS at 21:40

## 2020-07-30 RX ADMIN — ONDANSETRON 4 MILLIGRAM(S): 8 TABLET, FILM COATED ORAL at 18:33

## 2020-07-30 NOTE — PROGRESS NOTE ADULT - PROBLEM SELECTOR PLAN 1
Endo eval appreciated.  Lantus/Humalog
Endo f/up  Lantus/Humalog

## 2020-07-30 NOTE — PROGRESS NOTE ADULT - ASSESSMENT
30 y/o M with DM type 1 with gastroparesis, H. pylori s/p triple therapy p/w nausea and elevated blood sugars.  Pt reports nausea and vomiting starting last night.  Symptoms have been typical of his gastroparesis.  He reports no abdominal pain.  No fever or chills.  No cough.  Pt reports taking last night's dose of Lantus, and this AM dose of Humalog.  He did not take lunch time dose of Humalog 2/2 poor PO intake.  He tested FS today, and had readings in high 200's.  Pt states he took Reglan last night which he states is new for him, but feels it made his symptoms worse.

## 2020-07-30 NOTE — PROGRESS NOTE ADULT - PROBLEM SELECTOR PLAN 2
GI f/up noted.  - Zofran if needed  - Gastric emptying as OP
GI eval noted.  - Zofran if needed  - Gastric emptying as OP
GI eval noted.  - Zofran if needed  - Gastric emptying as OP
GI f/up noted.  - Zofran if needed  - Gastric emptying as OP
Symptoms improved  - Zofran if needed

## 2020-07-31 ENCOUNTER — TRANSCRIPTION ENCOUNTER (OUTPATIENT)
Age: 30
End: 2020-07-31

## 2020-07-31 VITALS
HEART RATE: 105 BPM | DIASTOLIC BLOOD PRESSURE: 66 MMHG | SYSTOLIC BLOOD PRESSURE: 116 MMHG | OXYGEN SATURATION: 100 % | RESPIRATION RATE: 16 BRPM | TEMPERATURE: 98 F

## 2020-07-31 LAB
ANION GAP SERPL CALC-SCNC: 11 MMO/L — SIGNIFICANT CHANGE UP (ref 7–14)
BUN SERPL-MCNC: 9 MG/DL — SIGNIFICANT CHANGE UP (ref 7–23)
CALCIUM SERPL-MCNC: 9 MG/DL — SIGNIFICANT CHANGE UP (ref 8.4–10.5)
CHLORIDE SERPL-SCNC: 97 MMOL/L — LOW (ref 98–107)
CO2 SERPL-SCNC: 25 MMOL/L — SIGNIFICANT CHANGE UP (ref 22–31)
CREAT SERPL-MCNC: 0.8 MG/DL — SIGNIFICANT CHANGE UP (ref 0.5–1.3)
GLUCOSE BLDC GLUCOMTR-MCNC: 156 MG/DL — HIGH (ref 70–99)
GLUCOSE BLDC GLUCOMTR-MCNC: 184 MG/DL — HIGH (ref 70–99)
GLUCOSE SERPL-MCNC: 193 MG/DL — HIGH (ref 70–99)
MAGNESIUM SERPL-MCNC: 2.1 MG/DL — SIGNIFICANT CHANGE UP (ref 1.6–2.6)
POTASSIUM SERPL-MCNC: 3.4 MMOL/L — LOW (ref 3.5–5.3)
POTASSIUM SERPL-SCNC: 3.4 MMOL/L — LOW (ref 3.5–5.3)
SODIUM SERPL-SCNC: 133 MMOL/L — LOW (ref 135–145)

## 2020-07-31 PROCEDURE — 99232 SBSQ HOSP IP/OBS MODERATE 35: CPT

## 2020-07-31 RX ORDER — ISOPROPYL ALCOHOL, BENZOCAINE .7; .06 ML/ML; ML/ML
1 SWAB TOPICAL
Qty: 100 | Refills: 1
Start: 2020-07-31 | End: 2020-09-18

## 2020-07-31 RX ORDER — INSULIN LISPRO 100/ML
3 VIAL (ML) SUBCUTANEOUS
Qty: 5 | Refills: 0
Start: 2020-07-31 | End: 2020-08-29

## 2020-07-31 RX ORDER — OMEPRAZOLE 10 MG/1
1 CAPSULE, DELAYED RELEASE ORAL
Qty: 30 | Refills: 0
Start: 2020-07-31 | End: 2020-08-29

## 2020-07-31 RX ORDER — PANTOPRAZOLE SODIUM 20 MG/1
1 TABLET, DELAYED RELEASE ORAL
Qty: 30 | Refills: 0
Start: 2020-07-31 | End: 2020-08-29

## 2020-07-31 RX ORDER — ONDANSETRON 8 MG/1
4 TABLET, FILM COATED ORAL EVERY 6 HOURS
Refills: 0 | Status: DISCONTINUED | OUTPATIENT
Start: 2020-07-31 | End: 2020-07-31

## 2020-07-31 RX ORDER — INSULIN GLARGINE 100 [IU]/ML
12 INJECTION, SOLUTION SUBCUTANEOUS
Qty: 5 | Refills: 0
Start: 2020-07-31 | End: 2020-08-29

## 2020-07-31 RX ORDER — PANTOPRAZOLE SODIUM 20 MG/1
40 TABLET, DELAYED RELEASE ORAL
Refills: 0 | Status: DISCONTINUED | OUTPATIENT
Start: 2020-07-31 | End: 2020-07-31

## 2020-07-31 RX ORDER — ONDANSETRON 8 MG/1
1 TABLET, FILM COATED ORAL
Qty: 56 | Refills: 0
Start: 2020-07-31 | End: 2020-08-13

## 2020-07-31 RX ORDER — POTASSIUM CHLORIDE 20 MEQ
40 PACKET (EA) ORAL ONCE
Refills: 0 | Status: COMPLETED | OUTPATIENT
Start: 2020-07-31 | End: 2020-07-31

## 2020-07-31 RX ADMIN — PANTOPRAZOLE SODIUM 40 MILLIGRAM(S): 20 TABLET, DELAYED RELEASE ORAL at 05:37

## 2020-07-31 RX ADMIN — Medication 3 UNIT(S): at 07:54

## 2020-07-31 RX ADMIN — Medication 3 UNIT(S): at 12:04

## 2020-07-31 RX ADMIN — Medication 1: at 07:54

## 2020-07-31 RX ADMIN — Medication 1: at 12:04

## 2020-07-31 RX ADMIN — Medication 40 MILLIEQUIVALENT(S): at 08:12

## 2020-07-31 NOTE — DISCHARGE NOTE NURSING/CASE MANAGEMENT/SOCIAL WORK - PATIENT PORTAL LINK FT
You can access the FollowMyHealth Patient Portal offered by [facility] by registering at the following website: [email or web address]. By joining [employer]’s FollowMyHealth portal, you will also be able to view your health information using other applications (apps) compatible with our system.

## 2020-07-31 NOTE — PROGRESS NOTE ADULT - REASON FOR ADMISSION
Nausea, elevated blood sugar

## 2020-07-31 NOTE — PROGRESS NOTE ADULT - ASSESSMENT
30 yo M with DM type 1 with gastroparesis, H.pylori s/p triple therapy presented nausea and elevated blood sugars. Admitted for management of gastroparesis and management of DKA. Endocrine consult requested for management of patient with DM1.     DM1 c/b neuropathy, gastroparesis   DKA resolved   A1c 8.1%, improved from 3/2020 A1c 10.2%, goal A1c <7%  FS goal 100-180  FS premeal and qhs  Recommend c/w Lantus to 12 units qhs to prevent hypoglycemia   recommend c/w Humalog to 3 units premeal - hold for npo/skips meal  Recommend low premeal and qhs correctional scale   nutrition consult complete    DC PLAN:   On discharge patient to continue basal/bolus insulin, patient insurance changed, therefore basaglar and admelog now covered.  Discussed with patient and primary team the following:   basaglar PEN 12 units sq qhs  admelog PEN 3 units sq tid ac  If pt BG's trending > 200, increase each insulin by 1 unit per day until pre hospitalization home doses achieved. Prehospital pt was on lantus 16 units and humalog 6u tid ac    Patient can follow up with [facility]   [address] [place] [address] [phone]    Appt scheduled for 9/22 at 9am at clinic above.           HTN   goal 130/80, at goal   Will continue to monitor     HLD   Consider checking fasting lipid panel   goal <70       Lary Davidson  Nurse Practitioner  Division of Endocrinology & Diabetes  Pager # [phone]      If after 6PM or before 9AM, or on weekends/holidays, please call endocrine answering service for assistance ([phone]).  For nonurgent matters email [email or web address] for assistance.

## 2020-07-31 NOTE — PROGRESS NOTE ADULT - PROBLEM SELECTOR PROBLEM 1
Type 1 diabetes mellitus with ketoacidosis without coma

## 2020-07-31 NOTE — PROGRESS NOTE ADULT - PROVIDER SPECIALTY LIST ADULT
Endocrinology
Internal Medicine

## 2020-07-31 NOTE — PROGRESS NOTE ADULT - SUBJECTIVE AND OBJECTIVE BOX
Chief Complaint: DM 1, DKA    History: Patient seen at bedside. Patient reports he is still feeling nauseous, but starting to feel a little better, Ate very little breakfast and reproted he will try to eat lunch.  Humalog for lunch already given    MEDICATIONS  (STANDING):  dextrose 50% Injectable 12.5 Gram(s) IV Push once  dextrose 50% Injectable 25 Gram(s) IV Push once  dextrose 50% Injectable 25 Gram(s) IV Push once  insulin glargine Injectable (LANTUS) 12 Unit(s) SubCutaneous at bedtime  insulin lispro (HumaLOG) corrective regimen sliding scale   SubCutaneous three times a day before meals  insulin lispro (HumaLOG) corrective regimen sliding scale   SubCutaneous at bedtime  insulin lispro Injectable (HumaLOG) 4 Unit(s) SubCutaneous three times a day before meals  lactated ringers. 1000 milliLiter(s) (100 mL/Hr) IV Continuous <Continuous>  pantoprazole  Injectable 40 milliGRAM(s) IV Push daily    MEDICATIONS  (PRN):  dextrose 40% Gel 15 Gram(s) Oral once PRN Blood Glucose LESS THAN 70 milliGRAM(s)/deciliter  glucagon  Injectable 1 milliGRAM(s) IntraMuscular once PRN Glucose LESS THAN 70 milligrams/deciliter  ondansetron Injectable 4 milliGRAM(s) IV Push every 6 hours PRN Nausea and/or Vomiting    No Known Allergies    Review of Systems:  HEENT: No pain  Cardiovascular: No chest pain  Respiratory: No SOB  GI: +nausea, no vomiting    PHYSICAL EXAM:  Vital Signs Last 24 Hrs  T(C): 36.7 (29 Jul 2020 09:44), Max: 37 (28 Jul 2020 22:14)  T(F): 98.1 (29 Jul 2020 09:44), Max: 98.6 (28 Jul 2020 22:14)  HR: 89 (29 Jul 2020 09:44) (66 - 89)  BP: 114/82 (29 Jul 2020 09:44) (110/75 - 122/82)  BP(mean): --  RR: 18 (29 Jul 2020 09:44) (18 - 18)  SpO2: 100% (29 Jul 2020 09:44) (99% - 100%)  GENERAL: NAD  EYES: No proptosis, no lid lag, anicteric  RESPIRATORY: unlabored respirations   PSYCH: Alert and oriented x 3, normal affect, normal mood      CAPILLARY BLOOD GLUCOSE    POCT Blood Glucose.: 89 mg/dL (29 Jul 2020 11:57)  POCT Blood Glucose.: 197 mg/dL (29 Jul 2020 07:21)  POCT Blood Glucose.: 144 mg/dL (28 Jul 2020 21:30)  POCT Blood Glucose.: 249 mg/dL (28 Jul 2020 16:43)    POCT Blood Glucose.: 125 mg/dL (28 Jul 2020 11:54)  POCT Blood Glucose.: 231 mg/dL (28 Jul 2020 07:17)  POCT Blood Glucose.: 189 mg/dL (27 Jul 2020 21:46)  POCT Blood Glucose.: 166 mg/dL (27 Jul 2020 16:25)        A1C with Estimated Average Glucose: 8.1 % (07-25-20 @ 15:10)  A1C with Estimated Average Glucose: 10.2 % (05-13-20 @ 04:30)  Hemoglobin A1C, Whole Blood: 10.0 % (03-23-20 @ 05:45)  Hemoglobin A1C, Whole Blood: 14.5 % (12-28-19 @ 02:05)
Chief Complaint: DM 1, DKA    History: Patient seen at bedside. Patient reports he is still feeling nauseous, was not able to eat lunch (pre-meal Humalog held appropriately). Patient denies s/s of hypoglycemia. Most recent  mg/dl.    MEDICATIONS  (STANDING):  dextrose 50% Injectable 12.5 Gram(s) IV Push once  dextrose 50% Injectable 25 Gram(s) IV Push once  dextrose 50% Injectable 25 Gram(s) IV Push once  insulin glargine Injectable (LANTUS) 12 Unit(s) SubCutaneous at bedtime  insulin lispro (HumaLOG) corrective regimen sliding scale   SubCutaneous three times a day before meals  insulin lispro (HumaLOG) corrective regimen sliding scale   SubCutaneous at bedtime  insulin lispro Injectable (HumaLOG) 4 Unit(s) SubCutaneous three times a day before meals  lactated ringers. 1000 milliLiter(s) (100 mL/Hr) IV Continuous <Continuous>  pantoprazole  Injectable 40 milliGRAM(s) IV Push daily    MEDICATIONS  (PRN):  dextrose 40% Gel 15 Gram(s) Oral once PRN Blood Glucose LESS THAN 70 milliGRAM(s)/deciliter  glucagon  Injectable 1 milliGRAM(s) IntraMuscular once PRN Glucose LESS THAN 70 milligrams/deciliter  ondansetron Injectable 4 milliGRAM(s) IV Push every 6 hours PRN Nausea and/or Vomiting    No Known Allergies    Review of Systems:  HEENT: No pain  Cardiovascular: No chest pain  Respiratory: No SOB  GI: +nausea, no vomiting    PHYSICAL EXAM:  VITALS: T(C): 37.1 (07-27-20 @ 13:38)  T(F): 98.7 (07-27-20 @ 13:38), Max: 99 (07-26-20 @ 17:26)  HR: 78 (07-27-20 @ 13:38) (78 - 95)  BP: 106/75 (07-27-20 @ 13:38) (106/75 - 130/82)  RR:  (18 - 18)  SpO2:  (100% - 100%)  Wt(kg): --  GENERAL: NAD  EYES: No proptosis, no lid lag, anicteric  RESPIRATORY: unlabored respirations   PSYCH: Alert and oriented x 3, normal affect, normal mood    CAPILLARY BLOOD GLUCOSE    POCT Blood Glucose.: 102 mg/dL (27 Jul 2020 11:36)  POCT Blood Glucose.: 128 mg/dL (27 Jul 2020 07:33)  POCT Blood Glucose.: 149 mg/dL (26 Jul 2020 21:12)  POCT Blood Glucose.: 153 mg/dL (26 Jul 2020 16:45)      07-26    137  |  104  |  15  ----------------------------<  136<H>  4.0   |  23  |  0.77    EGFR if : 143  EGFR if non : 124    Ca    9.1      07-26  Mg     1.8     07-26  Phos  3.4     07-26    TPro  7.9  /  Alb  4.7  /  TBili  1.4<H>  /  DBili  x   /  AST  17  /  ALT  16  /  AlkPhos  45  07-25      A1C with Estimated Average Glucose: 8.1 % (07-25-20 @ 15:10)  A1C with Estimated Average Glucose: 10.2 % (05-13-20 @ 04:30)  Hemoglobin A1C, Whole Blood: 10.0 % (03-23-20 @ 05:45)  Hemoglobin A1C, Whole Blood: 14.5 % (12-28-19 @ 02:05)
Chief Complaint: DM 1, DKA    History: Patient seen at bedside. Patient reports he is still feeling nauseous, was not able to eat lunch so humalog premeal was held.  No vomiting since yesterday. denies hypoglycemia s/s    MEDICATIONS  (STANDING):  dextrose 50% Injectable 12.5 Gram(s) IV Push once  dextrose 50% Injectable 25 Gram(s) IV Push once  dextrose 50% Injectable 25 Gram(s) IV Push once  insulin glargine Injectable (LANTUS) 12 Unit(s) SubCutaneous at bedtime  insulin lispro (HumaLOG) corrective regimen sliding scale   SubCutaneous three times a day before meals  insulin lispro (HumaLOG) corrective regimen sliding scale   SubCutaneous at bedtime  insulin lispro Injectable (HumaLOG) 4 Unit(s) SubCutaneous three times a day before meals  lactated ringers. 1000 milliLiter(s) (100 mL/Hr) IV Continuous <Continuous>  pantoprazole  Injectable 40 milliGRAM(s) IV Push daily    MEDICATIONS  (PRN):  dextrose 40% Gel 15 Gram(s) Oral once PRN Blood Glucose LESS THAN 70 milliGRAM(s)/deciliter  glucagon  Injectable 1 milliGRAM(s) IntraMuscular once PRN Glucose LESS THAN 70 milligrams/deciliter  ondansetron Injectable 4 milliGRAM(s) IV Push every 6 hours PRN Nausea and/or Vomiting    No Known Allergies    Review of Systems:  HEENT: No pain  Cardiovascular: No chest pain  Respiratory: No SOB  GI: +nausea, no vomiting    PHYSICAL EXAM:  Vital Signs Last 24 Hrs  T(C): 36.8 (28 Jul 2020 02:25), Max: 36.8 (28 Jul 2020 02:25)  T(F): 98.3 (28 Jul 2020 02:25), Max: 98.3 (28 Jul 2020 02:25)  HR: 78 (28 Jul 2020 10:20) (76 - 78)  BP: 122/83 (28 Jul 2020 10:20) (119/76 - 122/83)  BP(mean): --  RR: 19 (28 Jul 2020 10:20) (18 - 19)  SpO2: 100% (28 Jul 2020 10:20) (100% - 100%)  GENERAL: NAD  EYES: No proptosis, no lid lag, anicteric  RESPIRATORY: unlabored respirations   PSYCH: Alert and oriented x 3, normal affect, normal mood      CAPILLARY BLOOD GLUCOSE    POCT Blood Glucose.: 125 mg/dL (28 Jul 2020 11:54)  POCT Blood Glucose.: 231 mg/dL (28 Jul 2020 07:17)  POCT Blood Glucose.: 189 mg/dL (27 Jul 2020 21:46)  POCT Blood Glucose.: 166 mg/dL (27 Jul 2020 16:25)    POCT Blood Glucose.: 102 mg/dL (27 Jul 2020 11:36)  POCT Blood Glucose.: 128 mg/dL (27 Jul 2020 07:33)  POCT Blood Glucose.: 149 mg/dL (26 Jul 2020 21:12)  POCT Blood Glucose.: 153 mg/dL (26 Jul 2020 16:45)          A1C with Estimated Average Glucose: 8.1 % (07-25-20 @ 15:10)  A1C with Estimated Average Glucose: 10.2 % (05-13-20 @ 04:30)  Hemoglobin A1C, Whole Blood: 10.0 % (03-23-20 @ 05:45)  Hemoglobin A1C, Whole Blood: 14.5 % (12-28-19 @ 02:05)
Chief Complaint: DM 1, DKA    History: Patient seen at bedside. feeling better.  Being dc'd today.     MEDICATIONS  (STANDING):  dextrose 50% Injectable 12.5 Gram(s) IV Push once  dextrose 50% Injectable 25 Gram(s) IV Push once  dextrose 50% Injectable 25 Gram(s) IV Push once  insulin glargine Injectable (LANTUS) 12 Unit(s) SubCutaneous at bedtime  insulin lispro (HumaLOG) corrective regimen sliding scale   SubCutaneous three times a day before meals  insulin lispro (HumaLOG) corrective regimen sliding scale   SubCutaneous at bedtime  insulin lispro Injectable (HumaLOG) 4 Unit(s) SubCutaneous three times a day before meals  lactated ringers. 1000 milliLiter(s) (100 mL/Hr) IV Continuous <Continuous>  pantoprazole  Injectable 40 milliGRAM(s) IV Push daily    MEDICATIONS  (PRN):  dextrose 40% Gel 15 Gram(s) Oral once PRN Blood Glucose LESS THAN 70 milliGRAM(s)/deciliter  glucagon  Injectable 1 milliGRAM(s) IntraMuscular once PRN Glucose LESS THAN 70 milligrams/deciliter  ondansetron Injectable 4 milliGRAM(s) IV Push every 6 hours PRN Nausea and/or Vomiting    No Known Allergies    Review of Systems:  HEENT: No pain  Cardiovascular: No chest pain  Respiratory: No SOB  GI: +nausea, no vomiting    PHYSICAL EXAM:  Vital Signs Last 24 Hrs  T(C): 36.8 (31 Jul 2020 13:59), Max: 37 (30 Jul 2020 20:15)  T(F): 98.2 (31 Jul 2020 13:59), Max: 98.6 (30 Jul 2020 20:15)  HR: 105 (31 Jul 2020 13:59) (74 - 105)  BP: 116/66 (31 Jul 2020 13:59) (106/67 - 121/82)  BP(mean): --  RR: 16 (31 Jul 2020 13:59) (16 - 18)  SpO2: 100% (31 Jul 2020 13:59) (100% - 100%)  GENERAL: NAD  EYES: No proptosis, no lid lag, anicteric  RESPIRATORY: unlabored respirations   PSYCH: Alert and oriented x 3, normal affect, normal mood      CAPILLARY BLOOD GLUCOSE  POCT Blood Glucose.: 184 mg/dL (31 Jul 2020 11:58)  POCT Blood Glucose.: 156 mg/dL (31 Jul 2020 07:52)  POCT Blood Glucose.: 151 mg/dL (30 Jul 2020 21:24)  POCT Blood Glucose.: 160 mg/dL (30 Jul 2020 16:50)      POCT Blood Glucose.: 89 mg/dL (29 Jul 2020 11:57)  POCT Blood Glucose.: 197 mg/dL (29 Jul 2020 07:21)  POCT Blood Glucose.: 144 mg/dL (28 Jul 2020 21:30)  POCT Blood Glucose.: 249 mg/dL (28 Jul 2020 16:43)          A1C with Estimated Average Glucose: 8.1 % (07-25-20 @ 15:10)  A1C with Estimated Average Glucose: 10.2 % (05-13-20 @ 04:30)  Hemoglobin A1C, Whole Blood: 10.0 % (03-23-20 @ 05:45)  Hemoglobin A1C, Whole Blood: 14.5 % (12-28-19 @ 02:05)
Patient is a 29y old  Male who presents with a chief complaint of Nausea, elevated blood sugar (2020 13:52)      SUBJECTIVE / OVERNIGHT EVENTS:    Events noted.  CONSTITUTIONAL: Awake  Nausea  No CP/SOB    MEDICATIONS  (STANDING):  dextrose 50% Injectable 12.5 Gram(s) IV Push once  dextrose 50% Injectable 25 Gram(s) IV Push once  dextrose 50% Injectable 25 Gram(s) IV Push once  insulin glargine Injectable (LANTUS) 12 Unit(s) SubCutaneous at bedtime  insulin lispro (HumaLOG) corrective regimen sliding scale   SubCutaneous three times a day before meals  insulin lispro (HumaLOG) corrective regimen sliding scale   SubCutaneous at bedtime  insulin lispro Injectable (HumaLOG) 4 Unit(s) SubCutaneous three times a day before meals  lactated ringers. 1000 milliLiter(s) (100 mL/Hr) IV Continuous <Continuous>  pantoprazole    Tablet 40 milliGRAM(s) Oral before breakfast    MEDICATIONS  (PRN):  dextrose 40% Gel 15 Gram(s) Oral once PRN Blood Glucose LESS THAN 70 milliGRAM(s)/deciliter  glucagon  Injectable 1 milliGRAM(s) IntraMuscular once PRN Glucose LESS THAN 70 milligrams/deciliter  ondansetron Injectable 4 milliGRAM(s) IV Push every 6 hours PRN Nausea and/or Vomiting        CAPILLARY BLOOD GLUCOSE      POCT Blood Glucose.: 149 mg/dL (2020 21:12)  POCT Blood Glucose.: 153 mg/dL (2020 16:45)  POCT Blood Glucose.: 112 mg/dL (2020 12:15)  POCT Blood Glucose.: 108 mg/dL (2020 08:47)  POCT Blood Glucose.: 188 mg/dL (2020 23:32)    I&O's Summary    2020 07:01  -  2020 07:00  --------------------------------------------------------  IN: 250 mL / OUT: 0 mL / NET: 250 mL        PHYSICAL EXAM:    NECK: Supple, No JVD  CHEST/LUNG: Clear to auscultation bilaterally; No wheezing.  HEART: Regular rate and rhythm; No murmurs, rubs, or gallops  ABDOMEN: Soft, Nontender, Nondistended; Bowel sounds present  EXTREMITIES:   No edema  NEUROLOGY: Awake      LABS:                        11.2   9.51  )-----------( 269      ( 2020 06:10 )             32.3         137  |  104  |  15  ----------------------------<  136<H>  4.0   |  23  |  0.77    Ca    9.1      2020 06:10  Phos  3.4       Mg     1.8         TPro  7.9  /  Alb  4.7  /  TBili  1.4<H>  /  DBili  x   /  AST  17  /  ALT  16  /  AlkPhos  45            Urinalysis Basic - ( 2020 18:10 )    Color: LIGHT YELLOW / Appearance: CLEAR / S.033 / pH: 5.5  Gluc: >1000 / Ketone: LARGE  / Bili: NEGATIVE / Urobili: NORMAL   Blood: NEGATIVE / Protein: 10 / Nitrite: NEGATIVE   Leuk Esterase: NEGATIVE / RBC: x / WBC x   Sq Epi: x / Non Sq Epi: x / Bacteria: x      CAPILLARY BLOOD GLUCOSE      POCT Blood Glucose.: 149 mg/dL (2020 21:12)  POCT Blood Glucose.: 153 mg/dL (2020 16:45)  POCT Blood Glucose.: 112 mg/dL (2020 12:15)  POCT Blood Glucose.: 108 mg/dL (2020 08:47)  POCT Blood Glucose.: 188 mg/dL (2020 23:32)                RADIOLOGY & ADDITIONAL TESTS:    Imaging Personally Reviewed:    Consultant(s) Notes Reviewed:      Care Discussed with Consultants/Other Providers:    Yo Hameed MD, CMD, FACP    [address] [place] [address]  Office Tel: [phone]  Cell: [phone]
Patient is a 29y old  Male who presents with a chief complaint of Nausea, elevated blood sugar (27 Jul 2020 16:34)      SUBJECTIVE / OVERNIGHT EVENTS:    Events noted.  CONSTITUTIONAL: No fever,  or fatigue  Nausea/Vomiting  No HA    MEDICATIONS  (STANDING):  dextrose 50% Injectable 12.5 Gram(s) IV Push once  dextrose 50% Injectable 25 Gram(s) IV Push once  dextrose 50% Injectable 25 Gram(s) IV Push once  insulin glargine Injectable (LANTUS) 10 Unit(s) SubCutaneous at bedtime  insulin lispro (HumaLOG) corrective regimen sliding scale   SubCutaneous three times a day before meals  insulin lispro (HumaLOG) corrective regimen sliding scale   SubCutaneous at bedtime  insulin lispro Injectable (HumaLOG) 4 Unit(s) SubCutaneous three times a day before meals  lactated ringers. 1000 milliLiter(s) (100 mL/Hr) IV Continuous <Continuous>  pantoprazole  Injectable 40 milliGRAM(s) IV Push daily    MEDICATIONS  (PRN):  dextrose 40% Gel 15 Gram(s) Oral once PRN Blood Glucose LESS THAN 70 milliGRAM(s)/deciliter  glucagon  Injectable 1 milliGRAM(s) IntraMuscular once PRN Glucose LESS THAN 70 milligrams/deciliter  ondansetron Injectable 4 milliGRAM(s) IV Push every 6 hours PRN Nausea and/or Vomiting        CAPILLARY BLOOD GLUCOSE      POCT Blood Glucose.: 189 mg/dL (27 Jul 2020 21:46)  POCT Blood Glucose.: 166 mg/dL (27 Jul 2020 16:25)  POCT Blood Glucose.: 102 mg/dL (27 Jul 2020 11:36)  POCT Blood Glucose.: 128 mg/dL (27 Jul 2020 07:33)    I&O's Summary      PHYSICAL EXAM:    NECK: Supple, No JVD  CHEST/LUNG: Clear to auscultation bilaterally; No wheezing.  HEART: Regular rate and rhythm; No murmurs, rubs, or gallops  ABDOMEN: Soft, Nontender, Nondistended; Bowel sounds present  EXTREMITIES:   No edema  NEUROLOGY: AAO       LABS:                        11.2   9.51  )-----------( 269      ( 26 Jul 2020 06:10 )             32.3     07-26    137  |  104  |  15  ----------------------------<  136<H>  4.0   |  23  |  0.77    Ca    9.1      26 Jul 2020 06:10  Phos  3.4     07-26  Mg     1.8     07-26              CAPILLARY BLOOD GLUCOSE      POCT Blood Glucose.: 189 mg/dL (27 Jul 2020 21:46)  POCT Blood Glucose.: 166 mg/dL (27 Jul 2020 16:25)  POCT Blood Glucose.: 102 mg/dL (27 Jul 2020 11:36)  POCT Blood Glucose.: 128 mg/dL (27 Jul 2020 07:33)    07-25 @ 18:20  Culture-urine --  Culture results   <10,000 CFU/mL Normal Urogenital Clair  method type --  Organism --  Organism Identification --  Specimen source .Urine Clean Catch (Midstream)           07-25 @ 18:20  Culture blood --  Culture results   <10,000 CFU/mL Normal Urogenital Clair  Gram stain --  Gram stain blood --  Method type --  Organism --  Organism identification --  Specimen source .Urine Clean Catch (Midstream)      RADIOLOGY & ADDITIONAL TESTS:    Imaging Personally Reviewed:    Consultant(s) Notes Reviewed:      Care Discussed with Consultants/Other Providers:    Yo Hameed MD, CMD, FACP    [address] [place] [address]  Office Tel: [phone]  Cell: [phone]
Patient is a 29y old  Male who presents with a chief complaint of Nausea, elevated blood sugar (27 Jul 2020 16:34)      SUBJECTIVE / OVERNIGHT EVENTS:    Events noted.  CONSTITUTIONAL: No fever,  or fatigue  Nausea/Vomiting  No HA    MEDICATIONS  (STANDING):  dextrose 50% Injectable 12.5 Gram(s) IV Push once  dextrose 50% Injectable 25 Gram(s) IV Push once  dextrose 50% Injectable 25 Gram(s) IV Push once  insulin glargine Injectable (LANTUS) 10 Unit(s) SubCutaneous at bedtime  insulin lispro (HumaLOG) corrective regimen sliding scale   SubCutaneous three times a day before meals  insulin lispro (HumaLOG) corrective regimen sliding scale   SubCutaneous at bedtime  insulin lispro Injectable (HumaLOG) 4 Unit(s) SubCutaneous three times a day before meals  lactated ringers. 1000 milliLiter(s) (100 mL/Hr) IV Continuous <Continuous>  pantoprazole  Injectable 40 milliGRAM(s) IV Push daily    MEDICATIONS  (PRN):  dextrose 40% Gel 15 Gram(s) Oral once PRN Blood Glucose LESS THAN 70 milliGRAM(s)/deciliter  glucagon  Injectable 1 milliGRAM(s) IntraMuscular once PRN Glucose LESS THAN 70 milligrams/deciliter  ondansetron Injectable 4 milliGRAM(s) IV Push every 6 hours PRN Nausea and/or Vomiting        CAPILLARY BLOOD GLUCOSE      POCT Blood Glucose.: 189 mg/dL (27 Jul 2020 21:46)  POCT Blood Glucose.: 166 mg/dL (27 Jul 2020 16:25)  POCT Blood Glucose.: 102 mg/dL (27 Jul 2020 11:36)  POCT Blood Glucose.: 128 mg/dL (27 Jul 2020 07:33)    I&O's Summary      PHYSICAL EXAM:    NECK: Supple, No JVD  CHEST/LUNG: Clear to auscultation bilaterally; No wheezing.  HEART: Regular rate and rhythm; No murmurs, rubs, or gallops  ABDOMEN: Soft, Nontender, Nondistended; Bowel sounds present  EXTREMITIES:   No edema  NEUROLOGY: AAO       LABS:                        11.2   9.51  )-----------( 269      ( 26 Jul 2020 06:10 )             32.3     07-26    137  |  104  |  15  ----------------------------<  136<H>  4.0   |  23  |  0.77    Ca    9.1      26 Jul 2020 06:10  Phos  3.4     07-26  Mg     1.8     07-26              CAPILLARY BLOOD GLUCOSE      POCT Blood Glucose.: 189 mg/dL (27 Jul 2020 21:46)  POCT Blood Glucose.: 166 mg/dL (27 Jul 2020 16:25)  POCT Blood Glucose.: 102 mg/dL (27 Jul 2020 11:36)  POCT Blood Glucose.: 128 mg/dL (27 Jul 2020 07:33)    07-25 @ 18:20  Culture-urine --  Culture results   <10,000 CFU/mL Normal Urogenital Clair  method type --  Organism --  Organism Identification --  Specimen source .Urine Clean Catch (Midstream)           07-25 @ 18:20  Culture blood --  Culture results   <10,000 CFU/mL Normal Urogenital Clair  Gram stain --  Gram stain blood --  Method type --  Organism --  Organism identification --  Specimen source .Urine Clean Catch (Midstream)      RADIOLOGY & ADDITIONAL TESTS:    Imaging Personally Reviewed:    Consultant(s) Notes Reviewed:      Care Discussed with Consultants/Other Providers:    Yo Hameed MD, CMD, FACP    [address] [place] [address]  Office Tel: [phone]  Cell: [phone]
Patient is a 29y old  Male who presents with a chief complaint of Nausea, elevated blood sugar (28 Jul 2020 16:57)      SUBJECTIVE / OVERNIGHT EVENTS:    Events noted.  CONSTITUTIONAL: No fever,  or fatigue  RESPIRATORY: No cough, wheezing,  No shortness of breath  CARDIOVASCULAR: No chest pain, palpitations.   GASTROINTESTINAL: No abdominal or epigastric pain.   NEUROLOGICAL: No headaches,     MEDICATIONS  (STANDING):  dextrose 50% Injectable 12.5 Gram(s) IV Push once  dextrose 50% Injectable 25 Gram(s) IV Push once  dextrose 50% Injectable 25 Gram(s) IV Push once  insulin glargine Injectable (LANTUS) 12 Unit(s) SubCutaneous at bedtime  insulin lispro (HumaLOG) corrective regimen sliding scale   SubCutaneous three times a day before meals  insulin lispro (HumaLOG) corrective regimen sliding scale   SubCutaneous at bedtime  insulin lispro Injectable (HumaLOG) 4 Unit(s) SubCutaneous three times a day before meals  lactated ringers. 1000 milliLiter(s) (100 mL/Hr) IV Continuous <Continuous>  pantoprazole  Injectable 40 milliGRAM(s) IV Push daily    MEDICATIONS  (PRN):  dextrose 40% Gel 15 Gram(s) Oral once PRN Blood Glucose LESS THAN 70 milliGRAM(s)/deciliter  glucagon  Injectable 1 milliGRAM(s) IntraMuscular once PRN Glucose LESS THAN 70 milligrams/deciliter  ondansetron Injectable 4 milliGRAM(s) IV Push every 6 hours PRN Nausea and/or Vomiting        CAPILLARY BLOOD GLUCOSE      POCT Blood Glucose.: 89 mg/dL (29 Jul 2020 11:57)  POCT Blood Glucose.: 197 mg/dL (29 Jul 2020 07:21)  POCT Blood Glucose.: 144 mg/dL (28 Jul 2020 21:30)  POCT Blood Glucose.: 249 mg/dL (28 Jul 2020 16:43)    I&O's Summary    28 Jul 2020 07:01  -  29 Jul 2020 07:00  --------------------------------------------------------  IN: 1450 mL / OUT: 1350 mL / NET: 100 mL        PHYSICAL EXAM:  GENERAL: NAD  NECK: Supple, No JVD  CHEST/LUNG: Clear to auscultation bilaterally; No wheezing.  HEART: Regular rate and rhythm; No murmurs, rubs, or gallops  ABDOMEN: Soft, Nontender, Nondistended; Bowel sounds present  EXTREMITIES:   No edema  NEUROLOGY: AAO X 3      LABS:                        12.1   5.21  )-----------( 264      ( 29 Jul 2020 05:30 )             33.6     07-29    133<L>  |  97<L>  |  11  ----------------------------<  190<H>  3.9   |  21<L>  |  0.73    Ca    8.6      29 Jul 2020 05:30  Phos  2.4     07-29  Mg     1.7     07-29              CAPILLARY BLOOD GLUCOSE      POCT Blood Glucose.: 89 mg/dL (29 Jul 2020 11:57)  POCT Blood Glucose.: 197 mg/dL (29 Jul 2020 07:21)  POCT Blood Glucose.: 144 mg/dL (28 Jul 2020 21:30)  POCT Blood Glucose.: 249 mg/dL (28 Jul 2020 16:43)    07-25 @ 18:20  Culture-urine --  Culture results   <10,000 CFU/mL Normal Urogenital Clair  method type --  Organism --  Organism Identification --  Specimen source .Urine Clean Catch (Midstream)           07-25 @ 18:20  Culture blood --  Culture results   <10,000 CFU/mL Normal Urogenital Clair  Gram stain --  Gram stain blood --  Method type --  Organism --  Organism identification --  Specimen source .Urine Clean Catch (Midstream)      RADIOLOGY & ADDITIONAL TESTS:    Imaging Personally Reviewed:    Consultant(s) Notes Reviewed:      Care Discussed with Consultants/Other Providers:    Yo Hameed MD, CMD, FACP    [address] [place] [address]  Office Tel: [phone]  Cell: [phone]
Patient is a 29y old  Male who presents with a chief complaint of Nausea, elevated blood sugar (29 Jul 2020 17:39)      SUBJECTIVE / OVERNIGHT EVENTS:    Events noted.  CONSTITUTIONAL: No fever,  or fatigue  RESPIRATORY: No cough, wheezing,  No shortness of breath  CARDIOVASCULAR: No chest pain, palpitations, dizziness, or leg swelling  GASTROINTESTINAL: Nausea/Vomiting  NEUROLOGICAL: No headaches,     MEDICATIONS  (STANDING):  dextrose 50% Injectable 12.5 Gram(s) IV Push once  dextrose 50% Injectable 25 Gram(s) IV Push once  dextrose 50% Injectable 25 Gram(s) IV Push once  insulin glargine Injectable (LANTUS) 12 Unit(s) SubCutaneous at bedtime  insulin lispro (HumaLOG) corrective regimen sliding scale   SubCutaneous three times a day before meals  insulin lispro (HumaLOG) corrective regimen sliding scale   SubCutaneous at bedtime  insulin lispro Injectable (HumaLOG) 3 Unit(s) SubCutaneous three times a day before meals  lactated ringers. 1000 milliLiter(s) (100 mL/Hr) IV Continuous <Continuous>  pantoprazole  Injectable 40 milliGRAM(s) IV Push daily    MEDICATIONS  (PRN):  aluminum hydroxide/magnesium hydroxide/simethicone Suspension 30 milliLiter(s) Oral every 6 hours PRN Dyspepsia  dextrose 40% Gel 15 Gram(s) Oral once PRN Blood Glucose LESS THAN 70 milliGRAM(s)/deciliter  glucagon  Injectable 1 milliGRAM(s) IntraMuscular once PRN Glucose LESS THAN 70 milligrams/deciliter  ondansetron Injectable 4 milliGRAM(s) IV Push every 6 hours PRN Nausea and/or Vomiting        CAPILLARY BLOOD GLUCOSE      POCT Blood Glucose.: 151 mg/dL (30 Jul 2020 21:24)  POCT Blood Glucose.: 160 mg/dL (30 Jul 2020 16:50)  POCT Blood Glucose.: 103 mg/dL (30 Jul 2020 11:46)  POCT Blood Glucose.: 98 mg/dL (30 Jul 2020 07:19)    I&O's Summary    29 Jul 2020 07:01  -  30 Jul 2020 07:00  --------------------------------------------------------  IN: 0 mL / OUT: 1200 mL / NET: -1200 mL    30 Jul 2020 07:01  -  31 Jul 2020 00:04  --------------------------------------------------------  IN: 0 mL / OUT: 700 mL / NET: -700 mL        PHYSICAL EXAM:    NECK: Supple, No JVD  CHEST/LUNG: Clear to auscultation bilaterally; No wheezing.  HEART: Regular rate and rhythm; No murmurs, rubs, or gallops  ABDOMEN: Soft, Nontender, Nondistended; Bowel sounds present  EXTREMITIES:   No edema  NEUROLOGY: AAO X 3      LABS:                        12.1   5.21  )-----------( 264      ( 29 Jul 2020 05:30 )             33.6     07-30    135  |  98  |  8   ----------------------------<  92  3.0<L>   |  27  |  0.75    Ca    8.8      30 Jul 2020 06:20  Phos  2.9     07-30  Mg     1.8     07-30              CAPILLARY BLOOD GLUCOSE      POCT Blood Glucose.: 151 mg/dL (30 Jul 2020 21:24)  POCT Blood Glucose.: 160 mg/dL (30 Jul 2020 16:50)  POCT Blood Glucose.: 103 mg/dL (30 Jul 2020 11:46)  POCT Blood Glucose.: 98 mg/dL (30 Jul 2020 07:19)    07-25 @ 18:20  Culture-urine --  Culture results   <10,000 CFU/mL Normal Urogenital Clair  method type --  Organism --  Organism Identification --  Specimen source .Urine Clean Catch (Midstream)           07-25 @ 18:20  Culture blood --  Culture results   <10,000 CFU/mL Normal Urogenital Clair  Gram stain --  Gram stain blood --  Method type --  Organism --  Organism identification --  Specimen source .Urine Clean Catch (Midstream)      RADIOLOGY & ADDITIONAL TESTS:    Imaging Personally Reviewed:    Consultant(s) Notes Reviewed:      Care Discussed with Consultants/Other Providers:    Yo Hameed MD, CMD, FACP    [id number] [place] [address]  Office Tel: [phone]  Cell: [phone]

## 2020-07-31 NOTE — PROGRESS NOTE ADULT - PROBLEM SELECTOR PROBLEM 2
Hypertension, unspecified type
Gastroparesis due to DM

## 2020-09-22 ENCOUNTER — APPOINTMENT (OUTPATIENT)
Dept: ENDOCRINOLOGY | Facility: CLINIC | Age: 30
End: 2020-09-22
Payer: MEDICAID

## 2020-09-22 ENCOUNTER — OUTPATIENT (OUTPATIENT)
Dept: OUTPATIENT SERVICES | Facility: HOSPITAL | Age: 30
LOS: 1 days | End: 2020-09-22

## 2020-09-22 DIAGNOSIS — E10.9 TYPE 1 DIABETES MELLITUS WITHOUT COMPLICATIONS: ICD-10-CM

## 2020-09-22 DIAGNOSIS — E10.9 TYPE 1 DIABETES MELLITUS W/OUT COMPLICATIONS: ICD-10-CM

## 2020-09-22 PROCEDURE — ZZZZZ: CPT

## 2020-09-22 RX ORDER — PEN NEEDLE, DIABETIC 29 G X1/2"
32G X 4 MM NEEDLE, DISPOSABLE MISCELLANEOUS
Qty: 100 | Refills: 3 | Status: ACTIVE | COMMUNITY
Start: 2020-05-18 | End: 1900-01-01

## 2020-09-22 RX ORDER — INSULIN LISPRO 100 [IU]/ML
100 INJECTION, SOLUTION INTRAVENOUS; SUBCUTANEOUS 3 TIMES DAILY
Qty: 1 | Refills: 0 | Status: ACTIVE | COMMUNITY
Start: 2020-05-18 | End: 1900-01-01

## 2020-09-22 RX ORDER — INSULIN GLARGINE 100 [IU]/ML
100 INJECTION, SOLUTION SUBCUTANEOUS DAILY
Qty: 1 | Refills: 1 | Status: ACTIVE | COMMUNITY
Start: 2020-05-18 | End: 1900-01-01

## 2020-09-22 RX ORDER — BLOOD SUGAR DIAGNOSTIC
STRIP MISCELLANEOUS 4 TIMES DAILY
Qty: 100 | Refills: 3 | Status: ACTIVE | COMMUNITY
Start: 2020-05-18 | End: 1900-01-01

## 2020-09-22 NOTE — HISTORY OF PRESENT ILLNESS
[Home] : at home, [unfilled] , at the time of the visit. [Medical Office: ([facility])___] : at the medical office located in  [Verbal consent obtained from patient] : the patient, [unfilled] [FreeTextEntry1] : 30 year old M with T1DM (diagnosed in 2015), multiple admissions for DKA in past most recent 5/2020), following up for T1DM. Hba1c most recently 8.1 7/2020, prior to that was 10.2% in 5/2020 and 14.5% in Dec 2019. \par PCP: Dr. Milton Waddell ([place])\par \par PMH: T1DM (uncontrolled)\par PSH: none\par FH: Uncle with T2DM\par SH: denies tobacco or ETOH use. \par \par Patient seen by inpatient diabetes team on multiple occasions for uncontrolled DMT1 and DKA, most recently 5/2020. Patient was diagnosed with T1DM in 2015 and was initially on basal/bolus insulin. Was switched over to mixed insulin in between due to insurance issues but then back on basal/bolus. No known microvascular or macrovascular complications. \par \par Current medications/ Insulin regimen: Lantus 12 units qhs , Humalog 6-8 units ac tid \par \par Checks FS 4 times daily. Usually ranged from AM: 140s-210s, pre-lunch: 170s-200s pre-dinner 170s-200s ; bedtime: 160s \par No hypoglycemia or hypoglycemic symptoms reported. Occasional reports tingling in fee, was started on gabapentin by PCP (says taking low dose of 40 mg). \par  \par Eat 3 meals a day:\par Breakfast: eggs (fried or boiled)\par Lunch (not so heavy), does not clarify, says could be chicken breast or something light \par Dinner (heavy): says starchy and could be anything but trying to limit rice and eats brown rice only. \par Snacks: fruits\par Drinks: just water , no sodas or juices.\par \par Last Opthalmology visit: Have not seen one\par Last Podiatry visit: have not seen one\par Urine microalbumin/Cr: not done yet. \par \par Exercise for 20 mins daily in 3-4 divided sessions, usually after meals. \par \par Not on statin, Lipid profile (Dec 2019): Total cholesterol 142/ Triglycerides 86/ HDL 55/ LDL 74\par

## 2020-09-22 NOTE — DATA REVIEWED
[FreeTextEntry1] : Lab work:\par \par A1c (7/2020): 8.1% \par \par A1c (5/2020): 10.2%\par \par Dec 2019:\par Hba1c 14.5%\par Lipid profile: Total cholesterol 142/ Triglycerides 86/ HDL 55/ LDL 74

## 2020-09-22 NOTE — REVIEW OF SYSTEMS
[All other systems negative] : All other systems negative [Negative] : Heme/Lymph [Fatigue] : no fatigue [Decreased Appetite] : appetite not decreased [Eye Pain] : no pain [Blurred Vision] : no blurred vision [Dysphagia] : no dysphagia [Dysphonia] : no dysphonia [Chest Pain] : no chest pain [Palpitations] : no palpitations [Shortness Of Breath] : no shortness of breath [Cough] : no cough [Nausea] : no nausea [Abdominal Pain] : no abdominal pain [Vomiting] : no vomiting [Polyuria] : no polyuria [Dizziness] : no dizziness [Pain/Numbness of Digits] : no pain/numbness of digits [Polydipsia] : no polydipsia

## 2020-09-28 NOTE — PROVIDER CONTACT NOTE (OTHER) - REASON
Comprehensive Nutrition Assessment    Type and Reason for Visit:  Reassess(TF management)    Nutrition Recommendations/Plan:   Continue EN of Vital 1.2 at 20 ml/hr  Continue 1 proteinex 2GO (2.5oz liquid protein) bottle TID  Free water per MD  Monitoring diprivan, labs, tolerance to EN for adjustments as needed    Nutrition Assessment:  Pt improving from a nutritional standpoint AEB patient is tolerating current EN at goal.  Remains at risk for further nutritional compromise r/t admitted with acute respiratory failure with hypoexmia, obesity, elevated Hgb A1C of 9.9,  and underlying medical condition (hx: CAD, DM, GERD, Alcohol abuse, HLD, HTN, Vitamin D deficiency). Nutrition recommendations/interventions as per above. Malnutrition Assessment:  Malnutrition Status:  Insufficient data(+covid)    Context:  Acute Illness     Findings of the 6 clinical characteristics of malnutrition:  Energy Intake:  No significant decrease in energy intake(great appetite noted 9/14/20. NPO x 1 day)  Weight Loss:  (-14# in 7 weeks, note current weight is stated so not reliable)     Body Fat Loss:  Unable to assess(+covid patient)     Muscle Mass Loss:  Unable to assess(+covid)    Fluid Accumulation:  1 - Mild Extremities   Strength:  Not Performed    Estimated Daily Nutrient Needs:  Energy (kcal):  2604-2386 (8-11 kcals/kg for acute phase); Weight Used for Energy Requirements:  Current(129 kg  - stated)     Protein (g):  ~175 (2.5 grams/kg ideal weight); Weight Used for Protein Requirements:  Ideal(70 kg)        Fluid (ml/day):  per MD; Weight Used for Fluid Requirements:  (n/a)      Nutrition Related Findings:  +covid. Intubated 9/23, tolerating EN at current goal and note patient received 93% of prescribed TF regimen over the last 24 hours. Bronchoscopy completed 9/27/20, MAP: 95.   Meds: diprivan, lantus, zosyn, glycolax, senokot, dexamethasone, folvite, lasix, multivitamin, vitamin D      Wounds:  Stage III(stage 3 Pt complaining of dizziness while ambulating pressure ulcer on hip)       Current Nutrition Therapies:    Current Tube Feeding (TF) Orders:  · Feeding Route: Orogastric(OGT placed 9/23/20)  · Formula: Semi-Elemental(Vital 1.2 started 9/24)  · Schedule: Continuous(20ml/hour)  · Additives/Modulars: Protein(1 2.5ounce liquid protein bottle TID started 9/25)  · Water Flushes: per MD  · Current TF & Flush Orders Provides: await start  · Goal TF & Flush Orders Provides: 1400 kcals (576 TF, 512 diprivan, 312 protein), 114 grams protein (36 TF, 78 protein), 53 grams CHO, 2 grams fiber, 389 ml water via TF, 810 mg potassium, 405 mg phosphorus    Additional Calorie Sources:   diprivan at 19.4 ml/hr provides 512 lipid kcals    Anthropometric Measures:  · Height: 5' 8\" (172.7 cm)  · Current Body Weight: 284 lb (128.8 kg)(9/28/20, no weight source, +1 BUE and generalized, +2 BLE)   · Admission Body Weight: 285 lb (129.3 kg)(9/23/20, stated, +1 BLE and BUE edema)    · Usual Body Weight: 306 lb (138.8 kg)(EMR, 6/29/20, actual.  299# 8/15/20, bedscale.)     · Ideal Body Weight: 154 lbs;   · BMI: 43.2  · Adjusted Body Weight:  ; No Adjustment   · BMI Categories: Obese Class 3 (BMI 40.0 or greater)(43.21)       Nutrition Diagnosis:   · Inadequate oral intake related to impaired respiratory function as evidenced by NPO or clear liquid status due to medical condition, intubation, nutrition support - enteral nutrition    Nutrition Interventions:   Food and/or Nutrient Delivery:  Continue NPO, Continue Current Tube Feeding, Vitamin Supplement  Nutrition Education/Counseling:  No recommendation at this time   Coordination of Nutrition Care:  Continued Inpatient Monitoring, Interdisciplinary Rounds    Goals:  Patient will tolerate EN at 10-20 ml/hr during acute phase       Nutrition Monitoring and Evaluation:   Behavioral-Environmental Outcomes:  (n/a)   Food/Nutrient Intake Outcomes:  Enteral Nutrition Intake/Tolerance  Physical Signs/Symptoms Outcomes:  Biochemical Data, GI Status, Fluid Status or Edema, Hemodynamic Status, Skin, Weight     Discharge Planning:     Too soon to determine     Electronically signed by Nayeli Stanley RD, LD on 9/28/20 at 11:30 AM EDT    Contact: [phone]

## 2020-10-07 NOTE — H&P ADULT - NSHPRISKHIVSCREEN_GEN_ALL_CORE
Order pended, schedule before next appt, call pt to schedule   Unable to offer due to clinical condition

## 2020-12-10 NOTE — ED ADULT NURSE NOTE - NS ED NURSE LEVEL OF CONSCIOUSNESS AFFECT
Calm Bexarotene Pregnancy And Lactation Text: This medication is Pregnancy Category X and should not be given to women who are pregnant or may become pregnant. This medication should not be used if you are breast feeding.

## 2022-01-07 NOTE — DIETITIAN INITIAL EVALUATION ADULT. - NUTRITION DIAGNOSITC TERMINOLOGY #1
My summary of your visit today (Most often sent to your PCP):    Tests or blood work needed at this time:    None, I will ask your PCP to order as appropriate.    Medication changes:  Increase telmisartan to full pill daily    I strongly recommend both the influenza and COVID vaccine AND BOOSTER    PRESCRIPTION REFILLS:PLEASE CALL YOUR PHARMACY 5 DAYS BEFORE YOU RUN OUT   ASK THE PHARMACY TO CONTACT YOUR PRIMARY DOCTOR  FOR REFILLS.    If you receive a satisfaction survey regarding our team's care, please fill it out!    Elbert Edgar MD [facility]  Adjunct Clinical Professor of Medicine, [employer] [facility][note 3]  Past President, [place] Chapter, [employer]  The scribe for these instructions was: TF         Malnutrition...

## 2022-03-03 NOTE — PROGRESS NOTE ADULT - SUBJECTIVE AND OBJECTIVE BOX
Chief Complaint: uncontrolled Dm1, DKA    History:  ppatient not tolerating PO, found to have a hernia, will be nPO after MN for EGD in AM.  Patient has tried to eat and drink but it comes back up, continues on insulin infusion gtt    MEDICATIONS  (STANDING):  dextrose 5% + lactated ringers 1000 milliLiter(s) (200 mL/Hr) IV Continuous <Continuous>  dextrose 5%. 1000 milliLiter(s) (50 mL/Hr) IV Continuous <Continuous>  dextrose 50% Injectable 12.5 Gram(s) IV Push once  dextrose 50% Injectable 25 Gram(s) IV Push once  dextrose 50% Injectable 25 Gram(s) IV Push once  influenza   Vaccine 0.5 milliLiter(s) IntraMuscular once  insulin regular Infusion 3 Unit(s)/Hr (3 mL/Hr) IV Continuous <Continuous>    MEDICATIONS  (PRN):  dextrose 40% Gel 15 Gram(s) Oral once PRN Blood Glucose LESS THAN 70 milliGRAM(s)/deciliter  glucagon  Injectable 1 milliGRAM(s) IntraMuscular once PRN Glucose LESS THAN 70 milligrams/deciliter  metoclopramide Injectable 10 milliGRAM(s) IV Push every 6 hours PRN Nausea  ondansetron Injectable 4 milliGRAM(s) IV Push every 8 hours PRN Nausea and/or Vomiting      Allergies    No Known Allergies    Intolerances      Review of Systems:  Constitutional: No fever  Eyes: No blurry vision      ALL OTHER SYSTEMS REVIEWED AND NEGATIVE        Vital Signs Last 24 Hrs  T(C): 36.9 (30 Dec 2019 16:00), Max: 37.2 (30 Dec 2019 04:00)  T(F): 98.4 (30 Dec 2019 16:00), Max: 98.9 (30 Dec 2019 04:00)  HR: 66 (30 Dec 2019 18:00) (61 - 85)  BP: 106/74 (30 Dec 2019 18:00) (103/78 - 129/84)  BP(mean): 80 (30 Dec 2019 18:00) (79 - 94)  RR: 13 (30 Dec 2019 18:00) (10 - 19)  SpO2: 100% (30 Dec 2019 18:00) (98% - 100%)  GENERAL: NAD, well-groomed, well-developed  EYES: No proptosis, no lid lag, anicteric  GI: Soft, nontender, non distended  SKIN: Dry, intact, No rashes or lesions  PSYCH: Alert and oriented x 3, normal affect, normal mood      CAPILLARY BLOOD GLUCOSE      POCT Blood Glucose.: 138 mg/dL (30 Dec 2019 17:55)  POCT Blood Glucose.: 166 mg/dL (30 Dec 2019 16:10)  POCT Blood Glucose.: 136 mg/dL (30 Dec 2019 14:07)  POCT Blood Glucose.: 132 mg/dL (30 Dec 2019 12:10)  POCT Blood Glucose.: 156 mg/dL (30 Dec 2019 10:00)  POCT Blood Glucose.: 152 mg/dL (30 Dec 2019 08:01)  POCT Blood Glucose.: 198 mg/dL (30 Dec 2019 05:47)  POCT Blood Glucose.: 153 mg/dL (30 Dec 2019 03:51)  POCT Blood Glucose.: 112 mg/dL (30 Dec 2019 02:15)  POCT Blood Glucose.: 116 mg/dL (29 Dec 2019 23:53)  POCT Blood Glucose.: 135 mg/dL (29 Dec 2019 22:06)  POCT Blood Glucose.: 153 mg/dL (29 Dec 2019 19:59)  POCT Blood Glucose.: 132 mg/dL (29 Dec 2019 18:17)      POCT Blood Glucose.: 114 mg/dL (28 Dec 2019 12:13)  POCT Blood Glucose.: 134 mg/dL (28 Dec 2019 11:10)  POCT Blood Glucose.: 154 mg/dL (28 Dec 2019 10:11)  POCT Blood Glucose.: 136 mg/dL (28 Dec 2019 09:18)  POCT Blood Glucose.: 133 mg/dL (28 Dec 2019 08:20)  POCT Blood Glucose.: 175 mg/dL (28 Dec 2019 07:04)  POCT Blood Glucose.: 174 mg/dL (28 Dec 2019 06:03)  POCT Blood Glucose.: 142 mg/dL (28 Dec 2019 05:11)  POCT Blood Glucose.: 145 mg/dL (28 Dec 2019 04:00)  POCT Blood Glucose.: 116 mg/dL (28 Dec 2019 03:05)  POCT Blood Glucose.: 135 mg/dL (28 Dec 2019 02:00)  POCT Blood Glucose.: 170 mg/dL (28 Dec 2019 00:57)  POCT Blood Glucose.: 223 mg/dL (27 Dec 2019 23:51)  POCT Blood Glucose.: 270 mg/dL (27 Dec 2019 20:42)  POCT Blood Glucose.: 182 mg/dL (27 Dec 2019 17:01)  POCT Blood Glucose.: 216 mg/dL (27 Dec 2019 13:49)      12-29    140  |  105  |  5<L>  ----------------------------<  199<H>  3.9   |  23  |  0.71    Ca    8.6      29 Dec 2019 10:44  Phos  3.1     12-29  Mg     1.8     12-29    TPro  7.1  /  Alb  3.6  /  TBili  0.8  /  DBili  x   /  AST  60<H>  /  ALT  79<H>  /  AlkPhos  107  12-28    Beta Hydroxy-Butyrate: < 0.0 mmol/L (12.29.19 @ 05:58)    Beta Hydroxy-Butyrate: 1.6 mmol/L (12.29.19 @ 02:10)    Anion Gap, Serum: 12 mmo/L (12.29.19 @ 10:44)        Thyroid Function Tests:  12-27 @ 05:30 TSH 1.79 FreeT4 -- T3 -- Anti TPO -- Anti Thyroglobulin Ab -- TSI --      Hemoglobin A1C, Whole Blood: 14.5 % <H> [4.0 - 5.6] (12-28-19 @ 02:05) Anesthesia Type: 1% lidocaine with epinephrine

## 2022-06-22 NOTE — ED PROVIDER NOTE - ACUTE OR EVOLVING MI?
Pt discharged. No acute events this shift. Pt IV removed. Discharge papers given and reviewed. Pt free from falls and complaints of pain. Bed low and locked in place. Call bell and personal items in reach.      Problem: Adult Inpatient Plan of Care  Goal: Plan of Care Review  Outcome: Met  Goal: Patient-Specific Goal (Individualized)  Outcome: Met  Goal: Absence of Hospital-Acquired Illness or Injury  Outcome: Met  Goal: Optimal Comfort and Wellbeing  Outcome: Met  Goal: Readiness for Transition of Care  Outcome: Met     Problem: Diabetes Comorbidity  Goal: Blood Glucose Level Within Targeted Range  Outcome: Met     Problem: Infection  Goal: Absence of Infection Signs and Symptoms  Outcome: Met     Problem: Skin Injury Risk Increased  Goal: Skin Health and Integrity  Outcome: Met     Problem: Gas Exchange Impaired  Goal: Optimal Gas Exchange  Outcome: Met     Problem: Adjustment to Illness COPD (Chronic Obstructive Pulmonary Disease)  Goal: Optimal Chronic Illness Coping  Outcome: Met      no

## 2022-09-02 NOTE — PROGRESS NOTE ADULT - PROBLEM/PLAN-1
[facility] Call Center    Phone Message    May a detailed message be left on voicemail: yes     Reason for Call: Medication Refill Request    Has the patient contacted the pharmacy for the refill? Yes   Name of medication being requested: azelastine (ASTELIN) 0.1 % nasal spray    Provider who prescribed the medication: Ez Portillo MD  Pharmacy: [facility] [address] [facility] [id number]    Date medication is needed: Pt is out of medication     Remind patient there is a 72-business hour turn around time on refill requests      Action Taken: Message routed to:  [facility] ENT    Travel Screening: Not Applicable                                                                         DISPLAY PLAN FREE TEXT

## 2022-11-22 NOTE — DIETITIAN INITIAL EVALUATION ADULT. - RD TO REMAIN AVAILABLE
Impression and plan-    Routine physical-diet and activity discussed.  Weight reduction.  Patient had lab work-up done already.    Diabetes-start metformin.  Patient wants to see an endocrinologist.  Works in a hospital.  Referred to Dr. Marcos.    COPD-continue Spiriva, albuterol as needed.  No smoking.  Patient verbalized understanding.    Negative cardiac work-up, Dr. Salas, call if problems.   yes

## 2023-01-26 NOTE — ED PROVIDER NOTE - NSTIMEPROVIDERCAREINITIATE_GEN_ER
Lab Results   Component Value Date    HGBA1C 5 1 10/03/2022     neurotin back to the dose he was on, patient report it was decreased in the hospital   Continue monitoring symptoms 21-Mar-2020 00:06

## 2024-06-10 NOTE — ED PROVIDER NOTE - NS ED MD TWO NIGHTS YN
Results for orders placed or performed in visit on 06/10/24   Cardiac EP device report    Narrative    SJM SC PM/NOT MRI CONDITIONAL  MERLIN TRANSMISSION: BATTERY VOLTAGE ADEQUATE.( 1.9 YRS).  26%.ALL AVAILABLE LEAD PARAMETERS WITHIN NORMAL LIMITS. NO SIGNIFICANT HIGH RATE EPISODES. NORMAL DEVICE FUNCTION.---RÍOS        
Yes